# Patient Record
Sex: MALE | Race: WHITE | NOT HISPANIC OR LATINO | Employment: OTHER | URBAN - METROPOLITAN AREA
[De-identification: names, ages, dates, MRNs, and addresses within clinical notes are randomized per-mention and may not be internally consistent; named-entity substitution may affect disease eponyms.]

---

## 2017-01-13 ENCOUNTER — GENERIC CONVERSION - ENCOUNTER (OUTPATIENT)
Dept: OTHER | Facility: OTHER | Age: 47
End: 2017-01-13

## 2017-03-09 ENCOUNTER — ALLSCRIPTS OFFICE VISIT (OUTPATIENT)
Dept: OTHER | Facility: OTHER | Age: 47
End: 2017-03-09

## 2017-03-21 ENCOUNTER — ALLSCRIPTS OFFICE VISIT (OUTPATIENT)
Dept: OTHER | Facility: OTHER | Age: 47
End: 2017-03-21

## 2017-07-25 ENCOUNTER — ALLSCRIPTS OFFICE VISIT (OUTPATIENT)
Dept: OTHER | Facility: OTHER | Age: 47
End: 2017-07-25

## 2017-09-29 ENCOUNTER — GENERIC CONVERSION - ENCOUNTER (OUTPATIENT)
Dept: OTHER | Facility: OTHER | Age: 47
End: 2017-09-29

## 2017-09-29 DIAGNOSIS — E78.5 HYPERLIPIDEMIA: ICD-10-CM

## 2017-09-29 DIAGNOSIS — F32.9 MAJOR DEPRESSIVE DISORDER, SINGLE EPISODE: ICD-10-CM

## 2018-01-12 VITALS
OXYGEN SATURATION: 96 % | HEART RATE: 82 BPM | BODY MASS INDEX: 31.86 KG/M2 | HEIGHT: 67 IN | WEIGHT: 203 LBS | SYSTOLIC BLOOD PRESSURE: 118 MMHG | DIASTOLIC BLOOD PRESSURE: 82 MMHG

## 2018-01-12 NOTE — MISCELLANEOUS
Provider Comments  Provider Comments:   PT NO SHOWED      Signatures   Electronically signed by : ROMEO Logan;  Apr 20 2016 10:26AM EST                       (Author)    Electronically signed by : Kojo Meadows MD; Apr 20 2016  5:01PM EST

## 2018-01-14 VITALS
WEIGHT: 201 LBS | HEART RATE: 80 BPM | BODY MASS INDEX: 31.55 KG/M2 | OXYGEN SATURATION: 97 % | DIASTOLIC BLOOD PRESSURE: 88 MMHG | HEIGHT: 67 IN | SYSTOLIC BLOOD PRESSURE: 132 MMHG

## 2018-01-17 NOTE — PROCEDURES
Results/Data    Procedure: Electromyogram and Nerve Conduction Study  Indication: Left Upper Extremity   Referred by Dr Latia Foster  The procedure's were discussed with the patient  Written consent was obtained prior to the procedure and is detailed in the patient's record  Prior to the start of the procedure a time out was taken and the identity of the patient was confirmed via name and date of birth with the patient  The correct site and the procedure to be performed were confirmed  The correct side was confirmed if applicable  The positioning of the patient was verified  The availability of the correct equipment was verified  Procedure Start Time: 9:30    Technique: A sterile concentric needle electrode was used  The patient tolerated the procedure well  There were no complications  Results EMG LEFT UPPER EXTREMITY    Motor and sensory conduction studies were performed on the left median and ulnar nerves  The distal motor latencies were normal  The motor action potential amplitudes were normal  Motor conduction velocities were normal including conduction of the ulnar nerve across the elbow  The left median and ulnar F waves were normal     Left median and ulnar sensory latencies were normal including median palmar stimulation with normal sensory action potential amplitudes  Concentric needle EMG was performed in various distal and proximal muscles of the left upper extremity including APB, FDI, EDC, brachial radialis, biceps, triceps in the low cervical paraspinal region  There was no evidence of spontaneous activity seen  The compound motor unit potentials were of normal configuration and interference patterns were full or full for effort    IMPRESSION: This is a normal EMG of the left upper extremity    BUD Coleman        Signatures   Electronically signed by : Mai Gonzalez MD; Mar  9 2017 10:14AM EST                       (Author)

## 2018-01-22 VITALS
SYSTOLIC BLOOD PRESSURE: 128 MMHG | BODY MASS INDEX: 30.13 KG/M2 | WEIGHT: 192 LBS | DIASTOLIC BLOOD PRESSURE: 80 MMHG | HEART RATE: 86 BPM | HEIGHT: 67 IN | OXYGEN SATURATION: 98 %

## 2018-05-06 DIAGNOSIS — F32.A DEPRESSION, UNSPECIFIED DEPRESSION TYPE: Primary | ICD-10-CM

## 2018-05-07 RX ORDER — VENLAFAXINE HYDROCHLORIDE 150 MG/1
CAPSULE, EXTENDED RELEASE ORAL
Qty: 30 CAPSULE | Refills: 0 | Status: SHIPPED | OUTPATIENT
Start: 2018-05-07 | End: 2018-06-08 | Stop reason: SDUPTHER

## 2018-05-30 ENCOUNTER — OFFICE VISIT (OUTPATIENT)
Dept: INTERNAL MEDICINE CLINIC | Facility: CLINIC | Age: 48
End: 2018-05-30
Payer: COMMERCIAL

## 2018-05-30 VITALS
BODY MASS INDEX: 31.23 KG/M2 | WEIGHT: 199 LBS | DIASTOLIC BLOOD PRESSURE: 78 MMHG | HEART RATE: 80 BPM | SYSTOLIC BLOOD PRESSURE: 140 MMHG | OXYGEN SATURATION: 98 % | HEIGHT: 67 IN | RESPIRATION RATE: 16 BRPM

## 2018-05-30 DIAGNOSIS — E78.2 MIXED HYPERLIPIDEMIA: ICD-10-CM

## 2018-05-30 DIAGNOSIS — F32.A DEPRESSION, UNSPECIFIED DEPRESSION TYPE: Primary | ICD-10-CM

## 2018-05-30 PROBLEM — M99.01 CERVICAL SOMATIC DYSFUNCTION: Status: ACTIVE | Noted: 2018-05-30

## 2018-05-30 PROCEDURE — 99213 OFFICE O/P EST LOW 20 MIN: CPT | Performed by: PHYSICIAN ASSISTANT

## 2018-05-30 RX ORDER — SIMVASTATIN 10 MG
TABLET ORAL
COMMUNITY
End: 2018-09-20 | Stop reason: SDUPTHER

## 2018-05-30 RX ORDER — OXYCODONE AND ACETAMINOPHEN 10; 325 MG/1; MG/1
TABLET ORAL
Refills: 0 | COMMUNITY
Start: 2018-05-17 | End: 2019-07-19 | Stop reason: ALTCHOICE

## 2018-05-30 NOTE — PROGRESS NOTES
Assessment/Plan:   Patient Instructions   As patient to have his previously ordered labs done, and we will discuss when available  Meantime continue current medications  Schedule follow-up 6 months, sooner as needed  Followup scheduled per orders  Diagnoses and all orders for this visit:    Depression, unspecified depression type    Mixed hyperlipidemia    Other orders  -     simvastatin (ZOCOR) 10 mg tablet; simvastatin 10 mg tablet  -     oxyCODONE-acetaminophen (PERCOCET)  mg per tablet; TAKE 1 TABLET BY MOUTH 3 TIMES A DAY AS NEEDED **NEEDS P/A  MD FAXED  PT AWARE**          Subjective:      Patient ID: Koby Jerry is a 49 y o  male  Follow-up    Hyperlipidemia:  Patient did not have his labs done  He is telling me that he is taking his simvastatin, but on the same breath states he has a lot left over  He denies any side effects from the medication  Depression:  He is on venlafaxine and feels that it is effective  He got through the winter months doing well without any dysthymic episodes  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  Denies any other issues at this time  ALLERGIES:  Allergies   Allergen Reactions    Celecoxib Anaphylaxis    Diclofenac Shortness Of Breath       CURRENT MEDICATIONS:    Current Outpatient Prescriptions:     oxyCODONE-acetaminophen (PERCOCET)  mg per tablet, TAKE 1 TABLET BY MOUTH 3 TIMES A DAY AS NEEDED **NEEDS P/A  MD FAXED   PT AWARE**, Disp: , Rfl: 0    simvastatin (ZOCOR) 10 mg tablet, simvastatin 10 mg tablet, Disp: , Rfl:     venlafaxine (EFFEXOR-XR) 150 mg 24 hr capsule, TAKE ONE CAPSULE BY MOUTH EVERY DAY WITH FOOD, Disp: 30 capsule, Rfl: 0    ACTIVE PROBLEM LIST:  Patient Active Problem List   Diagnosis    Cervical somatic dysfunction    Depression    Disc degeneration, lumbar    Hyperlipidemia       PAST MEDICAL HISTORY:  Past Medical History:   Diagnosis Date    Disc degeneration, lumbar     Diverticulosis     Facet syndrome (HCC)     Other muscle spasm     Parapsoriasis     Psoriasis     Sacroiliitis (HCC)     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       PAST SURGICAL HISTORY:  Past Surgical History:   Procedure Laterality Date    HERNIA REPAIR      PYLOROMYOTOMY      ROTATOR CUFF REPAIR Left     SPINAL FUSION      EXPLORATION    TONSILLECTOMY AND ADENOIDECTOMY      TOOTH EXTRACTION         FAMILY HISTORY:  Family History   Problem Relation Age of Onset    Cancer Mother     Lung cancer Mother      CARCINOID TUMOR    Hypertension Mother     Hypothyroidism Mother     Kidney cancer Mother     Diabetes Maternal Grandmother     Alzheimer's disease Maternal Grandfather     Coronary artery disease Maternal Grandfather     Other Maternal Grandfather      PACEMAKER       SOCIAL HISTORY:  Social History     Social History    Marital status: /Civil Union     Spouse name: N/A    Number of children: 1    Years of education: N/A     Occupational History    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     Social History Main Topics    Smoking status: Former Smoker    Smokeless tobacco: Not on file    Alcohol use Yes      Comment: BEER - DESCRIBES AS INFREQUENT    Drug use: No    Sexual activity: Not on file     Other Topics Concern    Not on file     Social History Narrative    LIVING INDEPENDENTLY WITH SPOUSE    ONE SON       Review of Systems   Constitutional: Negative for activity change, chills, fatigue and fever  HENT: Negative for congestion  Eyes: Negative for discharge  Respiratory: Negative for cough, chest tightness and shortness of breath  Cardiovascular: Negative for chest pain, palpitations and leg swelling  Gastrointestinal: Negative for abdominal pain  Genitourinary: Negative for difficulty urinating  Musculoskeletal: Positive for back pain, neck pain and neck stiffness  Negative for arthralgias and myalgias  Skin: Negative for rash  Allergic/Immunologic: Negative for immunocompromised state  Neurological: Negative for dizziness, syncope, weakness, light-headedness and headaches  Hematological: Negative for adenopathy  Does not bruise/bleed easily  Psychiatric/Behavioral: Negative for dysphoric mood  The patient is not nervous/anxious  Objective:  Vitals:    05/30/18 1659   BP: 140/78   BP Location: Left arm   Patient Position: Sitting   Cuff Size: Adult   Pulse: 80   Resp: 16   SpO2: 98%   Weight: 90 3 kg (199 lb)   Height: 5' 7" (1 702 m)        Physical Exam   Constitutional: He is oriented to person, place, and time  He appears well-developed and well-nourished  No distress  Neck: Neck supple  Carotid bruit is not present  Cardiovascular: Normal rate, regular rhythm and normal heart sounds  Pulmonary/Chest: Effort normal and breath sounds normal    Musculoskeletal: He exhibits no edema  Lymphadenopathy:     He has no cervical adenopathy  Neurological: He is alert and oriented to person, place, and time  Skin: Skin is warm and dry  No rash noted  Psychiatric: He has a normal mood and affect  His behavior is normal    Nursing note and vitals reviewed  RESULTS:    No results found for this or any previous visit (from the past 1008 hour(s))  This note was created with voice recognition software  Phonic, grammatical and spelling errors may be present within the note as a result

## 2018-05-30 NOTE — PATIENT INSTRUCTIONS
As patient to have his previously ordered labs done, and we will discuss when available  Meantime continue current medications  Schedule follow-up 6 months, sooner as needed

## 2018-06-08 DIAGNOSIS — F32.A DEPRESSION, UNSPECIFIED DEPRESSION TYPE: ICD-10-CM

## 2018-06-08 RX ORDER — VENLAFAXINE HYDROCHLORIDE 150 MG/1
CAPSULE, EXTENDED RELEASE ORAL
Qty: 30 CAPSULE | Refills: 5 | Status: SHIPPED | OUTPATIENT
Start: 2018-06-08 | End: 2018-10-12 | Stop reason: ALTCHOICE

## 2018-07-17 LAB
ALBUMIN SERPL-MCNC: 4.4 G/DL (ref 3.6–5.1)
ALBUMIN/GLOB SERPL: 2.2 (CALC) (ref 1–2.5)
ALP SERPL-CCNC: 69 U/L (ref 40–115)
ALT SERPL-CCNC: 20 U/L (ref 9–46)
AST SERPL-CCNC: 18 U/L (ref 10–40)
BASOPHILS # BLD AUTO: 57 CELLS/UL (ref 0–200)
BASOPHILS NFR BLD AUTO: 1 %
BILIRUB SERPL-MCNC: 0.4 MG/DL (ref 0.2–1.2)
BUN SERPL-MCNC: 12 MG/DL (ref 7–25)
BUN/CREAT SERPL: NORMAL (CALC) (ref 6–22)
CALCIUM SERPL-MCNC: 9.4 MG/DL (ref 8.6–10.3)
CHLORIDE SERPL-SCNC: 107 MMOL/L (ref 98–110)
CHOLEST SERPL-MCNC: 188 MG/DL
CHOLEST/HDLC SERPL: 4.8 (CALC)
CO2 SERPL-SCNC: 26 MMOL/L (ref 20–31)
CREAT SERPL-MCNC: 0.87 MG/DL (ref 0.6–1.35)
EOSINOPHIL # BLD AUTO: 450 CELLS/UL (ref 15–500)
EOSINOPHIL NFR BLD AUTO: 7.9 %
ERYTHROCYTE [DISTWIDTH] IN BLOOD BY AUTOMATED COUNT: 12.8 % (ref 11–15)
GLOBULIN SER CALC-MCNC: 2 G/DL (CALC) (ref 1.9–3.7)
GLUCOSE SERPL-MCNC: 97 MG/DL (ref 65–99)
HCT VFR BLD AUTO: 42 % (ref 38.5–50)
HDLC SERPL-MCNC: 39 MG/DL
HGB BLD-MCNC: 14.5 G/DL (ref 13.2–17.1)
LDLC SERPL CALC-MCNC: 113 MG/DL (CALC)
LYMPHOCYTES # BLD AUTO: 2177 CELLS/UL (ref 850–3900)
LYMPHOCYTES NFR BLD AUTO: 38.2 %
MCH RBC QN AUTO: 31 PG (ref 27–33)
MCHC RBC AUTO-ENTMCNC: 34.5 G/DL (ref 32–36)
MCV RBC AUTO: 89.7 FL (ref 80–100)
MONOCYTES # BLD AUTO: 319 CELLS/UL (ref 200–950)
MONOCYTES NFR BLD AUTO: 5.6 %
NEUTROPHILS # BLD AUTO: 2696 CELLS/UL (ref 1500–7800)
NEUTROPHILS NFR BLD AUTO: 47.3 %
NONHDLC SERPL-MCNC: 149 MG/DL (CALC)
PLATELET # BLD AUTO: 301 THOUSAND/UL (ref 140–400)
PMV BLD REES-ECKER: 10 FL (ref 7.5–12.5)
POTASSIUM SERPL-SCNC: 4.1 MMOL/L (ref 3.5–5.3)
PROT SERPL-MCNC: 6.4 G/DL (ref 6.1–8.1)
RBC # BLD AUTO: 4.68 MILLION/UL (ref 4.2–5.8)
SL AMB EGFR AFRICAN AMERICAN: 118 ML/MIN/1.73M2
SL AMB EGFR NON AFRICAN AMERICAN: 102 ML/MIN/1.73M2
SODIUM SERPL-SCNC: 142 MMOL/L (ref 135–146)
TRIGL SERPL-MCNC: 236 MG/DL
WBC # BLD AUTO: 5.7 THOUSAND/UL (ref 3.8–10.8)

## 2018-07-26 ENCOUNTER — OFFICE VISIT (OUTPATIENT)
Dept: INTERNAL MEDICINE CLINIC | Facility: CLINIC | Age: 48
End: 2018-07-26
Payer: COMMERCIAL

## 2018-07-26 VITALS
OXYGEN SATURATION: 18 % | HEIGHT: 67 IN | RESPIRATION RATE: 20 BRPM | WEIGHT: 198.4 LBS | DIASTOLIC BLOOD PRESSURE: 82 MMHG | TEMPERATURE: 97.4 F | HEART RATE: 68 BPM | BODY MASS INDEX: 31.14 KG/M2 | SYSTOLIC BLOOD PRESSURE: 136 MMHG

## 2018-07-26 DIAGNOSIS — J20.9 ACUTE BRONCHITIS WITH BRONCHOSPASM: Primary | ICD-10-CM

## 2018-07-26 PROCEDURE — 99213 OFFICE O/P EST LOW 20 MIN: CPT | Performed by: INTERNAL MEDICINE

## 2018-07-26 PROCEDURE — 3008F BODY MASS INDEX DOCD: CPT | Performed by: INTERNAL MEDICINE

## 2018-07-26 RX ORDER — ALBUTEROL SULFATE 90 UG/1
2 AEROSOL, METERED RESPIRATORY (INHALATION) EVERY 6 HOURS PRN
Qty: 8.5 G | Refills: 0 | Status: SHIPPED | OUTPATIENT
Start: 2018-07-26 | End: 2019-02-25 | Stop reason: ALTCHOICE

## 2018-07-26 RX ORDER — AZITHROMYCIN 250 MG/1
TABLET, FILM COATED ORAL
Qty: 6 TABLET | Refills: 0 | Status: SHIPPED | OUTPATIENT
Start: 2018-07-26 | End: 2018-07-30

## 2018-07-26 NOTE — PROGRESS NOTES
Assessment/Plan:      Failed conservative outpatient measures  Will add antibiotics and since the inhaler did help, will send him a new 1  Return if symptoms worsen or fail to improve  No problem-specific Assessment & Plan notes found for this encounter  Diagnoses and all orders for this visit:    Acute bronchitis with bronchospasm  -     azithromycin (ZITHROMAX) 250 mg tablet; 2 tabs today then 1 tab daily for 4 days  -     albuterol (PROAIR HFA) 90 mcg/act inhaler; Inhale 2 puffs every 6 (six) hours as needed for wheezing          Subjective:      Patient ID: Sonja Patricio is a 49 y o  male  Patient comes in today complaining of 7-10 days worsening cough and chest congestion  No definite fevers  Cough is becoming more productive  He tried some over-the-counter remedies with minimal relief  He did note some wheezing  He did try an old albuterol inhaler which seemed to help somewhat  No ill contacts  ALLERGIES:  Allergies   Allergen Reactions    Celecoxib Anaphylaxis    Diclofenac Shortness Of Breath       CURRENT MEDICATIONS:    Current Outpatient Prescriptions:     albuterol (PROAIR HFA) 90 mcg/act inhaler, Inhale 2 puffs every 6 (six) hours as needed for wheezing, Disp: 8 5 g, Rfl: 0    azithromycin (ZITHROMAX) 250 mg tablet, 2 tabs today then 1 tab daily for 4 days, Disp: 6 tablet, Rfl: 0    oxyCODONE-acetaminophen (PERCOCET)  mg per tablet, TAKE 1 TABLET BY MOUTH 3 TIMES A DAY AS NEEDED **NEEDS P/A  MD FAXED   PT AWARE**, Disp: , Rfl: 0    simvastatin (ZOCOR) 10 mg tablet, simvastatin 10 mg tablet, Disp: , Rfl:     venlafaxine (EFFEXOR-XR) 150 mg 24 hr capsule, TAKE ONE CAPSULE BY MOUTH EVERY DAY WITH FOOD, Disp: 30 capsule, Rfl: 5    ACTIVE PROBLEM LIST:  Patient Active Problem List   Diagnosis    Cervical somatic dysfunction    Depression    Disc degeneration, lumbar    Hyperlipidemia       PAST MEDICAL HISTORY:  Past Medical History:   Diagnosis Date    Disc degeneration, lumbar     Diverticulosis     Facet syndrome (HCC)     Other muscle spasm     Parapsoriasis     Psoriasis     Sacroiliitis (HCC)     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       PAST SURGICAL HISTORY:  Past Surgical History:   Procedure Laterality Date    HERNIA REPAIR      PYLOROMYOTOMY      ROTATOR CUFF REPAIR Left     SPINAL FUSION      EXPLORATION    TONSILLECTOMY AND ADENOIDECTOMY      TOOTH EXTRACTION         FAMILY HISTORY:  Family History   Problem Relation Age of Onset    Cancer Mother     Lung cancer Mother         CARCINOID TUMOR    Hypertension Mother     Hypothyroidism Mother     Kidney cancer Mother     Diabetes Maternal Grandmother     Alzheimer's disease Maternal Grandfather     Coronary artery disease Maternal Grandfather     Other Maternal Grandfather         PACEMAKER       SOCIAL HISTORY:  Social History     Social History    Marital status: /Civil Union     Spouse name: N/A    Number of children: 1    Years of education: N/A     Occupational History    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     Social History Main Topics    Smoking status: Former Smoker    Smokeless tobacco: Not on file    Alcohol use Yes      Comment: BEER - DESCRIBES AS INFREQUENT    Drug use: No    Sexual activity: Not on file     Other Topics Concern    Not on file     Social History Narrative    LIVING INDEPENDENTLY WITH SPOUSE    ONE SON       Review of Systems   Constitutional: Negative for fever  HENT: Negative for sinus pain and sinus pressure  Respiratory: Positive for cough, chest tightness and wheezing  Cardiovascular: Negative for chest pain  Objective:  Vitals:    07/26/18 1409   BP: 136/82   Resp: 20   Temp: (!) 97 4 °F (36 3 °C)   TempSrc: Temporal   Weight: 90 kg (198 lb 6 4 oz)   Height: 5' 7" (1 702 m)        Physical Exam   Constitutional: He is oriented to person, place, and time   He appears well-developed and well-nourished  HENT:   Right Ear: External ear normal    Left Ear: External ear normal    Nose: Nose normal    Mouth/Throat: Oropharynx is clear and moist  No oropharyngeal exudate  Eyes: Conjunctivae are normal    Cardiovascular: Normal rate, regular rhythm and normal heart sounds  Pulmonary/Chest: Effort normal  No respiratory distress  He has wheezes  He has no rales  Lymphadenopathy:     He has no cervical adenopathy  Neurological: He is alert and oriented to person, place, and time  Skin: He is not diaphoretic  Nursing note and vitals reviewed          RESULTS:    Recent Results (from the past 1008 hour(s))   Lipid panel    Collection Time: 07/16/18  9:45 AM   Result Value Ref Range    Total Cholesterol 188 <200 mg/dL    HDL 39 (L) >40 mg/dL    Triglycerides 236 (H) <150 mg/dL    LDL Direct 113 (H) mg/dL (calc)    Chol HDLC Ratio 4 8 <5 0 (calc)    Non-HDL Cholesterol 149 (H) <130 mg/dL (calc)   Comprehensive metabolic panel    Collection Time: 07/16/18  9:45 AM   Result Value Ref Range    SL AMB GLUCOSE 97 65 - 99 mg/dL    BUN 12 7 - 25 mg/dL    Creatinine, Serum 0 87 0 60 - 1 35 mg/dL    eGFR Non  102 > OR = 60 mL/min/1 73m2    SL AMB EGFR  118 > OR = 60 mL/min/1 73m2    SL AMB BUN/CREATININE RATIO NOT APPLICABLE 6 - 22 (calc)    SL AMB SODIUM 142 135 - 146 mmol/L    SL AMB POTASSIUM 4 1 3 5 - 5 3 mmol/L    SL AMB CHLORIDE 107 98 - 110 mmol/L    SL AMB CARBON DIOXIDE 26 20 - 31 mmol/L    SL AMB CALCIUM 9 4 8 6 - 10 3 mg/dL    SL AMB PROTEIN, TOTAL 6 4 6 1 - 8 1 g/dL    Serum Albumin 4 4 3 6 - 5 1 g/dL    SL AMB GLOBULIN 2 0 1 9 - 3 7 g/dL (calc)    SL AMB ALBUMIN/GLOBULIN RATIO 2 2 1 0 - 2 5 (calc)    SL AMB BILIRUBIN, TOTAL 0 4 0 2 - 1 2 mg/dL    SL AMB ALKALINE PHOSPHATASE 69 40 - 115 U/L    SL AMB AST 18 10 - 40 U/L    SL AMB ALT 20 9 - 46 U/L   CBC and differential    Collection Time: 07/16/18  9:45 AM   Result Value Ref Range    SL AMB LAB WHITE BLOOD CELL COUNT 5 7 3 8 - 10 8 Thousand/uL    SL AMB LAB RED BLOOD CELLS 4 68 4 20 - 5 80 Million/uL    Hemoglobin 14 5 13 2 - 17 1 g/dL    Hematocrit 42 0 38 5 - 50 0 %    MCV 89 7 80 0 - 100 0 fL    MCH 31 0 27 0 - 33 0 pg    MCHC 34 5 32 0 - 36 0 g/dL    RDW 12 8 11 0 - 15 0 %    Platelet Count 172 711 - 400 Thousand/uL    SL AMB MPV 10 0 7 5 - 12 5 fL    Neutrophils (Absolute) 2,696 1,500 - 7,800 cells/uL    Lymphocytes (Absolute) 2,177 850 - 3,900 cells/uL    Monocytes (Absolute) 319 200 - 950 cells/uL    Eosinophils (Absolute) 450 15 - 500 cells/uL    Basophils (Absolute) 57 0 - 200 cells/uL    Neutrophils 47 3 %    Lymphocytes 38 2 %    Monocytes 5 6 %    Eosinophils 7 9 %    Basophils 1 0 %       This note was created with voice recognition software  Phonic, grammatical and spelling errors may be present within the note as a result

## 2018-09-20 DIAGNOSIS — E78.2 MIXED HYPERLIPIDEMIA: Primary | ICD-10-CM

## 2018-09-20 RX ORDER — SIMVASTATIN 10 MG
TABLET ORAL
Qty: 90 TABLET | Refills: 3 | Status: SHIPPED | OUTPATIENT
Start: 2018-09-20 | End: 2019-03-13

## 2018-10-12 ENCOUNTER — OFFICE VISIT (OUTPATIENT)
Dept: INTERNAL MEDICINE CLINIC | Facility: CLINIC | Age: 48
End: 2018-10-12
Payer: COMMERCIAL

## 2018-10-12 VITALS
BODY MASS INDEX: 30.35 KG/M2 | OXYGEN SATURATION: 96 % | RESPIRATION RATE: 17 BRPM | WEIGHT: 193.4 LBS | HEIGHT: 67 IN | TEMPERATURE: 98.1 F | SYSTOLIC BLOOD PRESSURE: 124 MMHG | DIASTOLIC BLOOD PRESSURE: 82 MMHG | HEART RATE: 80 BPM

## 2018-10-12 DIAGNOSIS — M79.645 PAIN OF FINGER OF LEFT HAND: ICD-10-CM

## 2018-10-12 DIAGNOSIS — Z23 NEED FOR INFLUENZA VACCINATION: ICD-10-CM

## 2018-10-12 DIAGNOSIS — F32.A DEPRESSION, UNSPECIFIED DEPRESSION TYPE: Primary | ICD-10-CM

## 2018-10-12 PROCEDURE — 99214 OFFICE O/P EST MOD 30 MIN: CPT | Performed by: PHYSICIAN ASSISTANT

## 2018-10-12 PROCEDURE — 90682 RIV4 VACC RECOMBINANT DNA IM: CPT

## 2018-10-12 PROCEDURE — 90471 IMMUNIZATION ADMIN: CPT

## 2018-10-12 RX ORDER — BIOFLAV,LEMON/VIT BCOMP,C
1000 TABLET ORAL 3 TIMES DAILY
COMMUNITY
End: 2019-02-25 | Stop reason: ALTCHOICE

## 2018-10-12 RX ORDER — DIPHENOXYLATE HYDROCHLORIDE AND ATROPINE SULFATE 2.5; .025 MG/1; MG/1
1 TABLET ORAL DAILY
COMMUNITY

## 2018-10-12 RX ORDER — VENLAFAXINE HYDROCHLORIDE 225 MG/1
225 TABLET, EXTENDED RELEASE ORAL
Qty: 30 TABLET | Refills: 5 | Status: SHIPPED | OUTPATIENT
Start: 2018-10-12 | End: 2019-04-08 | Stop reason: SDUPTHER

## 2018-10-12 NOTE — PROGRESS NOTES
Assessment/Plan:   Patient Instructions   Will increase venlafaxine to 225 mg daily  Will schedule patient to see psychologist   Names given to patient's wife to research whether not there covered under her insurance policy  Schedule follow-up with me in 3 weeks  Sooner as needed  Total time spent was greater than 25 minutes face to face of which greater than 50 percent was  for counseling and coordination of care         Return in about 3 weeks (around 11/2/2018) for Next scheduled follow up  Diagnoses and all orders for this visit:    Depression, unspecified depression type  -     venlafaxine 225 MG TB24; Take 1 tablet (225 mg total) by mouth daily with breakfast  -     Ambulatory referral to Psychology; Future    Need for influenza vaccination  -     influenza vaccine, 8313-6524, quadrivalent, recombinant, PF, 0 5 mL, for patients 18 yr+ (FLUBLOK)    Other orders  -     multivitamin (THERAGRAN) TABS; Take 1 tablet by mouth daily  -     Vitamins-Lipotropics (LIPOFLAVOVIT) TABS; Take 1,000 tablets by mouth 3 (three) times a day            Subjective:      Patient ID: Khang Fiore is a 49 y o  male  Acute visit/follow-up    Patient brought in by his wife today in that she is noted psychological decompensation over the past week  She states he is barely gotten out of bed for the past week  He admits this behavior is true  He states he has been progressively feeling more depressed since the summer months  He is on an antidepressant and is taking it on a regular basis  He however was able to discuss a significant psychological stressor that started when he was 14years of age  He states it revolved around [place] the beach  At this time he was not willing to reveal any other circumstances, but when discussing even to this part he became tearful and upset  He remark that he tried to tell his mother about the incident but she did not believe it  He is willing to start counseling    And was willing to try an increased dose of his medication to see if we can boost any affect  He denies any suicidal thoughts  Pain left 2nd finger  He states during the summer months he was trying to teach his son a bike trick, when he landed the bite his left hand slipped off the handle bar and jammed on another part of the bite  At that time he had significant swelling and discoloration  Since then it has gone down in size but patient states it still is painful when he goes to lift objects or extend the finger  Labs that have been done in July were reviewed with the patient and his wife  Lipids slightly elevated  ALLERGIES:  Allergies   Allergen Reactions    Celecoxib Anaphylaxis    Diclofenac Shortness Of Breath       CURRENT MEDICATIONS:    Current Outpatient Prescriptions:     albuterol (PROAIR HFA) 90 mcg/act inhaler, Inhale 2 puffs every 6 (six) hours as needed for wheezing, Disp: 8 5 g, Rfl: 0    multivitamin (THERAGRAN) TABS, Take 1 tablet by mouth daily, Disp: , Rfl:     oxyCODONE-acetaminophen (PERCOCET)  mg per tablet, TAKE 1 TABLET BY MOUTH 3 TIMES A DAY AS NEEDED **NEEDS P/A  MD FAXED   PT AWARE**, Disp: , Rfl: 0    simvastatin (ZOCOR) 10 mg tablet, TAKE 1 TABLET AT BEDTIME, Disp: 90 tablet, Rfl: 3    Vitamins-Lipotropics (LIPOFLAVOVIT) TABS, Take 1,000 tablets by mouth 3 (three) times a day  , Disp: , Rfl:     venlafaxine 225 MG TB24, Take 1 tablet (225 mg total) by mouth daily with breakfast, Disp: 30 tablet, Rfl: 5    ACTIVE PROBLEM LIST:  Patient Active Problem List   Diagnosis    Cervical somatic dysfunction    Depression    Disc degeneration, lumbar    Hyperlipidemia       PAST MEDICAL HISTORY:  Past Medical History:   Diagnosis Date    Disc degeneration, lumbar     Diverticulosis     Facet syndrome (HCC)     Other muscle spasm     Parapsoriasis     Psoriasis     Sacroiliitis (HCC)     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       PAST SURGICAL HISTORY:  Past Surgical History:   Procedure Laterality Date    HERNIA REPAIR      PYLOROMYOTOMY      ROTATOR CUFF REPAIR Left     SPINAL FUSION      EXPLORATION    TONSILLECTOMY AND ADENOIDECTOMY      TOOTH EXTRACTION         FAMILY HISTORY:  Family History   Problem Relation Age of Onset    Cancer Mother     Lung cancer Mother         CARCINOID TUMOR    Hypertension Mother     Hypothyroidism Mother     Kidney cancer Mother     Diabetes Maternal Grandmother     Alzheimer's disease Maternal Grandfather     Coronary artery disease Maternal Grandfather     Other Maternal Grandfather         PACEMAKER       SOCIAL HISTORY:  Social History     Social History    Marital status: /Civil Union     Spouse name: N/A    Number of children: 1    Years of education: N/A     Occupational History    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     Social History Main Topics    Smoking status: Former Smoker    Smokeless tobacco: Not on file    Alcohol use Yes      Comment: BEER - DESCRIBES AS INFREQUENT    Drug use: No    Sexual activity: Not on file     Other Topics Concern    Not on file     Social History Narrative    LIVING INDEPENDENTLY WITH SPOUSE    ONE SON       Review of Systems   Constitutional: Negative for activity change, chills, fatigue and fever  HENT: Negative for congestion  Eyes: Negative for discharge  Respiratory: Negative for cough, chest tightness and shortness of breath  Cardiovascular: Negative for chest pain, palpitations and leg swelling  Gastrointestinal: Negative for abdominal pain  Genitourinary: Negative for difficulty urinating  Musculoskeletal: Positive for arthralgias  Negative for myalgias  Skin: Negative for rash  Allergic/Immunologic: Negative for immunocompromised state  Neurological: Negative for dizziness, syncope, weakness, light-headedness and headaches  Hematological: Negative for adenopathy   Does not bruise/bleed easily  Psychiatric/Behavioral: Positive for decreased concentration and dysphoric mood  Negative for self-injury, sleep disturbance and suicidal ideas  The patient is not nervous/anxious  Objective:  Vitals:    10/12/18 1541   BP: 124/82   BP Location: Left arm   Patient Position: Sitting   Cuff Size: Adult   Pulse: 80   Resp: 17   Temp: 98 1 °F (36 7 °C)   TempSrc: Temporal   SpO2: 96%   Weight: 87 7 kg (193 lb 6 4 oz)   Height: 5' 7" (1 702 m)        Physical Exam   Constitutional: He is oriented to person, place, and time  He appears well-developed and well-nourished  No distress  Neck: Neck supple  No JVD present  Carotid bruit is not present  Erythema present  Cardiovascular: Normal rate, regular rhythm and normal heart sounds  Pulmonary/Chest: Effort normal and breath sounds normal    Musculoskeletal: He exhibits no edema  No appreciable palpable tenderness of his left 2nd finger or thumb  Patient did complain of tenderness on extension of the 2nd finger  No instability demonstrated  Lymphadenopathy:     He has no cervical adenopathy  Neurological: He is alert and oriented to person, place, and time  Skin: Skin is warm and dry  No rash noted  Psychiatric: He has a normal mood and affect  His behavior is normal    Patient demonstrated normal affect and range of affect  Behavior was appropriate, no difficulty and thought content  Normal judgment  Nursing note and vitals reviewed  RESULTS:    No results found for this or any previous visit (from the past 1008 hour(s))  This note was created with voice recognition software  Phonic, grammatical and spelling errors may be present within the note as a result

## 2018-10-12 NOTE — PATIENT INSTRUCTIONS
Will increase venlafaxine to 225 mg daily  Will schedule patient to see psychologist   Names given to patient's wife to research whether not there covered under her insurance policy  Schedule follow-up with me in 3 weeks  Sooner as needed

## 2018-10-24 ENCOUNTER — OFFICE VISIT (OUTPATIENT)
Dept: BEHAVIORAL/MENTAL HEALTH CLINIC | Facility: CLINIC | Age: 48
End: 2018-10-24
Payer: COMMERCIAL

## 2018-10-24 DIAGNOSIS — F33.42 MAJOR DEPRESSIVE DISORDER, RECURRENT, IN FULL REMISSION (HCC): Primary | ICD-10-CM

## 2018-10-24 PROCEDURE — 90832 PSYTX W PT 30 MINUTES: CPT | Performed by: SOCIAL WORKER

## 2018-10-24 NOTE — PSYCH
Assessment/Plan:      There are no diagnoses linked to this encounter  Subjective:     Patient ID: Sidra Lopez is a 51 y o  male  Provider met with patient for a 30 minute follow up appointment  Patient shared that there have been no changes  He shared that he did schedule an appointment with a therapist in [place] had recommended for him and he has an appointment on 11/1/2018  Patient did identify that he sleeps in order to deal with racing thoughts  He shared that when he has racing thoughts his pulse increases  Discussed patient implementing Deep Breathing techniques to help him reduce his anxiety and also discussed patient trying to reduce his use of sleep as a way to escape from what he is feeling  Encouraged patient to implement Deep Breathing to assist with this  Patient agreed that he would try  Discussed patient following up with provider as needed  Patient is agreeable  Review of Systems   Psychiatric/Behavioral: Positive for dysphoric mood  The patient is nervous/anxious  Objective:     Physical Exam   Psychiatric: He has a normal mood and affect  His behavior is normal  Judgment and thought content normal    Patient reports no SI or HI at this visit

## 2018-10-31 ENCOUNTER — OFFICE VISIT (OUTPATIENT)
Dept: INTERNAL MEDICINE CLINIC | Facility: CLINIC | Age: 48
End: 2018-10-31
Payer: COMMERCIAL

## 2018-10-31 VITALS
SYSTOLIC BLOOD PRESSURE: 130 MMHG | HEIGHT: 67 IN | HEART RATE: 90 BPM | BODY MASS INDEX: 30.45 KG/M2 | WEIGHT: 194 LBS | OXYGEN SATURATION: 95 % | RESPIRATION RATE: 16 BRPM | DIASTOLIC BLOOD PRESSURE: 76 MMHG

## 2018-10-31 DIAGNOSIS — F32.A DEPRESSION, UNSPECIFIED DEPRESSION TYPE: Primary | ICD-10-CM

## 2018-10-31 PROCEDURE — 3008F BODY MASS INDEX DOCD: CPT | Performed by: PHYSICIAN ASSISTANT

## 2018-10-31 PROCEDURE — 99213 OFFICE O/P EST LOW 20 MIN: CPT | Performed by: PHYSICIAN ASSISTANT

## 2018-10-31 PROCEDURE — 1036F TOBACCO NON-USER: CPT | Performed by: PHYSICIAN ASSISTANT

## 2018-10-31 NOTE — PROGRESS NOTES
Assessment/Plan:   Patient Instructions   Patient to follow with psychologist tomorrow  To call me after visit with medication recommendation  Will keep follow-up scheduled for end of November  Follow here sooner as needed  Return for Next scheduled follow up  Diagnoses and all orders for this visit:    Depression, unspecified depression type          Subjective:      Patient ID: Joel Kc is a 51 y o  male  Follow-up    Patient presents today to discuss his anti depressant medication  Patient states he has been on venlafaxine for at least 5 years with gradual increase in the dosage  He believes it is not helping at all  Patient has been seen by a psychotherapist and was told to present to have his medication changed  He will be seeing a new therapist starting tomorrow  We spent time today discussing medications  According to the patient venlafaxine is the only antidepressant he has ever tried  He has not been on any medication SSRI category  At this time he admits to occasional episodes of anxiety  He is not sleeping well with early morning awakening, his concentration is not as good as it has been, energy level is low, no interest in things he used to like to do, and admits to easily becoming irritated  He denies any suicidal thoughts  ALLERGIES:  Allergies   Allergen Reactions    Celecoxib Anaphylaxis    Diclofenac Shortness Of Breath       CURRENT MEDICATIONS:    Current Outpatient Prescriptions:     albuterol (PROAIR HFA) 90 mcg/act inhaler, Inhale 2 puffs every 6 (six) hours as needed for wheezing, Disp: 8 5 g, Rfl: 0    multivitamin (THERAGRAN) TABS, Take 1 tablet by mouth daily, Disp: , Rfl:     oxyCODONE-acetaminophen (PERCOCET)  mg per tablet, TAKE 1 TABLET BY MOUTH 3 TIMES A DAY AS NEEDED **NEEDS P/A  MD FAXED   PT AWARE**, Disp: , Rfl: 0    simvastatin (ZOCOR) 10 mg tablet, TAKE 1 TABLET AT BEDTIME, Disp: 90 tablet, Rfl: 3    venlafaxine 225 MG TB24, Take 1 tablet (225 mg total) by mouth daily with breakfast, Disp: 30 tablet, Rfl: 5    Vitamins-Lipotropics (LIPOFLAVOVIT) TABS, Take 1,000 tablets by mouth 3 (three) times a day  , Disp: , Rfl:     ACTIVE PROBLEM LIST:  Patient Active Problem List   Diagnosis    Cervical somatic dysfunction    Depression    Disc degeneration, lumbar    Hyperlipidemia    Pain of finger of left hand       PAST MEDICAL HISTORY:  Past Medical History:   Diagnosis Date    Disc degeneration, lumbar     Diverticulosis     Facet syndrome (HCC)     Other muscle spasm     Parapsoriasis     Psoriasis     Sacroiliitis (HCC)     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       PAST SURGICAL HISTORY:  Past Surgical History:   Procedure Laterality Date    HERNIA REPAIR      PYLOROMYOTOMY      ROTATOR CUFF REPAIR Left     SPINAL FUSION      EXPLORATION    TONSILLECTOMY AND ADENOIDECTOMY      TOOTH EXTRACTION         FAMILY HISTORY:  Family History   Problem Relation Age of Onset    Cancer Mother     Lung cancer Mother         CARCINOID TUMOR    Hypertension Mother     Hypothyroidism Mother     Kidney cancer Mother     Diabetes Maternal Grandmother     Alzheimer's disease Maternal Grandfather     Coronary artery disease Maternal Grandfather     Other Maternal Grandfather         PACEMAKER       SOCIAL HISTORY:  Social History     Social History    Marital status: /Civil Union     Spouse name: N/A    Number of children: 1    Years of education: N/A     Occupational History    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     Social History Main Topics    Smoking status: Former Smoker    Smokeless tobacco: Never Used    Alcohol use Yes      Comment: BEER - DESCRIBES AS INFREQUENT    Drug use: No    Sexual activity: Not on file     Other Topics Concern    Not on file     Social History Narrative    LIVING INDEPENDENTLY WITH SPOUSE    ONE SON       Review of Systems   Constitutional: Positive for fatigue  Negative for activity change, chills and fever  HENT: Negative for congestion  Eyes: Negative for discharge  Respiratory: Negative for cough, chest tightness and shortness of breath  Cardiovascular: Negative for chest pain, palpitations and leg swelling  Gastrointestinal: Negative for abdominal pain  Genitourinary: Negative for difficulty urinating  Musculoskeletal: Negative for arthralgias and myalgias  Skin: Negative for rash  Allergic/Immunologic: Negative for immunocompromised state  Neurological: Negative for dizziness, syncope, weakness, light-headedness and headaches  Hematological: Negative for adenopathy  Does not bruise/bleed easily  Psychiatric/Behavioral: Positive for confusion, decreased concentration, dysphoric mood and sleep disturbance  Negative for suicidal ideas  The patient is nervous/anxious  Objective:  Vitals:    10/31/18 1442   BP: 130/76   BP Location: Left arm   Patient Position: Sitting   Cuff Size: Adult   Pulse: 90   Resp: 16   SpO2: 95%   Weight: 88 kg (194 lb)   Height: 5' 7" (1 702 m)        Physical Exam      RESULTS:    No results found for this or any previous visit (from the past 1008 hour(s))  This note was created with voice recognition software  Phonic, grammatical and spelling errors may be present within the note as a result

## 2018-10-31 NOTE — PATIENT INSTRUCTIONS
Patient to follow with psychologist tomorrow  To call me after visit with medication recommendation  Will keep follow-up scheduled for end of November  Follow here sooner as needed

## 2018-11-06 ENCOUNTER — TELEPHONE (OUTPATIENT)
Dept: INTERNAL MEDICINE CLINIC | Facility: CLINIC | Age: 48
End: 2018-11-06

## 2018-11-06 NOTE — TELEPHONE ENCOUNTER
Pt called office stating that he seen Dr Riddhi Ferrari for psych pt states that he also recommended that he stays on the venoflaxine till further notice

## 2019-02-25 ENCOUNTER — OFFICE VISIT (OUTPATIENT)
Dept: INTERNAL MEDICINE CLINIC | Facility: CLINIC | Age: 49
End: 2019-02-25
Payer: COMMERCIAL

## 2019-02-25 VITALS
HEIGHT: 67 IN | BODY MASS INDEX: 29.03 KG/M2 | RESPIRATION RATE: 16 BRPM | DIASTOLIC BLOOD PRESSURE: 86 MMHG | OXYGEN SATURATION: 97 % | SYSTOLIC BLOOD PRESSURE: 132 MMHG | HEART RATE: 90 BPM | WEIGHT: 185 LBS

## 2019-02-25 DIAGNOSIS — M77.11 RIGHT LATERAL EPICONDYLITIS: Primary | ICD-10-CM

## 2019-02-25 DIAGNOSIS — F32.A DEPRESSION, UNSPECIFIED DEPRESSION TYPE: ICD-10-CM

## 2019-02-25 DIAGNOSIS — L30.1 DYSHYDROSIS: ICD-10-CM

## 2019-02-25 DIAGNOSIS — E78.2 MIXED HYPERLIPIDEMIA: ICD-10-CM

## 2019-02-25 DIAGNOSIS — R63.4 WEIGHT LOSS: ICD-10-CM

## 2019-02-25 PROCEDURE — 99214 OFFICE O/P EST MOD 30 MIN: CPT | Performed by: PHYSICIAN ASSISTANT

## 2019-02-25 RX ORDER — TRIAMCINOLONE ACETONIDE 5 MG/G
CREAM TOPICAL 3 TIMES DAILY
Qty: 30 G | Refills: 0 | Status: SHIPPED | OUTPATIENT
Start: 2019-02-25 | End: 2020-01-23 | Stop reason: ALTCHOICE

## 2019-02-25 NOTE — PATIENT INSTRUCTIONS
For right lateral epicondylitis, patient will try either Aleve 2 tablets twice daily with food for 5-7 days or Advil 3 tablets 3 times daily with food for 5-7 days  Can also apply the band that he has purchased as we discussed  For hand cracking will have patient use steroid cream and rub in well 2-3 times daily  Apply at the onset of the pimply type rash  For weight loss we will do some screening labs and have patient come back to review in 2 weeks

## 2019-02-25 NOTE — PROGRESS NOTES
Assessment/Plan:   Patient Instructions   For right lateral epicondylitis, patient will try either Aleve 2 tablets twice daily with food for 5-7 days or Advil 3 tablets 3 times daily with food for 5-7 days  Can also apply the band that he has purchased as we discussed  For hand cracking will have patient use steroid cream and rub in well 2-3 times daily  Apply at the onset of the pimply type rash  For weight loss we will do some screening labs and have patient come back to review in 2 weeks  BMI Counseling: Body mass index is 28 98 kg/m²  Discussed the patient's BMI with him  The BMI is above average  BMI counseling and education was provided to the patient  Nutrition recommendations include reducing portion sizes and decreasing overall calorie intake  Exercise recommendations include exercising 3-5 times per week  Return in about 2 weeks (around 3/11/2019) for Next scheduled follow up  Diagnoses and all orders for this visit:    Right lateral epicondylitis  -     Sedimentation rate, automated; Future    Dyshydrosis  Comments:  hands    Orders:  -     triamcinolone (KENALOG) 0 5 % cream; Apply topically 3 (three) times a day    Weight loss  -     CBC and differential; Future  -     Comprehensive metabolic panel; Future  -     T4, free; Future  -     TSH, 3rd generation; Future    Mixed hyperlipidemia  -     Lipid panel; Future    Depression, unspecified depression type          Subjective:      Patient ID: Chauncey Nova is a 51 y o  male  Acute visit/follow-up    Last time patient was seen here he was in significant psychological distress  He had been referred to Psychology for which he states he went to 4 sessions, did not feel he was connecting with the therapist therefore discontinued  He at this time states he is feeling better but acknowledges he should probably be seen someone  He denies any suicidal thoughts      He complains of right elbow pain that has been present for a couple of months  He at knowledge is that it hurts to pronate supinate the elbow  It also hurts to fully extend the elbow  He obtained an elbow band and placed it on the area of pain, however it sounds as if he applied the pressure to intensely, therefore cause more pain  He states he also obtained a wrist brace since his wrist was also hurting, he applied significant pressure to the wrist area and noted that the pain went away in the elbow, however the whole arm went numb  He denies any trauma to the area  It is not affecting his ADLs  He has had some patches on his fingers that initially look like little bumps that become very itchy, then progress to looking like dry skin, crack and eventually peel  Hyperlipidemia:  He is on a statin  Needs to have labs were checked  Weight loss:  He states that according to his scale since November he has lost what he believes to be 30 lb  According to our scale and weight measurements he has only lost 11 lb  He has chronic pain for which he sees a pain specialist, he has asked the pain specialist to wean him off the opioids and he is now being placed on medical marijuana  He is down to 2 oxycodone daily  He is also wondering whether not this is contributing to him feeling better and possibly some of his weight loss  He denies polyuria, polyphagia, polydipsia  Denies hair loss, sleeping difficulty, palpitations  ALLERGIES:  Allergies   Allergen Reactions    Celecoxib Anaphylaxis    Diclofenac Shortness Of Breath       CURRENT MEDICATIONS:    Current Outpatient Medications:     multivitamin (THERAGRAN) TABS, Take 1 tablet by mouth daily, Disp: , Rfl:     oxyCODONE-acetaminophen (PERCOCET)  mg per tablet, TAKE 1 TABLET BY MOUTH 3 TIMES A DAY AS NEEDED **NEEDS P/A  MD FAXED   PT AWARE**, Disp: , Rfl: 0    simvastatin (ZOCOR) 10 mg tablet, TAKE 1 TABLET AT BEDTIME, Disp: 90 tablet, Rfl: 3    venlafaxine 225 MG TB24, Take 1 tablet (225 mg total) by mouth daily with breakfast, Disp: 30 tablet, Rfl: 5    triamcinolone (KENALOG) 0 5 % cream, Apply topically 3 (three) times a day, Disp: 30 g, Rfl: 0    ACTIVE PROBLEM LIST:  Patient Active Problem List   Diagnosis    Cervical somatic dysfunction    Depression    Disc degeneration, lumbar    Hyperlipidemia    Pain of finger of left hand       PAST MEDICAL HISTORY:  Past Medical History:   Diagnosis Date    Disc degeneration, lumbar     Diverticulosis     Facet syndrome (HCC)     Other muscle spasm     Parapsoriasis     Psoriasis     Sacroiliitis (HCC)     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       PAST SURGICAL HISTORY:  Past Surgical History:   Procedure Laterality Date    HERNIA REPAIR      PYLOROMYOTOMY      ROTATOR CUFF REPAIR Left     SPINAL FUSION      EXPLORATION    TONSILLECTOMY AND ADENOIDECTOMY      TOOTH EXTRACTION         FAMILY HISTORY:  Family History   Problem Relation Age of Onset    Cancer Mother     Lung cancer Mother         CARCINOID TUMOR    Hypertension Mother     Hypothyroidism Mother     Kidney cancer Mother     Diabetes Maternal Grandmother     Alzheimer's disease Maternal Grandfather     Coronary artery disease Maternal Grandfather     Other Maternal Grandfather         PACEMAKER       SOCIAL HISTORY:  Social History     Socioeconomic History    Marital status: /Civil Union     Spouse name: Not on file    Number of children: 1    Years of education: Not on file    Highest education level: Not on file   Occupational History    Occupation: COMPUTER CONSULTING     Comment: FULL-TIME EMPLOYMENT   Social Needs    Financial resource strain: Not on file    Food insecurity:     Worry: Not on file     Inability: Not on file    Transportation needs:     Medical: Not on file     Non-medical: Not on file   Tobacco Use    Smoking status: Former Smoker    Smokeless tobacco: Never Used   Substance and Sexual Activity    Alcohol use: Yes     Comment: BEER - DESCRIBES AS INFREQUENT    Drug use: No    Sexual activity: Not on file   Lifestyle    Physical activity:     Days per week: Not on file     Minutes per session: Not on file    Stress: Not on file   Relationships    Social connections:     Talks on phone: Not on file     Gets together: Not on file     Attends Jehovah's witness service: Not on file     Active member of club or organization: Not on file     Attends meetings of clubs or organizations: Not on file     Relationship status: Not on file    Intimate partner violence:     Fear of current or ex partner: Not on file     Emotionally abused: Not on file     Physically abused: Not on file     Forced sexual activity: Not on file   Other Topics Concern    Not on file   Social History Narrative    LIVING INDEPENDENTLY WITH SPOUSE    ONE SON       Review of Systems   Constitutional: Negative for activity change, chills, fatigue and fever  HENT: Negative for congestion  Eyes: Negative for discharge  Respiratory: Negative for cough, chest tightness and shortness of breath  Cardiovascular: Negative for chest pain, palpitations and leg swelling  Gastrointestinal: Negative for abdominal pain, constipation, diarrhea, nausea and vomiting  Endocrine: Negative for polydipsia, polyphagia and polyuria  Genitourinary: Negative for difficulty urinating  Musculoskeletal: Negative for arthralgias and myalgias  Skin: Positive for rash  Allergic/Immunologic: Negative for immunocompromised state  Neurological: Negative for dizziness, syncope, weakness, light-headedness and headaches  Hematological: Negative for adenopathy  Does not bruise/bleed easily  Psychiatric/Behavioral: Negative for dysphoric mood  The patient is not nervous/anxious            Objective:  Vitals:    02/25/19 1029   BP: 132/86   Pulse: 90   Resp: 16   SpO2: 97%   Weight: 83 9 kg (185 lb)   Height: 5' 7" (1 702 m)     Body mass index is 28 98 kg/m²  Physical Exam   Constitutional: He is oriented to person, place, and time  He appears well-developed and well-nourished  Neck: Neck supple  Carotid bruit is not present  No thyromegaly present  Cardiovascular: Normal rate, regular rhythm and normal heart sounds  Pulmonary/Chest: Effort normal and breath sounds normal    Abdominal: Soft  Bowel sounds are normal  There is no hepatosplenomegaly  There is no tenderness  Musculoskeletal: He exhibits no edema  Lymphadenopathy:     He has no cervical adenopathy  Neurological: He is alert and oriented to person, place, and time  Skin: Skin is warm and dry  There is dry cracking patches on 1 finger of both hands  Left 5th finger and right 2nd finger  No secondary signs of infection  No neurovascular compromise  Psychiatric: He has a normal mood and affect  His behavior is normal    Nursing note and vitals reviewed  RESULTS:    No results found for this or any previous visit (from the past 1008 hour(s))  This note was created with voice recognition software  Phonic, grammatical and spelling errors may be present within the note as a result

## 2019-03-05 LAB
ALBUMIN SERPL-MCNC: 4.3 G/DL (ref 3.6–5.1)
ALBUMIN/GLOB SERPL: 2.3 (CALC) (ref 1–2.5)
ALP SERPL-CCNC: 59 U/L (ref 40–115)
ALT SERPL-CCNC: 18 U/L (ref 9–46)
AST SERPL-CCNC: 17 U/L (ref 10–40)
BASOPHILS # BLD AUTO: 51 CELLS/UL (ref 0–200)
BASOPHILS NFR BLD AUTO: 1 %
BILIRUB SERPL-MCNC: 0.5 MG/DL (ref 0.2–1.2)
BUN SERPL-MCNC: 11 MG/DL (ref 7–25)
BUN/CREAT SERPL: NORMAL (CALC) (ref 6–22)
CALCIUM SERPL-MCNC: 9.4 MG/DL (ref 8.6–10.3)
CHLORIDE SERPL-SCNC: 107 MMOL/L (ref 98–110)
CHOLEST SERPL-MCNC: 194 MG/DL
CHOLEST/HDLC SERPL: 4.7 (CALC)
CO2 SERPL-SCNC: 29 MMOL/L (ref 20–32)
CREAT SERPL-MCNC: 0.92 MG/DL (ref 0.6–1.35)
EOSINOPHIL # BLD AUTO: 270 CELLS/UL (ref 15–500)
EOSINOPHIL NFR BLD AUTO: 5.3 %
ERYTHROCYTE [DISTWIDTH] IN BLOOD BY AUTOMATED COUNT: 12.5 % (ref 11–15)
ERYTHROCYTE [SEDIMENTATION RATE] IN BLOOD BY WESTERGREN METHOD: 2 MM/H
GLOBULIN SER CALC-MCNC: 1.9 G/DL (CALC) (ref 1.9–3.7)
GLUCOSE SERPL-MCNC: 90 MG/DL (ref 65–99)
HCT VFR BLD AUTO: 40.3 % (ref 38.5–50)
HDLC SERPL-MCNC: 41 MG/DL
HGB BLD-MCNC: 13.9 G/DL (ref 13.2–17.1)
LDLC SERPL CALC-MCNC: 128 MG/DL (CALC)
LYMPHOCYTES # BLD AUTO: 1821 CELLS/UL (ref 850–3900)
LYMPHOCYTES NFR BLD AUTO: 35.7 %
MCH RBC QN AUTO: 30.5 PG (ref 27–33)
MCHC RBC AUTO-ENTMCNC: 34.5 G/DL (ref 32–36)
MCV RBC AUTO: 88.6 FL (ref 80–100)
MONOCYTES # BLD AUTO: 260 CELLS/UL (ref 200–950)
MONOCYTES NFR BLD AUTO: 5.1 %
NEUTROPHILS # BLD AUTO: 2698 CELLS/UL (ref 1500–7800)
NEUTROPHILS NFR BLD AUTO: 52.9 %
NONHDLC SERPL-MCNC: 153 MG/DL (CALC)
PLATELET # BLD AUTO: 287 THOUSAND/UL (ref 140–400)
PMV BLD REES-ECKER: 10.2 FL (ref 7.5–12.5)
POTASSIUM SERPL-SCNC: 4.8 MMOL/L (ref 3.5–5.3)
PROT SERPL-MCNC: 6.2 G/DL (ref 6.1–8.1)
RBC # BLD AUTO: 4.55 MILLION/UL (ref 4.2–5.8)
SL AMB EGFR AFRICAN AMERICAN: 113 ML/MIN/1.73M2
SL AMB EGFR NON AFRICAN AMERICAN: 97 ML/MIN/1.73M2
SODIUM SERPL-SCNC: 142 MMOL/L (ref 135–146)
T4 FREE SERPL-MCNC: 1.2 NG/DL (ref 0.8–1.8)
TRIGL SERPL-MCNC: 142 MG/DL
TSH SERPL-ACNC: 1.22 MIU/L (ref 0.4–4.5)
WBC # BLD AUTO: 5.1 THOUSAND/UL (ref 3.8–10.8)

## 2019-03-13 ENCOUNTER — OFFICE VISIT (OUTPATIENT)
Dept: INTERNAL MEDICINE CLINIC | Facility: CLINIC | Age: 49
End: 2019-03-13
Payer: COMMERCIAL

## 2019-03-13 VITALS
OXYGEN SATURATION: 99 % | HEIGHT: 67 IN | RESPIRATION RATE: 18 BRPM | WEIGHT: 185 LBS | SYSTOLIC BLOOD PRESSURE: 122 MMHG | HEART RATE: 84 BPM | BODY MASS INDEX: 29.03 KG/M2 | DIASTOLIC BLOOD PRESSURE: 86 MMHG

## 2019-03-13 DIAGNOSIS — E78.2 MIXED HYPERLIPIDEMIA: ICD-10-CM

## 2019-03-13 DIAGNOSIS — F32.A DEPRESSION, UNSPECIFIED DEPRESSION TYPE: Primary | ICD-10-CM

## 2019-03-13 PROCEDURE — 99213 OFFICE O/P EST LOW 20 MIN: CPT | Performed by: PHYSICIAN ASSISTANT

## 2019-03-13 PROCEDURE — 1036F TOBACCO NON-USER: CPT | Performed by: PHYSICIAN ASSISTANT

## 2019-03-13 PROCEDURE — 3008F BODY MASS INDEX DOCD: CPT | Performed by: PHYSICIAN ASSISTANT

## 2019-03-13 RX ORDER — SIMVASTATIN 10 MG
20 TABLET ORAL
Qty: 90 TABLET | Refills: 3
Start: 2019-03-13 | End: 2021-03-09 | Stop reason: SDUPTHER

## 2019-03-13 NOTE — PROGRESS NOTES
Assessment/Plan:   Patient Instructions   Will increase simvastatin to 20 mg daily in order to get better control of LDL cholesterol  Will schedule follow-up in 4 months with repeat labs  Will also refer to office Psychology  BMI Counseling: Body mass index is 28 98 kg/m²  Discussed the patient's BMI with him  The BMI is above average  BMI counseling and education was provided to the patient  Nutrition recommendations include reducing portion sizes and decreasing overall calorie intake  Exercise recommendations include exercising 3-5 times per week  Return in about 4 months (around 7/13/2019) for Next scheduled follow up  Diagnoses and all orders for this visit:    Depression, unspecified depression type  -     Ambulatory referral to Psychology; Future    Mixed hyperlipidemia  -     simvastatin (ZOCOR) 10 mg tablet; Take 2 tablets (20 mg total) by mouth daily at bedtime  -     Comprehensive metabolic panel; Future  -     Lipid panel; Future          Subjective:      Patient ID: Jovanni Schumacher is a 51 y o  male  Follow-up    Labs reviewed with patient  Hyperlipidemia:  Currently on simvastatin 10 mg daily  LDLs remain elevated  We discussed increasing the simvastatin to 20 mg daily and he is in agreement  Lateral epicondylitis:  Improved with using flaxseed oil and an elbow brace  Depression:  Patient agreeable to starting counseling with our office counselor  No other focal issues today  ALLERGIES:  Allergies   Allergen Reactions    Celecoxib Anaphylaxis    Diclofenac Shortness Of Breath       CURRENT MEDICATIONS:    Current Outpatient Medications:     multivitamin (THERAGRAN) TABS, Take 1 tablet by mouth daily, Disp: , Rfl:     oxyCODONE-acetaminophen (PERCOCET)  mg per tablet, TAKE 1 TABLET BY MOUTH 3 TIMES A DAY AS NEEDED **NEEDS P/A  MD FAXED   PT AWARE**, Disp: , Rfl: 0    simvastatin (ZOCOR) 10 mg tablet, Take 2 tablets (20 mg total) by mouth daily at bedtime, Disp: 90 tablet, Rfl: 3    triamcinolone (KENALOG) 0 5 % cream, Apply topically 3 (three) times a day, Disp: 30 g, Rfl: 0    venlafaxine 225 MG TB24, Take 1 tablet (225 mg total) by mouth daily with breakfast, Disp: 30 tablet, Rfl: 5    ACTIVE PROBLEM LIST:  Patient Active Problem List   Diagnosis    Cervical somatic dysfunction    Depression    Disc degeneration, lumbar    Hyperlipidemia    Pain of finger of left hand       PAST MEDICAL HISTORY:  Past Medical History:   Diagnosis Date    Disc degeneration, lumbar     Diverticulosis     Facet syndrome (HCC)     Other muscle spasm     Parapsoriasis     Psoriasis     Sacroiliitis (HCC)     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       PAST SURGICAL HISTORY:  Past Surgical History:   Procedure Laterality Date    HERNIA REPAIR      PYLOROMYOTOMY      ROTATOR CUFF REPAIR Left     SPINAL FUSION      EXPLORATION    TONSILLECTOMY AND ADENOIDECTOMY      TOOTH EXTRACTION         FAMILY HISTORY:  Family History   Problem Relation Age of Onset    Cancer Mother     Lung cancer Mother         CARCINOID TUMOR    Hypertension Mother     Hypothyroidism Mother     Kidney cancer Mother     Diabetes Maternal Grandmother     Alzheimer's disease Maternal Grandfather     Coronary artery disease Maternal Grandfather     Other Maternal Grandfather         PACEMAKER       SOCIAL HISTORY:  Social History     Socioeconomic History    Marital status: /Civil Union     Spouse name: Not on file    Number of children: 1    Years of education: Not on file    Highest education level: Not on file   Occupational History    Occupation: COMPUTER CONSULTING     Comment: FULL-TIME EMPLOYMENT   Social Needs    Financial resource strain: Not on file    Food insecurity:     Worry: Not on file     Inability: Not on file    Transportation needs:     Medical: Not on file     Non-medical: Not on file   Tobacco Use  Smoking status: Former Smoker    Smokeless tobacco: Never Used   Substance and Sexual Activity    Alcohol use: Yes     Comment: BEER - DESCRIBES AS INFREQUENT    Drug use: No    Sexual activity: Not on file   Lifestyle    Physical activity:     Days per week: Not on file     Minutes per session: Not on file    Stress: Not on file   Relationships    Social connections:     Talks on phone: Not on file     Gets together: Not on file     Attends Roman [employer] service: Not on file     Active member of club or organization: Not on file     Attends meetings of clubs or organizations: Not on file     Relationship status: Not on file    Intimate partner violence:     Fear of current or ex partner: Not on file     Emotionally abused: Not on file     Physically abused: Not on file     Forced sexual activity: Not on file   Other Topics Concern    Not on file   Social History Narrative    LIVING INDEPENDENTLY WITH SPOUSE    ONE SON       Review of Systems   Constitutional: Negative for activity change, chills, fatigue and fever  HENT: Negative for congestion  Eyes: Negative for discharge  Respiratory: Negative for cough, chest tightness and shortness of breath  Cardiovascular: Negative for chest pain, palpitations and leg swelling  Gastrointestinal: Negative for abdominal pain  Genitourinary: Negative for difficulty urinating  Musculoskeletal: Positive for arthralgias  Negative for myalgias  Skin: Negative for rash  Allergic/Immunologic: Negative for immunocompromised state  Neurological: Negative for dizziness, syncope, weakness, light-headedness and headaches  Hematological: Negative for adenopathy  Does not bruise/bleed easily  Psychiatric/Behavioral: Negative for agitation, confusion, decreased concentration, dysphoric mood and suicidal ideas  The patient is not nervous/anxious            Objective:  Vitals:    03/13/19 1134   BP: 122/86   Pulse: 84   Resp: 18   SpO2: 99%   Weight: 83 9 kg (185 lb)   Height: 5' 7" (1 702 m)     Body mass index is 28 98 kg/m²  Physical Exam   Constitutional: He is oriented to person, place, and time  He appears well-developed and well-nourished  No distress  Cardiovascular: Normal rate, regular rhythm and normal heart sounds  Pulmonary/Chest: Effort normal and breath sounds normal    Musculoskeletal: He exhibits no edema  Neurological: He is alert and oriented to person, place, and time  Skin: Skin is warm and dry  No rash noted  Psychiatric: He has a normal mood and affect  His behavior is normal    Nursing note and vitals reviewed          RESULTS:    Recent Results (from the past 1008 hour(s))   Lipid panel    Collection Time: 03/04/19 11:02 AM   Result Value Ref Range    Total Cholesterol 194 <200 mg/dL    HDL 41 >40 mg/dL    Triglycerides 142 <150 mg/dL    LDL Direct 128 (H) mg/dL (calc)    Chol HDLC Ratio 4 7 <5 0 (calc)    Non-HDL Cholesterol 153 (H) <130 mg/dL (calc)   Comprehensive metabolic panel    Collection Time: 03/04/19 11:02 AM   Result Value Ref Range    Glucose, Random 90 65 - 99 mg/dL    BUN 11 7 - 25 mg/dL    Creatinine 0 92 0 60 - 1 35 mg/dL    eGFR Non  97 > OR = 60 mL/min/1 73m2    eGFR  113 > OR = 60 mL/min/1 73m2    SL AMB BUN/CREATININE RATIO NOT APPLICABLE 6 - 22 (calc)    Sodium 142 135 - 146 mmol/L    Potassium 4 8 3 5 - 5 3 mmol/L    Chloride 107 98 - 110 mmol/L    CO2 29 20 - 32 mmol/L    SL AMB CALCIUM 9 4 8 6 - 10 3 mg/dL    Protein, Total 6 2 6 1 - 8 1 g/dL    Albumin 4 3 3 6 - 5 1 g/dL    Globulin 1 9 1 9 - 3 7 g/dL (calc)    Albumin/Globulin Ratio 2 3 1 0 - 2 5 (calc)    TOTAL BILIRUBIN 0 5 0 2 - 1 2 mg/dL    Alkaline Phosphatase 59 40 - 115 U/L    AST 17 10 - 40 U/L    ALT 18 9 - 46 U/L   Sedimentation rate, automated    Collection Time: 03/04/19 11:02 AM   Result Value Ref Range    Sedimentation Rate 2 < OR = 15 mm/h   CBC and differential    Collection Time: 03/04/19 11:02 AM   Result Value Ref Range    White Blood Cell Count 5 1 3 8 - 10 8 Thousand/uL    Red Blood Cell Count 4 55 4 20 - 5 80 Million/uL    Hemoglobin 13 9 13 2 - 17 1 g/dL    HCT 40 3 38 5 - 50 0 %    MCV 88 6 80 0 - 100 0 fL    MCH 30 5 27 0 - 33 0 pg    MCHC 34 5 32 0 - 36 0 g/dL    RDW 12 5 11 0 - 15 0 %    Platelet Count 150 730 - 400 Thousand/uL    SL AMB MPV 10 2 7 5 - 12 5 fL    Neutrophils (Absolute) 2,698 1,500 - 7,800 cells/uL    Lymphocytes (Absolute) 1,821 850 - 3,900 cells/uL    Monocytes (Absolute) 260 200 - 950 cells/uL    Eosinophils (Absolute) 270 15 - 500 cells/uL    Basophils ABS 51 0 - 200 cells/uL    Neutrophils 52 9 %    Lymphocytes 35 7 %    Monocytes 5 1 %    Eosinophils 5 3 %    Basophils PCT 1 0 %   T4, free    Collection Time: 03/04/19 11:02 AM   Result Value Ref Range    Free t4 1 2 0 8 - 1 8 ng/dL   TSH, 3rd generation    Collection Time: 03/04/19 11:02 AM   Result Value Ref Range    TSH 1 22 0 40 - 4 50 mIU/L       This note was created with voice recognition software  Phonic, grammatical and spelling errors may be present within the note as a result

## 2019-03-13 NOTE — PATIENT INSTRUCTIONS
Will increase simvastatin to 20 mg daily in order to get better control of LDL cholesterol  Will schedule follow-up in 4 months with repeat labs  Will also refer to office Psychology

## 2019-04-08 DIAGNOSIS — F32.A DEPRESSION, UNSPECIFIED DEPRESSION TYPE: ICD-10-CM

## 2019-04-08 RX ORDER — VENLAFAXINE HYDROCHLORIDE 225 MG/1
TABLET, EXTENDED RELEASE ORAL
Qty: 30 TABLET | Refills: 5 | Status: SHIPPED | OUTPATIENT
Start: 2019-04-08 | End: 2019-10-06 | Stop reason: SDUPTHER

## 2019-04-29 ENCOUNTER — OFFICE VISIT (OUTPATIENT)
Dept: URGENT CARE | Facility: CLINIC | Age: 49
End: 2019-04-29
Payer: COMMERCIAL

## 2019-04-29 VITALS
BODY MASS INDEX: 29.35 KG/M2 | OXYGEN SATURATION: 96 % | WEIGHT: 187 LBS | RESPIRATION RATE: 16 BRPM | DIASTOLIC BLOOD PRESSURE: 94 MMHG | HEART RATE: 94 BPM | SYSTOLIC BLOOD PRESSURE: 152 MMHG | HEIGHT: 67 IN | TEMPERATURE: 97.6 F

## 2019-04-29 DIAGNOSIS — J02.9 SORE THROAT: Primary | ICD-10-CM

## 2019-04-29 LAB — S PYO AG THROAT QL: NEGATIVE

## 2019-04-29 PROCEDURE — 87070 CULTURE OTHR SPECIMN AEROBIC: CPT | Performed by: PHYSICIAN ASSISTANT

## 2019-04-29 PROCEDURE — G0382 LEV 3 HOSP TYPE B ED VISIT: HCPCS | Performed by: PHYSICIAN ASSISTANT

## 2019-04-29 RX ORDER — ALBUTEROL SULFATE 90 UG/1
AEROSOL, METERED RESPIRATORY (INHALATION)
COMMUNITY
End: 2019-09-25 | Stop reason: SDUPTHER

## 2019-04-29 RX ORDER — OXYCODONE AND ACETAMINOPHEN 7.5; 325 MG/1; MG/1
1 TABLET ORAL 2 TIMES DAILY PRN
Refills: 0 | Status: ON HOLD | COMMUNITY
Start: 2019-04-02 | End: 2020-08-04

## 2019-05-01 LAB — BACTERIA THROAT CULT: NORMAL

## 2019-07-19 ENCOUNTER — OFFICE VISIT (OUTPATIENT)
Dept: INTERNAL MEDICINE CLINIC | Facility: CLINIC | Age: 49
End: 2019-07-19
Payer: COMMERCIAL

## 2019-07-19 VITALS
DIASTOLIC BLOOD PRESSURE: 74 MMHG | SYSTOLIC BLOOD PRESSURE: 122 MMHG | HEIGHT: 67 IN | OXYGEN SATURATION: 99 % | WEIGHT: 174 LBS | HEART RATE: 80 BPM | RESPIRATION RATE: 18 BRPM | BODY MASS INDEX: 27.31 KG/M2

## 2019-07-19 DIAGNOSIS — F32.A DEPRESSION, UNSPECIFIED DEPRESSION TYPE: ICD-10-CM

## 2019-07-19 DIAGNOSIS — E78.2 MIXED HYPERLIPIDEMIA: ICD-10-CM

## 2019-07-19 DIAGNOSIS — M77.12 LATERAL EPICONDYLITIS OF BOTH ELBOWS: Primary | ICD-10-CM

## 2019-07-19 DIAGNOSIS — M77.11 LATERAL EPICONDYLITIS OF BOTH ELBOWS: Primary | ICD-10-CM

## 2019-07-19 PROCEDURE — 3008F BODY MASS INDEX DOCD: CPT | Performed by: PHYSICIAN ASSISTANT

## 2019-07-19 PROCEDURE — 99213 OFFICE O/P EST LOW 20 MIN: CPT | Performed by: PHYSICIAN ASSISTANT

## 2019-07-19 PROCEDURE — 1036F TOBACCO NON-USER: CPT | Performed by: PHYSICIAN ASSISTANT

## 2019-07-19 NOTE — PROGRESS NOTES
Assessment/Plan:   Patient Instructions   For ongoing bilateral lateral epicondylitis right greater than left only partially responsive to conservative treatment, will refer to Orthopedics  Patient will have his labs done in 2 weeks and I will call him with results  Otherwise schedule follow-up in 6 months, sooner as needed  BMI Counseling: Body mass index is 27 25 kg/m²  Discussed the patient's BMI with him  The BMI is above average  BMI counseling and education was provided to the patient  Nutrition recommendations include reducing portion sizes and decreasing overall calorie intake  Exercise recommendations include exercising 3-5 times per week  Return in about 6 months (around 1/19/2020) for Next scheduled follow up  Diagnoses and all orders for this visit:    Lateral epicondylitis of both elbows  -     Ambulatory referral to Orthopedic Surgery; Future    Mixed hyperlipidemia    Depression, unspecified depression type          Subjective:      Patient ID: Fred Persaud is a 51 y o  male  Follow-up    Hyperlipidemia:  Patient did not have his labs done for this visit  He is on simvastatin 20 mg daily  Denies side effects  He will have his labs done within the next 2 weeks, and I will call him with results and any changes  Bilateral lateral epicondylitis right worse than left  He has tried elbow sleeves, flaxseed oil, and anti-inflammatories  All have provided minimal relief but the pain tends to recur  Right is definitely worse than his left  Depression:  On SNRI  Feels it is still beneficial   S stop going to counseling  No suicidal thoughts  Energy level better  Focusing better  No anhedonia  No sleep disturbance  Chronic pain in his low back  Follows with pain management  Stating that Pain Management is trying to wean him off narcotics and have him start medical marijuana        ALLERGIES:  Allergies   Allergen Reactions    Celecoxib Anaphylaxis    Diclofenac Shortness Of Breath       CURRENT MEDICATIONS:    Current Outpatient Medications:     albuterol (PROAIR HFA) 90 mcg/act inhaler, ProAir HFA 90 mcg/actuation aerosol inhaler, Disp: , Rfl:     Cholecalciferol (VITAMIN D3 PO), Take by mouth, Disp: , Rfl:     Flaxseed Oil OIL, by Does not apply route, Disp: , Rfl:     multivitamin (THERAGRAN) TABS, Take 1 tablet by mouth daily, Disp: , Rfl:     simvastatin (ZOCOR) 10 mg tablet, Take 2 tablets (20 mg total) by mouth daily at bedtime, Disp: 90 tablet, Rfl: 3    triamcinolone (KENALOG) 0 5 % cream, Apply topically 3 (three) times a day, Disp: 30 g, Rfl: 0    venlafaxine 225 MG TB24, TAKE 1 TABLET BY MOUTH EVERY DAY WITH BREAKFAST, Disp: 30 tablet, Rfl: 5    oxyCODONE-acetaminophen (PERCOCET) 7 5-325 MG per tablet, Take 1 tablet by mouth 2 (two) times a day as needed, Disp: , Rfl: 0    ACTIVE PROBLEM LIST:  Patient Active Problem List   Diagnosis    Cervical somatic dysfunction    Depression    Disc degeneration, lumbar    Hyperlipidemia    Pain of finger of left hand       PAST MEDICAL HISTORY:  Past Medical History:   Diagnosis Date    Disc degeneration, lumbar     Diverticulosis     Facet syndrome     Other muscle spasm     Parapsoriasis     Psoriasis     Sacroiliitis (HCC)     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       PAST SURGICAL HISTORY:  Past Surgical History:   Procedure Laterality Date    HERNIA REPAIR      PYLOROMYOTOMY      ROTATOR CUFF REPAIR Left     SPINAL FUSION      EXPLORATION    TONSILLECTOMY AND ADENOIDECTOMY      TOOTH EXTRACTION         FAMILY HISTORY:  Family History   Problem Relation Age of Onset    Cancer Mother     Lung cancer Mother         CARCINOID TUMOR    Hypertension Mother     Hypothyroidism Mother     Kidney cancer Mother     Diabetes Maternal Grandmother     Alzheimer's disease Maternal Grandfather     Coronary artery disease Maternal Grandfather     Other Maternal Grandfather         PACEMAKER       SOCIAL HISTORY:  Social History     Socioeconomic History    Marital status: /Civil Union     Spouse name: Not on file    Number of children: 1    Years of education: Not on file    Highest education level: Not on file   Occupational History    Occupation: COMPUTER CONSULTING     Comment: FULL-TIME EMPLOYMENT   Social Needs    Financial resource strain: Not on file    Food insecurity:     Worry: Not on file     Inability: Not on file    Transportation needs:     Medical: Not on file     Non-medical: Not on file   Tobacco Use    Smoking status: Former Smoker    Smokeless tobacco: Never Used   Substance and Sexual Activity    Alcohol use: Not Currently     Frequency: Monthly or less     Comment: BEER - DESCRIBES AS INFREQUENT    Drug use: Yes     Types: Marijuana    Sexual activity: Not on file   Lifestyle    Physical activity:     Days per week: Not on file     Minutes per session: Not on file    Stress: Not on file   Relationships    Social connections:     Talks on phone: Not on file     Gets together: Not on file     Attends Spiritism service: Not on file     Active member of club or organization: Not on file     Attends meetings of clubs or organizations: Not on file     Relationship status: Not on file    Intimate partner violence:     Fear of current or ex partner: Not on file     Emotionally abused: Not on file     Physically abused: Not on file     Forced sexual activity: Not on file   Other Topics Concern    Not on file   Social History Narrative    LIVING INDEPENDENTLY WITH SPOUSE    ONE SON       Review of Systems   Constitutional: Negative for activity change, chills, fatigue and fever  HENT: Negative for congestion  Eyes: Negative for discharge  Respiratory: Negative for cough, chest tightness and shortness of breath  Cardiovascular: Negative for chest pain, palpitations and leg swelling     Gastrointestinal: Negative for abdominal pain  Genitourinary: Negative for difficulty urinating  Musculoskeletal: Positive for arthralgias  Negative for myalgias  Skin: Negative for rash  Allergic/Immunologic: Negative for immunocompromised state  Neurological: Negative for dizziness, syncope, weakness, light-headedness and headaches  Hematological: Negative for adenopathy  Does not bruise/bleed easily  Psychiatric/Behavioral: Negative for agitation, confusion, decreased concentration, dysphoric mood, sleep disturbance and suicidal ideas  The patient is not nervous/anxious  Objective:  Vitals:    07/19/19 1028   BP: 122/74   BP Location: Left arm   Patient Position: Sitting   Cuff Size: Adult   Pulse: 80   Resp: 18   SpO2: 99%   Weight: 78 9 kg (174 lb)   Height: 5' 7" (1 702 m)     Body mass index is 27 25 kg/m²  Physical Exam   Constitutional: He is oriented to person, place, and time  He appears well-developed and well-nourished  No distress  Cardiovascular: Normal rate, regular rhythm and normal heart sounds  Pulmonary/Chest: Effort normal and breath sounds normal    Musculoskeletal: He exhibits no edema  Acute tenderness of bilateral lateral epicondyle regions  Tenderness right more than left on active pronation supination  Lymphadenopathy:     He has no cervical adenopathy  Neurological: He is alert and oriented to person, place, and time  Skin: Skin is warm and dry  No rash noted  Psychiatric: He has a normal mood and affect  His behavior is normal    Nursing note and vitals reviewed  RESULTS:    No results found for this or any previous visit (from the past 1008 hour(s))  This note was created with voice recognition software  Phonic, grammatical and spelling errors may be present within the note as a result

## 2019-07-19 NOTE — PATIENT INSTRUCTIONS
For ongoing bilateral lateral epicondylitis right greater than left only partially responsive to conservative treatment, will refer to Orthopedics  Patient will have his labs done in 2 weeks and I will call him with results  Otherwise schedule follow-up in 6 months, sooner as needed

## 2019-09-13 ENCOUNTER — APPOINTMENT (OUTPATIENT)
Dept: LAB | Facility: CLINIC | Age: 49
End: 2019-09-13
Payer: COMMERCIAL

## 2019-09-13 DIAGNOSIS — E78.2 MIXED HYPERLIPIDEMIA: ICD-10-CM

## 2019-09-13 DIAGNOSIS — R63.4 WEIGHT LOSS: ICD-10-CM

## 2019-09-13 DIAGNOSIS — M77.11 RIGHT LATERAL EPICONDYLITIS: ICD-10-CM

## 2019-09-13 LAB
ALBUMIN SERPL BCP-MCNC: 4.3 G/DL (ref 3.5–5)
ALP SERPL-CCNC: 82 U/L (ref 46–116)
ALT SERPL W P-5'-P-CCNC: 33 U/L (ref 12–78)
ANION GAP SERPL CALCULATED.3IONS-SCNC: -1 MMOL/L (ref 4–13)
AST SERPL W P-5'-P-CCNC: 21 U/L (ref 5–45)
BASOPHILS # BLD AUTO: 0.1 THOUSANDS/ΜL (ref 0–0.1)
BASOPHILS NFR BLD AUTO: 1 % (ref 0–1)
BILIRUB SERPL-MCNC: 0.41 MG/DL (ref 0.2–1)
BUN SERPL-MCNC: 13 MG/DL (ref 5–25)
CALCIUM SERPL-MCNC: 9.2 MG/DL (ref 8.3–10.1)
CHLORIDE SERPL-SCNC: 106 MMOL/L (ref 100–108)
CHOLEST SERPL-MCNC: 199 MG/DL (ref 50–200)
CO2 SERPL-SCNC: 31 MMOL/L (ref 21–32)
CREAT SERPL-MCNC: 0.96 MG/DL (ref 0.6–1.3)
EOSINOPHIL # BLD AUTO: 0.47 THOUSAND/ΜL (ref 0–0.61)
EOSINOPHIL NFR BLD AUTO: 5 % (ref 0–6)
ERYTHROCYTE [DISTWIDTH] IN BLOOD BY AUTOMATED COUNT: 12.8 % (ref 11.6–15.1)
ERYTHROCYTE [SEDIMENTATION RATE] IN BLOOD: 5 MM/HOUR (ref 0–10)
GFR SERPL CREATININE-BSD FRML MDRD: 92 ML/MIN/1.73SQ M
GLUCOSE P FAST SERPL-MCNC: 91 MG/DL (ref 65–99)
HCT VFR BLD AUTO: 46.1 % (ref 36.5–49.3)
HDLC SERPL-MCNC: 52 MG/DL (ref 40–60)
HGB BLD-MCNC: 15.6 G/DL (ref 12–17)
IMM GRANULOCYTES # BLD AUTO: 0.04 THOUSAND/UL (ref 0–0.2)
IMM GRANULOCYTES NFR BLD AUTO: 1 % (ref 0–2)
LDLC SERPL CALC-MCNC: 99 MG/DL (ref 0–100)
LYMPHOCYTES # BLD AUTO: 3.22 THOUSANDS/ΜL (ref 0.6–4.47)
LYMPHOCYTES NFR BLD AUTO: 37 % (ref 14–44)
MCH RBC QN AUTO: 30.8 PG (ref 26.8–34.3)
MCHC RBC AUTO-ENTMCNC: 33.8 G/DL (ref 31.4–37.4)
MCV RBC AUTO: 91 FL (ref 82–98)
MONOCYTES # BLD AUTO: 0.47 THOUSAND/ΜL (ref 0.17–1.22)
MONOCYTES NFR BLD AUTO: 5 % (ref 4–12)
NEUTROPHILS # BLD AUTO: 4.52 THOUSANDS/ΜL (ref 1.85–7.62)
NEUTS SEG NFR BLD AUTO: 51 % (ref 43–75)
NONHDLC SERPL-MCNC: 147 MG/DL
NRBC BLD AUTO-RTO: 0 /100 WBCS
PLATELET # BLD AUTO: 355 THOUSANDS/UL (ref 149–390)
PMV BLD AUTO: 9.4 FL (ref 8.9–12.7)
POTASSIUM SERPL-SCNC: 4.1 MMOL/L (ref 3.5–5.3)
PROT SERPL-MCNC: 7.5 G/DL (ref 6.4–8.2)
RBC # BLD AUTO: 5.06 MILLION/UL (ref 3.88–5.62)
SODIUM SERPL-SCNC: 136 MMOL/L (ref 136–145)
T4 FREE SERPL-MCNC: 0.8 NG/DL (ref 0.76–1.46)
TRIGL SERPL-MCNC: 241 MG/DL
TSH SERPL DL<=0.05 MIU/L-ACNC: 1.87 UIU/ML (ref 0.36–3.74)
WBC # BLD AUTO: 8.82 THOUSAND/UL (ref 4.31–10.16)

## 2019-09-13 PROCEDURE — 84443 ASSAY THYROID STIM HORMONE: CPT

## 2019-09-13 PROCEDURE — 84439 ASSAY OF FREE THYROXINE: CPT

## 2019-09-13 PROCEDURE — 80061 LIPID PANEL: CPT

## 2019-09-13 PROCEDURE — 36415 COLL VENOUS BLD VENIPUNCTURE: CPT

## 2019-09-13 PROCEDURE — 85025 COMPLETE CBC W/AUTO DIFF WBC: CPT

## 2019-09-13 PROCEDURE — 85652 RBC SED RATE AUTOMATED: CPT

## 2019-09-13 PROCEDURE — 80053 COMPREHEN METABOLIC PANEL: CPT

## 2019-09-25 DIAGNOSIS — J20.9 ACUTE BRONCHITIS WITH BRONCHOSPASM: ICD-10-CM

## 2019-10-06 DIAGNOSIS — F32.A DEPRESSION, UNSPECIFIED DEPRESSION TYPE: ICD-10-CM

## 2019-10-06 RX ORDER — VENLAFAXINE HYDROCHLORIDE 225 MG/1
TABLET, EXTENDED RELEASE ORAL
Qty: 90 TABLET | Refills: 1 | Status: SHIPPED | OUTPATIENT
Start: 2019-10-06 | End: 2020-04-12

## 2019-10-17 DIAGNOSIS — E78.2 MIXED HYPERLIPIDEMIA: ICD-10-CM

## 2019-10-17 RX ORDER — SIMVASTATIN 10 MG
TABLET ORAL
Qty: 90 TABLET | Refills: 3 | Status: SHIPPED | OUTPATIENT
Start: 2019-10-17 | End: 2020-07-27 | Stop reason: SDUPTHER

## 2019-10-18 DIAGNOSIS — J20.9 ACUTE BRONCHITIS WITH BRONCHOSPASM: ICD-10-CM

## 2019-10-18 RX ORDER — ALBUTEROL SULFATE 90 UG/1
AEROSOL, METERED RESPIRATORY (INHALATION)
Qty: 8.5 INHALER | Refills: 2 | Status: SHIPPED | OUTPATIENT
Start: 2019-10-18

## 2019-12-04 ENCOUNTER — TRANSCRIBE ORDERS (OUTPATIENT)
Dept: LAB | Facility: CLINIC | Age: 49
End: 2019-12-04

## 2019-12-04 ENCOUNTER — OFFICE VISIT (OUTPATIENT)
Dept: INTERNAL MEDICINE CLINIC | Facility: CLINIC | Age: 49
End: 2019-12-04
Payer: COMMERCIAL

## 2019-12-04 ENCOUNTER — APPOINTMENT (OUTPATIENT)
Dept: LAB | Facility: CLINIC | Age: 49
End: 2019-12-04
Payer: COMMERCIAL

## 2019-12-04 VITALS
BODY MASS INDEX: 28.25 KG/M2 | HEIGHT: 67 IN | WEIGHT: 180 LBS | OXYGEN SATURATION: 96 % | RESPIRATION RATE: 16 BRPM | TEMPERATURE: 98.2 F | HEART RATE: 92 BPM | DIASTOLIC BLOOD PRESSURE: 86 MMHG | SYSTOLIC BLOOD PRESSURE: 124 MMHG

## 2019-12-04 DIAGNOSIS — Z12.11 SCREENING FOR COLON CANCER: ICD-10-CM

## 2019-12-04 DIAGNOSIS — J01.00 ACUTE NON-RECURRENT MAXILLARY SINUSITIS: Primary | ICD-10-CM

## 2019-12-04 DIAGNOSIS — Z11.4 SCREENING FOR HIV (HUMAN IMMUNODEFICIENCY VIRUS): ICD-10-CM

## 2019-12-04 DIAGNOSIS — J20.9 ACUTE BRONCHITIS, UNSPECIFIED ORGANISM: ICD-10-CM

## 2019-12-04 PROCEDURE — 1036F TOBACCO NON-USER: CPT | Performed by: PHYSICIAN ASSISTANT

## 2019-12-04 PROCEDURE — 87389 HIV-1 AG W/HIV-1&-2 AB AG IA: CPT

## 2019-12-04 PROCEDURE — 3008F BODY MASS INDEX DOCD: CPT | Performed by: PHYSICIAN ASSISTANT

## 2019-12-04 PROCEDURE — 36415 COLL VENOUS BLD VENIPUNCTURE: CPT

## 2019-12-04 PROCEDURE — 99213 OFFICE O/P EST LOW 20 MIN: CPT | Performed by: PHYSICIAN ASSISTANT

## 2019-12-04 RX ORDER — CEFUROXIME AXETIL 250 MG/1
250 TABLET ORAL EVERY 12 HOURS SCHEDULED
Qty: 20 TABLET | Refills: 0 | Status: SHIPPED | OUTPATIENT
Start: 2019-12-04 | End: 2019-12-14

## 2019-12-04 NOTE — PATIENT INSTRUCTIONS
Rest, increase fluids  Tylenol for fever or aches as per package instructions  Start and finish antibiotic as we discussed  Always wise to take a probiotic and or eat yogurt daily when on an antibiotic  Also would advise starting either Claritin (loratadine) 10 mg, or Allegra (fexofenadine) 180 mg, or Zyrtec (cetirizine) 10 mg, or Xyzal 5 mg 1 daily for postnasal drip  May need to use 2-3 weeks  Can continue your Afrin for 3-4 days only  Follow up if not steadily improving  Can also use your Flonase on a daily basis  Will also refer for colonoscopy

## 2019-12-04 NOTE — PROGRESS NOTES
Assessment/Plan:      Patient Instructions   Rest, increase fluids  Tylenol for fever or aches as per package instructions  Start and finish antibiotic as we discussed  Always wise to take a probiotic and or eat yogurt daily when on an antibiotic  Also would advise starting either Claritin (loratadine) 10 mg, or Allegra (fexofenadine) 180 mg, or Zyrtec (cetirizine) 10 mg, or Xyzal 5 mg 1 daily for postnasal drip  May need to use 2-3 weeks  Can continue your Afrin for 3-4 days only  Follow up if not steadily improving  Can also use your Flonase on a daily basis  Will also refer for colonoscopy  Quality Measures:       Return for Next scheduled follow up  Diagnoses and all orders for this visit:    Acute non-recurrent maxillary sinusitis  -     cefuroxime (CEFTIN) 250 mg tablet; Take 1 tablet (250 mg total) by mouth every 12 (twelve) hours for 10 days    Acute bronchitis, unspecified organism  -     cefuroxime (CEFTIN) 250 mg tablet; Take 1 tablet (250 mg total) by mouth every 12 (twelve) hours for 10 days    Screening for colon cancer  -     Ambulatory referral to Gastroenterology; Future    Screening for HIV (human immunodeficiency virus)  -     HIV 1/2 AG-AB combo; Future          Subjective:      Patient ID: Marilyn Yusuf is a 47 y o  male  Acute visit    Patient has had the ongoing symptoms of postnasal drip and cough for the better part of 3 weeks  He initially started Mucinex over-the-counter for the cough which seems to help to some degree however the postnasal drip would continue  He has noticed some left maxillary sinus pressure, worse at night when supine with worsening of the postnasal drip at night  Cough rarely produces any sputum  No fever or chills  He has tried some over-the-counter Afrin at night which does seem to help the postnasal drip to some degree that he is able to sleep  He starting to feel tired because of the ongoing symptoms    No complaint of frequent sneezing, itchy watery eyes, or persistent runny nose  Discussed HIV screening and he is in agreement  Due for colonoscopy, he is in agreement to go to GI        ALLERGIES:  Allergies   Allergen Reactions    Celecoxib Anaphylaxis    Diclofenac Shortness Of Breath       CURRENT MEDICATIONS:    Current Outpatient Medications:     albuterol (PROVENTIL HFA,VENTOLIN HFA) 90 mcg/act inhaler, TAKE 2 PUFFS BY MOUTH EVERY 6 HOURS AS NEEDED FOR WHEEZE, Disp: 8 5 Inhaler, Rfl: 2    Cholecalciferol (VITAMIN D3 PO), Take by mouth, Disp: , Rfl:     Flaxseed Oil OIL, by Does not apply route, Disp: , Rfl:     multivitamin (THERAGRAN) TABS, Take 1 tablet by mouth daily, Disp: , Rfl:     oxyCODONE-acetaminophen (PERCOCET) 7 5-325 MG per tablet, Take 1 tablet by mouth 2 (two) times a day as needed, Disp: , Rfl: 0    venlafaxine 225 MG TB24, TAKE 1 TABLET BY MOUTH EVERY DAY WITH BREAKFAST, Disp: 90 tablet, Rfl: 1    cefuroxime (CEFTIN) 250 mg tablet, Take 1 tablet (250 mg total) by mouth every 12 (twelve) hours for 10 days, Disp: 20 tablet, Rfl: 0    simvastatin (ZOCOR) 10 mg tablet, Take 2 tablets (20 mg total) by mouth daily at bedtime (Patient not taking: Reported on 12/4/2019), Disp: 90 tablet, Rfl: 3    simvastatin (ZOCOR) 10 mg tablet, TAKE 1 TABLET BY MOUTH EVERYDAY AT BEDTIME (Patient not taking: Reported on 12/4/2019), Disp: 90 tablet, Rfl: 3    triamcinolone (KENALOG) 0 5 % cream, Apply topically 3 (three) times a day (Patient not taking: Reported on 12/4/2019), Disp: 30 g, Rfl: 0    ACTIVE PROBLEM LIST:  Patient Active Problem List   Diagnosis    Cervical somatic dysfunction    Depression    Disc degeneration, lumbar    Hyperlipidemia    Pain of finger of left hand       PAST MEDICAL HISTORY:  Past Medical History:   Diagnosis Date    Disc degeneration, lumbar     Diverticulosis     Facet syndrome     Other muscle spasm     Parapsoriasis     Psoriasis     Sacroiliitis (HCC)     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       PAST SURGICAL HISTORY:  Past Surgical History:   Procedure Laterality Date    HERNIA REPAIR      PYLOROMYOTOMY      ROTATOR CUFF REPAIR Left     SPINAL FUSION      EXPLORATION    TONSILLECTOMY AND ADENOIDECTOMY      TOOTH EXTRACTION         FAMILY HISTORY:  Family History   Problem Relation Age of Onset    Cancer Mother     Lung cancer Mother         CARCINOID TUMOR    Hypertension Mother     Hypothyroidism Mother     Kidney cancer Mother     Diabetes Maternal Grandmother     Alzheimer's disease Maternal Grandfather     Coronary artery disease Maternal Grandfather     Other Maternal Grandfather         PACEMAKER       SOCIAL HISTORY:  Social History     Socioeconomic History    Marital status: /Civil Union     Spouse name: Not on file    Number of children: 1    Years of education: Not on file    Highest education level: Not on file   Occupational History    Occupation: COMPUTER CONSULTING     Comment: FULL-TIME EMPLOYMENT   Social Needs    Financial resource strain: Not on file    Food insecurity:     Worry: Not on file     Inability: Not on file    Transportation needs:     Medical: Not on file     Non-medical: Not on file   Tobacco Use    Smoking status: Former Smoker    Smokeless tobacco: Never Used   Substance and Sexual Activity    Alcohol use: Not Currently     Frequency: Monthly or less     Comment: BEER - DESCRIBES AS INFREQUENT    Drug use: Yes     Types: Marijuana    Sexual activity: Not on file   Lifestyle    Physical activity:     Days per week: Not on file     Minutes per session: Not on file    Stress: Not on file   Relationships    Social connections:     Talks on phone: Not on file     Gets together: Not on file     Attends Tenriism service: Not on file     Active member of club or organization: Not on file     Attends meetings of clubs or organizations: Not on file     Relationship status: Not on file    Intimate partner violence:     Fear of current or ex partner: Not on file     Emotionally abused: Not on file     Physically abused: Not on file     Forced sexual activity: Not on file   Other Topics Concern    Not on file   Social History Narrative    LIVING INDEPENDENTLY WITH SPOUSE    ONE SON       Review of Systems   Constitutional: Negative for activity change, chills, fatigue and fever  HENT: Positive for congestion, postnasal drip, rhinorrhea and sinus pressure  Negative for ear pain, sneezing, sore throat and trouble swallowing  Eyes: Negative for discharge, redness, itching and visual disturbance  Respiratory: Positive for cough  Negative for chest tightness, shortness of breath and wheezing  Cardiovascular: Negative for chest pain, palpitations and leg swelling  Gastrointestinal: Negative for abdominal pain  Genitourinary: Negative for difficulty urinating  Musculoskeletal: Negative for arthralgias and myalgias  Skin: Negative for rash  Allergic/Immunologic: Negative for immunocompromised state  Neurological: Negative for dizziness, syncope, weakness, light-headedness and headaches  Hematological: Negative for adenopathy  Does not bruise/bleed easily  Psychiatric/Behavioral: Negative for dysphoric mood  The patient is not nervous/anxious  Objective:  Vitals:    12/04/19 1058   BP: 124/86   BP Location: Left arm   Patient Position: Sitting   Cuff Size: Standard   Pulse: 92   Resp: 16   Temp: 98 2 °F (36 8 °C)   TempSrc: Oral   SpO2: 96%   Weight: 81 6 kg (180 lb)   Height: 5' 7" (1 702 m)     Body mass index is 28 19 kg/m²  Physical Exam   Constitutional: He is oriented to person, place, and time  He appears well-developed and well-nourished  No distress  HENT:   Head: Normocephalic     HEENT-injected conjunctivae, TMs dull with fluid behind, nasal mucosa hyperemic with yellow/green mucoid drainage, injected red posterior pharynx with yellow/green post nasal drainage, tender left maxillary sinus   Eyes: Pupils are equal, round, and reactive to light  Neck: Neck supple  Cystic lesion left submandibular area  Cardiovascular: Normal rate, regular rhythm and normal heart sounds  Pulmonary/Chest: Effort normal and breath sounds normal  No respiratory distress  He has no wheezes  He has no rales  He exhibits no tenderness  Musculoskeletal: He exhibits no edema  Lymphadenopathy:     He has no cervical adenopathy  Neurological: He is alert and oriented to person, place, and time  Skin: Skin is warm and dry  No rash noted  Psychiatric: He has a normal mood and affect  His behavior is normal    Nursing note and vitals reviewed  RESULTS:    No results found for this or any previous visit (from the past 1008 hour(s))  This note was created with voice recognition software  Phonic, grammatical and spelling errors may be present within the note as a result

## 2019-12-05 LAB — HIV 1+2 AB+HIV1 P24 AG SERPL QL IA: NORMAL

## 2020-01-06 ENCOUNTER — APPOINTMENT (OUTPATIENT)
Dept: RADIOLOGY | Facility: CLINIC | Age: 50
End: 2020-01-06
Payer: COMMERCIAL

## 2020-01-06 ENCOUNTER — OFFICE VISIT (OUTPATIENT)
Dept: URGENT CARE | Facility: CLINIC | Age: 50
End: 2020-01-06
Payer: COMMERCIAL

## 2020-01-06 VITALS
DIASTOLIC BLOOD PRESSURE: 86 MMHG | RESPIRATION RATE: 16 BRPM | OXYGEN SATURATION: 98 % | SYSTOLIC BLOOD PRESSURE: 136 MMHG | BODY MASS INDEX: 29.03 KG/M2 | WEIGHT: 185 LBS | TEMPERATURE: 98.5 F | HEART RATE: 80 BPM | HEIGHT: 67 IN

## 2020-01-06 DIAGNOSIS — S69.92XA INJURY OF LEFT WRIST, INITIAL ENCOUNTER: ICD-10-CM

## 2020-01-06 DIAGNOSIS — S60.212A CONTUSION OF LEFT WRIST, INITIAL ENCOUNTER: Primary | ICD-10-CM

## 2020-01-06 PROCEDURE — 73110 X-RAY EXAM OF WRIST: CPT

## 2020-01-06 PROCEDURE — G0382 LEV 3 HOSP TYPE B ED VISIT: HCPCS | Performed by: FAMILY MEDICINE

## 2020-01-06 NOTE — PROGRESS NOTES
[address] [facility]        NAME: Forrest Montiel is a 47 y o  male  : 1970    MRN: [id number]  DATE: 2020  TIME: 12:23 PM    Assessment and Plan   Contusion of left wrist, initial encounter [S60 212A]  1  Contusion of left wrist, initial encounter     2  Injury of left wrist, initial encounter  XR wrist 3+ vw left         Patient Instructions   Left wrist contusion/sprain  Wear thumb spica splint for comfort and stability for the next 10-14 days  May take out and move wrist to avoid stiffness  Apply cool compress as needed for pain or swelling  Apply warm compress as needed for stiffness  Ibuprofen/Aleve OTC as needed for pain  Follow up with Orthopedics if pain persist     Follow up with PCP in 3-5 days  Proceed to  ER if symptoms worsen  Chief Complaint     Chief Complaint   Patient presents with    Wrist Pain     L wrist pain, states he fell yesterday while snowboarding         History of Present Illness       Patient presents with left wrist pain since falling on outstretched hand yesterday while snowboarding for the 1st time  He felt pain and stiffness with mild swelling  Pain is mostly localized over the radial aspect of the wrist and base of the thumb  Motion of the thumb is painful  Mild change in sensation along the 1st web space  No elbow pain  We used a thumb spica wrist splint which he had from embolization overnight  Pain slightly improved today  Review of Systems   Review of Systems   Musculoskeletal: Positive for arthralgias (Left wrist pain) and joint swelling  Skin: Negative for color change, rash and wound  Neurological: Positive for numbness           Current Medications       Current Outpatient Medications:     albuterol (PROVENTIL HFA,VENTOLIN HFA) 90 mcg/act inhaler, TAKE 2 PUFFS BY MOUTH EVERY 6 HOURS AS NEEDED FOR WHEEZE, Disp: 8 5 Inhaler, Rfl: 2    Cholecalciferol (VITAMIN D3 PO), Take by mouth, Disp: , Rfl:     Flaxseed Oil OIL, by Does not apply route, Disp: , Rfl:     multivitamin (THERAGRAN) TABS, Take 1 tablet by mouth daily, Disp: , Rfl:     oxyCODONE-acetaminophen (PERCOCET) 7 5-325 MG per tablet, Take 1 tablet by mouth 2 (two) times a day as needed, Disp: , Rfl: 0    venlafaxine 225 MG TB24, TAKE 1 TABLET BY MOUTH EVERY DAY WITH BREAKFAST, Disp: 90 tablet, Rfl: 1    simvastatin (ZOCOR) 10 mg tablet, Take 2 tablets (20 mg total) by mouth daily at bedtime (Patient not taking: Reported on 12/4/2019), Disp: 90 tablet, Rfl: 3    simvastatin (ZOCOR) 10 mg tablet, TAKE 1 TABLET BY MOUTH EVERYDAY AT BEDTIME (Patient not taking: Reported on 12/4/2019), Disp: 90 tablet, Rfl: 3    triamcinolone (KENALOG) 0 5 % cream, Apply topically 3 (three) times a day (Patient not taking: Reported on 12/4/2019), Disp: 30 g, Rfl: 0    Current Allergies     Allergies as of 01/06/2020 - Reviewed 01/06/2020   Allergen Reaction Noted    Celecoxib Anaphylaxis     Diclofenac Shortness Of Breath 01/17/2014            The following portions of the patient's history were reviewed and updated as appropriate: allergies, current medications, past family history, past medical history, past social history, past surgical history and problem list      Past Medical History:   Diagnosis Date    Disc degeneration, lumbar     Diverticulosis     Facet syndrome     Other muscle spasm     Parapsoriasis     Psoriasis     Sacroiliitis (HCC)     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       Past Surgical History:   Procedure Laterality Date    HERNIA REPAIR      PYLOROMYOTOMY      ROTATOR CUFF REPAIR Left     SPINAL FUSION      EXPLORATION    TONSILLECTOMY AND ADENOIDECTOMY      TOOTH EXTRACTION         Family History   Problem Relation Age of Onset    Cancer Mother     Lung cancer Mother         CARCINOID TUMOR    Hypertension Mother     Hypothyroidism Mother     Kidney cancer Mother     Diabetes Maternal Grandmother  Alzheimer's disease Maternal Grandfather     Coronary artery disease Maternal Grandfather     Other Maternal Grandfather         PACEMAKER         Medications have been verified  Objective   /86   Pulse 80   Temp 98 5 °F (36 9 °C) (Oral)   Resp 16   Ht 5' 7" (1 702 m)   Wt 83 9 kg (185 lb)   SpO2 98%   BMI 28 98 kg/m²        Physical Exam     Physical Exam   Constitutional: He appears well-developed and well-nourished  No distress  Musculoskeletal:   Left upper extremity with normal appearance  There is minimal swelling over the radial aspect of the right wrist and base of thumb  No skin changes otherwise  Tenderness to palpation is mild along the radial aspect of the wrist joint/scaphoid  No DRUJ tenderness  No significant tenderness through the hand  There is pain with resisted thumb motion mostly abduction  Sensation slightly diminished in the 1st web space along the ulnar border of the thumb and radial border of the index finger full finger extension with some discomfort and nearly full fist with discomfort/stiffness  Full pronation supination of the forearm  Wrist motion mildly decreased due to stiffness  Radial pulse is easily palpable, capillary refill less than 2 seconds  Neurological: A sensory deficit is present  Skin: Skin is warm and dry  Capillary refill takes less than 2 seconds  No rash noted  He is not diaphoretic  No erythema  No pallor  X-ray of the left wrist independently viewed and interpreted by me  No acute fracture or dislocation

## 2020-01-06 NOTE — PATIENT INSTRUCTIONS
Left wrist contusion/sprain  Wear thumb spica splint for comfort and stability for the next 10-14 days  May take out and move wrist to avoid stiffness  Apply cool compress as needed for pain or swelling  Apply warm compress as needed for stiffness  Ibuprofen/Aleve OTC as needed for pain    Follow up with Orthopedics if pain persist

## 2020-01-23 ENCOUNTER — OFFICE VISIT (OUTPATIENT)
Dept: INTERNAL MEDICINE CLINIC | Facility: CLINIC | Age: 50
End: 2020-01-23
Payer: COMMERCIAL

## 2020-01-23 VITALS
BODY MASS INDEX: 28.41 KG/M2 | HEIGHT: 67 IN | WEIGHT: 181 LBS | HEART RATE: 87 BPM | SYSTOLIC BLOOD PRESSURE: 132 MMHG | DIASTOLIC BLOOD PRESSURE: 88 MMHG | OXYGEN SATURATION: 97 %

## 2020-01-23 DIAGNOSIS — S66.912A SPRAIN AND STRAIN OF LEFT WRIST: ICD-10-CM

## 2020-01-23 DIAGNOSIS — E78.2 MIXED HYPERLIPIDEMIA: Primary | ICD-10-CM

## 2020-01-23 DIAGNOSIS — L72.3 SEBACEOUS CYST: ICD-10-CM

## 2020-01-23 DIAGNOSIS — F32.A DEPRESSION, UNSPECIFIED DEPRESSION TYPE: ICD-10-CM

## 2020-01-23 DIAGNOSIS — R05.9 COUGH: ICD-10-CM

## 2020-01-23 DIAGNOSIS — S63.502A SPRAIN AND STRAIN OF LEFT WRIST: ICD-10-CM

## 2020-01-23 PROCEDURE — 99214 OFFICE O/P EST MOD 30 MIN: CPT | Performed by: PHYSICIAN ASSISTANT

## 2020-01-23 PROCEDURE — 3008F BODY MASS INDEX DOCD: CPT | Performed by: PHYSICIAN ASSISTANT

## 2020-01-23 NOTE — PROGRESS NOTES
Assessment/Plan:   Patient Instructions   No change in current medication  Patient will do labs in March and I will call him with results  Due to recent left wrist sprain and ongoing pain with limitation of motion of the wrist will refer to physical therapy  Patient will keep me informed next week if current cough symptoms are not improving  Schedule follow-up in 6 months, sooner as needed  Quality Measures: BMI Counseling: Body mass index is 28 35 kg/m²  The BMI is above normal  Nutrition recommendations include decreasing portion sizes, encouraging healthy choices of fruits and vegetables, decreasing fast food intake and moderation in carbohydrate intake  Exercise recommendations include exercising 3-5 times per week  Return in about 6 months (around 7/23/2020) for Next scheduled follow up  Diagnoses and all orders for this visit:    Mixed hyperlipidemia  -     Comprehensive metabolic panel; Future  -     Lipid panel; Future    Sprain and strain of left wrist  -     Ambulatory referral to Physical Therapy; Future    Depression, unspecified depression type    Cough          Subjective:      Patient ID: Marium Marinelli is a 47 y o  male  Follow-up    Hyperlipidemia:  Labs not repeated for this visit  He informs me he has not taking his simvastatin  He believes his overall achiness has improved  Will recheck labs in March  Depression:  On venlafaxine which she now feels is effective  Denies typical depressive symptomatology  Ongoing cough for the past week and a half  Notes postnasal drip and some nasal stuffiness  No fever or chills  Cough is generally worse in the morning  No significant sinus symptoms at this time  Since the beginning of this month patient fell 1 time when trying to snowboard, injured his left wrist, went to urgent care where he was evaluated, x-rays were negative he was diagnosed with a sprain    He then 4 days later fell approximately 4 ft and sprained his right wrist   At this point his right wrist is not bothering him but his left remains sore and he is not able to move it is freely as his right wrist   Swelling is not significant  Cyst on the left side of his neck  He would like it removed as he at times nicks it when he shaves        ALLERGIES:  Allergies   Allergen Reactions    Celecoxib Anaphylaxis    Diclofenac Shortness Of Breath       CURRENT MEDICATIONS:    Current Outpatient Medications:     albuterol (PROVENTIL HFA,VENTOLIN HFA) 90 mcg/act inhaler, TAKE 2 PUFFS BY MOUTH EVERY 6 HOURS AS NEEDED FOR WHEEZE, Disp: 8 5 Inhaler, Rfl: 2    Cholecalciferol (VITAMIN D3 PO), Take by mouth, Disp: , Rfl:     Flaxseed Oil OIL, by Does not apply route, Disp: , Rfl:     multivitamin (THERAGRAN) TABS, Take 1 tablet by mouth daily, Disp: , Rfl:     oxyCODONE-acetaminophen (PERCOCET) 7 5-325 MG per tablet, Take 1 tablet by mouth 2 (two) times a day as needed, Disp: , Rfl: 0    venlafaxine 225 MG TB24, TAKE 1 TABLET BY MOUTH EVERY DAY WITH BREAKFAST, Disp: 90 tablet, Rfl: 1    simvastatin (ZOCOR) 10 mg tablet, Take 2 tablets (20 mg total) by mouth daily at bedtime (Patient not taking: Reported on 12/4/2019), Disp: 90 tablet, Rfl: 3    simvastatin (ZOCOR) 10 mg tablet, TAKE 1 TABLET BY MOUTH EVERYDAY AT BEDTIME (Patient not taking: Reported on 12/4/2019), Disp: 90 tablet, Rfl: 3    ACTIVE PROBLEM LIST:  Patient Active Problem List   Diagnosis    Cervical somatic dysfunction    Depression    Disc degeneration, lumbar    Hyperlipidemia    Pain of finger of left hand       PAST MEDICAL HISTORY:  Past Medical History:   Diagnosis Date    Disc degeneration, lumbar     Diverticulosis     Facet syndrome     Other muscle spasm     Parapsoriasis     Psoriasis     Sacroiliitis (HCC)     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       PAST SURGICAL HISTORY:  Past Surgical History:   Procedure Laterality Date    HERNIA REPAIR      PYLOROMYOTOMY      ROTATOR CUFF REPAIR Left     SPINAL FUSION      EXPLORATION    TONSILLECTOMY AND ADENOIDECTOMY      TOOTH EXTRACTION         FAMILY HISTORY:  Family History   Problem Relation Age of Onset    Cancer Mother     Lung cancer Mother         CARCINOID TUMOR    Hypertension Mother     Hypothyroidism Mother     Kidney cancer Mother     Diabetes Maternal Grandmother     Alzheimer's disease Maternal Grandfather     Coronary artery disease Maternal Grandfather     Other Maternal Grandfather         PACEMAKER       SOCIAL HISTORY:  Social History     Socioeconomic History    Marital status: /Civil Union     Spouse name: Not on file    Number of children: 1    Years of education: Not on file    Highest education level: Not on file   Occupational History    Occupation: COMPUTER CONSULTING     Comment: FULL-TIME EMPLOYMENT   Social Needs    Financial resource strain: Not on file    Food insecurity:     Worry: Not on file     Inability: Not on file    Transportation needs:     Medical: Not on file     Non-medical: Not on file   Tobacco Use    Smoking status: Former Smoker    Smokeless tobacco: Never Used   Substance and Sexual Activity    Alcohol use: Not Currently     Frequency: Monthly or less     Comment: BEER - DESCRIBES AS INFREQUENT    Drug use: Yes     Types: Marijuana    Sexual activity: Not on file   Lifestyle    Physical activity:     Days per week: Not on file     Minutes per session: Not on file    Stress: Not on file   Relationships    Social connections:     Talks on phone: Not on file     Gets together: Not on file     Attends Synagogue service: Not on file     Active member of club or organization: Not on file     Attends meetings of clubs or organizations: Not on file     Relationship status: Not on file    Intimate partner violence:     Fear of current or ex partner: Not on file     Emotionally abused: Not on file     Physically abused: Not on file     Forced sexual activity: Not on file   Other Topics Concern    Not on file   Social History Narrative    LIVING INDEPENDENTLY WITH SPOUSE    ONE SON       Review of Systems   Constitutional: Negative for activity change, chills, fatigue and fever  HENT: Positive for congestion, postnasal drip and rhinorrhea  Negative for sinus pressure, sinus pain, sneezing and sore throat  Eyes: Negative for discharge, redness, itching and visual disturbance  Respiratory: Positive for cough  Negative for chest tightness, shortness of breath and wheezing  Cardiovascular: Negative for chest pain, palpitations and leg swelling  Gastrointestinal: Negative for abdominal pain, blood in stool, constipation, diarrhea, nausea and vomiting  Genitourinary: Negative for difficulty urinating  Musculoskeletal: Positive for arthralgias  Negative for myalgias  Skin: Negative for rash  Allergic/Immunologic: Negative for immunocompromised state  Neurological: Negative for dizziness, syncope, weakness, light-headedness and headaches  Hematological: Negative for adenopathy  Does not bruise/bleed easily  Psychiatric/Behavioral: Negative for dysphoric mood, sleep disturbance and suicidal ideas  The patient is not nervous/anxious  Objective:  Vitals:    01/23/20 0953   BP: 132/88   BP Location: Right arm   Patient Position: Sitting   Cuff Size: Adult   Pulse: 87   SpO2: 97%   Weight: 82 1 kg (181 lb)   Height: 5' 7" (1 702 m)     Body mass index is 28 35 kg/m²  Physical Exam   Constitutional: He is oriented to person, place, and time  He appears well-developed and well-nourished  No distress  HENT:   HEENT-unremarkable   Eyes: Pupils are equal, round, and reactive to light  Neck: Neck supple  Nickel size sebaceous cyst left side of neck   Cardiovascular: Normal rate, regular rhythm and normal heart sounds     Pulmonary/Chest: Effort normal and breath sounds normal  No respiratory distress  He has no wheezes  He exhibits no tenderness  Musculoskeletal: He exhibits tenderness (Tenderness with limitation motion of left wrist on flexion and extension, no neurovascular compromise )  He exhibits no edema  Lymphadenopathy:     He has no cervical adenopathy  Neurological: He is alert and oriented to person, place, and time  Skin: Skin is warm and dry  No rash noted  Psychiatric: He has a normal mood and affect  His behavior is normal    Nursing note and vitals reviewed  RESULTS:    No results found for this or any previous visit (from the past 1008 hour(s))  This note was created with voice recognition software  Phonic, grammatical and spelling errors may be present within the note as a result

## 2020-01-23 NOTE — PATIENT INSTRUCTIONS
No change in current medication  Patient will do labs in March and I will call him with results  Due to recent left wrist sprain and ongoing pain with limitation of motion of the wrist will refer to physical therapy  Patient will keep me informed next week if current cough symptoms are not improving  Schedule follow-up in 6 months, sooner as needed

## 2020-01-27 ENCOUNTER — TELEPHONE (OUTPATIENT)
Dept: GASTROENTEROLOGY | Facility: CLINIC | Age: 50
End: 2020-01-27

## 2020-01-27 NOTE — TELEPHONE ENCOUNTER
01/27/20  Screened by: Lynn Crawford    Referring Provider     : If patient answers NO to medical questions, then schedule procedure  If patient answers YES to medical questions, then schedule office appointment  Previous Colonoscopy no  Date and Facility of last colonoscopy? Comments:         Pre- Screening: There is no height or weight on file to calculate BMI  Has patient been referred for a routine screening Colonoscopy? yes  Is the patient between 39-70 years old? yes    SCHEDULING STAFF   If the patient is between 45yrs-49yrs, please advise patient to confirm benefits/coverage with their insurance company for a routine screening colonoscopy, some insurance carriers will only cover at [phone] or older   If the patient is over 76years old, please schedule an office visit   If the patient had a previous colonoscopy send to the procedure  before continuing    Have you been diagnosed with a bleeding disorder or anemia? no    Do you take Other blood thinning medications no    Have you been diagnosed with Diabetes or are you taking any   Diabetic medications? no    Do you have any of the following symptoms?  none    Have you had a coronary or vascular stent within the last year? no    Have you had a heart attack or stroke in the last 6 months? no    Have you had intestinal surgery in the last 3 months? no    Do you have problems with: none    Do you use: none    Have you been hospitalized in the last Month? no    Have you had chest pain (angina) or breathing problems  (COPD) in the last 3 months? no    Do you have any difficulty walking up a flight of stairs? no    Have you had Kidney failure or insufficiency? no    Have you had heart valve surgery? no    Are you confined to a wheelchair?  no

## 2020-01-28 ENCOUNTER — EVALUATION (OUTPATIENT)
Dept: OCCUPATIONAL THERAPY | Facility: CLINIC | Age: 50
End: 2020-01-28
Payer: COMMERCIAL

## 2020-01-28 DIAGNOSIS — S66.912A SPRAIN AND STRAIN OF LEFT WRIST: ICD-10-CM

## 2020-01-28 DIAGNOSIS — S63.502A SPRAIN AND STRAIN OF LEFT WRIST: ICD-10-CM

## 2020-01-28 DIAGNOSIS — S63.502D SPRAIN OF LEFT WRIST, SUBSEQUENT ENCOUNTER: Primary | ICD-10-CM

## 2020-01-28 PROCEDURE — 97165 OT EVAL LOW COMPLEX 30 MIN: CPT

## 2020-01-28 PROCEDURE — 97110 THERAPEUTIC EXERCISES: CPT

## 2020-01-28 NOTE — PROGRESS NOTES
OT Evaluation     Today's date: 2020  Patient name: Joanie Phan  : 1970  MRN: [id number]  Referring provider: Barby Burton PA-C  Dx:   Encounter Diagnosis     ICD-10-CM    1  Sprain of left wrist, subsequent encounter S63 502D    2  Sprain and strain of left wrist S63 502A Ambulatory referral to Physical Therapy    [id number]                   Assessment  Assessment details: Mirela Briggs is a 47 y o  RHD male referred to OT/Hand Therapy for evaluation and treatment for diagnosis L wrist sprain/strain  Clinical findings are consistent with referring diagnosis  Pt presents with increased pain with activity, decreased wrist AROM, and decreased L /pinch strength  Due to these impairments pt has difficulty turning the steering wheel when driving and is unable to push up/down with the L hand  Pt does not present with palpable tenderness, but pain is reported at end range of wrist and thumb flexion  Pain is localized to the dorsal wrist  Pt would benefit from continued skilled OT to address these deficits and enable improved functional use of the LUE  Pt was instructed in a HEP addressing extrinsic UE stretches and a trial application of kinesiotape for dorsal wrist pain  Impairments: abnormal or restricted ROM, activity intolerance, impaired physical strength, lacks appropriate home exercise program and pain with function  Functional limitations: difficulty driving, unable to lift heavy items, unable to push down/pull up with LUE  Symptom irritability: moderateBarriers to therapy: none  Understanding of Dx/Px/POC: excellent  Goals  STGs:  1  Pt will be independent with HEP (1-2 visits)  2  Decrease pain by 50% when using LUE to drive (4 visits)  3  Increase L wrist AROM to WNL (4 visits)    LTGs (to be achieved by discharge):  1  Pt will be able to lift a 5# object without pain  2  Pt will be independent with household chores using the LUE  3   Improve FOTO score to 76    Plan  Patient would benefit from: skilled occupational therapy  Planned modality interventions: ultrasound, thermotherapy: paraffin bath and thermotherapy: hydrocollator packs  Planned therapy interventions: joint mobilization, manual therapy, Henderson taping, strengthening, stretching, therapeutic activities, therapeutic exercise, functional ROM exercises and home exercise program  Frequency: 2x week  Duration in weeks: 4  Plan of Care beginning date: 2020  Plan of Care expiration date: 2020  Treatment plan discussed with: patient        Subjective Evaluation    History of Present Illness  Date of onset: 2020  Mechanism of injury: Pt fell onto outstretched L arm while snowboarding  Pt went to Urgent Care the next day and was isued a removable brace which he wore for about 1 week  Not a recurrent problem   Quality of life: excellent    Pain  Current pain ratin  At best pain ratin  At worst pain ratin  Location: dorsal wrist and thumb  Quality: burning and sharp  Relieving factors: ice, medications and rest  Aggravating factors: lifting  Progression: no change    Social Support  Lives with: spouse and young children    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; currently not working)  Hand dominance: right    Treatments  No previous or current treatments  Current treatment: occupational therapy  Patient Goals  Patient goals for therapy: decreased pain, increased motion, increased strength, independence with ADLs/IADLs and return to sport/leisure activities  Patient goal: return to rock climbing        Objective     Tenderness     Left Wrist/Hand   No tenderness in the first dorsal compartment and triangular fibrocartilage complex        Additional Tenderness Details  No tenderness reported with palpation of dorsal wrist structures    Neurological Testing     Additional Neurological Details  Pt denies any numbness/tingling    Active Range of Motion     Left Wrist   Wrist flexion: 45 degrees with pain  Wrist extension: 62 degrees   Radial deviation: 15 degrees   Ulnar deviation: 15 degrees     Right Wrist   Wrist flexion: 55 degrees   Wrist extension: 68 degrees   Radial deviation: 15 degrees   Ulnar deviation: 20 degrees     Additional Active Range of Motion Details  Pain over mid/ulnar dorsal wrist during wrist flexion    Strength/Myotome Testing     Left Wrist/Hand   Wrist extension: 4-  Wrist flexion: 4  Radial deviation: 4  Ulnar deviation: 5     (2nd hand position)     Trial 1: 77    Trial 2: 68    Trial 3: 61    Thumb Strength  Key/Lateral Pinch     Trail 1: 18    Trail 2: 14    Trial 3: 11    Average: 14 33  Tip/Two-Point Pinch     Trial 1: 9    Trial 2: 10    Trial 3: 7    Average: 8 67  Palmar/Three-Point Pinch     Trial 1: 11    Trial 2: 8    Trial 3: 10    Average: 9 67     Right Wrist/Hand      (2nd hand position)     Trial 1: 91    Trial 2: 80    Trial 3: 77    Thumb Strength   Key/Lateral Pinch     Trial 1: 18    Trial 2: 19    Trial 3: 18    Average: 18 33  Tip/Two-Point Pinch     Trial 1: 12    Trial 2: 12    Trial 3: 11    Average: 11 67  Palmar/Three-Point Pinch     Trial 1: 15    Trial 2: 14    Trial 3: 14    Average: 14 33    Additional Strength Details  Pain over dorsal wrist (radial) during L  testing    Tests     Left Wrist/Hand   Negative Finkelstein's       Swelling     Left Wrist/Hand   Circumference wrist: 17 5 cm    Right Wrist/Hand   Circumference wrist: 17 3 cm      Flowsheet Rows      Most Recent Value   PT/OT G-Codes   Current Score  62   Projected Score  76             Precautions: Universal      Manual  1/28            IASTM                                                    K-tape applied to dorsal wrist                Exercise Diary  1/28            Wrist ext stretch 5x HEP            1st DC stretch 5x HEP Modalities              MHP L wrist

## 2020-02-04 ENCOUNTER — OFFICE VISIT (OUTPATIENT)
Dept: OCCUPATIONAL THERAPY | Facility: CLINIC | Age: 50
End: 2020-02-04
Payer: COMMERCIAL

## 2020-02-04 DIAGNOSIS — S63.502D SPRAIN OF LEFT WRIST, SUBSEQUENT ENCOUNTER: Primary | ICD-10-CM

## 2020-02-04 PROCEDURE — 97110 THERAPEUTIC EXERCISES: CPT

## 2020-02-04 PROCEDURE — 97010 HOT OR COLD PACKS THERAPY: CPT

## 2020-02-04 PROCEDURE — 97140 MANUAL THERAPY 1/> REGIONS: CPT

## 2020-02-04 NOTE — PROGRESS NOTES
Daily Note     Today's date: 2020  Patient name: Joanie Phan  : 1970  MRN: [id number]  Referring provider: Barby Burton PA-C  Dx:   Encounter Diagnosis     ICD-10-CM    1  Sprain of left wrist, subsequent encounter S63 502D                   Subjective: "My elbow hurts " NPR = 2/10      Objective: See treatment diary below      Assessment: Tolerated treatment well  Patient would benefit from continued OT  Pt c/o soreness over dorsal wrist and lateral elbow  AROM appears WFL      Plan: Continue per plan of care  Progress treatment as tolerated         Precautions: Universal      Manual             IASTM  10'                                                  K-tape applied to dorsal wrist applied               Exercise Diary             Wrist ext stretch 5x HEP 5x           1st DC stretch 5x HEP 5x           Thumb isometrics  10x           Eccentric wrist ext  1# 2x10           RFB sup/pron  3x10                                                                                                                                                                                                                  Modalities              MHP L wrist 10'

## 2020-02-05 ENCOUNTER — TELEPHONE (OUTPATIENT)
Dept: INTERNAL MEDICINE CLINIC | Facility: CLINIC | Age: 50
End: 2020-02-05

## 2020-02-05 DIAGNOSIS — J01.00 ACUTE NON-RECURRENT MAXILLARY SINUSITIS: Primary | ICD-10-CM

## 2020-02-05 RX ORDER — CEFUROXIME AXETIL 250 MG/1
250 TABLET ORAL EVERY 12 HOURS SCHEDULED
Qty: 20 TABLET | Refills: 0 | Status: SHIPPED | OUTPATIENT
Start: 2020-02-05 | End: 2020-02-15

## 2020-02-07 ENCOUNTER — APPOINTMENT (OUTPATIENT)
Dept: OCCUPATIONAL THERAPY | Facility: CLINIC | Age: 50
End: 2020-02-07
Payer: COMMERCIAL

## 2020-02-11 ENCOUNTER — OFFICE VISIT (OUTPATIENT)
Dept: OCCUPATIONAL THERAPY | Facility: CLINIC | Age: 50
End: 2020-02-11
Payer: COMMERCIAL

## 2020-02-11 DIAGNOSIS — S63.502D SPRAIN OF LEFT WRIST, SUBSEQUENT ENCOUNTER: Primary | ICD-10-CM

## 2020-02-11 DIAGNOSIS — S63.502A SPRAIN AND STRAIN OF LEFT WRIST: ICD-10-CM

## 2020-02-11 DIAGNOSIS — S66.912A SPRAIN AND STRAIN OF LEFT WRIST: ICD-10-CM

## 2020-02-11 PROCEDURE — 97110 THERAPEUTIC EXERCISES: CPT

## 2020-02-11 PROCEDURE — 97140 MANUAL THERAPY 1/> REGIONS: CPT

## 2020-02-11 NOTE — PROGRESS NOTES
Daily Note     Today's date: 2020  Patient name: Mariann Joseph  : 1970  MRN: [id number]  Referring provider: Sussy Walters PA-C  Dx:   Encounter Diagnosis     ICD-10-CM    1  Sprain of left wrist, subsequent encounter S63 502D    2  Sprain and strain of left wrist L12 129J     L15 675P                   Subjective: "It's sore " Pt c/o pain over volar radial wrist/base of thumb      Objective: See treatment diary below      Assessment: Tolerated treatment well  Patient would benefit from continued OT  Pt c/o fatigue more than pain after treatment  Pt instructed in isometric thumb & wrist HEP; demo good understanding  Plan: Continue per plan of care  Progress treatment as tolerated         Precautions: Universal      Manual            IASTM  10' 10'                                                 K-tape applied to dorsal wrist applied               Exercise Diary            Wrist ext stretch 5x HEP 5x 5x          1st DC stretch 5x HEP 5x 5x          Thumb isometrics  10x 10x HEP          Eccentric wrist ext  1# 2x10           RFB sup/pron  3x10 3x10          Wrist isometrics   10x HEP          RFB we/wf   3x10                                                                                                                                                                                       Modalities             MHP L wrist 10' 5'           CP post  5'

## 2020-02-14 ENCOUNTER — APPOINTMENT (OUTPATIENT)
Dept: OCCUPATIONAL THERAPY | Facility: CLINIC | Age: 50
End: 2020-02-14
Payer: COMMERCIAL

## 2020-02-14 ENCOUNTER — CONSULT (OUTPATIENT)
Dept: PLASTIC SURGERY | Facility: CLINIC | Age: 50
End: 2020-02-14
Payer: COMMERCIAL

## 2020-02-14 VITALS — HEIGHT: 67 IN | BODY MASS INDEX: 28.25 KG/M2 | WEIGHT: 180 LBS

## 2020-02-14 DIAGNOSIS — L72.9 SKIN CYST: Primary | ICD-10-CM

## 2020-02-14 PROCEDURE — 1036F TOBACCO NON-USER: CPT | Performed by: PHYSICIAN ASSISTANT

## 2020-02-14 PROCEDURE — 3008F BODY MASS INDEX DOCD: CPT | Performed by: PHYSICIAN ASSISTANT

## 2020-02-14 PROCEDURE — 99203 OFFICE O/P NEW LOW 30 MIN: CPT | Performed by: PHYSICIAN ASSISTANT

## 2020-02-14 NOTE — PROGRESS NOTES
Assessment/Plan:    No problem-specific Assessment & Plan notes found for this encounter  Diagnoses and all orders for this visit:    Skin cyst      Plan:  Schedule for excision, complex closure  All risks, benefits, and options, including but not limited to, pain, scarring, bleeding, infection,contour deformity, damage to adjacent nerves, vessels, hematoma, seroma, paresthesias, anesthesia (temporary versus permanent), skin necrosis, fat necrosis, flap necrosis, wound dehiscence with need for healing by secondary intention and postoperative dressing changes, hypertrophic and/or keloid scar formation, recurrence, and need for revision and/or reoperation were fully explained to the patient  Pt expressed understanding, would like to undergo the procedure, and signed the consent today  I have spent 30 minutes with Patient  today in which greater than 50% of this time was spent in counseling/coordination of care regarding Prognosis, Risks and benefits of tx options and Intructions for management  Subjective:      Patient ID: Joanie Phan is a 47 y o  male  Pt presents with concern about a cyst on the left side of his neck  He would like it removed as he at times nicks it when he shaves          Current Outpatient Medications:     albuterol (PROVENTIL HFA,VENTOLIN HFA) 90 mcg/act inhaler, TAKE 2 PUFFS BY MOUTH EVERY 6 HOURS AS NEEDED FOR WHEEZE, Disp: 8 5 Inhaler, Rfl: 2    cefuroxime (CEFTIN) 250 mg tablet, Take 1 tablet (250 mg total) by mouth every 12 (twelve) hours for 10 days, Disp: 20 tablet, Rfl: 0    Cholecalciferol (VITAMIN D3 PO), Take by mouth, Disp: , Rfl:     Flaxseed Oil OIL, by Does not apply route, Disp: , Rfl:     multivitamin (THERAGRAN) TABS, Take 1 tablet by mouth daily, Disp: , Rfl:     oxyCODONE-acetaminophen (PERCOCET) 7 5-325 MG per tablet, Take 1 tablet by mouth 2 (two) times a day as needed, Disp: , Rfl: 0    simvastatin (ZOCOR) 10 mg tablet, Take 2 tablets (20 mg total) by mouth daily at bedtime (Patient not taking: Reported on 12/4/2019), Disp: 90 tablet, Rfl: 3    simvastatin (ZOCOR) 10 mg tablet, TAKE 1 TABLET BY MOUTH EVERYDAY AT BEDTIME (Patient not taking: Reported on 12/4/2019), Disp: 90 tablet, Rfl: 3    venlafaxine 225 MG TB24, TAKE 1 TABLET BY MOUTH EVERY DAY WITH BREAKFAST, Disp: 90 tablet, Rfl: 1     Allergies   Allergen Reactions    Celecoxib Anaphylaxis    Diclofenac Shortness Of Breath       Past Medical History:   Diagnosis Date    Disc degeneration, lumbar     Diverticulosis     Facet syndrome     Other muscle spasm     Parapsoriasis     Psoriasis     Sacroiliitis (HCC)     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     Past Surgical History:   Procedure Laterality Date    HERNIA REPAIR      PYLOROMYOTOMY      ROTATOR CUFF REPAIR Left     SPINAL FUSION      EXPLORATION    TONSILLECTOMY AND ADENOIDECTOMY      TOOTH EXTRACTION       Family History   Problem Relation Age of Onset    Cancer Mother     Lung cancer Mother         CARCINOID TUMOR    Hypertension Mother     Hypothyroidism Mother     Kidney cancer Mother     Diabetes Maternal Grandmother     Alzheimer's disease Maternal Grandfather     Coronary artery disease Maternal Grandfather     Other Maternal Grandfather         PACEMAKER     Social History     Tobacco Use    Smoking status: Former Smoker    Smokeless tobacco: Never Used   Substance Use Topics    Alcohol use: Not Currently     Frequency: Monthly or less     Comment: BEER - DESCRIBES AS INFREQUENT    Drug use: Yes     Types: Marijuana       Review of Systems   Constitutional: Negative  HENT: Positive for sinus pressure  Eyes: Negative  Respiratory: Negative  Cardiovascular: Negative  Gastrointestinal: Negative  Endocrine: Negative  Genitourinary: Negative  Musculoskeletal: Negative  Skin: Negative  Allergic/Immunologic: Negative  Neurological: Negative  Hematological: Negative  Psychiatric/Behavioral: Positive for dysphoric mood  The patient is nervous/anxious  Objective: There were no vitals taken for this visit  Physical Exam   Constitutional: He is oriented to person, place, and time  He appears well-developed and well-nourished  HENT:   Head: Normocephalic and atraumatic  Eyes: Pupils are equal, round, and reactive to light  Neck: Normal range of motion  Cardiovascular: Normal rate  Pulmonary/Chest: Effort normal    Abdominal: Soft  Bowel sounds are normal    Neurological: He is alert and oriented to person, place, and time  Skin: Skin is warm and dry  Psychiatric: He has a normal mood and affect   His behavior is normal

## 2020-02-19 ENCOUNTER — OFFICE VISIT (OUTPATIENT)
Dept: OCCUPATIONAL THERAPY | Facility: CLINIC | Age: 50
End: 2020-02-19
Payer: COMMERCIAL

## 2020-02-19 DIAGNOSIS — S63.502D SPRAIN OF LEFT WRIST, SUBSEQUENT ENCOUNTER: Primary | ICD-10-CM

## 2020-02-19 PROCEDURE — 97140 MANUAL THERAPY 1/> REGIONS: CPT

## 2020-02-19 PROCEDURE — 97110 THERAPEUTIC EXERCISES: CPT

## 2020-02-19 PROCEDURE — 97530 THERAPEUTIC ACTIVITIES: CPT

## 2020-02-19 NOTE — PROGRESS NOTES
Daily Note     Today's date: 2020  Patient name: Mariann Joseph  : 1970  MRN: [id number]  Referring provider: Sussy Walters PA-C  Dx:   Encounter Diagnosis     ICD-10-CM    1  Sprain of left wrist, subsequent encounter S63 502D                   Subjective: "I was able to use a crowbar with this hand yesterday "      Objective: See treatment diary below      Assessment: Tolerated treatment well  Patient would benefit from continued OT  Pt c/o fatigue more than pain after treatment  Plan: Continue per plan of care  Progress treatment as tolerated         Precautions: Universal      Manual           IASTM  10' 10' 10'                                                K-tape applied to dorsal wrist applied               Exercise Diary           Wrist ext stretch 5x HEP 5x 5x 5x         1st DC stretch 5x HEP 5x 5x 5x         Thumb isometrics  10x 10x HEP          Eccentric wrist ext  1# 2x10  2# 2x10         RFB sup/pron  3x10 3x10 3x10         Wrist isometrics   10x HEP          RFB we/wf   3x10 3x10         RPW - , thumb flex    15x         Rubber bands on tennis ball    3x5                                                                                                                                                            Modalities            MHP L wrist 10' 5' 5'          CP post  5'

## 2020-02-21 ENCOUNTER — OFFICE VISIT (OUTPATIENT)
Dept: OCCUPATIONAL THERAPY | Facility: CLINIC | Age: 50
End: 2020-02-21
Payer: COMMERCIAL

## 2020-02-21 ENCOUNTER — OFFICE VISIT (OUTPATIENT)
Dept: INTERNAL MEDICINE CLINIC | Facility: CLINIC | Age: 50
End: 2020-02-21
Payer: COMMERCIAL

## 2020-02-21 VITALS
OXYGEN SATURATION: 98 % | BODY MASS INDEX: 28.88 KG/M2 | SYSTOLIC BLOOD PRESSURE: 116 MMHG | TEMPERATURE: 97.6 F | RESPIRATION RATE: 16 BRPM | DIASTOLIC BLOOD PRESSURE: 72 MMHG | HEART RATE: 70 BPM | HEIGHT: 67 IN | WEIGHT: 184 LBS

## 2020-02-21 DIAGNOSIS — J01.40 ACUTE NON-RECURRENT PANSINUSITIS: ICD-10-CM

## 2020-02-21 DIAGNOSIS — S63.502D SPRAIN OF LEFT WRIST, SUBSEQUENT ENCOUNTER: Primary | ICD-10-CM

## 2020-02-21 DIAGNOSIS — J20.9 ACUTE BRONCHITIS WITH BRONCHOSPASM: Primary | ICD-10-CM

## 2020-02-21 PROCEDURE — 99213 OFFICE O/P EST LOW 20 MIN: CPT | Performed by: PHYSICIAN ASSISTANT

## 2020-02-21 PROCEDURE — 97110 THERAPEUTIC EXERCISES: CPT

## 2020-02-21 PROCEDURE — 1036F TOBACCO NON-USER: CPT | Performed by: PHYSICIAN ASSISTANT

## 2020-02-21 PROCEDURE — 97530 THERAPEUTIC ACTIVITIES: CPT

## 2020-02-21 PROCEDURE — 97140 MANUAL THERAPY 1/> REGIONS: CPT

## 2020-02-21 PROCEDURE — 3008F BODY MASS INDEX DOCD: CPT | Performed by: PHYSICIAN ASSISTANT

## 2020-02-21 RX ORDER — CEFUROXIME AXETIL 500 MG/1
500 TABLET ORAL EVERY 12 HOURS SCHEDULED
Qty: 20 TABLET | Refills: 0 | Status: SHIPPED | OUTPATIENT
Start: 2020-02-21 | End: 2020-03-02

## 2020-02-21 NOTE — PATIENT INSTRUCTIONS
Rest, increase fluids  Tylenol for fever or aches as per package instructions  Start antibiotic and finish  Always wise to take a probiotic and or eat yogurt daily when on an antibiotic  Can still use over-the-counter decongestant  Usual inhaler 3 times daily for the next 5 days minimally  Follow-up if not steadily improving

## 2020-02-21 NOTE — PROGRESS NOTES
Daily Note     Today's date: 2020  Patient name: Mayo Valdes  : 1970  MRN: [id number]  Referring provider: Cristian Lee PA-C  Dx:   Encounter Diagnosis     ICD-10-CM    1  Sprain of left wrist, subsequent encounter S63 502D                   Subjective: "I tweaked my thumb yesterday when I tried to take a cast iron out of the oven "      Objective: See treatment diary below      Assessment: Tolerated treatment well  Patient would benefit from continued OT  Pt able to tolerate TE upgrades without c/o pain  Reports fatigue after treatment  Plan: Continue per plan of care  Progress treatment as tolerated         Precautions: Universal      Manual          IASTM  10' 10' 10' 5'        Passive extrinsic stretches     5'                                  K-tape applied to dorsal wrist applied               Exercise Diary          Wrist ext stretch 5x HEP 5x 5x 5x 5x        1st DC stretch 5x HEP 5x 5x 5x 5x        Thumb isometrics  10x 10x HEP          Eccentric wrist ext  1# 2x10  2# 2x10         RFB sup/pron  3x10 3x10 3x10 GFB 3x10        Wrist isometrics   10x HEP          RFB we/wf   3x10 3x10 GFB 3x10        RPW - , thumb flex    15x 3x10 each        Rubber bands on tennis ball    3x5 3x5 3 sets        RFB on table             Sup/pron bar     1# 3x10        Prayer stretch     5x                                                                                                                    Modalities           MHP L wrist 10' 5' 5' 5'         CP post  5'

## 2020-02-21 NOTE — PROGRESS NOTES
Assessment/Plan:   Patient Instructions   Rest, increase fluids  Tylenol for fever or aches as per package instructions  Start antibiotic and finish  Always wise to take a probiotic and or eat yogurt daily when on an antibiotic  Can still use over-the-counter decongestant  Usual inhaler 3 times daily for the next 5 days minimally  Follow-up if not steadily improving  Quality Measures:       No follow-ups on file  Diagnoses and all orders for this visit:    Acute bronchitis with bronchospasm  -     cefuroxime (CEFTIN) 500 mg tablet; Take 1 tablet (500 mg total) by mouth every 12 (twelve) hours for 10 days    Acute non-recurrent pansinusitis  -     cefuroxime (CEFTIN) 500 mg tablet; Take 1 tablet (500 mg total) by mouth every 12 (twelve) hours for 10 days          Subjective:      Patient ID: Forrest Montiel is a 47 y o  male  Acute visit    Patient over the last week has been using over-the-counter Tylenol cold and sinus for head congestion and postnasal drip  Last night he awoke with headache and complete blockage of his nose to the point where he used Afrin nasal spray to open his nasal passages  He has developed a cough that is productive of yellowish sputum along with hearing himself wheeze  He does have an inhaler but has not started to use it  He has felt feverish but has not taken his temperature  Has had intermittent sore throat and ear congestion symptoms  Patient informing me that he has his colonoscopy set up for Monday        ALLERGIES:  Allergies   Allergen Reactions    Celecoxib Anaphylaxis    Diclofenac Shortness Of Breath       CURRENT MEDICATIONS:    Current Outpatient Medications:     albuterol (PROVENTIL HFA,VENTOLIN HFA) 90 mcg/act inhaler, TAKE 2 PUFFS BY MOUTH EVERY 6 HOURS AS NEEDED FOR WHEEZE, Disp: 8 5 Inhaler, Rfl: 2    Cholecalciferol (VITAMIN D3 PO), Take by mouth, Disp: , Rfl:     Flaxseed Oil OIL, by Does not apply route, Disp: , Rfl:    multivitamin (THERAGRAN) TABS, Take 1 tablet by mouth daily, Disp: , Rfl:     oxyCODONE-acetaminophen (PERCOCET) 7 5-325 MG per tablet, Take 1 tablet by mouth 2 (two) times a day as needed, Disp: , Rfl: 0    venlafaxine 225 MG TB24, TAKE 1 TABLET BY MOUTH EVERY DAY WITH BREAKFAST, Disp: 90 tablet, Rfl: 1    cefuroxime (CEFTIN) 500 mg tablet, Take 1 tablet (500 mg total) by mouth every 12 (twelve) hours for 10 days, Disp: 20 tablet, Rfl: 0    simvastatin (ZOCOR) 10 mg tablet, Take 2 tablets (20 mg total) by mouth daily at bedtime (Patient not taking: Reported on 2/21/2020), Disp: 90 tablet, Rfl: 3    simvastatin (ZOCOR) 10 mg tablet, TAKE 1 TABLET BY MOUTH EVERYDAY AT BEDTIME (Patient not taking: Reported on 2/21/2020), Disp: 90 tablet, Rfl: 3    ACTIVE PROBLEM LIST:  Patient Active Problem List   Diagnosis    Cervical somatic dysfunction    Depression    Disc degeneration, lumbar    Hyperlipidemia    Pain of finger of left hand    Skin cyst       PAST MEDICAL HISTORY:  Past Medical History:   Diagnosis Date    Disc degeneration, lumbar     Diverticulosis     Facet syndrome     Other muscle spasm     Parapsoriasis     Psoriasis     Sacroiliitis (HCC)     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       PAST SURGICAL HISTORY:  Past Surgical History:   Procedure Laterality Date    HERNIA REPAIR      PYLOROMYOTOMY      ROTATOR CUFF REPAIR Left     SPINAL FUSION      EXPLORATION    TONSILLECTOMY AND ADENOIDECTOMY      TOOTH EXTRACTION         FAMILY HISTORY:  Family History   Problem Relation Age of Onset    Cancer Mother     Lung cancer Mother         CARCINOID TUMOR    Hypertension Mother     Hypothyroidism Mother     Kidney cancer Mother     Diabetes Maternal Grandmother     Alzheimer's disease Maternal Grandfather     Coronary artery disease Maternal Grandfather     Other Maternal Grandfather         PACEMAKER       SOCIAL HISTORY:  Social History     Socioeconomic History    Marital status: /Civil Union     Spouse name: Not on file    Number of children: 1    Years of education: Not on file    Highest education level: Not on file   Occupational History    Occupation: COMPUTER CONSULTING     Comment: FULL-TIME EMPLOYMENT   Social Needs    Financial resource strain: Not on file    Food insecurity:     Worry: Not on file     Inability: Not on file    Transportation needs:     Medical: Not on file     Non-medical: Not on file   Tobacco Use    Smoking status: Former Smoker    Smokeless tobacco: Never Used   Substance and Sexual Activity    Alcohol use: Not Currently     Frequency: Monthly or less     Comment: BEER - DESCRIBES AS INFREQUENT    Drug use: Yes     Types: Marijuana    Sexual activity: Not on file   Lifestyle    Physical activity:     Days per week: Not on file     Minutes per session: Not on file    Stress: Not on file   Relationships    Social connections:     Talks on phone: Not on file     Gets together: Not on file     Attends Religion service: Not on file     Active member of club or organization: Not on file     Attends meetings of clubs or organizations: Not on file     Relationship status: Not on file    Intimate partner violence:     Fear of current or ex partner: Not on file     Emotionally abused: Not on file     Physically abused: Not on file     Forced sexual activity: Not on file   Other Topics Concern    Not on file   Social History Narrative    LIVING INDEPENDENTLY WITH SPOUSE    ONE SON       Review of Systems   Constitutional: Positive for fatigue  Negative for activity change, chills and fever  HENT: Positive for congestion, ear pain, postnasal drip, rhinorrhea, sinus pressure and sore throat  Eyes: Negative for discharge, redness, itching and visual disturbance  Respiratory: Positive for cough, shortness of breath and wheezing  Negative for chest tightness      Cardiovascular: Negative for chest pain, palpitations and leg swelling  Gastrointestinal: Negative for abdominal pain  Endocrine: Negative for polydipsia, polyphagia and polyuria  Genitourinary: Negative for difficulty urinating  Musculoskeletal: Negative for arthralgias and myalgias  Skin: Negative for rash  Allergic/Immunologic: Negative for immunocompromised state  Neurological: Negative for dizziness, syncope, weakness, light-headedness and headaches  Hematological: Negative for adenopathy  Does not bruise/bleed easily  Psychiatric/Behavioral: Negative for dysphoric mood, sleep disturbance and suicidal ideas  The patient is not nervous/anxious  Objective:  Vitals:    02/21/20 0824   BP: 116/72   BP Location: Left arm   Patient Position: Sitting   Cuff Size: Adult   Pulse: 70   Resp: 16   Temp: 97 6 °F (36 4 °C)   SpO2: 98%   Weight: 83 5 kg (184 lb)   Height: 5' 7" (1 702 m)     Body mass index is 28 82 kg/m²  Physical Exam   Constitutional: He is oriented to person, place, and time  He appears well-developed and well-nourished  No distress  HENT:   Head: Normocephalic  HEENT-injected conjunctivae, TMs dull with fluid behind, mildly retracted nasal mucosa hyperemic with yellow/green mucoid drainage, injected red posterior pharynx with yellow/green post nasal drainage, tender maxillary sinus region   Eyes: Pupils are equal, round, and reactive to light  Neck: Neck supple  Cardiovascular: Normal rate, regular rhythm and normal heart sounds  Pulmonary/Chest: Effort normal  No respiratory distress  He has wheezes (Inspiratory and expiratory wheezing throughout, some clear with coughing, rare rhonchi)  He exhibits no tenderness  Musculoskeletal: He exhibits no edema  Lymphadenopathy:     He has no cervical adenopathy  Neurological: He is alert and oriented to person, place, and time  Skin: Skin is warm and dry  No rash noted  Psychiatric: He has a normal mood and affect   His behavior is normal  Nursing note and vitals reviewed  RESULTS:    No results found for this or any previous visit (from the past 1008 hour(s))  This note was created with voice recognition software  Phonic, grammatical and spelling errors may be present within the note as a result

## 2020-02-24 ENCOUNTER — LAB REQUISITION (OUTPATIENT)
Dept: LAB | Facility: HOSPITAL | Age: 50
End: 2020-02-24
Payer: COMMERCIAL

## 2020-02-24 DIAGNOSIS — R63.5 ABNORMAL WEIGHT GAIN: ICD-10-CM

## 2020-02-24 PROCEDURE — 88305 TISSUE EXAM BY PATHOLOGIST: CPT | Performed by: PATHOLOGY

## 2020-02-25 ENCOUNTER — APPOINTMENT (OUTPATIENT)
Dept: OCCUPATIONAL THERAPY | Facility: CLINIC | Age: 50
End: 2020-02-25
Payer: COMMERCIAL

## 2020-02-28 ENCOUNTER — OFFICE VISIT (OUTPATIENT)
Dept: OCCUPATIONAL THERAPY | Facility: CLINIC | Age: 50
End: 2020-02-28
Payer: COMMERCIAL

## 2020-02-28 DIAGNOSIS — S63.502D SPRAIN OF LEFT WRIST, SUBSEQUENT ENCOUNTER: Primary | ICD-10-CM

## 2020-02-28 PROCEDURE — 97110 THERAPEUTIC EXERCISES: CPT

## 2020-02-28 PROCEDURE — 97530 THERAPEUTIC ACTIVITIES: CPT

## 2020-02-28 PROCEDURE — 97140 MANUAL THERAPY 1/> REGIONS: CPT

## 2020-03-03 ENCOUNTER — OFFICE VISIT (OUTPATIENT)
Dept: OCCUPATIONAL THERAPY | Facility: CLINIC | Age: 50
End: 2020-03-03
Payer: COMMERCIAL

## 2020-03-03 DIAGNOSIS — S63.502D SPRAIN OF LEFT WRIST, SUBSEQUENT ENCOUNTER: Primary | ICD-10-CM

## 2020-03-03 PROCEDURE — 97140 MANUAL THERAPY 1/> REGIONS: CPT

## 2020-03-03 PROCEDURE — 97110 THERAPEUTIC EXERCISES: CPT

## 2020-03-03 PROCEDURE — 97530 THERAPEUTIC ACTIVITIES: CPT

## 2020-03-03 NOTE — PROGRESS NOTES
Daily Note     Today's date: 3/3/2020  Patient name: Karma Vivas  : 1970  MRN: [id number]  Referring provider: Ana Luisa Galeas PA-C  Dx:   Encounter Diagnosis     ICD-10-CM    1  Sprain of left wrist, subsequent encounter S69 502D                   Subjective: "I get pain when I try to lift heavy things with my palm up "      Objective: See treatment diary below      Assessment: Tolerated treatment well  Patient would benefit from continued OT  Pt reports fatigue at end of treatment session  Able to complete TE without increased pain  Plan: Continue per plan of care  Progress treatment as tolerated         Precautions: Universal      Manual   3/3      IASTM  10' 10' 10' 5' 7' volar/radial wrist 7' volar/radial wrist      Passive extrinsic stretches     5' 3' 3'                                K-tape applied to dorsal wrist applied               Exercise Diary   33      Wrist ext stretch 5x HEP 5x 5x 5x 5x  10x      1st DC stretch 5x HEP 5x 5x 5x 5x        Thumb isometrics  10x 10x HEP          Eccentric wrist ext  1# 2x10  2# 2x10         RFB sup/pron  3x10 3x10 3x10 GFB 3x10 GFB 3x10 GFB 3x10      Wrist isometrics   10x HEP          RFB we/wf   3x10 3x10 GFB 3x10 GFB 3x10 GFB 3x10      GPW -       3x10 3x10      RPW- , thumb flex    15x 3x10 each RPW thumb GPW 3x10      Rubber bands on tennis ball    3x5 3x5 3 sets 2 sets 3 sets      RFB on table       10x 4 direc      Sup/pron bar     1# 3x10 1# 3x10 2# 3x10      Prayer stretch     5x 5x 10x                                                                                                                  Modalities   3/3       MHP L wrist 10' 5' 5' 5' 5' 5'       CP post  5'

## 2020-03-06 ENCOUNTER — OFFICE VISIT (OUTPATIENT)
Dept: OCCUPATIONAL THERAPY | Facility: CLINIC | Age: 50
End: 2020-03-06
Payer: COMMERCIAL

## 2020-03-06 DIAGNOSIS — S63.502D SPRAIN OF LEFT WRIST, SUBSEQUENT ENCOUNTER: Primary | ICD-10-CM

## 2020-03-06 PROCEDURE — 97140 MANUAL THERAPY 1/> REGIONS: CPT

## 2020-03-06 PROCEDURE — 97110 THERAPEUTIC EXERCISES: CPT

## 2020-03-06 PROCEDURE — 97530 THERAPEUTIC ACTIVITIES: CPT

## 2020-03-06 NOTE — PROGRESS NOTES
OT Evaluation     Today's date: 3/6/2020  Patient name: Ramona Torres  : 1970  MRN: [id number]  Referring provider: Kelli Leung PA-C  Dx:   Encounter Diagnosis     ICD-10-CM    1  Sprain of left wrist, subsequent encounter S63 502D                   Assessment  Assessment details: Destiny Funes is a 47 y o  RHD male referred to OT/Hand Therapy for evaluation and treatment for diagnosis L wrist sprain/strain  Clinical findings are consistent with referring diagnosis  Pt presents with increased pain with activity, decreased wrist AROM, and decreased L /pinch strength  Due to these impairments pt has difficulty turning the steering wheel when driving and is unable to push up/down with the L hand  Pt does not present with palpable tenderness, but pain is reported at end range of wrist and thumb flexion  Pain is localized to the dorsal wrist  Pt would benefit from continued skilled OT to address these deficits and enable improved functional use of the LUE  Pt was instructed in a HEP addressing extrinsic UE stretches and a trial application of kinesiotape for dorsal wrist pain  3/6/20 Chica Belle has been seen for 8 OT visits and is making steady progress as noted by decreased pain, increased AROM, increased strength and pt report of improved functional use of the LUE  Pt continues to experience occasional pain with resistive/heavy lifting activities  Pt would benefit from continued skilled OT for strengthening  Impairments: activity intolerance, impaired physical strength, lacks appropriate home exercise program and pain with function  Functional limitations: pain with lifting heavy objects in supinated position  Symptom irritability: lowBarriers to therapy: none  Understanding of Dx/Px/POC: excellent  Goals  STGs:  1  Pt will be independent with HEP (1-2 visits) - MET  2  Decrease pain by 50% when using LUE to drive (4 visits) - MET  3   Increase L wrist AROM to WNL (4 visits) - MET    LTGs (to be achieved by discharge):  1  Pt will be able to lift a 5# object without pain - PROGRESSING  2  Pt will be independent with household chores using the LUE - PROGRESSING  3  Improve FOTO score to 76 - NOT MET    Plan  Plan details: Cont OT to achieve goals as stated above  Patient would benefit from: skilled occupational therapy  Planned modality interventions: ultrasound, thermotherapy: paraffin bath and thermotherapy: hydrocollator packs  Planned therapy interventions: joint mobilization, manual therapy, Henderson taping, strengthening, stretching, therapeutic activities, therapeutic exercise, functional ROM exercises and home exercise program  Frequency: 2x week  Duration in weeks: 4  Plan of Care beginning date: 3/6/2020  Plan of Care expiration date: 2020  Treatment plan discussed with: patient        Subjective Evaluation    History of Present Illness  Date of onset: 2020  Mechanism of injury: Pt fell onto outstretched L arm while snowboarding  Pt went to Urgent Care the next day and was isued a removable brace which he wore for about 1 week            Not a recurrent problem   Quality of life: excellent    Pain  Current pain ratin  At best pain ratin  At worst pain rating: 3  Location: dorsal wrist and thumb  Quality: burning and sharp  Relieving factors: ice, medications and rest  Aggravating factors: lifting  Progression: no change    Social Support  Lives with: spouse and young children    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; currently not working)  Hand dominance: right    Treatments  No previous or current treatments  Current treatment: occupational therapy  Patient Goals  Patient goals for therapy: decreased pain, increased motion, increased strength, independence with ADLs/IADLs and return to sport/leisure activities  Patient goal: return to rock climbing        Objective     Tenderness     Left Wrist/Hand   No tenderness in the first dorsal compartment and triangular fibrocartilage complex       Neurological Testing     Additional Neurological Details  Pt denies any numbness/tingling    Active Range of Motion     Left Wrist   Wrist flexion: 65 degrees   Wrist extension: 70 degrees   Radial deviation: 20 degrees   Ulnar deviation: 20 degrees     Right Wrist   Wrist flexion: 55 degrees   Wrist extension: 68 degrees   Radial deviation: 15 degrees   Ulnar deviation: 20 degrees     Strength/Myotome Testing     Left Wrist/Hand   Wrist extension: 5  Wrist flexion: 5  Radial deviation: 4  Ulnar deviation: 5     (2nd hand position)     Trial 1: 86    Trial 2: 84    Trial 3: 74    Thumb Strength  Key/Lateral Pinch     Trail 1: 16    Trail 2: 16    Trial 3: 19    Average: 17  Tip/Two-Point Pinch     Trial 1: 12    Trial 2: 10    Trial 3: 10    Average: 10 67  Palmar/Three-Point Pinch     Trial 1: 15    Trial 2: 15    Trial 3: 16    Average: 15 33     Tests     Left Wrist/Hand   Negative Finkelstein's  Swelling     Left Wrist/Hand   Circumference wrist: 17 5 cm    Right Wrist/Hand   Circumference wrist: 17 3 cm        Daily Note     Today's date: 3/6/2020  Patient name: Marilyn Yuusf  : 1970  MRN: [id number]  Referring provider: Tanika Cruz PA-C  Dx:   Encounter Diagnosis     ICD-10-CM    1  Sprain of left wrist, subsequent encounter S63 502D                   Subjective: "It's much better  I've been working in my basement "      Objective: See treatment diary below  See Re-eval      Assessment: See Re-eval for details  Tolerated treatment well  Patient demonstrated fatigue post treatment and would benefit from continued OT for strengthening  Plan: Continue per plan of care  Progress treatment as tolerated            Precautions: Universal        Manual  1/28 2/4 2/11 2/19 2/21 2/28 3/3 3/6     IASTM  10' 10' 10' 5' 7' volar/radial wrist 7' volar/radial wrist 7'     Passive extrinsic stretches     5' 3' 3' 3'                  Re-eval        done     K-tape applied to dorsal wrist applied               Exercise Diary  1/28 2/4 2/11 2/19 2/21 2/28 3/3 3/6     Wrist ext stretch 5x HEP 5x 5x 5x 5x  10x 10x     1st DC stretch 5x HEP 5x 5x 5x 5x   5x     Thumb isometrics  10x 10x HEP          Eccentric wrist ext  1# 2x10  2# 2x10         RFB sup/pron  3x10 3x10 3x10 GFB 3x10 GFB 3x10 GFB 3x10 GFB 3x10     Wrist isometrics   10x HEP          RFB we/wf   3x10 3x10 GFB 3x10 GFB 3x10 GFB 3x10 GFB 3x10     GPW -       3x10 3x10      RPW- , thumb flex    15x 3x10 each RPW thumb GPW 3x10      Rubber bands on tennis ball    3x5 3x5 3 sets 2 sets 3 sets      RFB on table       10x 4 direc GFB 10x 4 direc     Sup/pron bar     1# 3x10 1# 3x10 2# 3x10 3# 3x10     Prayer stretch     5x 5x 10x 10x     Ball turns        Green ball, FA sup 10x     Wall ball walks        Green 6x                                                                                       Modalities  2/4 2/11 2/19 2/21 2/28 3/3 3/6      MHP L wrist 10' 5' 5' 5' 5' 5' 5'      CP post  5'

## 2020-03-10 ENCOUNTER — OFFICE VISIT (OUTPATIENT)
Dept: OCCUPATIONAL THERAPY | Facility: CLINIC | Age: 50
End: 2020-03-10
Payer: COMMERCIAL

## 2020-03-10 DIAGNOSIS — S63.502D SPRAIN OF LEFT WRIST, SUBSEQUENT ENCOUNTER: Primary | ICD-10-CM

## 2020-03-10 PROCEDURE — 97530 THERAPEUTIC ACTIVITIES: CPT

## 2020-03-10 PROCEDURE — 97140 MANUAL THERAPY 1/> REGIONS: CPT

## 2020-03-10 PROCEDURE — 97110 THERAPEUTIC EXERCISES: CPT

## 2020-03-10 NOTE — PROGRESS NOTES
Daily Note     Today's date: 3/10/2020  Patient name: Neil Suárez  : 1970  MRN: [id number]  Referring provider: Nila Olmedo PA-C  Dx:   Encounter Diagnosis     ICD-10-CM    1  Sprain of left wrist, subsequent encounter S63 502D                   Subjective: "My arm was sore after rolling the ball up the wall last time "      Objective: See treatment diary below      Assessment: Tolerated treatment well  Patient demonstrated fatigue post treatment and would benefit from continued OTfor strengthening  Extrinsic tightness improves after passive/active stretching  Pt reports improving functional use of LUE      Plan: Continue per plan of care  Progress treatment as tolerated            Precautions: Universal        Manual   2 3/3 36 3/10    IASTM  10' 10' 10' 5' 7' volar/radial wrist 7' volar/radial wrist 7' 7'    Passive extrinsic stretches     5' 3' 3' 3' 3'                 Re-eval        done     K-tape applied to dorsal wrist applied               Exercise Diary   2 3/3 3/6 3/10    Wrist ext stretch 5x HEP 5x 5x 5x 5x  10x 10x 10x    1st DC stretch 5x HEP 5x 5x 5x 5x   5x     Thumb isometrics  10x 10x HEP          Eccentric wrist ext  1# 2x10  2# 2x10         RFB sup/pron  3x10 3x10 3x10 GFB 3x10 GFB 3x10 GFB 3x10 GFB 3x10 GFB 3x10    Wrist isometrics   10x HEP          RFB we/wf   3x10 3x10 GFB 3x10 GFB 3x10 GFB 3x10 GFB 3x10 GFB 3x10    GPW -       3x10 3x10      RPW- , thumb flex    15x 3x10 each RPW thumb GPW 3x10      Rubber bands on tennis ball    3x5 3x5 3 sets 2 sets 3 sets      RFB on table       10x 4 direc GFB 10x 4 direc GFB 10x 4 direc    Sup/pron bar     1# 3x10 1# 3x10 2# 3x10 3# 3x10 3# 3x10    Prayer stretch     5x 5x 10x 10x 10x    Ball turns        Green ball, FA sup 10x     Wall ball walks        Green 6x 5x    Gripper         35# 15x                                                                         Modalities 2/4 2/11 2/19 2/21 2/28 3/3 3/6 3/10     MHP L wrist 10' 5' 5' 5' 5' 5' 5' 5'     CP post  5'

## 2020-03-13 ENCOUNTER — OFFICE VISIT (OUTPATIENT)
Dept: OCCUPATIONAL THERAPY | Facility: CLINIC | Age: 50
End: 2020-03-13
Payer: COMMERCIAL

## 2020-03-13 DIAGNOSIS — S63.502D SPRAIN OF LEFT WRIST, SUBSEQUENT ENCOUNTER: Primary | ICD-10-CM

## 2020-03-13 PROCEDURE — 97110 THERAPEUTIC EXERCISES: CPT

## 2020-03-13 PROCEDURE — 97140 MANUAL THERAPY 1/> REGIONS: CPT

## 2020-03-13 PROCEDURE — 97530 THERAPEUTIC ACTIVITIES: CPT

## 2020-03-13 NOTE — PROGRESS NOTES
Daily Note     Today's date: 3/13/2020  Patient name: Nico De Paz  : 1970  MRN: [id number]  Referring provider: Chapo Tena PA-C  Dx:   Encounter Diagnosis     ICD-10-CM    1  Sprain of left wrist, subsequent encounter S63 502D                   Subjective: "It feels worked (after tx) "      Objective: See treatment diary below      Assessment: Tolerated treatment well  Patient demonstrated fatigue post treatment and would benefit from continued OT for strengthening  Pt reports difficulty using LUE for carrying heavy items  Pt is progressing with strengthening program       Plan: Continue per plan of care  Progress treatment as tolerated            Precautions: Universal        Manual   3/3 3/6 3/10 3/13   IASTM  10' 10' 10' 5' 7' volar/radial wrist 7' volar/radial wrist 7' 7' 5'   Passive extrinsic stretches     5' 3' 3' 3' 3' 3'                Re-eval        done     K-tape applied to dorsal wrist applied               Exercise Diary   2 3/3 3/6 3/10 3/13   Wrist ext stretch 5x HEP 5x 5x 5x 5x  10x 10x 10x 10x   1st DC stretch 5x HEP 5x 5x 5x 5x   5x  5x   Thumb isometrics  10x 10x HEP          Eccentric wrist ext  1# 2x10  2# 2x10         RFB sup/pron  3x10 3x10 3x10 GFB 3x10 GFB 3x10 GFB 3x10 GFB 3x10 GFB 3x10    Wrist isometrics   10x HEP          RFB we/wf   3x10 3x10 GFB 3x10 GFB 3x10 GFB 3x10 GFB 3x10 GFB 3x10    GPW -       3x10 3x10      RPW- , thumb flex    15x 3x10 each RPW thumb GPW 3x10      Rubber bands on tennis ball    3x5 3x5 3 sets 2 sets 3 sets      RFB on table       10x 4 direc GFB 10x 4 direc GFB 10x 4 direc    Sup/pron bar     1# 3x10 1# 3x10 2# 3x10 3# 3x10 3# 3x10 3# 2x10   Prayer stretch     5x 5x 10x 10x 10x 10x   Ball turns        Green ball, FA sup 10x     Wall ball walks        Green 6x 5x 5x   Gripper         35# 15x    Wrist PREs          3# 2x10   Rebounder          G ball 2x25 Modalities  2/4 2/11 2/19 2/21 2/28 3/3 3/6 3/10 3/13    MHP L wrist 10' 5' 5' 5' 5' 5' 5' 5' 5'    CP post  5'

## 2020-03-17 ENCOUNTER — OFFICE VISIT (OUTPATIENT)
Dept: OCCUPATIONAL THERAPY | Facility: CLINIC | Age: 50
End: 2020-03-17
Payer: COMMERCIAL

## 2020-03-17 DIAGNOSIS — S63.502D SPRAIN OF LEFT WRIST, SUBSEQUENT ENCOUNTER: Primary | ICD-10-CM

## 2020-03-17 PROCEDURE — 97110 THERAPEUTIC EXERCISES: CPT

## 2020-03-17 PROCEDURE — 97530 THERAPEUTIC ACTIVITIES: CPT

## 2020-03-17 PROCEDURE — 97140 MANUAL THERAPY 1/> REGIONS: CPT

## 2020-03-17 NOTE — PROGRESS NOTES
OT Re-Evaluation  and OT Discharge     Today's date: 3/17/2020  Patient name: Mariann Joseph  : 1970  MRN: [id number]  Referring provider: Sussy Walters PA-C  Dx:   Encounter Diagnosis     ICD-10-CM    1  Sprain of left wrist, subsequent encounter S63 502D                   Assessment  Assessment details: Prashant Garner is a 47 y o  RHD male referred to OT/Hand Therapy for evaluation and treatment for diagnosis L wrist sprain/strain  Clinical findings are consistent with referring diagnosis  Pt presents with increased pain with activity, decreased wrist AROM, and decreased L /pinch strength  Due to these impairments pt has difficulty turning the steering wheel when driving and is unable to push up/down with the L hand  Pt does not present with palpable tenderness, but pain is reported at end range of wrist and thumb flexion  Pain is localized to the dorsal wrist  Pt would benefit from continued skilled OT to address these deficits and enable improved functional use of the LUE  Pt was instructed in a HEP addressing extrinsic UE stretches and a trial application of kinesiotape for dorsal wrist pain  3/6/20 Liz Storey has been seen for 8 OT visits and is making steady progress as noted by decreased pain, increased AROM, increased strength and pt report of improved functional use of the LUE  Pt continues to experience occasional pain with resistive/heavy lifting activities  Pt would benefit from continued skilled OT for strengthening  3/17/20 - Ernesto Clark has been seen for 11 OT visits  LUE AROM is WNL and pt presents with decreased pain, increased strength and improved functional use of his LUE  Pt's primary deficit is occasional pain over the volar radial wrist when lifting objects weighing more than 10-15#  Pt states he is independent with ADLs and is using his LUE for home improvement projects   Pt has been instructed in an upgraded HEP addressing strengthening, wrist stabilization exercises and extrinsic muscle stretching  Plan is to see pt for 1 more OT visit to address any concerns and upgrade HEP as needed  Pt is in agreement with plan of care  Impairments: pain with function  Functional limitations: occasional pain w/lifting heavy objects   Symptom irritability: lowBarriers to therapy: none  Understanding of Dx/Px/POC: excellent  Goals  STGs:  1  Pt will be independent with HEP (1-2 visits) - MET  2  Decrease pain by 50% when using LUE to drive (4 visits) - MET  3  Increase L wrist AROM to WNL (4 visits) - MET    LTGs (to be achieved by discharge):  1  Pt will be able to lift a 5# object without pain - MET  2  Pt will be independent with household chores using the LUE - MET  3  Improve FOTO score to 68 - PROGRESSING    Plan  Plan details: Potential discharge to [facility] at next visit  Patient would benefit from: skilled occupational therapy  Planned therapy interventions: strengthening, stretching, therapeutic activities, therapeutic exercise and home exercise program  Frequency: 1x week  Duration in weeks: 1  Plan of Care beginning date: 3/17/2020  Plan of Care expiration date: 2020  Treatment plan discussed with: patient        Subjective Evaluation    History of Present Illness  Date of onset: 2020  Mechanism of injury: Pt fell onto outstretched L arm while snowboarding  Pt went to Urgent Care the next day and was isued a removable brace which he wore for about 1 week            Not a recurrent problem   Quality of life: excellent    Pain  Current pain ratin  At best pain ratin  At worst pain rating: 3  Location: volar wrist  Relieving factors: change in position  Aggravating factors: lifting  Progression: improved    Social Support  Lives with: spouse and young children    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; currently not working)  Hand dominance: right    Treatments  No previous or current treatments  Current treatment: occupational therapy  Patient Goals  Patient goals for therapy: decreased pain, increased motion, increased strength, independence with ADLs/IADLs and return to sport/leisure activities  Patient goal: return to rock climbing        Objective     Tenderness     Left Wrist/Hand   No tenderness in the first dorsal compartment and triangular fibrocartilage complex   Additional Tenderness Details  No palpable tenderness    Neurological Testing     Additional Neurological Details  Pt denies any numbness/tingling    Active Range of Motion     Left Wrist   Normal active range of motion  Wrist flexion: 65 degrees   Wrist extension: 70 degrees   Radial deviation: 20 degrees   Ulnar deviation: 20 degrees     Right Wrist   Wrist flexion: 55 degrees   Wrist extension: 68 degrees   Radial deviation: 15 degrees   Ulnar deviation: 20 degrees     Strength/Myotome Testing     Left Wrist/Hand   Wrist extension: 5  Wrist flexion: 5  Radial deviation: 4+  Ulnar deviation: 5     (2nd hand position)     Trial 1: 95    Trial 2: 77    Trial 3: 82    Thumb Strength  Key/Lateral Pinch     Trail 1: 19    Trail 2: 20    Trial 3: 17    Average: 18 67  Tip/Two-Point Pinch     Trial 1: 11    Trial 2: 10    Trial 3: 11    Average: 10 67  Palmar/Three-Point Pinch     Trial 1: 16    Trial 2: 18    Trial 3: 16    Average: 16 67     Right Wrist/Hand      (2nd hand position)     Trial 1: 93    Trial 2: 92    Trial 3: 93    Thumb Strength   Key/Lateral Pinch     Trial 1: 19    Trial 2: 20    Trial 3: 19    Average: 19 33  Tip/Two-Point Pinch     Trial 1: 13    Trial 2: 13    Trial 3: 14    Average: 13 33  Palmar/Three-Point Pinch     Trial 1: 15    Trial 2: 13    Trial 3: 15    Average: 14 33    Tests     Left Wrist/Hand   Negative Finkelstein's       Swelling     Left Wrist/Hand   Circumference wrist: 17 5 cm    Right Wrist/Hand   Circumference wrist: 17 3 cm        Daily Note     Today's date: 3/17/2020  Patient name: Forrest Montiel  : 1970  MRN: [id number]  Referring provider: Katie Diana RODNEY  Dx:   Encounter Diagnosis     ICD-10-CM    1  Sprain of left wrist, subsequent encounter S63 502D                   Subjective: "I'm doing a lot more  I had some pain when I was lifting a sheet of dry wall "      Objective: See treatment diary below  See Re-eval      Assessment: See Re-eval for details  Tolerated treatment well  Patient is independent with HEP  Pt denies any pain after completing strengthening exercises; only fatigue reported  Plan: Potential discharge next visit          Flowsheet Rows      Most Recent Value   PT/OT G-Codes   Current Score  72   Projected Score  76          Precautions: Universal        Manual  1/28 2/4 2/11 2/19 2/21 2/28 3/3 3/6 3/10 3/13 3/17   IASTM  10' 10' 10' 5' 7' volar/radial wrist 7' volar/radial wrist 7' 7' 5' 5'   Passive extrinsic stretches     5' 3' 3' 3' 3' 3' 3'                 Re-eval        done   done   K-tape applied to dorsal wrist applied                Exercise Diary  1/28 2/4 2/11 2/19 2/21 2/28 3/3 3/6 3/10 3/13 3/17   Wrist ext stretch 5x HEP 5x 5x 5x 5x  10x 10x 10x 10x 10x   1st DC stretch 5x HEP 5x 5x 5x 5x   5x  5x 5x   Thumb isometrics  10x 10x HEP           Eccentric wrist ext  1# 2x10  2# 2x10          RFB sup/pron  3x10 3x10 3x10 GFB 3x10 GFB 3x10 GFB 3x10 GFB 3x10 GFB 3x10     Wrist isometrics   10x HEP           RFB we/wf   3x10 3x10 GFB 3x10 GFB 3x10 GFB 3x10 GFB 3x10 GFB 3x10     GPW -       3x10 3x10       RPW- , thumb flex    15x 3x10 each RPW thumb GPW 3x10    GPW 3x10   Rubber bands on tennis ball    3x5 3x5 3 sets 2 sets 3 sets       RFB on table       10x 4 direc GFB 10x 4 direc GFB 10x 4 direc     Sup/pron bar     1# 3x10 1# 3x10 2# 3x10 3# 3x10 3# 3x10 3# 2x10    Prayer stretch     5x 5x 10x 10x 10x 10x 10x   Ball turns        Green ball, FA sup 10x      Wall ball walks        Green 6x 5x 5x 6x   Gripper         35# 15x     Wrist PREs          3# 2x10    Rebounder          G ball 2x25 G ball 3x25   Pinch ring R/G 3x                                   Modalities  2/4 2/11 2/19 2/21 2/28 3/3 3/6 3/10 3/13    MHP L wrist 10' 5' 5' 5' 5' 5' 5' 5' 5'    CP post  5'

## 2020-03-17 NOTE — PROGRESS NOTES
Daily Note     Today's date: 3/17/2020  Patient name: Pancho Bonilla  : 1970  MRN: [id number]  Referring provider: Yann Calhoun PA-C  Dx:   Encounter Diagnosis     ICD-10-CM    1  Sprain of left wrist, subsequent encounter S61 645D                   Subjective: "It feels worked (after tx) "      Objective: See treatment diary below    L (2) = 95, 77, 82   R (2) = 93, 92, 93    Lateral pinch: L= 19, 20,17; R= 19, 20, 19  3 jaw toñito pinch: L= 16, 18, 16;  R= 15, 13, 15  2 point pinch: L= 11, 10, 11 (w/pain); R = 13, 13, 14    Assessment: Tolerated treatment well  Patient demonstrated fatigue post treatment and would benefit from continued OT for strengthening  Pt reports difficulty using LUE for carrying heavy items  Pt is progressing with strengthening program       Plan: Continue per plan of care  Progress treatment as tolerated  Precautions: Universal      3/17  Manu  OT Evaluation     Today's date: 3/17/2020  Patient name: Pancho Bonilla  : 1970  MRN: [id number]  Referring provider: Yann Calhoun PA-C  Dx:   Encounter Diagnosis     ICD-10-CM    1  Sprain of left wrist, subsequent encounter S69 668D                   Assessment  Assessment details: Cuca Nieves is a 47 y o  RHD male referred to OT/Hand Therapy for evaluation and treatment for diagnosis L wrist sprain/strain  Clinical findings are consistent with referring diagnosis  Pt presents with increased pain with activity, decreased wrist AROM, and decreased L /pinch strength  Due to these impairments pt has difficulty turning the steering wheel when driving and is unable to push up/down with the L hand  Pt does not present with palpable tenderness, but pain is reported at end range of wrist and thumb flexion  Pain is localized to the dorsal wrist  Pt would benefit from continued skilled OT to address these deficits and enable improved functional use of the LUE   Pt was instructed in a HEP addressing extrinsic UE stretches and a trial application of kinesiotape for dorsal wrist pain  3/6/20 Liz Storey has been seen for 8 OT visits and is making steady progress as noted by decreased pain, increased AROM, increased strength and pt report of improved functional use of the LUE  Pt continues to experience occasional pain with resistive/heavy lifting activities  Pt would benefit from continued skilled OT for strengthening  3/17/20 Liz Storey has been seen for 11 OT visits and presents with improved AROM and strength  Pt also reports improved functional use of his LUE  His primary deficit is discomfort with weightbearing activities and difficulty with carrying heavy objects (>10#)  Pt's FOTO score has improved to 72  Pt is independent with his upgraded strengthening HEP  Pt is transitioning to HEP only and will be seen for 1 more visit to address any concerns and upgrade his HEP as needed  Pt is in agreement with this plan  Impairments: impaired physical strength and pain with function  Functional limitations: pain with lifting heavy objects in supinated position  Symptom irritability: lowBarriers to therapy: none  Understanding of Dx/Px/POC: excellent  Goals  STGs:  1  Pt will be independent with HEP (1-2 visits) - MET  2  Decrease pain by 50% when using LUE to drive (4 visits) - MET  3  Increase L wrist AROM to WNL (4 visits) - MET    LTGs (to be achieved by discharge):  1  Pt will be able to lift a 5# object without pain - MET  2  Pt will be independent with household chores using the LUE - MET  3   Improve FOTO score to 76 - PROGRESSING    Plan  Plan details: Cont OT to achieve goals as stated above  Patient would benefit from: skilled occupational therapy  Planned modality interventions: thermotherapy: hydrocollator packs  Planned therapy interventions: strengthening, therapeutic activities, therapeutic exercise and home exercise program  Frequency: 1x week  Duration in visits: 1  Plan of Care beginning date: 3/17/2020  Plan of Care expiration date: 2020  Treatment plan discussed with: patient        Subjective Evaluation    History of Present Illness  Date of onset: 2020  Mechanism of injury: Pt fell onto outstretched L arm while snowboarding  Pt went to Urgent Care the next day and was isued a removable brace which he wore for about 1 week  Not a recurrent problem   Quality of life: excellent    Pain  Current pain ratin  At best pain ratin  At worst pain rating: 3  Location: dorsal wrist and thumb  Quality: burning and sharp  Relieving factors: ice, medications and rest  Aggravating factors: lifting  Progression: no change    Social Support  Lives with: spouse and young children    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; currently not working)  Hand dominance: right    Treatments  No previous or current treatments  Current treatment: occupational therapy  Patient Goals  Patient goals for therapy: decreased pain, increased motion, increased strength, independence with ADLs/IADLs and return to sport/leisure activities  Patient goal: return to rock climbing        Objective     Tenderness     Left Wrist/Hand   No tenderness in the first dorsal compartment and triangular fibrocartilage complex        Neurological Testing     Additional Neurological Details  Pt denies any numbness/tingling    Active Range of Motion     Left Wrist   Wrist flexion: 65 degrees   Wrist extension: 70 degrees   Radial deviation: 20 degrees   Ulnar deviation: 20 degrees     Right Wrist   Wrist flexion: 55 degrees   Wrist extension: 68 degrees   Radial deviation: 15 degrees   Ulnar deviation: 20 degrees     Strength/Myotome Testing     Left Wrist/Hand   Wrist extension: 5  Wrist flexion: 5  Radial deviation: 4  Ulnar deviation: 5     (2nd hand position)     Trial 1: 86    Trial 2: 84    Trial 3: 74    Thumb Strength  Key/Lateral Pinch     Trail 1: 16    Trail 2: 16    Trial 3: 19    Average: 17  Tip/Two-Point Pinch Trial 1: 12    Trial 2: 10    Trial 3: 10    Average: 10 67  Palmar/Three-Point Pinch     Trial 1: 15    Trial 2: 15    Trial 3: 16    Average: 15 33     Tests     Left Wrist/Hand   Negative Finkelstein's  Swelling     Left Wrist/Hand   Circumference wrist: 17 5 cm    Right Wrist/Hand   Circumference wrist: 17 3 cm        Daily Note     Today's date: 3/17/2020  Patient name: Viki Collins  : 1970  MRN: [id number]  Referring provider: Frances Mosqueda PA-C  Dx:   Encounter Diagnosis     ICD-10-CM    1  Sprain of left wrist, subsequent encounter S63 502D                   Subjective: "It's much better  I've been working in my basement "      Objective: See treatment diary below  See Re-eval      Assessment: See Re-eval for details  Tolerated treatment well  Patient demonstrated fatigue post treatment and would benefit from continued OT for strengthening  Plan: Continue per plan of care  Progress treatment as tolerated            Flowsheet Rows      Most Recent Value   PT/OT G-Codes   Current Score  72   Projected Score  76      Precautions: Universal        Manual   3/3 3/6     IASTM  10' 10' 10' 5' 7' volar/radial wrist 7' volar/radial wrist 7'     Passive extrinsic stretches     5' 3' 3' 3'                  Re-eval        done     K-tape applied to dorsal wrist applied               Exercise Diary   2 3/3 3/6     Wrist ext stretch 5x HEP 5x 5x 5x 5x  10x 10x     1st DC stretch 5x HEP 5x 5x 5x 5x   5x     Thumb isometrics  10x 10x HEP          Eccentric wrist ext  1# 2x10  2# 2x10         RFB sup/pron  3x10 3x10 3x10 GFB 3x10 GFB 3x10 GFB 3x10 GFB 3x10     Wrist isometrics   10x HEP          RFB we/wf   3x10 3x10 GFB 3x10 GFB 3x10 GFB 3x10 GFB 3x10     GPW -       3x10 3x10      RPW- , thumb flex    15x 3x10 each RPW thumb GPW 3x10      Rubber bands on tennis ball    3x5 3x5 3 sets 2 sets 3 sets      RFB on table       10x 4 direc GFB 10x 4 direc     Sup/pron bar     1# 3x10 1# 3x10 2# 3x10 3# 3x10     Prayer stretch     5x 5x 10x 10x     Ball turns        Green ball, FA sup 10x     Wall ball walks        Green 6x                                                                                       Modalities  2/4 2/11 2/19 2/21 2/28 3/3 3/6      MHP L wrist 10' 5' 5' 5' 5' 5' 5'      CP post  5'                              al  1/28 2/4 2/11 2/19 2/21 2/28 3/3 3/6 3/10 3/13    IASTM  10' 10' 10' 5' 7' volar/radial wrist 7' volar/radial wrist 7' 7' 5'    Passive extrinsic stretches     5' 3' 3' 3' 3' 3'                  Re-eval        done      K-tape applied to dorsal wrist applied                Exercise Diary  1/28 2/4 2/11 2/19 2/21 2/28 3/3 3/6 3/10 3/13   Wrist ext stretch 5x HEP 5x 5x 5x 5x  10x 10x 10x 10x   1st DC stretch 5x HEP 5x 5x 5x 5x   5x  5x   Thumb isometrics  10x 10x HEP          Eccentric wrist ext  1# 2x10  2# 2x10         RFB sup/pron  3x10 3x10 3x10 GFB 3x10 GFB 3x10 GFB 3x10 GFB 3x10 GFB 3x10    Wrist isometrics   10x HEP          RFB we/wf   3x10 3x10 GFB 3x10 GFB 3x10 GFB 3x10 GFB 3x10 GFB 3x10    GPW -       3x10 3x10      RPW- , thumb flex    15x 3x10 each RPW thumb GPW 3x10      Rubber bands on tennis ball    3x5 3x5 3 sets 2 sets 3 sets      RFB on table       10x 4 direc GFB 10x 4 direc GFB 10x 4 direc    Sup/pron bar     1# 3x10 1# 3x10 2# 3x10 3# 3x10 3# 3x10 3# 2x10   Prayer stretch     5x 5x 10x 10x 10x 10x   Ball turns        Green ball, FA sup 10x     Wall ball walks        Green 6x 5x 5x   Gripper         35# 15x    Wrist PREs          3# 2x10   Rebounder          G ball 2x25                                              Modalities  2/4 2/11 2/19 2/21 2/28 3/3 3/6 3/10 3/13    MHP L wrist 10' 5' 5' 5' 5' 5' 5' 5' 5'    CP post  5'

## 2020-03-20 ENCOUNTER — APPOINTMENT (OUTPATIENT)
Dept: OCCUPATIONAL THERAPY | Facility: CLINIC | Age: 50
End: 2020-03-20
Payer: COMMERCIAL

## 2020-03-24 ENCOUNTER — APPOINTMENT (OUTPATIENT)
Dept: OCCUPATIONAL THERAPY | Facility: CLINIC | Age: 50
End: 2020-03-24
Payer: COMMERCIAL

## 2020-04-11 DIAGNOSIS — F32.A DEPRESSION, UNSPECIFIED DEPRESSION TYPE: ICD-10-CM

## 2020-04-12 RX ORDER — VENLAFAXINE HYDROCHLORIDE 225 MG/1
TABLET, EXTENDED RELEASE ORAL
Qty: 90 TABLET | Refills: 1 | Status: SHIPPED | OUTPATIENT
Start: 2020-04-12 | End: 2020-07-06 | Stop reason: SDUPTHER

## 2020-06-05 ENCOUNTER — OFFICE VISIT (OUTPATIENT)
Dept: INTERNAL MEDICINE CLINIC | Facility: CLINIC | Age: 50
End: 2020-06-05
Payer: COMMERCIAL

## 2020-06-05 VITALS
OXYGEN SATURATION: 98 % | SYSTOLIC BLOOD PRESSURE: 128 MMHG | WEIGHT: 184 LBS | DIASTOLIC BLOOD PRESSURE: 78 MMHG | BODY MASS INDEX: 28.88 KG/M2 | TEMPERATURE: 98.1 F | HEART RATE: 89 BPM | HEIGHT: 67 IN

## 2020-06-05 DIAGNOSIS — S91.332A PUNCTURE WOUND OF LEFT FOOT, INITIAL ENCOUNTER: Primary | ICD-10-CM

## 2020-06-05 PROCEDURE — 90471 IMMUNIZATION ADMIN: CPT | Performed by: INTERNAL MEDICINE

## 2020-06-05 PROCEDURE — 99213 OFFICE O/P EST LOW 20 MIN: CPT | Performed by: INTERNAL MEDICINE

## 2020-06-05 PROCEDURE — 90715 TDAP VACCINE 7 YRS/> IM: CPT | Performed by: INTERNAL MEDICINE

## 2020-06-05 PROCEDURE — 1036F TOBACCO NON-USER: CPT | Performed by: INTERNAL MEDICINE

## 2020-06-05 PROCEDURE — 3008F BODY MASS INDEX DOCD: CPT | Performed by: INTERNAL MEDICINE

## 2020-06-05 RX ORDER — AMOXICILLIN AND CLAVULANATE POTASSIUM 875; 125 MG/1; MG/1
1 TABLET, FILM COATED ORAL EVERY 12 HOURS SCHEDULED
Qty: 14 TABLET | Refills: 0 | Status: SHIPPED | OUTPATIENT
Start: 2020-06-05 | End: 2020-06-12

## 2020-07-06 ENCOUNTER — TELEPHONE (OUTPATIENT)
Dept: INTERNAL MEDICINE CLINIC | Facility: CLINIC | Age: 50
End: 2020-07-06

## 2020-07-06 DIAGNOSIS — F32.A DEPRESSION, UNSPECIFIED DEPRESSION TYPE: ICD-10-CM

## 2020-07-06 RX ORDER — VENLAFAXINE HYDROCHLORIDE 150 MG/1
150 TABLET, EXTENDED RELEASE ORAL
Qty: 30 TABLET | Refills: 1 | Status: SHIPPED | OUTPATIENT
Start: 2020-07-06 | End: 2020-07-29

## 2020-07-27 ENCOUNTER — OFFICE VISIT (OUTPATIENT)
Dept: INTERNAL MEDICINE CLINIC | Facility: CLINIC | Age: 50
End: 2020-07-27
Payer: COMMERCIAL

## 2020-07-27 VITALS
SYSTOLIC BLOOD PRESSURE: 120 MMHG | TEMPERATURE: 98.7 F | HEART RATE: 76 BPM | DIASTOLIC BLOOD PRESSURE: 84 MMHG | HEIGHT: 67 IN | RESPIRATION RATE: 16 BRPM | OXYGEN SATURATION: 97 % | BODY MASS INDEX: 29.98 KG/M2 | WEIGHT: 191 LBS

## 2020-07-27 DIAGNOSIS — E78.2 MIXED HYPERLIPIDEMIA: ICD-10-CM

## 2020-07-27 DIAGNOSIS — M25.521 RIGHT ELBOW PAIN: ICD-10-CM

## 2020-07-27 DIAGNOSIS — F32.A DEPRESSION, UNSPECIFIED DEPRESSION TYPE: Primary | ICD-10-CM

## 2020-07-27 PROCEDURE — 1036F TOBACCO NON-USER: CPT | Performed by: PHYSICIAN ASSISTANT

## 2020-07-27 PROCEDURE — 99214 OFFICE O/P EST MOD 30 MIN: CPT | Performed by: PHYSICIAN ASSISTANT

## 2020-07-27 PROCEDURE — 3008F BODY MASS INDEX DOCD: CPT | Performed by: PHYSICIAN ASSISTANT

## 2020-07-27 NOTE — PROGRESS NOTES
Assessment/Plan:   Patient Instructions   Change in current medications the patient is taking  If right elbow pain persists he will contact me and we will x-ray the elbow  Meantime he will get his labs done and we will report the results to him when available  Keep up the good work with exercise and weight reduction  Follow-up in 6 months, sooner as needed  Quality Measures:       Return in about 6 months (around 1/27/2021) for Next scheduled follow up  Diagnoses and all orders for this visit:    Depression, unspecified depression type    Mixed hyperlipidemia    Right elbow pain          Subjective:      Patient ID: Mar Rodriguez is a 47 y o  male  Follow-up    Overall doing well  Did not have his labs done for this visit  States he has been much more active, exercising regularly and according to his home scale losing weight  No focal issues today other than right elbow pain  He thinks it happened from [employer], lifting weights and also he was riding his quad when going over a stump it jerked his right arm  Denies any swelling or paresthesias  Depression:  Under good control with use of venlafaxine  Feels this is been effective in does not want to stop the medication  Hyperlipidemia:  Has not been taking simvastatin  He will do his labs and we will discuss the results  He feels with his current diet, exercise and weight loss his lipids should be under control        ALLERGIES:  Allergies   Allergen Reactions    Celecoxib Anaphylaxis    Diclofenac Shortness Of Breath       CURRENT MEDICATIONS:    Current Outpatient Medications:     albuterol (PROVENTIL HFA,VENTOLIN HFA) 90 mcg/act inhaler, TAKE 2 PUFFS BY MOUTH EVERY 6 HOURS AS NEEDED FOR WHEEZE, Disp: 8 5 Inhaler, Rfl: 2    Flaxseed Oil OIL, by Does not apply route, Disp: , Rfl:     multivitamin (THERAGRAN) TABS, Take 1 tablet by mouth daily, Disp: , Rfl:     venlafaxine 150 MG TB24, Take 1 tablet (150 mg total) by mouth daily with breakfast, Disp: 30 tablet, Rfl: 1    Cholecalciferol (VITAMIN D3 PO), Take by mouth, Disp: , Rfl:     oxyCODONE-acetaminophen (PERCOCET) 7 5-325 MG per tablet, Take 1 tablet by mouth 2 (two) times a day as needed, Disp: , Rfl: 0    simvastatin (ZOCOR) 10 mg tablet, Take 2 tablets (20 mg total) by mouth daily at bedtime (Patient not taking: Reported on 7/27/2020), Disp: 90 tablet, Rfl: 3    ACTIVE PROBLEM LIST:  Patient Active Problem List   Diagnosis    Cervical somatic dysfunction    Depression    Disc degeneration, lumbar    Hyperlipidemia    Pain of finger of left hand    Skin cyst       PAST MEDICAL HISTORY:  Past Medical History:   Diagnosis Date    Disc degeneration, lumbar     Diverticulosis     Facet syndrome     Other muscle spasm     Parapsoriasis     Psoriasis     Sacroiliitis (HCC)     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       PAST SURGICAL HISTORY:  Past Surgical History:   Procedure Laterality Date    HERNIA REPAIR      PYLOROMYOTOMY      ROTATOR CUFF REPAIR Left     SPINAL FUSION      EXPLORATION    TONSILLECTOMY AND ADENOIDECTOMY      TOOTH EXTRACTION         FAMILY HISTORY:  Family History   Problem Relation Age of Onset    Cancer Mother     Lung cancer Mother         CARCINOID TUMOR    Hypertension Mother     Hypothyroidism Mother     Kidney cancer Mother     Diabetes Maternal Grandmother     Alzheimer's disease Maternal Grandfather     Coronary artery disease Maternal Grandfather     Other Maternal Grandfather         PACEMAKER       SOCIAL HISTORY:  Social History     Socioeconomic History    Marital status: /Civil Union     Spouse name: Not on file    Number of children: 1    Years of education: Not on file    Highest education level: Not on file   Occupational History    Occupation: COMPUTER CONSULTING     Comment: FULL-TIME EMPLOYMENT   Social Needs    Financial resource strain: Not on file    Food insecurity:     Worry: Not on file     Inability: Not on file    Transportation needs:     Medical: Not on file     Non-medical: Not on file   Tobacco Use    Smoking status: Former Smoker    Smokeless tobacco: Never Used   Substance and Sexual Activity    Alcohol use: Not Currently     Frequency: Monthly or less     Comment: BEER - DESCRIBES AS INFREQUENT    Drug use: Yes     Types: Marijuana    Sexual activity: Not on file   Lifestyle    Physical activity:     Days per week: Not on file     Minutes per session: Not on file    Stress: Not on file   Relationships    Social connections:     Talks on phone: Not on file     Gets together: Not on file     Attends Hindu service: Not on file     Active member of club or organization: Not on file     Attends meetings of clubs or organizations: Not on file     Relationship status: Not on file    Intimate partner violence:     Fear of current or ex partner: Not on file     Emotionally abused: Not on file     Physically abused: Not on file     Forced sexual activity: Not on file   Other Topics Concern    Not on file   Social History Narrative    LIVING INDEPENDENTLY WITH SPOUSE    ONE SON       Review of Systems   Constitutional: Negative for activity change, chills, fatigue and fever  HENT: Negative for congestion  Eyes: Negative for discharge  Respiratory: Negative for cough, chest tightness and shortness of breath  Cardiovascular: Negative for chest pain, palpitations and leg swelling  Gastrointestinal: Negative for abdominal pain  Genitourinary: Negative for difficulty urinating  Musculoskeletal: Positive for arthralgias  Negative for myalgias  Skin: Negative for rash  Allergic/Immunologic: Negative for immunocompromised state  Neurological: Negative for dizziness, syncope, weakness, light-headedness and headaches  Hematological: Negative for adenopathy  Does not bruise/bleed easily     Psychiatric/Behavioral: Negative for dysphoric mood, sleep disturbance and suicidal ideas  The patient is not nervous/anxious  Objective:  Vitals:    07/27/20 0804   BP: 120/84   BP Location: Left arm   Patient Position: Sitting   Cuff Size: Standard   Pulse: 76   Resp: 16   Temp: 98 7 °F (37 1 °C)   TempSrc: Tympanic   SpO2: 97%   Weight: 86 6 kg (191 lb)   Height: 5' 7" (1 702 m)     Body mass index is 29 91 kg/m²  Physical Exam   Constitutional: He is oriented to person, place, and time  He appears well-developed and well-nourished  No distress  HENT:   Head: Normocephalic and atraumatic  Neck: No JVD present  Carotid bruit is not present  No thyromegaly present  Cardiovascular: Normal rate, regular rhythm and normal heart sounds  Pulmonary/Chest: Effort normal  He has wheezes (Occasional right basilar wheeze that clear with coughing  )  Musculoskeletal: He exhibits no edema  Tender right medial epicondyle region  Strength equal reflexes equal no neurovascular compromise   Lymphadenopathy:     He has no cervical adenopathy  Neurological: He is alert and oriented to person, place, and time  Skin: Skin is warm and dry  No rash noted  Psychiatric: He has a normal mood and affect  His behavior is normal    Nursing note and vitals reviewed  RESULTS:    No results found for this or any previous visit (from the past 1008 hour(s))  This note was created with voice recognition software  Phonic, grammatical and spelling errors may be present within the note as a result

## 2020-07-27 NOTE — PATIENT INSTRUCTIONS
Change in current medications the patient is taking  If right elbow pain persists he will contact me and we will x-ray the elbow  Meantime he will get his labs done and we will report the results to him when available  Keep up the good work with exercise and weight reduction  Follow-up in 6 months, sooner as needed

## 2020-07-29 ENCOUNTER — APPOINTMENT (OUTPATIENT)
Dept: LAB | Facility: HOSPITAL | Age: 50
End: 2020-07-29
Payer: COMMERCIAL

## 2020-07-29 DIAGNOSIS — F32.A DEPRESSION, UNSPECIFIED DEPRESSION TYPE: ICD-10-CM

## 2020-07-29 DIAGNOSIS — E78.2 MIXED HYPERLIPIDEMIA: ICD-10-CM

## 2020-07-29 LAB
ALBUMIN SERPL BCP-MCNC: 3.8 G/DL (ref 3.5–5)
ALP SERPL-CCNC: 72 U/L (ref 46–116)
ALT SERPL W P-5'-P-CCNC: 24 U/L (ref 12–78)
ANION GAP SERPL CALCULATED.3IONS-SCNC: 9 MMOL/L (ref 4–13)
AST SERPL W P-5'-P-CCNC: 18 U/L (ref 5–45)
BILIRUB SERPL-MCNC: 0.3 MG/DL (ref 0.2–1)
BUN SERPL-MCNC: 14 MG/DL (ref 5–25)
CALCIUM SERPL-MCNC: 8.7 MG/DL (ref 8.3–10.1)
CHLORIDE SERPL-SCNC: 106 MMOL/L (ref 100–108)
CHOLEST SERPL-MCNC: 238 MG/DL (ref 50–200)
CO2 SERPL-SCNC: 27 MMOL/L (ref 21–32)
CREAT SERPL-MCNC: 0.95 MG/DL (ref 0.6–1.3)
GFR SERPL CREATININE-BSD FRML MDRD: 93 ML/MIN/1.73SQ M
GLUCOSE P FAST SERPL-MCNC: 103 MG/DL (ref 65–99)
HDLC SERPL-MCNC: 44 MG/DL
LDLC SERPL CALC-MCNC: 176 MG/DL (ref 0–100)
NONHDLC SERPL-MCNC: 194 MG/DL
POTASSIUM SERPL-SCNC: 4.3 MMOL/L (ref 3.5–5.3)
PROT SERPL-MCNC: 6.9 G/DL (ref 6.4–8.2)
SODIUM SERPL-SCNC: 142 MMOL/L (ref 136–145)
TRIGL SERPL-MCNC: 90 MG/DL

## 2020-07-29 PROCEDURE — 80061 LIPID PANEL: CPT

## 2020-07-29 PROCEDURE — 36415 COLL VENOUS BLD VENIPUNCTURE: CPT

## 2020-07-29 PROCEDURE — 80053 COMPREHEN METABOLIC PANEL: CPT

## 2020-07-29 RX ORDER — VENLAFAXINE HYDROCHLORIDE 150 MG/1
CAPSULE, EXTENDED RELEASE ORAL
Qty: 30 CAPSULE | Refills: 3 | Status: SHIPPED | OUTPATIENT
Start: 2020-07-29 | End: 2020-10-22

## 2020-08-03 PROCEDURE — 99024 POSTOP FOLLOW-UP VISIT: CPT | Performed by: PHYSICIAN ASSISTANT

## 2020-08-04 ENCOUNTER — HOSPITAL ENCOUNTER (OUTPATIENT)
Facility: HOSPITAL | Age: 50
Setting detail: OUTPATIENT SURGERY
Discharge: HOME | End: 2020-08-04
Attending: PLASTIC SURGERY | Admitting: PLASTIC SURGERY
Payer: COMMERCIAL

## 2020-08-04 VITALS
OXYGEN SATURATION: 97 % | HEIGHT: 66 IN | RESPIRATION RATE: 18 BRPM | TEMPERATURE: 98 F | HEART RATE: 68 BPM | BODY MASS INDEX: 28.63 KG/M2 | SYSTOLIC BLOOD PRESSURE: 114 MMHG | DIASTOLIC BLOOD PRESSURE: 58 MMHG | WEIGHT: 178.13 LBS

## 2020-08-04 DIAGNOSIS — L72.9 SKIN CYST: ICD-10-CM

## 2020-08-04 PROCEDURE — 11423 EXC H-F-NK-SP B9+MARG 2.1-3: CPT | Performed by: PHYSICIAN ASSISTANT

## 2020-08-04 PROCEDURE — 88304 TISSUE EXAM BY PATHOLOGIST: CPT | Performed by: PATHOLOGY

## 2020-08-04 PROCEDURE — 12042 INTMD RPR N-HF/GENIT2.6-7.5: CPT | Performed by: PLASTIC SURGERY

## 2020-08-04 PROCEDURE — 12042 INTMD RPR N-HF/GENIT2.6-7.5: CPT | Performed by: PHYSICIAN ASSISTANT

## 2020-08-04 PROCEDURE — 11423 EXC H-F-NK-SP B9+MARG 2.1-3: CPT | Performed by: PLASTIC SURGERY

## 2020-08-04 RX ORDER — ACETAMINOPHEN 325 MG/1
975 TABLET ORAL ONCE
Status: COMPLETED | OUTPATIENT
Start: 2020-08-04 | End: 2020-08-04

## 2020-08-04 RX ORDER — GINSENG 100 MG
CAPSULE ORAL AS NEEDED
Status: DISCONTINUED | OUTPATIENT
Start: 2020-08-04 | End: 2020-08-04 | Stop reason: HOSPADM

## 2020-08-04 RX ORDER — GABAPENTIN 300 MG/1
300 CAPSULE ORAL ONCE
Status: COMPLETED | OUTPATIENT
Start: 2020-08-04 | End: 2020-08-04

## 2020-08-04 RX ORDER — MAGNESIUM HYDROXIDE 1200 MG/15ML
LIQUID ORAL AS NEEDED
Status: DISCONTINUED | OUTPATIENT
Start: 2020-08-04 | End: 2020-08-04 | Stop reason: HOSPADM

## 2020-08-04 RX ORDER — BUPIVACAINE HYDROCHLORIDE AND EPINEPHRINE 2.5; 5 MG/ML; UG/ML
INJECTION, SOLUTION EPIDURAL; INFILTRATION; INTRACAUDAL; PERINEURAL AS NEEDED
Status: DISCONTINUED | OUTPATIENT
Start: 2020-08-04 | End: 2020-08-04 | Stop reason: HOSPADM

## 2020-08-04 RX ORDER — LIDOCAINE HYDROCHLORIDE AND EPINEPHRINE 10; 10 MG/ML; UG/ML
INJECTION, SOLUTION INFILTRATION; PERINEURAL AS NEEDED
Status: DISCONTINUED | OUTPATIENT
Start: 2020-08-04 | End: 2020-08-04 | Stop reason: HOSPADM

## 2020-08-04 RX ADMIN — ACETAMINOPHEN 975 MG: 325 TABLET, FILM COATED ORAL at 07:14

## 2020-08-04 RX ADMIN — GABAPENTIN 300 MG: 300 CAPSULE ORAL at 07:14

## 2020-08-04 NOTE — INTERVAL H&P NOTE
H&P reviewed  After examining the patient I find no changes in the patients condition since the H&P had been written      Vitals:    08/04/20 0705   BP: 128/83   Pulse: 82   Resp: 18   Temp: (!) 97 3 °F (36 3 °C)   SpO2: 99%

## 2020-08-04 NOTE — H&P
H&P - Plastic Surgery   Jae Schwab 47 y o  male MRN: [id number]  Unit/Bed#:  Encounter: [id number]           Procedures:        EXCISION CYST NECK (Left Neck)       COMPLEX CLOSURE VS ADJACENT TISSUE REARRANGEMENT NECK (Left Neck)    Anesthesia type: Local    Diagnosis: Skin cyst [L72 9]            HPI:   Jae Schwab is a 47y o  year old male who presents with a skin cyst   He presents with concern about a cyst on the left side of his neck  He would like it removed as he at times nicks it when he shaves  REVIEW OF SYSTEMS    GENERAL/CONSTITUTIONAL: The patient denies fever, fatigue, weakness, weight gain or weight loss  HEAD, EYES, EARS, NOSE AND THROAT: Eyes - The patient denies pain, redness, loss of vision, double or blurred vision and denies wearing glasses  The patient denies ringing in the ears, nosebleeds sinusitis, post nasal drip  Also denies frequent sore throats, hoarseness, painful swallowing  CARDIOVASCULAR: The patient denies chest pain, irregular heartbeats, palpitations, shortness of breath, heart murmurs, high blood pressure, cramps in his legs with walking, pain in his feet or toes at night or varicose veins  RESPIRATORY: The patient denies chronic cough, wheezing or night sweats  GASTROINTESTINAL: The patient denies decreased appetite, nausea, vomiting, diarrhea, constipation, blood in the stools  GENITOURINARY: The patient denies difficult urination, pain or burning with urination, blood in the urine  MUSCULOSKELETAL: The patient denies arm, thigh or calf cramps  No joint or muscle pain  No muscle weakness or tenderness  No joint swelling, neck pain, back pain or major orthopedic injuries  SKIN AND BREASTS: see hpi The patient denies easy bruising, skin redness, skin rash, hives  NEUROLOGIC: The patient denies headache, dizziness, fainting, memory loss  PSYCHIATRIC: Positive for dysphoric mood  The patient is nervous/anxious      ENDOCRINE: The patient denies intolerance to hot or cold temperature, flushing, fingernail changes, increased thirst, increased salt intake or decreased sexual desire  HEMATOLOGIC/LYMPHATIC: The patient denies anemia, bleeding tendency or clotting tendency  ALLERGIC/IMMUNOLOGIC: The patient denies rhinitis, asthma, skin sensitivity, latex allergies or sensitivity  Historical Information   Past Medical History:   Diagnosis Date    Disc degeneration, lumbar     Diverticulosis     Facet syndrome     Other muscle spasm     Parapsoriasis     Psoriasis     Sacroiliitis (HCC)     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     Past Surgical History:   Procedure Laterality Date    HERNIA REPAIR      PYLOROMYOTOMY      ROTATOR CUFF REPAIR Left     SPINAL FUSION      EXPLORATION    TONSILLECTOMY AND ADENOIDECTOMY      TOOTH EXTRACTION       Social History   Social History     Substance and Sexual Activity   Alcohol Use Not Currently    Frequency: Monthly or less    Comment: BEER - DESCRIBES AS INFREQUENT     Social History     Substance and Sexual Activity   Drug Use Yes    Types: Marijuana     Social History     Tobacco Use   Smoking Status Former Smoker   Smokeless Tobacco Never Used     Family History:   Family History   Problem Relation Age of Onset    Cancer Mother     Lung cancer Mother         CARCINOID TUMOR    Hypertension Mother     Hypothyroidism Mother     Kidney cancer Mother     Diabetes Maternal Grandmother     Alzheimer's disease Maternal Grandfather     Coronary artery disease Maternal Grandfather     Other Maternal Grandfather         PACEMAKER       Meds/Allergies   No current facility-administered medications for this encounter       Current Outpatient Medications:     albuterol (PROVENTIL HFA,VENTOLIN HFA) 90 mcg/act inhaler, TAKE 2 PUFFS BY MOUTH EVERY 6 HOURS AS NEEDED FOR WHEEZE, Disp: 8 5 Inhaler, Rfl: 2    Cholecalciferol (VITAMIN D3 PO), Take by mouth, Disp: , Rfl:    Flaxseed Oil OIL, by Does not apply route, Disp: , Rfl:     multivitamin (THERAGRAN) TABS, Take 1 tablet by mouth daily, Disp: , Rfl:     oxyCODONE-acetaminophen (PERCOCET) 7 5-325 MG per tablet, Take 1 tablet by mouth 2 (two) times a day as needed, Disp: , Rfl: 0    simvastatin (ZOCOR) 10 mg tablet, Take 2 tablets (20 mg total) by mouth daily at bedtime (Patient not taking: Reported on 7/27/2020), Disp: 90 tablet, Rfl: 3    venlafaxine (EFFEXOR-XR) 150 mg 24 hr capsule, TAKE 1 CAPSULE BY MOUTH DAILY WITH BREAKFAST, Disp: 30 capsule, Rfl: 3     Allergies   Allergen Reactions    Celecoxib Anaphylaxis    Diclofenac Shortness Of Breath         Objective      Physical Exam   Constitutional: He is oriented to person, place, and time  He appears well-developed and well-nourished  HENT:   Head: Normocephalic and atraumatic  Eyes: Pupils are equal, round, and reactive to light  Neck: Normal range of motion  Cardiovascular: Normal rate  Pulmonary/Chest: Effort normal    Abdominal: Soft  Bowel sounds are normal    Neurological: He is alert and oriented to person, place, and time  Skin: Skin is warm and dry  Psychiatric: He has a normal mood and affect  His behavior is normal      Lab Results:   Lab Results   Component Value Date    WBC 8 82 09/13/2019    HGB 15 6 09/13/2019    HCT 46 1 09/13/2019    MCV 91 09/13/2019     09/13/2019          No results found for: TISSUECULT, WOUNDCULT      Imaging Studies:   No results found  EKG, Pathology, and Other Studies:   Lab Results   Component Value Date/Time    [facility]  02/24/2020 07:03 AM     A   Cecal biopsies:  - Tubular adenoma  - No high grade dysplasia nor malignancy seen         No intake or output data in the 24 hours ending 08/03/20 2244    Invasive Devices     None                 VTE Prophylaxis: Sequential compression device [place])     Code Status: No Order  Advance Directive and Living Will:      Power of :

## 2020-08-04 NOTE — OP NOTE
OPERATIVE REPORT  PATIENT NAME: Taisha Dickey    :  1970  MRN: [id number]  Pt Location: [facility] OR ROOM 02    SURGERY DATE: 2020    Surgeon(s) and Role:     * Leonardo Murillo MD - Primary     * Elizabeth George PA-C - Assisting    Preop Diagnosis:  Skin cyst [L72 9]    Post-Op Diagnosis Codes:     * Skin cyst [L72 9]    Procedure(s) (LRB):  EXCISION CYST NECK (Left)  COMPLEX CLOSURE  NECK (Left)    Specimen(s):  ID Type Source Tests Collected by Time Destination   1 : Left Neck Cyst Tissue Cyst TISSUE EXAM Leonardo Murillo MD 2020 [id number]        Estimated Blood Loss:   Minimal    Drains:  * No LDAs found *    Anesthesia Type:   Local    Operative Indications:  Skin cyst [L72 9]      Operative Findings:  25 X 20 mm cystic lesion with capsule and whitish material consistent of inclusion cyst   Left neck secondary defect 25 x 15 mm   Total length of closure 30 mm     Complications:   None    Procedure and Technique:  Mr Alma Hudson is about 47years old male with history of symptomatic left excess who presents for excision and closure  Risks and benefits of the procedure were explained in detail and informed consent was then obtained  Patient was met in the preoperative holding area and all the last minute questions were properly answered  Site of surgery was marked and verified he was then taken to the operative theater placed in supine position  The left neck was then prepped and draped in usual sterile fashion a time-out was then performed  Cystic lesion along the left neck was encounter and an ellipse was marked on opening trach  The area was infiltrated with 15 mL of a mixture 1% lidocaine with epinephrine and 0 25% Marcaine with epinephrine    After several minutes of anesthetic effect and incision was then made with a scalpel down to the subcutaneous tissue and using sharp scissors the cyst was then dissected free circumferentially with assistant are retraction cephalad caudal medial laterally satisfactorily and with electrocautery of the base to remove any blood vessels  Specimen passed of the table for permanent pathology  Hemostasis and irrigation was then performed  Skin flap was then undermined cephalad and caudal for approximately 2 cm to allow closure without undue tension  Using interrupted to 3-0 Monocryl the sternocleidomastoid muscle fascia was used to reapproximate the Rich's tissue for dead space obliteration  Followed by deep dermal 3-0 Monocryl and a running 4-0 Monocryl subcuticular stitch  Dressings were then applied the conclusion of the case       I was present for the entire procedure, I was present for all critical portions of the procedure, A qualified resident physician was not available and A physician assistant was required during the procedure for retraction tissue handling,dissection and suturing    Patient Disposition:  PACU  and hemodynamically stable    SIGNATURE: Rajeev Oconnor MD  DATE: August 4, 2020  TIME: 8:43 AM

## 2020-08-04 NOTE — DISCHARGE INSTRUCTIONS
Discharge Instructions - Excision      Please take tylenol and ibuprofen for pain  May shower tomorrow  Use a gentle shampoo such as baby shampoo  Apply Bacitracin and a bandaid to incision after shower  Keep head elevated to avoid bruising, swelling  Avoid applying any other lotions or creams to the incision  Follow up in our office in 10-14 days for wound check and suture removal   Please call [phone] for appointment  If  you experience fever greater than 100 5°, increasing redness,  increasing drainage or swelling, call our office at [phone]

## 2020-08-14 ENCOUNTER — OFFICE VISIT (OUTPATIENT)
Dept: PLASTIC SURGERY | Facility: CLINIC | Age: 50
End: 2020-08-14

## 2020-08-14 VITALS — BODY MASS INDEX: 28.61 KG/M2 | TEMPERATURE: 98.4 F | WEIGHT: 178 LBS | HEIGHT: 66 IN

## 2020-08-14 DIAGNOSIS — Z98.890 POST-OPERATIVE STATE: Primary | ICD-10-CM

## 2020-08-14 PROCEDURE — 99024 POSTOP FOLLOW-UP VISIT: CPT | Performed by: PLASTIC SURGERY

## 2020-08-14 PROCEDURE — 3008F BODY MASS INDEX DOCD: CPT | Performed by: PLASTIC SURGERY

## 2020-08-14 PROCEDURE — 3008F BODY MASS INDEX DOCD: CPT | Performed by: PHYSICIAN ASSISTANT

## 2020-08-14 RX ORDER — CEFUROXIME AXETIL 500 MG/1
TABLET ORAL
COMMUNITY
End: 2020-09-09 | Stop reason: ALTCHOICE

## 2020-08-14 RX ORDER — HYDROCODONE BITARTRATE AND ACETAMINOPHEN 7.5; 3 MG/1; MG/1
TABLET ORAL
COMMUNITY
End: 2020-09-09 | Stop reason: ALTCHOICE

## 2020-08-14 RX ORDER — OXYCODONE HYDROCHLORIDE AND ACETAMINOPHEN 5; 325 MG/1; MG/1
TABLET ORAL
Status: ON HOLD | COMMUNITY
Start: 2020-07-06 | End: 2021-02-22 | Stop reason: ALTCHOICE

## 2020-08-14 RX ORDER — PREGABALIN 25 MG/1
CAPSULE ORAL
COMMUNITY
End: 2020-09-09 | Stop reason: ALTCHOICE

## 2020-08-14 NOTE — PROGRESS NOTES
Patient in the office for first post operative incision check  S/P excision neck cyst on 8/4/2020  Incision is clean, dry, and intact  Suture loops removed  Photographs were taken  Patient instructed to use care when shaving  Bacitracin and Band-Aid place  Scar management given to patient, instructed to start in 2 weeks  The patient will follow up in 1 month

## 2020-09-09 ENCOUNTER — OFFICE VISIT (OUTPATIENT)
Dept: INTERNAL MEDICINE CLINIC | Facility: CLINIC | Age: 50
End: 2020-09-09
Payer: COMMERCIAL

## 2020-09-09 VITALS
SYSTOLIC BLOOD PRESSURE: 116 MMHG | DIASTOLIC BLOOD PRESSURE: 72 MMHG | TEMPERATURE: 98.9 F | OXYGEN SATURATION: 98 % | BODY MASS INDEX: 28.93 KG/M2 | WEIGHT: 180 LBS | HEART RATE: 72 BPM | HEIGHT: 66 IN

## 2020-09-09 DIAGNOSIS — M77.11 RIGHT LATERAL EPICONDYLITIS: ICD-10-CM

## 2020-09-09 DIAGNOSIS — L98.9 SKIN LESION: Primary | ICD-10-CM

## 2020-09-09 PROCEDURE — 99213 OFFICE O/P EST LOW 20 MIN: CPT | Performed by: PHYSICIAN ASSISTANT

## 2020-09-09 NOTE — PROGRESS NOTES
Assessment/Plan:   Patient Instructions   Due to re-injury of right elbow will have x-ray done, and report results when available  Due to the symptoms and exam consistent with tennis elbow (lateral epicondylitis) will have patient apply lateral epicondyle band as instructed, start Aleve 2 tablets in the morning after breakfast and 2 tablets after dinner for next 5-7 days  Apply topical moist heat to area 3-4 times daily for 20 minutes at a time if able  Since patient is following up with plastics will have them look at lesion on forehead  Quality Measures:       Return for Next scheduled follow up  Diagnoses and all orders for this visit:    Skin lesion  Comments:  Forehead  Orders:  -     Ambulatory referral to Plastic Surgery; Future    Right lateral epicondylitis  -     XR elbow 3+ vw right; Future          Subjective:      Patient ID: Chauncey Nova is a 47 y o  male  Acute visit    At last visit patient did discussed pain in his right elbow  He injured the elbow riding his quad when the handlebars were sharply turned in jammed his right elbow  He states with conservative treatment of ice packs and rest seemed as if the elbow was improving until he started Cr Soup on a punching bag, went to punch the bag and the bag had moved, this caused a sharp pronation movement and a snapping sensation  Ever since he elbow has been sore again and does not seem to respond to ice packs, in fact the ice packs seem to make it ache more  He has paretic lead tried some NSAIDs which she is not really sure helped  He has also noted a dark spot that just appeared on his mid forehead  This concerns him because he had a friend recently die of cancer however it was not skin cancer        ALLERGIES:  Allergies   Allergen Reactions    Celecoxib Anaphylaxis    Diclofenac Shortness Of Breath       CURRENT MEDICATIONS:    Current Outpatient Medications:     albuterol (PROVENTIL HFA,VENTOLIN HFA) 90 mcg/act inhaler, TAKE 2 PUFFS BY MOUTH EVERY 6 HOURS AS NEEDED FOR WHEEZE, Disp: 8 5 Inhaler, Rfl: 2    Cholecalciferol (VITAMIN D3 PO), Take by mouth, Disp: , Rfl:     Flaxseed Oil OIL, by Does not apply route, Disp: , Rfl:     multivitamin (THERAGRAN) TABS, Take 1 tablet by mouth daily, Disp: , Rfl:     oxyCODONE-acetaminophen (PERCOCET) 5-325 mg per tablet, TAKE ONE TABLET BY MOUTH ONCE TO TWICE DAILY AS NEEDED, Disp: , Rfl:     simvastatin (ZOCOR) 10 mg tablet, Take 2 tablets (20 mg total) by mouth daily at bedtime, Disp: 90 tablet, Rfl: 3    venlafaxine (EFFEXOR-XR) 150 mg 24 hr capsule, TAKE 1 CAPSULE BY MOUTH DAILY WITH BREAKFAST, Disp: 30 capsule, Rfl: 3    ACTIVE PROBLEM LIST:  Patient Active Problem List   Diagnosis    Cervical somatic dysfunction    Depression    Disc degeneration, lumbar    Hyperlipidemia    Pain of finger of left hand    Skin cyst       PAST MEDICAL HISTORY:  Past Medical History:   Diagnosis Date    Disc degeneration, lumbar     Diverticulosis     Facet syndrome     Hyperlipidemia     Other muscle spasm     Parapsoriasis     Psoriasis     Sacroiliitis (HCC)     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       PAST SURGICAL HISTORY:  Past Surgical History:   Procedure Laterality Date    FACIAL/NECK BIOPSY Left 8/4/2020    Procedure: EXCISION CYST NECK;  Surgeon: Joan Rodriguez MD;  [facility];  Service: Plastics    FLAP LOCAL HEAD / NECK Left 8/4/2020    Procedure: COMPLEX CLOSURE VS ADJACENT TISSUE REARRANGEMENT NECK;  Surgeon: Joan Rodriguez MD;  [facility];  Service: Plastics    HERNIA REPAIR      PYLOROMYOTOMY      ROTATOR CUFF REPAIR Left     TONSILLECTOMY AND ADENOIDECTOMY      TOOTH EXTRACTION         FAMILY HISTORY:  Family History   Problem Relation Age of Onset    Cancer Mother     Lung cancer Mother         CARCINOID TUMOR    Hypertension Mother     Hypothyroidism Mother     Kidney cancer Mother     Diabetes Maternal Grandmother     Alzheimer's disease Maternal Grandfather     Coronary artery disease Maternal Grandfather     Other Maternal Grandfather         PACEMAKER       SOCIAL HISTORY:  Social History     Socioeconomic History    Marital status: /Civil Union     Spouse name: Not on file    Number of children: 1    Years of education: Not on file    Highest education level: Not on file   Occupational History    Occupation: COMPUTER CONSULTING     Comment: FULL-TIME EMPLOYMENT   Social Needs    Financial resource strain: Not on file    Food insecurity     Worry: Not on file     Inability: Not on file    Transportation needs     Medical: Not on file     Non-medical: Not on file   Tobacco Use    Smoking status: Former Smoker    Smokeless tobacco: Never Used   Substance and Sexual Activity    Alcohol use: Yes     Frequency: Monthly or less     Drinks per session: 1 or 2     Comment: BEER - DESCRIBES AS INFREQUENT    Drug use: Yes     Types: Marijuana     Comment: Medical marijuana    Sexual activity: Not on file   Lifestyle    Physical activity     Days per week: Not on file     Minutes per session: Not on file    Stress: Not on file   Relationships    Social connections     Talks on phone: Not on file     Gets together: Not on file     Attends [employer] service: Not on file     Active member of club or organization: Not on file     Attends meetings of clubs or organizations: Not on file     Relationship status: Not on file    Intimate partner violence     Fear of current or ex partner: Not on file     Emotionally abused: Not on file     Physically abused: Not on file     Forced sexual activity: Not on file   Other Topics Concern    Not on file   Social History Narrative    LIVING INDEPENDENTLY WITH SPOUSE    ONE SON       Review of Systems   Constitutional: Negative for activity change, chills, fatigue and fever  HENT: Negative for congestion  Eyes: Negative for discharge  Respiratory: Negative for cough, chest tightness and shortness of breath  Cardiovascular: Negative for chest pain, palpitations and leg swelling  Gastrointestinal: Negative for abdominal pain  Genitourinary: Negative for difficulty urinating  Musculoskeletal: Positive for arthralgias  Negative for myalgias  Skin: Negative for rash  Allergic/Immunologic: Negative for immunocompromised state  Neurological: Negative for dizziness, syncope, weakness, light-headedness and headaches  Hematological: Negative for adenopathy  Does not bruise/bleed easily  Psychiatric/Behavioral: Negative for dysphoric mood  The patient is not nervous/anxious  Objective:  Vitals:    09/09/20 1500   BP: 116/72   BP Location: Left arm   Patient Position: Sitting   Cuff Size: Adult   Pulse: 72   Temp: 98 9 °F (37 2 °C)   TempSrc: Temporal   SpO2: 98%   Weight: 81 6 kg (180 lb)   Height: 5' 6" (1 676 m)     Body mass index is 29 05 kg/m²  Physical Exam  Vitals signs and nursing note reviewed  Constitutional:       General: He is not in acute distress  Appearance: He is well-developed  HENT:      Head: Normocephalic and atraumatic  Neck:      Thyroid: No thyromegaly  Vascular: No carotid bruit or JVD  Cardiovascular:      Rate and Rhythm: Normal rate and regular rhythm  Heart sounds: Normal heart sounds  Pulmonary:      Effort: Pulmonary effort is normal       Breath sounds: Normal breath sounds  Musculoskeletal:         General: Tenderness present  Right lower leg: No edema  Left lower leg: No edema  Comments: Acute tenderness of right lateral epicondyle region  Acute tenderness with active pronation  Tenderness with dorsiflexion of his right wrist   Finkelstein's negative  No weakness  No neurovascular compromise  Lymphadenopathy:      Cervical: No cervical adenopathy  Skin:     General: Skin is warm and dry        Findings: No rash       Comments: Tiny less than 1 mm dark spot located at mid forehead  Looks benign  Not raised, not red, no signs of inflammation  Neurological:      General: No focal deficit present  Mental Status: He is alert and oriented to person, place, and time  Mental status is at baseline  Psychiatric:         Mood and Affect: Mood normal          Behavior: Behavior normal            RESULTS:    Recent Results (from the past 1008 hour(s))   Tissue Exam    Collection Time: 08/04/20  8:38 AM   Result Value Ref Range    Case Report       Surgical Pathology Report                         Case: [id number]                                   Authorizing Provider:  Saba Ardon,   Collected:           08/04/2020 9327                                     MD                                                                           Ordering Location:     [facility] Received:            08/04/2020 1413                                     Operating Room                                                               Pathologist:           Adam Villela MD                                                             Specimen:    Cyst, Left Neck Cyst                                                                       Final Diagnosis       A  Skin Cyst, Left Neck Cyst, excision:  - Epidermal (infundibular) cyst             Additional Information       All reported additional testing was performed with appropriately reactive controls  These tests were developed and their performance characteristics determined by [facility] or appropriate performing facility, though some tests may be performed on tissues which have not been validated for performance characteristics (such as staining performed on alcohol exposed cell blocks and decalcified tissues)  Results should be interpreted with caution and in the context of the patients clinical condition   These tests may not be cleared or approved by the [place]  Food and Drug Administration, though the FDA has determined that such clearance or approval is not necessary  These tests are used for clinical purposes and they should not be regarded as investigational or for research  This laboratory has been approved by [place], designated as a high-complexity laboratory and is qualified to perform these tests  Interpretation performed at [facility], [address] [place] [address]  Gross Description       A  The specimen is received in formalin, labeled with the patient's name and hospital number, and is designated "left neck cyst  The specimen consists of a tan-white, rubbery, well-circumscribed cystic structure measuring 2 0 x 1 8 x 1 6 cm in greatest dimension with an attached tan, rubbery, wrinkled, unoriented ellipse of skin measuring 1 8 x 0 8 cm in greatest dimension  The epithelial surface exhibits no grossly identifiable lesions  The specimen is serially sectioned releasing tan-white caseous material from the cystic cavity  A representative section is submitted in 1 cassette  Note: The estimated total formalin fixation time based upon information provided by the submitting clinician and the standard processing schedule is under 72 hours  Lukas      Clinical Information Skin cyst        This note was created with voice recognition software  Phonic, grammatical and spelling errors may be present within the note as a result

## 2020-09-09 NOTE — PATIENT INSTRUCTIONS
Due to re-injury of right elbow will have x-ray done, and report results when available  Due to the symptoms and exam consistent with tennis elbow (lateral epicondylitis) will have patient apply lateral epicondyle band as instructed, start Aleve 2 tablets in the morning after breakfast and 2 tablets after dinner for next 5-7 days  Apply topical moist heat to area 3-4 times daily for 20 minutes at a time if able  Since patient is following up with plastics will have them look at lesion on forehead

## 2020-09-11 ENCOUNTER — HOSPITAL ENCOUNTER (OUTPATIENT)
Dept: RADIOLOGY | Facility: HOSPITAL | Age: 50
Discharge: HOME | End: 2020-09-11
Payer: COMMERCIAL

## 2020-09-11 DIAGNOSIS — M77.11 RIGHT LATERAL EPICONDYLITIS: ICD-10-CM

## 2020-09-11 PROCEDURE — 73080 X-RAY EXAM OF ELBOW: CPT

## 2020-09-15 ENCOUNTER — OFFICE VISIT (OUTPATIENT)
Dept: PLASTIC SURGERY | Facility: CLINIC | Age: 50
End: 2020-09-15

## 2020-09-15 VITALS — HEIGHT: 66 IN | TEMPERATURE: 98 F | BODY MASS INDEX: 28.93 KG/M2 | WEIGHT: 180 LBS

## 2020-09-15 DIAGNOSIS — L98.9 SKIN LESION: ICD-10-CM

## 2020-09-15 PROCEDURE — 99024 POSTOP FOLLOW-UP VISIT: CPT | Performed by: PHYSICIAN ASSISTANT

## 2020-09-15 NOTE — PROGRESS NOTES
POST-OP EVALUATION  9/15/2020    Hemanth Mathews is a 47 y o  male is here today for routine post-operative follow up   08/04/20 0800          Procedures:        EXCISION CYST NECK (Left Neck)       COMPLEX CLOSURE VS ADJACENT TISSUE REARRANGEMENT NECK (Left Neck)    He has no concerns today about his incision        Past Medical History:   Diagnosis Date    Disc degeneration, lumbar     Diverticulosis     Facet syndrome     Hyperlipidemia     Other muscle spasm     Parapsoriasis     Psoriasis     Sacroiliitis (HCC)     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     Past Surgical History:   Procedure Laterality Date    FACIAL/NECK BIOPSY Left 8/4/2020    Procedure: EXCISION CYST NECK;  Surgeon: Linnea Hilario MD;  [facility];  Service: Plastics    FLAP LOCAL HEAD / NECK Left 8/4/2020    Procedure: COMPLEX CLOSURE VS ADJACENT TISSUE REARRANGEMENT NECK;  Surgeon: Linnea Hilario MD;  [facility];  Service: Plastics    HERNIA REPAIR      PYLOROMYOTOMY      ROTATOR CUFF REPAIR Left     TONSILLECTOMY AND ADENOIDECTOMY      TOOTH EXTRACTION          Current Outpatient Medications:     albuterol (PROVENTIL HFA,VENTOLIN HFA) 90 mcg/act inhaler, TAKE 2 PUFFS BY MOUTH EVERY 6 HOURS AS NEEDED FOR WHEEZE, Disp: 8 5 Inhaler, Rfl: 2    Cholecalciferol (VITAMIN D3 PO), Take by mouth, Disp: , Rfl:     Flaxseed Oil OIL, by Does not apply route, Disp: , Rfl:     multivitamin (THERAGRAN) TABS, Take 1 tablet by mouth daily, Disp: , Rfl:     Omega-3 Fatty Acids (FISH OIL PO), Take by mouth, Disp: , Rfl:     oxyCODONE-acetaminophen (PERCOCET) 5-325 mg per tablet, TAKE ONE TABLET BY MOUTH ONCE TO TWICE DAILY AS NEEDED, Disp: , Rfl:     simvastatin (ZOCOR) 10 mg tablet, Take 2 tablets (20 mg total) by mouth daily at bedtime, Disp: 90 tablet, Rfl: 3    venlafaxine (EFFEXOR-XR) 150 mg 24 hr capsule, TAKE 1 CAPSULE BY MOUTH DAILY WITH BREAKFAST, Disp: 30 capsule, Rfl: 3  Allergies: Celecoxib and Diclofenac    Objective    Physical Exam   Constitutional  He appears well-developed and well-nourished  Eyes      General -             Right: Right eye extraocular movements are normal              Left: Left eye extraocular movements are normal        Pulmonary/Chest  Effort normal and breath sounds normal      Skin  Skin is warm and dry  Psychiatric  He has a normal mood and affect  His behavior is normal      Face          Assessment/Plan   Diagnoses and all orders for this visit:    Skin lesion  Comments:  Forehead  Orders:  -     Ambulatory referral to Plastic Surgery    Other orders  -     Omega-3 Fatty Acids (FISH OIL PO); Take by mouth      Silicone scar gel if desired  Monitor for concerning changes  Followup as needed      Layla Montanez PA-C

## 2020-09-17 ENCOUNTER — TELEPHONE (OUTPATIENT)
Dept: INTERNAL MEDICINE CLINIC | Facility: CLINIC | Age: 50
End: 2020-09-17

## 2020-09-17 NOTE — TELEPHONE ENCOUNTER
Patient's wife called stating that this patient is having a "nervous breakdown" due to him reliving his childhood trauma  She stated that her and her son became fearful for their lives and ended up leaving their home  The state troopers went to his house for a welfare check and she stated they left because she said the patient "is manipulative and knows how to talk to people"  She stated he was homicidal with a plan to harm certain people  After speaking with our , I contacted [facility] to make them aware our patient is having a mental health crisis and to do another welfare check  Officer that I spoke to took [employer] number and his wife's information  He will be calling the office back with an update as well

## 2020-09-18 ENCOUNTER — HOSPITAL ENCOUNTER (EMERGENCY)
Facility: HOSPITAL | Age: 50
Discharge: HOME | End: 2020-09-18
Attending: EMERGENCY MEDICINE
Payer: COMMERCIAL

## 2020-09-18 ENCOUNTER — TELEPHONE (OUTPATIENT)
Dept: INTERNAL MEDICINE CLINIC | Facility: CLINIC | Age: 50
End: 2020-09-18

## 2020-09-18 VITALS
RESPIRATION RATE: 16 BRPM | HEART RATE: 79 BPM | SYSTOLIC BLOOD PRESSURE: 212 MMHG | BODY MASS INDEX: 29.04 KG/M2 | OXYGEN SATURATION: 99 % | WEIGHT: 179.9 LBS | DIASTOLIC BLOOD PRESSURE: 94 MMHG | TEMPERATURE: 98.7 F

## 2020-09-18 DIAGNOSIS — R45.1 AGITATION: Primary | ICD-10-CM

## 2020-09-18 LAB
AMPHETAMINES SERPL QL SCN: NEGATIVE
BARBITURATES UR QL: NEGATIVE
BENZODIAZ UR QL: NEGATIVE
COCAINE UR QL: NEGATIVE
ETHANOL EXG-MCNC: 0 MG/DL
METHADONE UR QL: NEGATIVE
OPIATES UR QL SCN: NEGATIVE
OXYCODONE+OXYMORPHONE UR QL SCN: NEGATIVE
PCP UR QL: NEGATIVE
THC UR QL: POSITIVE

## 2020-09-18 PROCEDURE — 82075 ASSAY OF BREATH ETHANOL: CPT | Performed by: EMERGENCY MEDICINE

## 2020-09-18 PROCEDURE — 99284 EMERGENCY DEPT VISIT MOD MDM: CPT

## 2020-09-18 PROCEDURE — 80307 DRUG TEST PRSMV CHEM ANLYZR: CPT | Performed by: EMERGENCY MEDICINE

## 2020-09-18 PROCEDURE — 99284 EMERGENCY DEPT VISIT MOD MDM: CPT | Performed by: EMERGENCY MEDICINE

## 2020-09-18 NOTE — TELEPHONE ENCOUNTER
Александр Muniz, wife, will wait for your call later in the day today  She can be reached at [phone]

## 2020-09-21 ENCOUNTER — TELEPHONE (OUTPATIENT)
Dept: INTERNAL MEDICINE CLINIC | Facility: CLINIC | Age: 50
End: 2020-09-21

## 2020-09-22 NOTE — PSYCH
Assessment/Plan:      Diagnoses and all orders for this visit:    Moderate episode of recurrent major depressive disorder (HCC)    PTSD (post-traumatic stress disorder)          Subjective:     Patient ID: Taisha Dickey is a 47 y o  male  [facility] Plan   AREAS OF NEED: Depression as evidenced by decrease in energy and interests, sleep and appetite disturbance, poor impulse control, anger outbursts, feelings of guilt and self blame, flashbacks, and anxiety related to past trauma, relationship stressors, employment stressors, and unresolved grief  Date Initiated: 9/22/20    Strengths: loves his family, enjoys the outdoors    [address]:   Date Initiated: 9/22/20  1 0  I will identify 3 ways my overall functioning has improved and I am more productive in my day to day routine  Target Date: 10/22/20   Completion Date:       SHORT TERM OBJECTIVES:     Date Initiated: 9/22/20  1 1 I will identify 3 distress tolerance skills I can utilize when I begin to experience an intense emotion  Share successes and challenges of practicing tools with team    Revision Date:   Target Date: 10/2/20  Completion Date:     Date Initiated: 9/22/20  1 2 I will identify 3 ways I can begin to express emotion to myself and others practicing through expressing in group, writing, and creative ways  Revision Date:   Target Date: 10/2/20  Completion Date:     Date Initiated: 9/22/20  1 3 I will take medications as prescribed and share questions and concerns if arise  Revision Date:  Target Date: 10/2/20  Completion Date:      Date Initiated: 9/22/20  1 4 I will identify 1 meaningful thing from program each day and share with a support to begin a conversation about my wellness needs       Revision Date:  Target Date: 10/2/20  Completion Date:         7 DAY REVISION:    Date Initiated:  Revision Date:   Target Date:   Completion Date:    DUE TO COVID-19, CURRENTLY ALL INTERVENTIONS ARE BEING OFFERED VIRTUALLY    PSYCHIATRY:  Date Initiated: 9/22/20  Medication Management and Education       Revision Date:   The person(s) responsible for carrying out the plan is Cathi Lemus MD    NURSING:   Date Initiated: 9/22/20  1 1,1 2,1 3,1 4 This RN will provide daily wellness group five days weekly to educate Umberto Landis on S/S of his diagnoses and medications used in treatment  Revision Date:  The person(s) responsible for carrying out the plan is Aaron Carter RN    PSYCHOLOGY:   Date Initiated: 9/22/20       1 1, 1 2, 1 4 Provide psychotherapy group 5 times per week to allow opportunity for Umberto Landis  to explore stressors and ways of coping  Revision Date:   The person(s) responsible for carrying out the plan is Homer Hudson LCSW    ALLIED THERAPY:   Date Initiated: 9/22/20  1 1,1 2 Engage Umberto Landis in AT group 5 times daily to encourage development and use of wellness tools to decrease symptoms and promote recovery through meaningful activity  Revision Date:   The person(s) responsible for carrying out the plan is REMY Sher        CASE MANAGEMENT:   Date Initiated: 9/22/20      1 0 This  will meet with Umberto Landis  3-4 times weekly to assess treatment progress, discharge planning, connection to community supports and UR as indicated  Revision Date:   The person(s) responsible for carrying out the plan is Homer Hudson LCSW      TREATMENT REVIEW/COMMENTS:     DISCHARGE CRITERIA: Identify 3 signs of progress and complete relapse prevention plan  DISCHARGE PLAN: Referral to OP psychiatry and therapy  Estimated Length of Stay: 10-15 treatment days      Diagnosis and Treatment Plan explained to Clare Nikolai relates understanding diagnosis and is agreeable to Treatment Plan         CLIENT COMMENTS / Please share your thoughts, feelings, need and/or experiences regarding your treatment plan: _____________________________________________________________________________________________________________________________________________________________________________________________________________________________________________________________________________________________________________________ Date/Time: ______________     Patient Signature: ___Developed and Reviewed while online during TEAMS initial case management evaluation - Zuleika Love agreeable 9/23/20 at [phone] - copy sent via mail______________________________     Date/Time: ______________      Signature: Munir Castaneda, LCSW 9/23/20 5397 _______________________________     Date/Time: ______________
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
## 2020-09-23 ENCOUNTER — OFFICE VISIT (OUTPATIENT)
Dept: PSYCHOLOGY | Facility: CLINIC | Age: 50
End: 2020-09-23
Payer: COMMERCIAL

## 2020-09-23 ENCOUNTER — TELEMEDICINE (OUTPATIENT)
Dept: PSYCHIATRY | Facility: CLINIC | Age: 50
End: 2020-09-23
Payer: COMMERCIAL

## 2020-09-23 DIAGNOSIS — F43.10 PTSD (POST-TRAUMATIC STRESS DISORDER): ICD-10-CM

## 2020-09-23 DIAGNOSIS — F33.1 MODERATE EPISODE OF RECURRENT MAJOR DEPRESSIVE DISORDER (HCC): Primary | ICD-10-CM

## 2020-09-23 DIAGNOSIS — F32.A DEPRESSION, UNSPECIFIED DEPRESSION TYPE: Primary | ICD-10-CM

## 2020-09-23 PROCEDURE — 90791 PSYCH DIAGNOSTIC EVALUATION: CPT

## 2020-09-23 PROCEDURE — 90791 PSYCH DIAGNOSTIC EVALUATION: CPT | Performed by: HOSPITALIST

## 2020-09-23 NOTE — PSYCH
Virtual Regular Visit      Assessment/Plan:    Problem List Items Addressed This Visit        Other    PTSD (post-traumatic stress disorder)    Moderate episode of recurrent major depressive disorder ([facility] [address] ) - Primary               Reason for visit is VIRTUAL PHP INITIAL ASSESSMENT DUE TO COVID-19  Encounter provider [facility] PARTIAL PSYCH PROGRAM    Provider located at [address] [place] [address]   [id number] [address]    The patient was identified by name and date of birth  Bowenlucia Anisa was informed that this is a telemedicine visit and that the visit is being conducted through [employer]  My office door was closed  No one else was in the room  He acknowledged consent and understanding of privacy and security of the video platform  The patient has agreed to participate and understands they can discontinue the visit at any time  Patient is aware this is a billable service  Subjective  Bowenlucia Anisa is a 47 y o  male   I spent 60 minutes with patient today in which greater than 50% of the time was spent in counseling/coordination of care regarding PHP - SEE NOTES  VIRTUAL VISIT DISCLAIMER    Mercedes Lange acknowledges that he has consented to an online visit or consultation  He understands that the online visit is based solely on information provided by him, and that, in the absence of a face-to-face physical evaluation by the physician, the diagnosis he receives is both limited and provisional in terms of accuracy and completeness  This is not intended to replace a full medical face-to-face evaluation by the physician  Mercedes Lange understands and accepts these terms
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
## 2020-09-24 ENCOUNTER — OFFICE VISIT (OUTPATIENT)
Dept: PSYCHOLOGY | Facility: CLINIC | Age: 50
End: 2020-09-24
Payer: COMMERCIAL

## 2020-09-24 ENCOUNTER — DOCUMENTATION (OUTPATIENT)
Dept: PSYCHOLOGY | Facility: CLINIC | Age: 50
End: 2020-09-24

## 2020-09-24 DIAGNOSIS — F33.1 MODERATE EPISODE OF RECURRENT MAJOR DEPRESSIVE DISORDER (HCC): Primary | ICD-10-CM

## 2020-09-24 DIAGNOSIS — F43.10 PTSD (POST-TRAUMATIC STRESS DISORDER): ICD-10-CM

## 2020-09-24 PROCEDURE — 90832 PSYTX W PT 30 MINUTES: CPT

## 2020-09-24 PROCEDURE — G0410 GRP PSYCH PARTIAL HOSP 45-50: HCPCS

## 2020-09-24 PROCEDURE — G0176 OPPS/PHP;ACTIVITY THERAPY: HCPCS

## 2020-09-24 PROCEDURE — G0177 OPPS/PHP; TRAIN & EDUC SERV: HCPCS

## 2020-09-24 NOTE — PSYCH
Subjective:     Patient ID: Federica Israel is a 47 y o  male  [facility] Clinical Progress Notes      Specialized Services Documentation  Therapist must complete separate progress note for each specific clinical activity in which the individual participated during the day  Allied Therapy   This group was facilitated virtually in a private office using [employer] and Approved [employer] Teams  Federica Israel consented to the use of tele-video modality of treatment  [id number] Federica Israel  actively shared in [facility] group focused on WRAP development and use  Group explored key facets of recovery through prieto analysis and sections of the WRAP  Group explored the benefits of WRAP development and strategies to using this tool to support ongoing recovery  Volodymyr Lester was engaged and shared for his first treatment day  Some beginning progress noted toward goal   Continue AT to explore recovery strategies and use        Tx Plan Objective: 1 1, Therapist:  Blanka CARRASCOBC

## 2020-09-24 NOTE — PSYCH
Subjective:     Patient ID: Laurie Mathews is a 47 y o  male  [facility] Clinical Progress Notes      Specialized Services Documentation  Therapist must complete separate progress note for each specific clinical activity in which the individual participated during the day  Group Psychotherapy [id number]  This group was facilitated virtually in a private office using [employer] and Approved [employer] Teams  Laurie Mathews consented to the use of tele-video modality of treatment  Psychotherapy community outreach group focused on decreasing self- stigma and increasing available supports  Clients attentively listened to [facility] share his life story  Group encouraged power of learning about self, accepting mental health and personal responsibility in recovery and maintenance of wellness  Community resources reviewed in addition to personal resources including self talk/mantras  Laurie Mathews was attentive in group  Makeda Socorro is encouraged to continue group to encourage self-awareness and healthy engagement of supports  Tx Plan Objective: 1 4, Therapist:  Amna Kessler LCSW    Case Management Note    Amna Kessler LCSW    Current suicide risk : Low     [id number]  This case management session was facilitated virtually in a private office using HIPPA Compliant and Approved [employer] Teams  Laurie Mathews consented to the use of tele-video modality of treatment  Met with Marty  He is orienting to program   He states, "I shared a little today but I am shy "  Makeda Quintanilla gained from listening to his peers in group today  He felt he could relate to a peer who talked about work stress; specifically with technology  This allowed him to realize he is not alone  His treatment plan was reviewed  He has no questions or concerns at this time     He was encouraged to talk to his wife tonight about the program    Continue PHP to identify strengths, activate wellness tools, and create structure to prevent hospitalization  He declined a case management check in for tomorrow, 9/25/20  Next case management check in scheduled for Monday, 10/28/30  He has the program and Counts include [facility] beds at the [facility] crisis phone numbers if needed  Medications changes/added/denied? No    Treatment session number: 1    Individual Case Management Visit provided today?  Yes     [facility] follow up physician's orders: none at this time

## 2020-09-24 NOTE — PSYCH
Virtual Regular Visit      Assessment/Plan:    Problem List Items Addressed This Visit        Other    PTSD (post-traumatic stress disorder)    Moderate episode of recurrent major depressive disorder ([facility] [address] ) - Primary               Reason for visit is VIRTUAL PHP GROUP DUE TO COVID-19  Encounter provider [facility] PARTIAL PSYCH PROGRAM    Provider located at [address] [place] [address]   [address] [address]    The patient was identified by name and date of birth  Sarah Livingston was informed that this is a telemedicine visit and that the visit is being conducted through [employer]  My office door was closed  No one else was in the room  He acknowledged consent and understanding of privacy and security of the video platform  The patient has agreed to participate and understands they can discontinue the visit at any time  Patient is aware this is a billable service  Subjective  Sarah Livingston is a 47 y o  male   I spent FOUR GROUP HOURS PLUS CASE MANAGEMENT minutes with patient today in which greater than 50% of the time was spent in counseling/coordination of care regarding PHP - SEE NOTES  VIRTUAL VISIT DISCLAIMER    Sarah Livingston acknowledges that he has consented to an online visit or consultation  He understands that the online visit is based solely on information provided by him, and that, in the absence of a face-to-face physical evaluation by the physician, the diagnosis he receives is both limited and provisional in terms of accuracy and completeness  This is not intended to replace a full medical face-to-face evaluation by the physician  Sarah Livingston understands and accepts these terms

## 2020-09-24 NOTE — PSYCH
Subjective:     Patient ID: Michael Sweet is a 47 y o  male  In[facility]ations Clinical Progress Notes      Specialized Services Documentation  Therapist must complete separate progress note for each specific clinical activity in which the individual participated during the day  Education Therapy   This group was facilitated virtually in a private office using [employer] and Approved [employer] Teams  Michael Sweet consented to the use of tele-video modality of treatment    Time:  2638-7264  Previous goal met: First treatment day  Readiness to Learning: Receptive  Barriers to Learning: None  Learning Assessment  Time: 1892-6288  Education Completed: Illness, Aftercare and Wellness Tools  Teaching Method: Verbal, A/V and Demonstration  Shared Area of Learning: Yes   Goal Set: To find better seating  Tx Plan Objective: 1 1 Therapist:  Jeanne Gowers, RN

## 2020-09-24 NOTE — PROGRESS NOTES
Subjective:     Patient ID: Michael Sweet is a 47 y o  male  [facility]ations Clinical Progress Notes      Specialized Services Documentation  Therapist must complete separate progress note for each specific clinical activity in which the individual participated during the day  Group Psychotherapy 10:25-11:10  Tx Plan Objective: 1 2, Therapist:  Marybel Estrella LCSW    D:  Bridgett Stratton participated in group  Topics discussed were low self esteem, PTSD and  stress  Shared regarding his PTSD  "He thought he was good with his trauma but, found out he as not (referring to a relapse he had had) "  Discussed the dynamics of PTSD  A:  Mild progress on tx plan goals  It appears he did make progress in coping with his PTSD but, had a relapse  P:  To continue with tx plan goals  Apply coping skills discussed in group

## 2020-09-25 ENCOUNTER — OFFICE VISIT (OUTPATIENT)
Dept: PSYCHOLOGY | Facility: CLINIC | Age: 50
End: 2020-09-25
Payer: COMMERCIAL

## 2020-09-25 DIAGNOSIS — F33.1 MODERATE EPISODE OF RECURRENT MAJOR DEPRESSIVE DISORDER (HCC): Primary | ICD-10-CM

## 2020-09-25 DIAGNOSIS — F43.10 PTSD (POST-TRAUMATIC STRESS DISORDER): ICD-10-CM

## 2020-09-25 PROCEDURE — G0410 GRP PSYCH PARTIAL HOSP 45-50: HCPCS

## 2020-09-25 PROCEDURE — G0176 OPPS/PHP;ACTIVITY THERAPY: HCPCS

## 2020-09-25 PROCEDURE — G0177 OPPS/PHP; TRAIN & EDUC SERV: HCPCS

## 2020-09-25 NOTE — PSYCH
Subjective:     Patient ID: Yvrose Vázquez is a 47 y o  male    In[facility]ations Clinical Progress Notes      Specialized Services Documentation  Therapist must complete separate progress note for each specific clinical activity in which the individual participated during the day  Psychotherapy Group (9:30-10:15) This group was facilitated virtually in a private office using HIPAA Compliant and Approved [employer] Teams  Yvrose Vázquez consented to the use of tele-video modality of treatment  Pt moderately participated in Weekly Wellness Assessment group  This group was facilitated virtually in a private office using HIPAA Compliant and Approved [employer] Teams  Yvrose Vázquez consented to the use of tele-video modality of treatment and was virtually present for weekly wellness assessment group today  Clients reviewing weekend supports and plan as they approach the weekend  Compliance of medications reviewed and understood  The six (6) dimension of wellness discussed (Physical, Emotional, Cognitive, Vocational, Social and Spiritual)  The format for assessing wellness and measuring progress reviewed  Bill shared that one physical wellness area he would like to improve on for next week is not falling asleep on the couch in order to improve his sleep hygiene  Yvrose Vázquez is making progress towards goals and objectives and will continue to attend wellness group  Pt actively engaged with peers and shared willingly during group discussion  Pt was able to attend to activity and discussion throughout duration of group session  Continue to engage pt in group psychotherapy in order to continue to progress toward long-term goal achievement  Tx Plan Objective: 1 1, 1 2, 1 3 and 1 4    Therapist: Ana Sandoval MS, OTR/L

## 2020-09-25 NOTE — PSYCH
Virtual Regular Visit      Assessment/Plan:    Problem List Items Addressed This Visit        Other    PTSD (post-traumatic stress disorder)    Moderate episode of recurrent major depressive disorder ([facility] [address] ) - Primary               Reason for visit is VIRTUAL PHP GROUP DUE TO COVID-19  Encounter provider [facility] PARTIAL PSYCH PROGRAM    Provider located at [address] [place] [address]   [address] [address]    The patient was identified by name and date of birth  Taz Leija was informed that this is a telemedicine visit and that the visit is being conducted through [employer]  My office door was closed  No one else was in the room  He acknowledged consent and understanding of privacy and security of the video platform  The patient has agreed to participate and understands they can discontinue the visit at any time  Patient is aware this is a billable service  Subjective  Taz Leija is a 47 y o  male   I spent FOUR GROUP HOURS PLUS CASE MANAGEMENT minutes with patient today in which greater than 50% of the time was spent in counseling/coordination of care regarding PHP - SEE NOTES  VIRTUAL VISIT DISCLAIMER    Taz Guzmanmanan acknowledges that he has consented to an online visit or consultation  He understands that the online visit is based solely on information provided by him, and that, in the absence of a face-to-face physical evaluation by the physician, the diagnosis he receives is both limited and provisional in terms of accuracy and completeness  This is not intended to replace a full medical face-to-face evaluation by the physician  Taz Leija understands and accepts these terms

## 2020-09-25 NOTE — PSYCH
Subjective:     Patient ID: Kj Quinteros is a 47 y o  male  Innovations Clinical Progress Notes      Specialized Services Documentation  Therapist must complete separate progress note for each specific clinical activity in which the individual participated during the day  Group Psychotherapy [id number]  This group was facilitated virtually in a private office using [employer] and Approved [employer] Teams  Kj Quinteros consented to the use of tele-video modality of treatment  Open psychotherapy group held today  Topic discussed was coping effectively with intense emotions  Nemo Vale participated in the group discussion  He shared how he's been reflecting on the situation that led to his admission to program and exploring effective coping mechanisms for his anger  He identifies adrenaline rushes as an effective coping mechanism such as rock climbing, using his punching bag, and indoor skydiving  Group members offered support and feedback in which Nemo Vale was receptive towards  Some initial progress toward goals  Nemo Vale is encouraged to make progress toward goals and objectives through group participation and will continue to attend psychotherapy group  Tx Plan Objective: 1 1, 1 2, Therapist:  Alisa Hernandez LCSW    Education Therapy   This group was facilitated virtually in a private office using [employer] and Approved [employer] Teams  Kj Quinteros consented to the use of tele-video modality of treatment    Time:  0976-2240  Previous goal met: Yes   Readiness to Learning: Receptive  Barriers to Learning: None  Learning Assessment  Time: 3786-6721  Education Completed: Illness and Wellness Tools  Teaching Method: Verbal, Written and A/V  Shared Area of Learning: Yes   Goal Set: work on cleaning up the downstairs  Tx Plan Objective: 1 1, Therapist:  Alisa Hernandez LCSW      Case Management Note    Alisa Hernandez LCSW    Current suicide risk : Low     Today is not a scheduled CM meeting with Gerard Stevens  Additionally, Gerard Stevens did not request to meet with this writer  Medications changes/added/denied? No    Treatment session number: 2    Individual Case Management Visit provided today?  No    Innovations follow up physician's orders: none at this time

## 2020-09-25 NOTE — PSYCH
Subjective:     Patient ID: Mar Rodriguez is a 47 y o  male  Innovations Clinical Progress Notes      Specialized Services Documentation  Therapist must complete separate progress note for each specific clinical activity in which the individual participated during the day  Allied Therapy   This group was facilitated virtually in a private office using [employer] and Approved [employer] Teams  Mar Rodriguez consented to the use of tele-video modality of treatment  [id number] Mar Rodriguez  actively shared in [facility] group focused on managing anger and emotional regulation skills  Bill engaged in task exploring effective versus ineffective ways in addition to skills to refocus in the moment  He shared appropriately and appeared invested in topic and program    Group explored the benefits of positive choices with intense emotion and factors that increase emotional vulnerability  Some beginning  work toward goal noted   Continue AT to explore wellness tool awareness and practice       Tx Plan Objective: 1 2,1 1, Therapist:  Lexie ALBERTO

## 2020-09-28 ENCOUNTER — OFFICE VISIT (OUTPATIENT)
Dept: PSYCHOLOGY | Facility: CLINIC | Age: 50
End: 2020-09-28
Payer: COMMERCIAL

## 2020-09-28 DIAGNOSIS — F33.1 MODERATE EPISODE OF RECURRENT MAJOR DEPRESSIVE DISORDER (HCC): Primary | ICD-10-CM

## 2020-09-28 DIAGNOSIS — F43.10 PTSD (POST-TRAUMATIC STRESS DISORDER): ICD-10-CM

## 2020-09-28 PROCEDURE — G0176 OPPS/PHP;ACTIVITY THERAPY: HCPCS

## 2020-09-28 PROCEDURE — G0177 OPPS/PHP; TRAIN & EDUC SERV: HCPCS

## 2020-09-28 PROCEDURE — 90832 PSYTX W PT 30 MINUTES: CPT

## 2020-09-28 PROCEDURE — G0410 GRP PSYCH PARTIAL HOSP 45-50: HCPCS

## 2020-09-28 NOTE — PSYCH
Subjective:     Patient ID: Sidra Lopez is a 47 y o  male  In[facility]ations Clinical Progress Notes      Specialized Services Documentation  Therapist must complete separate progress note for each specific clinical activity in which the individual participated during the day  Allied Therapy   This group was facilitated virtually in a private office using [email or web address] and Approved [employer] Teams  Sidra Lopez consented to the use of tele-video modality of treatment  [id number] Sidra Lopez  actively shared in [facility] group focused on mindfulness and the [facility] strategies  Mariangel Kramer engaged in review of the [employer] of observe, describe and participate  Group introduced to and practiced the [facility] skills non-judgmental, one-mindfully, and effectiveness through various tasks  Group discussed ways to apply to slowing down to make more effective choices  Some beginning effort noted toward treatment goal   Continue AT to encourage the development and practice of mindfulness strategies to alleviate symptoms and support wellness        Tx Plan Objective: 1 1,1 2, Therapist:  Paola ALBERTO

## 2020-09-28 NOTE — PSYCH
Subjective:     Patient ID: Michael Sweet is a 47 y o  male  Innovations Clinical Progress Notes      Specialized Services Documentation  Therapist must complete separate progress note for each specific clinical activity in which the individual participated during the day  Group Psychotherapy 0930-10:15 am  Tx Plan Objective: 1 2, 1 4, Therapist:  Chi Padilla RN     This group was facilitated virtually in a private office using HIPAA Compliant and Approved [employer] Teams  Marty consented to the use of tele-video modality of treatment  Bridgetttigre Stratton participated in a wellness psychoeducation group focused on the science of positive emotions and how they work and how to utilize in recovery  Educational information was discussed and each member outlined their hand on a piece of paper and identified positive emotions associated with each finger, for example the thumb identified something they feel proud of, middle finger when client did something nice for another person, pinky identified something they feel grateful for  Bill shared and made progress towards goals  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

## 2020-09-28 NOTE — PSYCH
Subjective:     Patient ID: Mar Rodriguez is a [address] y o  male  Innovations Clinical Progress Notes      Specialized Services Documentation  Therapist must complete separate progress note for each specific clinical activity in which the individual participated during the day  Group Psychotherapy [id number]  This group was facilitated virtually in a private office using [employer] and Approved [employer] Teams  Mar Rodriguez consented to the use of tele-video modality of treatment  Psychotherapy group focused on cognitive distortions and tools to challenge negative thoughts of oneself, others, and the world  Psychoeducation was provided on the cognitive model  The group focused on the  cognitive distortions:  Personalization and catastrophising  Through discussion, Adam Kelley was able to gain increased awareness of how his thoughts effect ones feelings and behaviors  Adam Kelley was introduced to the tools, socratic questioning, thought log, putting your thoughts on trial, and de-catastrophising to begin challenging negative thoughts  Adam Kelley participated in the group discussion where he shared personal examples of distortions  He demonstrated understanding of the cognitive model and was able to relate it to personal experiences  Some positive progress toward goals  Adam Kelley is encouraged to make progress toward goals and objectives through participation and will continue to attend psychotherapy group  Tx Plan Objective: 1 1, Therapist:  Denise Schumacher [facility]    Education Therapy   This group was facilitated virtually in a private office using [employer] and Approved [employer] Teams  Mar Rodriguez consented to the use of tele-video modality of treatment    Time:  4886-3562  Previous goal met: Yes   Readiness to Learning: Receptive  Barriers to Learning: None  Learning Assessment  Time: 8710-8722  Education Completed: Illness and Wellness Tools  Teaching Method: Verbal, Written and A/V  Shared Area of Learning: Yes   Goal Set: make dinner  Tx Plan Objective: 1 4, Therapist:  Oscar Ramos LCSW      Case Management Note    Oscar Ramos LCSW    Current suicide risk : Low     [id number]  This case management session was facilitated virtually in a private office using [employer] and Approved [employer] Teams  Radhapavel  consented to the use of tele-video modality of treatment  Met with Marty  He states, "I am slowly starting to feel more like myself again "  He relates this to improvement with his elbow and being able to be more physically active  He feels his lack of physical activity with his elbow injury was an element to his anger outburst due to a lack of an emotional outlet  He shows insight that he's been suppressing his anger related to his own trauma and was triggered and emotionally reactive to a recent event  He expressed interest in beginning to work on forgiveness  Journaling, spirituality, meditations, mantras, and self compassion skills were reviewed  This writer encouraged ongoing individual therapy for when he completes PHP  He agrees to referral    Continue PHP to identify strengths, activate wellness tools, and create structure to prevent hospitalization  Next case management check in scheduled for Wednesday, 9/30/20  He was reminded of his medication follow up appt scheduled for tomorrow morning  Medications changes/added/denied? No    Treatment session number: 3    Individual Case Management Visit provided today?  Yes     Innovations follow up physician's orders: none at this time

## 2020-09-28 NOTE — PSYCH
Virtual Regular Visit      Assessment/Plan:    Problem List Items Addressed This Visit        Other    PTSD (post-traumatic stress disorder)    Moderate episode of recurrent major depressive disorder ([facility] [address] ) - Primary               Reason for visit is VIRTUAL PHP GROUP DUE TO COVID-19  Encounter provider [address] [facility] PARTIAL PSYCH PROGRAM    Provider located at [address] [place] [address]   [address] [address] [address]    The patient was identified by name and date of birth  Melissa Hare was informed that this is a telemedicine visit and that the visit is being conducted through [employer]  My office door was closed  No one else was in the room  He acknowledged consent and understanding of privacy and security of the video platform  The patient has agreed to participate and understands they can discontinue the visit at any time  Patient is aware this is a billable service  Subjective  Melissa Hare is a 47 y o  male   I spent FOUR GROUP HOURS PLUS CASE MANAGEMENT minutes with patient today in which greater than 50% of the time was spent in counseling/coordination of care regarding PHP - SEE NOTES  VIRTUAL VISIT DISCLAIMER    Melissa Connerecho acknowledges that he has consented to an online visit or consultation  He understands that the online visit is based solely on information provided by him, and that, in the absence of a face-to-face physical evaluation by the physician, the diagnosis he receives is both limited and provisional in terms of accuracy and completeness  This is not intended to replace a full medical face-to-face evaluation by the physician  Melissa Hare understands and accepts these terms

## 2020-09-29 ENCOUNTER — OFFICE VISIT (OUTPATIENT)
Dept: PSYCHOLOGY | Facility: CLINIC | Age: 50
End: 2020-09-29
Payer: COMMERCIAL

## 2020-09-29 ENCOUNTER — TELEMEDICINE (OUTPATIENT)
Dept: PSYCHIATRY | Facility: CLINIC | Age: 50
End: 2020-09-29
Payer: COMMERCIAL

## 2020-09-29 DIAGNOSIS — F43.10 PTSD (POST-TRAUMATIC STRESS DISORDER): ICD-10-CM

## 2020-09-29 DIAGNOSIS — F33.1 MODERATE EPISODE OF RECURRENT MAJOR DEPRESSIVE DISORDER (HCC): Primary | ICD-10-CM

## 2020-09-29 PROCEDURE — G0176 OPPS/PHP;ACTIVITY THERAPY: HCPCS

## 2020-09-29 PROCEDURE — G0410 GRP PSYCH PARTIAL HOSP 45-50: HCPCS

## 2020-09-29 PROCEDURE — G0177 OPPS/PHP; TRAIN & EDUC SERV: HCPCS

## 2020-09-29 PROCEDURE — 99213 OFFICE O/P EST LOW 20 MIN: CPT | Performed by: PHYSICIAN ASSISTANT

## 2020-09-29 NOTE — PSYCH
Subjective:     Patient ID: Laine Marvin is a 47 y o  male  In[facility]ations Clinical Progress Notes      Specialized Services Documentation  Therapist must complete separate progress note for each specific clinical activity in which the individual participated during the day  Group Psychotherapy [id number]  This group was facilitated virtually in a private office using [employer] and Approved [employer] Teams  Laine Marvin consented to the use of tele-video modality of treatment  Psychotherapy group focused on mindfulness  Marty explored what mindfulness is, the benefits of practicing mindfulness, and different mindfulness techniques  Marty then participated in a 15 minutes progressive muscle relaxation  Hilda Can participated in the discussion  He expressed interest in adding progressive muscle relaxation into his sleep hygiene routine  Some positive  progress toward goals  Hilda Can is encouraged to make progress towards goals and objectives through group participation and will continue to attend psychotherapy group  Tx Plan Objective: 1 1, Therapist:  Iris Overton LCSW      Education Therapy   This group was facilitated virtually in a private office using [employer] and Approved [employer] Teams  Laine Marvin consented to the use of tele-video modality of treatment  Time:  0998-2656  Previous goal met: Yes   Readiness to Learning: Receptive  Barriers to Learning: None  Learning Assessment  Time: 1023-5403  Education Completed: Illness and Wellness Tools  Teaching Method: Verbal, Written and A/V  Shared Area of Learning: Yes   Goal Set: take son to out to exercise  Tx Plan Objective: 1 1,1 4, Therapist:  Iris Overton LCSW    Case Management Note    Yesi Fleming LCSW    Current suicide risk : Low     Today is not a scheduled CM meeting with Marty  Additionally, Hilda Can did not request to meet with this writer  Medications changes/added/denied?  No    Treatment session number: 4    Individual Case Management Visit provided today? no    Innovations follow up physician's orders: see Sabiha Guzmán PA-C note

## 2020-09-29 NOTE — PSYCH
Virtual Regular Visit      Assessment/Plan:    Problem List Items Addressed This Visit        Other    PTSD (post-traumatic stress disorder)    Moderate episode of recurrent major depressive disorder ([facility] [address] ) - Primary               Reason for visit is VIRTUAL PHP GROUP DUE TO COVID-19  Encounter provider [facility] PARTIAL PSYCH PROGRAM    Provider located at [address] [place] [address]   [address] [address]    The patient was identified by name and date of birth  Teresa Calderon was informed that this is a telemedicine visit and that the visit is being conducted through [employer]  My office door was closed  No one else was in the room  He acknowledged consent and understanding of privacy and security of the video platform  The patient has agreed to participate and understands they can discontinue the visit at any time  Patient is aware this is a billable service  Subjective  Teresa Calderon is a 47 y o  male   I spent FOUR GROUP HOURS PLUS CASE MANAGEMENT minutes with patient today in which greater than 50% of the time was spent in counseling/coordination of care regarding PHP - SEE NOTES  VIRTUAL VISIT DISCLAIMER    Teresa Calderon acknowledges that he has consented to an online visit or consultation  He understands that the online visit is based solely on information provided by him, and that, in the absence of a face-to-face physical evaluation by the physician, the diagnosis he receives is both limited and provisional in terms of accuracy and completeness  This is not intended to replace a full medical face-to-face evaluation by the physician  Teresapavel Cooks understands and accepts these terms

## 2020-09-29 NOTE — PSYCH
Subjective:     Patient ID: Laine Marvin is a 47 y o  male  Innovations Clinical Progress Notes      Specialized Services Documentation  Therapist must complete separate progress note for each specific clinical activity in which the individual participated during the day  Group Psychotherapy (9:30-10:15) Group was facilitated virtually in a private office using HIPAA Compliant and Approved [employer] Teams  Terrance Mann consented to the use of tele-video modality of treatment and was virtually present for group psychotherapy today  This group was on processing the emotion of guilt  Participants received education about prompting evens for guilt, biological experiences of guilt and the after effects  Participants were introduced to toxic or unhealthy guilt which is present when one takes on responsibility for things they have no control over  Participants learned that life events, childhood experiences and depression all contribute to unhealthy feelings of guilt and cycles of negative thinking often which include guilt and questioning what one could have done differently  Participants were asked to share experiences, challenge unhealthy thoughts that contributed to this cycle and also learned new coping skills including a visualization to help decipher between guilt that can lead to healthy response verse that which magnifies depressive symptoms  Hilda Can was attentive and participated throughout this group  He shared a traumatic experience of he and his wife deciding to terminate a pregnancy due to the risk to his wife and how he has struggled with guilt for years regarding this  He discussed how he honors the memory of his unborn child  Hilda Can was engaged throughout the session and is making good progress toward his treatment goals  Tx Objectives: 1 1,1 2 Therapist: Esperanza Massey

## 2020-09-29 NOTE — PSYCH
Subjective:     Patient ID: Annette Kim is a 47 y o  male  Innovations Clinical Progress Notes      Specialized Services Documentation  Therapist must complete separate progress note for each specific clinical activity in which the individual participated during the day  Allied Therapy   This group was facilitated virtually in a private office using [employer] and Approved [employer] Teams  Annette Kim consented to the use of tele-video modality of treatment  [id number] Annette Kim  actively shared in [facility] group focused on challenging limiting beliefs  Group explored self talk through prieto analysis  Given list of limiting beliefs and CBT strategies of inclusion and counter-beliefs introduced and practiced  DBT [place] kindness meditation reviewed  Jono Sears remains attentive and shared when promted  Some effort toward treatment goals explored  Continue AT to encourage the development of wellness strategies and active practice        Tx Plan Objective: 1 2, Therapist:  Heather ALBERTO
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
## 2020-09-30 ENCOUNTER — OFFICE VISIT (OUTPATIENT)
Dept: PSYCHOLOGY | Facility: CLINIC | Age: 50
End: 2020-09-30
Payer: COMMERCIAL

## 2020-09-30 DIAGNOSIS — F33.1 MODERATE EPISODE OF RECURRENT MAJOR DEPRESSIVE DISORDER (HCC): Primary | ICD-10-CM

## 2020-09-30 DIAGNOSIS — F43.10 PTSD (POST-TRAUMATIC STRESS DISORDER): ICD-10-CM

## 2020-09-30 PROCEDURE — 90832 PSYTX W PT 30 MINUTES: CPT

## 2020-09-30 PROCEDURE — G0176 OPPS/PHP;ACTIVITY THERAPY: HCPCS

## 2020-09-30 PROCEDURE — G0177 OPPS/PHP; TRAIN & EDUC SERV: HCPCS

## 2020-09-30 PROCEDURE — G0410 GRP PSYCH PARTIAL HOSP 45-50: HCPCS
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
## 2020-09-30 NOTE — PSYCH
Virtual Regular Visit      Assessment/Plan:    Problem List Items Addressed This Visit        Other    PTSD (post-traumatic stress disorder)    Moderate episode of recurrent major depressive disorder ([facility] [address] ) - Primary               Reason for visit is VIRTUAL PHP GROUP DUE TO COVID-19  Encounter provider [facility] PARTIAL PSYCH PROGRAM    Provider located at [address] [place] [address]   [address] [address]    The patient was identified by name and date of birth  Tomas Alcazar was informed that this is a telemedicine visit and that the visit is being conducted through [employer]  My office door was closed  No one else was in the room  He acknowledged consent and understanding of privacy and security of the video platform  The patient has agreed to participate and understands they can discontinue the visit at any time  Patient is aware this is a billable service  Subjective  Tomas Alcazar is a 47 y o  male   I spent FOUR GROUP HOURS PLUS CASE MANAGEMENT minutes with patient today in which greater than 50% of the time was spent in counseling/coordination of care regarding PHP - SEE NOTES  VIRTUAL VISIT DISCLAIMER    Tomas Alcazar acknowledges that he has consented to an online visit or consultation  He understands that the online visit is based solely on information provided by him, and that, in the absence of a face-to-face physical evaluation by the physician, the diagnosis he receives is both limited and provisional in terms of accuracy and completeness  This is not intended to replace a full medical face-to-face evaluation by the physician  Tomas Alcazar understands and accepts these terms

## 2020-09-30 NOTE — PSYCH
Subjective:     Patient ID: Khang Fiore is a 47 y o  male  Innovations Clinical Progress Notes      Specialized Services Documentation  Therapist must complete separate progress note for each specific clinical activity in which the individual participated during the day  Allied Therapy   This group was facilitated virtually in a private office using [employer] and Approved [employer] Teams  Khang Fiore consented to the use of tele-video modality of treatment  [id number] Khang Fiore actively shared in [facility] group focused on self- care  Gracy Ochoa was encouraged to identify aspects of self-care and barriers to it  The concept of self -care versus selfishness explored  He identified engaging in music  as self-care and self as a barrier to it  When asked at end of a specific thing he could commit to in order to increase self-care, he stated exercise  Some progress voiced toward goal   Continue AT to engage in the development and practice of wellness tools         Tx Plan Objective: 1 2, Therapist:  Fadumo Franco MT-BC

## 2020-09-30 NOTE — PSYCH
Subjective:     Patient ID: Jovanni Schumacher is a 47 y o  male  Innovations Clinical Progress Notes      Specialized Services Documentation  Therapist must complete separate progress note for each specific clinical activity in which the individual participated during the day  Group Psychotherapy ([id number])   This group was facilitated virtually in a private office using [employer] and Approved [employer] Teams  Jovanni Schumacher consented to the use of tele-video modality of treatment  Jovanni Schumacher actively participated in nurse education group this morning which focused on the topic of anxiety  Group was educated on types of anxiety disorders, the cycle of anxiety, and treatments  Group members were encouraged to share their anxiety symptoms and current treatment as well as ways in which they are managing their symptoms  Sissytapan Sierra shared that he has been diagnosed with an anxiety disorder and discussed his symptoms that he currently struggles with  Good progress toward goal noted  Continue nurse education group in order to further learn about anxiety and strategies to manage common signs and symptoms      Tx Plan Objective: 1 2 Therapist:  Slick Griffin RN

## 2020-10-01 ENCOUNTER — OFFICE VISIT (OUTPATIENT)
Dept: PSYCHOLOGY | Facility: CLINIC | Age: 50
End: 2020-10-01
Payer: COMMERCIAL

## 2020-10-01 DIAGNOSIS — F43.10 PTSD (POST-TRAUMATIC STRESS DISORDER): ICD-10-CM

## 2020-10-01 DIAGNOSIS — F33.1 MODERATE EPISODE OF RECURRENT MAJOR DEPRESSIVE DISORDER (HCC): Primary | ICD-10-CM

## 2020-10-01 PROCEDURE — G0410 GRP PSYCH PARTIAL HOSP 45-50: HCPCS

## 2020-10-01 PROCEDURE — G0176 OPPS/PHP;ACTIVITY THERAPY: HCPCS

## 2020-10-01 PROCEDURE — G0177 OPPS/PHP; TRAIN & EDUC SERV: HCPCS

## 2020-10-02 ENCOUNTER — OFFICE VISIT (OUTPATIENT)
Dept: PSYCHOLOGY | Facility: CLINIC | Age: 50
End: 2020-10-02
Payer: COMMERCIAL

## 2020-10-02 DIAGNOSIS — F33.1 MODERATE EPISODE OF RECURRENT MAJOR DEPRESSIVE DISORDER (HCC): Primary | ICD-10-CM

## 2020-10-02 PROCEDURE — G0410 GRP PSYCH PARTIAL HOSP 45-50: HCPCS

## 2020-10-02 PROCEDURE — 90832 PSYTX W PT 30 MINUTES: CPT

## 2020-10-02 PROCEDURE — G0177 OPPS/PHP; TRAIN & EDUC SERV: HCPCS

## 2020-10-05 ENCOUNTER — OFFICE VISIT (OUTPATIENT)
Dept: PSYCHOLOGY | Facility: CLINIC | Age: 50
End: 2020-10-05
Payer: COMMERCIAL

## 2020-10-05 DIAGNOSIS — F33.1 MODERATE EPISODE OF RECURRENT MAJOR DEPRESSIVE DISORDER (HCC): Primary | ICD-10-CM

## 2020-10-05 PROCEDURE — 90832 PSYTX W PT 30 MINUTES: CPT

## 2020-10-05 PROCEDURE — G0177 OPPS/PHP; TRAIN & EDUC SERV: HCPCS

## 2020-10-05 PROCEDURE — G0410 GRP PSYCH PARTIAL HOSP 45-50: HCPCS

## 2020-10-05 PROCEDURE — G0176 OPPS/PHP;ACTIVITY THERAPY: HCPCS

## 2020-10-06 ENCOUNTER — OFFICE VISIT (OUTPATIENT)
Dept: PSYCHOLOGY | Facility: CLINIC | Age: 50
End: 2020-10-06
Payer: COMMERCIAL

## 2020-10-06 DIAGNOSIS — F43.10 PTSD (POST-TRAUMATIC STRESS DISORDER): ICD-10-CM

## 2020-10-06 DIAGNOSIS — F33.1 MODERATE EPISODE OF RECURRENT MAJOR DEPRESSIVE DISORDER (HCC): Primary | ICD-10-CM

## 2020-10-06 PROCEDURE — G0177 OPPS/PHP; TRAIN & EDUC SERV: HCPCS

## 2020-10-06 PROCEDURE — G0176 OPPS/PHP;ACTIVITY THERAPY: HCPCS

## 2020-10-06 PROCEDURE — G0410 GRP PSYCH PARTIAL HOSP 45-50: HCPCS

## 2020-10-07 ENCOUNTER — OFFICE VISIT (OUTPATIENT)
Dept: PSYCHOLOGY | Facility: CLINIC | Age: 50
End: 2020-10-07
Payer: COMMERCIAL

## 2020-10-07 ENCOUNTER — DOCUMENTATION (OUTPATIENT)
Dept: PSYCHOLOGY | Facility: CLINIC | Age: 50
End: 2020-10-07

## 2020-10-07 DIAGNOSIS — F33.1 MODERATE EPISODE OF RECURRENT MAJOR DEPRESSIVE DISORDER (HCC): Primary | ICD-10-CM

## 2020-10-07 DIAGNOSIS — F43.10 PTSD (POST-TRAUMATIC STRESS DISORDER): ICD-10-CM

## 2020-10-07 PROCEDURE — G0177 OPPS/PHP; TRAIN & EDUC SERV: HCPCS

## 2020-10-07 PROCEDURE — G0410 GRP PSYCH PARTIAL HOSP 45-50: HCPCS

## 2020-10-07 PROCEDURE — G0176 OPPS/PHP;ACTIVITY THERAPY: HCPCS

## 2020-10-08 ENCOUNTER — OFFICE VISIT (OUTPATIENT)
Dept: PSYCHOLOGY | Facility: CLINIC | Age: 50
End: 2020-10-08
Payer: COMMERCIAL

## 2020-10-08 DIAGNOSIS — F43.10 PTSD (POST-TRAUMATIC STRESS DISORDER): ICD-10-CM

## 2020-10-08 DIAGNOSIS — F33.1 MODERATE EPISODE OF RECURRENT MAJOR DEPRESSIVE DISORDER (HCC): Primary | ICD-10-CM

## 2020-10-08 PROCEDURE — G0410 GRP PSYCH PARTIAL HOSP 45-50: HCPCS

## 2020-10-08 PROCEDURE — G0177 OPPS/PHP; TRAIN & EDUC SERV: HCPCS

## 2020-10-08 PROCEDURE — G0176 OPPS/PHP;ACTIVITY THERAPY: HCPCS

## 2020-10-09 ENCOUNTER — OFFICE VISIT (OUTPATIENT)
Dept: PSYCHOLOGY | Facility: CLINIC | Age: 50
End: 2020-10-09
Payer: COMMERCIAL

## 2020-10-09 ENCOUNTER — TELEMEDICINE (OUTPATIENT)
Dept: PSYCHIATRY | Facility: CLINIC | Age: 50
End: 2020-10-09
Payer: COMMERCIAL

## 2020-10-09 DIAGNOSIS — F43.10 PTSD (POST-TRAUMATIC STRESS DISORDER): ICD-10-CM

## 2020-10-09 DIAGNOSIS — F33.1 MODERATE EPISODE OF RECURRENT MAJOR DEPRESSIVE DISORDER (HCC): Primary | ICD-10-CM

## 2020-10-09 PROCEDURE — G0176 OPPS/PHP;ACTIVITY THERAPY: HCPCS

## 2020-10-09 PROCEDURE — G0177 OPPS/PHP; TRAIN & EDUC SERV: HCPCS

## 2020-10-09 PROCEDURE — 90832 PSYTX W PT 30 MINUTES: CPT

## 2020-10-09 PROCEDURE — G0410 GRP PSYCH PARTIAL HOSP 45-50: HCPCS

## 2020-10-09 PROCEDURE — 99213 OFFICE O/P EST LOW 20 MIN: CPT | Performed by: PHYSICIAN ASSISTANT

## 2020-10-12 ENCOUNTER — OFFICE VISIT (OUTPATIENT)
Dept: PSYCHOLOGY | Facility: CLINIC | Age: 50
End: 2020-10-12
Payer: COMMERCIAL

## 2020-10-12 DIAGNOSIS — F33.1 MODERATE EPISODE OF RECURRENT MAJOR DEPRESSIVE DISORDER (HCC): Primary | ICD-10-CM

## 2020-10-12 DIAGNOSIS — F43.10 PTSD (POST-TRAUMATIC STRESS DISORDER): ICD-10-CM

## 2020-10-12 PROCEDURE — G0177 OPPS/PHP; TRAIN & EDUC SERV: HCPCS

## 2020-10-12 PROCEDURE — G0176 OPPS/PHP;ACTIVITY THERAPY: HCPCS

## 2020-10-12 PROCEDURE — G0410 GRP PSYCH PARTIAL HOSP 45-50: HCPCS

## 2020-10-13 ENCOUNTER — OFFICE VISIT (OUTPATIENT)
Dept: PSYCHOLOGY | Facility: CLINIC | Age: 50
End: 2020-10-13
Payer: COMMERCIAL

## 2020-10-13 ENCOUNTER — TELEMEDICINE (OUTPATIENT)
Dept: PSYCHIATRY | Facility: CLINIC | Age: 50
End: 2020-10-13
Payer: COMMERCIAL

## 2020-10-13 DIAGNOSIS — F43.10 PTSD (POST-TRAUMATIC STRESS DISORDER): ICD-10-CM

## 2020-10-13 DIAGNOSIS — F33.1 MODERATE EPISODE OF RECURRENT MAJOR DEPRESSIVE DISORDER (HCC): Primary | ICD-10-CM

## 2020-10-13 PROCEDURE — G0410 GRP PSYCH PARTIAL HOSP 45-50: HCPCS

## 2020-10-13 PROCEDURE — 90832 PSYTX W PT 30 MINUTES: CPT

## 2020-10-13 PROCEDURE — 99213 OFFICE O/P EST LOW 20 MIN: CPT | Performed by: PHYSICIAN ASSISTANT

## 2020-10-13 PROCEDURE — G0176 OPPS/PHP;ACTIVITY THERAPY: HCPCS

## 2020-10-13 PROCEDURE — G0177 OPPS/PHP; TRAIN & EDUC SERV: HCPCS

## 2020-10-14 ENCOUNTER — OFFICE VISIT (OUTPATIENT)
Dept: PSYCHOLOGY | Facility: CLINIC | Age: 50
End: 2020-10-14
Payer: COMMERCIAL

## 2020-10-14 DIAGNOSIS — F43.10 PTSD (POST-TRAUMATIC STRESS DISORDER): ICD-10-CM

## 2020-10-14 DIAGNOSIS — F33.1 MODERATE EPISODE OF RECURRENT MAJOR DEPRESSIVE DISORDER (HCC): Primary | ICD-10-CM

## 2020-10-14 PROCEDURE — 90832 PSYTX W PT 30 MINUTES: CPT

## 2020-10-14 PROCEDURE — G0177 OPPS/PHP; TRAIN & EDUC SERV: HCPCS

## 2020-10-14 PROCEDURE — G0410 GRP PSYCH PARTIAL HOSP 45-50: HCPCS

## 2020-10-14 PROCEDURE — G0176 OPPS/PHP;ACTIVITY THERAPY: HCPCS

## 2020-10-15 ENCOUNTER — OFFICE VISIT (OUTPATIENT)
Dept: PSYCHOLOGY | Facility: CLINIC | Age: 50
End: 2020-10-15
Payer: COMMERCIAL

## 2020-10-15 DIAGNOSIS — F33.1 MODERATE EPISODE OF RECURRENT MAJOR DEPRESSIVE DISORDER (HCC): Primary | ICD-10-CM

## 2020-10-15 DIAGNOSIS — F43.10 PTSD (POST-TRAUMATIC STRESS DISORDER): ICD-10-CM

## 2020-10-15 PROCEDURE — G0177 OPPS/PHP; TRAIN & EDUC SERV: HCPCS

## 2020-10-15 PROCEDURE — G0410 GRP PSYCH PARTIAL HOSP 45-50: HCPCS

## 2020-10-15 PROCEDURE — G0176 OPPS/PHP;ACTIVITY THERAPY: HCPCS

## 2020-10-16 ENCOUNTER — OFFICE VISIT (OUTPATIENT)
Dept: PSYCHOLOGY | Facility: CLINIC | Age: 50
End: 2020-10-16
Payer: COMMERCIAL

## 2020-10-16 DIAGNOSIS — F33.1 MODERATE EPISODE OF RECURRENT MAJOR DEPRESSIVE DISORDER (HCC): ICD-10-CM

## 2020-10-16 DIAGNOSIS — F43.10 PTSD (POST-TRAUMATIC STRESS DISORDER): Primary | ICD-10-CM

## 2020-10-16 PROCEDURE — 90832 PSYTX W PT 30 MINUTES: CPT

## 2020-10-16 PROCEDURE — G0177 OPPS/PHP; TRAIN & EDUC SERV: HCPCS

## 2020-10-16 PROCEDURE — G0410 GRP PSYCH PARTIAL HOSP 45-50: HCPCS

## 2020-10-19 ENCOUNTER — OFFICE VISIT (OUTPATIENT)
Dept: PSYCHOLOGY | Facility: CLINIC | Age: 50
End: 2020-10-19
Payer: COMMERCIAL

## 2020-10-19 ENCOUNTER — DOCUMENTATION (OUTPATIENT)
Dept: PSYCHOLOGY | Facility: CLINIC | Age: 50
End: 2020-10-19

## 2020-10-19 DIAGNOSIS — F33.1 MODERATE EPISODE OF RECURRENT MAJOR DEPRESSIVE DISORDER (HCC): Primary | ICD-10-CM

## 2020-10-19 DIAGNOSIS — F43.10 PTSD (POST-TRAUMATIC STRESS DISORDER): ICD-10-CM

## 2020-10-19 PROCEDURE — G0410 GRP PSYCH PARTIAL HOSP 45-50: HCPCS

## 2020-10-19 PROCEDURE — G0177 OPPS/PHP; TRAIN & EDUC SERV: HCPCS

## 2020-10-19 PROCEDURE — 90832 PSYTX W PT 30 MINUTES: CPT

## 2020-10-20 ENCOUNTER — OFFICE VISIT (OUTPATIENT)
Dept: PSYCHOLOGY | Facility: CLINIC | Age: 50
End: 2020-10-20
Payer: COMMERCIAL

## 2020-10-20 DIAGNOSIS — F33.1 MODERATE EPISODE OF RECURRENT MAJOR DEPRESSIVE DISORDER (HCC): Primary | ICD-10-CM

## 2020-10-20 PROCEDURE — G0177 OPPS/PHP; TRAIN & EDUC SERV: HCPCS

## 2020-10-20 PROCEDURE — G0410 GRP PSYCH PARTIAL HOSP 45-50: HCPCS

## 2020-10-21 ENCOUNTER — TELEMEDICINE (OUTPATIENT)
Dept: PSYCHIATRY | Facility: CLINIC | Age: 50
End: 2020-10-21
Payer: COMMERCIAL

## 2020-10-21 ENCOUNTER — OFFICE VISIT (OUTPATIENT)
Dept: PSYCHOLOGY | Facility: CLINIC | Age: 50
End: 2020-10-21
Payer: COMMERCIAL

## 2020-10-21 DIAGNOSIS — F43.10 PTSD (POST-TRAUMATIC STRESS DISORDER): ICD-10-CM

## 2020-10-21 DIAGNOSIS — F33.1 MODERATE EPISODE OF RECURRENT MAJOR DEPRESSIVE DISORDER (HCC): Primary | ICD-10-CM

## 2020-10-21 PROCEDURE — G0410 GRP PSYCH PARTIAL HOSP 45-50: HCPCS

## 2020-10-21 PROCEDURE — 90832 PSYTX W PT 30 MINUTES: CPT

## 2020-10-21 PROCEDURE — 99213 OFFICE O/P EST LOW 20 MIN: CPT | Performed by: PHYSICIAN ASSISTANT

## 2020-10-21 PROCEDURE — 1036F TOBACCO NON-USER: CPT | Performed by: PHYSICIAN ASSISTANT

## 2020-10-21 PROCEDURE — G0176 OPPS/PHP;ACTIVITY THERAPY: HCPCS

## 2020-10-21 PROCEDURE — G0177 OPPS/PHP; TRAIN & EDUC SERV: HCPCS

## 2020-10-22 ENCOUNTER — APPOINTMENT (OUTPATIENT)
Dept: PSYCHOLOGY | Facility: CLINIC | Age: 50
End: 2020-10-22
Payer: COMMERCIAL

## 2020-10-22 DIAGNOSIS — F32.A DEPRESSION, UNSPECIFIED DEPRESSION TYPE: ICD-10-CM

## 2020-10-22 RX ORDER — VENLAFAXINE HYDROCHLORIDE 150 MG/1
CAPSULE, EXTENDED RELEASE ORAL
Qty: 90 CAPSULE | Refills: 1 | Status: SHIPPED | OUTPATIENT
Start: 2020-10-22 | End: 2021-04-23

## 2020-10-23 ENCOUNTER — APPOINTMENT (OUTPATIENT)
Dept: PSYCHOLOGY | Facility: CLINIC | Age: 50
End: 2020-10-23
Payer: COMMERCIAL

## 2020-10-28 ENCOUNTER — SOCIAL WORK (OUTPATIENT)
Dept: BEHAVIORAL/MENTAL HEALTH CLINIC | Facility: CLINIC | Age: 50
End: 2020-10-28
Payer: COMMERCIAL

## 2020-10-28 DIAGNOSIS — F43.10 PTSD (POST-TRAUMATIC STRESS DISORDER): Primary | ICD-10-CM

## 2020-10-28 DIAGNOSIS — F33.1 MODERATE EPISODE OF RECURRENT MAJOR DEPRESSIVE DISORDER (HCC): ICD-10-CM

## 2020-10-28 PROCEDURE — 90834 PSYTX W PT 45 MINUTES: CPT | Performed by: SOCIAL WORKER

## 2020-11-03 ENCOUNTER — OFFICE VISIT (OUTPATIENT)
Dept: INTERNAL MEDICINE CLINIC | Facility: CLINIC | Age: 50
End: 2020-11-03
Payer: COMMERCIAL

## 2020-11-03 VITALS
HEART RATE: 91 BPM | TEMPERATURE: 98.3 F | SYSTOLIC BLOOD PRESSURE: 120 MMHG | RESPIRATION RATE: 16 BRPM | WEIGHT: 179 LBS | HEIGHT: 66 IN | BODY MASS INDEX: 28.77 KG/M2 | OXYGEN SATURATION: 97 % | DIASTOLIC BLOOD PRESSURE: 82 MMHG

## 2020-11-03 DIAGNOSIS — F33.1 MODERATE EPISODE OF RECURRENT MAJOR DEPRESSIVE DISORDER (HCC): Primary | ICD-10-CM

## 2020-11-03 DIAGNOSIS — E78.2 MIXED HYPERLIPIDEMIA: ICD-10-CM

## 2020-11-03 DIAGNOSIS — R51.9 ACUTE NONINTRACTABLE HEADACHE, UNSPECIFIED HEADACHE TYPE: ICD-10-CM

## 2020-11-03 PROCEDURE — 99214 OFFICE O/P EST MOD 30 MIN: CPT | Performed by: PHYSICIAN ASSISTANT

## 2020-11-03 PROCEDURE — 3008F BODY MASS INDEX DOCD: CPT | Performed by: PHYSICIAN ASSISTANT

## 2020-11-03 PROCEDURE — 1036F TOBACCO NON-USER: CPT | Performed by: PHYSICIAN ASSISTANT

## 2020-11-03 PROCEDURE — 3725F SCREEN DEPRESSION PERFORMED: CPT | Performed by: PHYSICIAN ASSISTANT

## 2020-11-04 ENCOUNTER — SOCIAL WORK (OUTPATIENT)
Dept: BEHAVIORAL/MENTAL HEALTH CLINIC | Facility: CLINIC | Age: 50
End: 2020-11-04
Payer: COMMERCIAL

## 2020-11-04 DIAGNOSIS — F43.10 PTSD (POST-TRAUMATIC STRESS DISORDER): Primary | ICD-10-CM

## 2020-11-04 PROCEDURE — 90834 PSYTX W PT 45 MINUTES: CPT | Performed by: SOCIAL WORKER

## 2020-11-18 ENCOUNTER — SOCIAL WORK (OUTPATIENT)
Dept: BEHAVIORAL/MENTAL HEALTH CLINIC | Facility: CLINIC | Age: 50
End: 2020-11-18
Payer: COMMERCIAL

## 2020-11-18 DIAGNOSIS — F43.10 PTSD (POST-TRAUMATIC STRESS DISORDER): Primary | ICD-10-CM

## 2020-11-18 DIAGNOSIS — F33.1 MODERATE EPISODE OF RECURRENT MAJOR DEPRESSIVE DISORDER (HCC): ICD-10-CM

## 2020-11-18 PROCEDURE — 90834 PSYTX W PT 45 MINUTES: CPT | Performed by: SOCIAL WORKER

## 2020-11-25 ENCOUNTER — SOCIAL WORK (OUTPATIENT)
Dept: BEHAVIORAL/MENTAL HEALTH CLINIC | Facility: CLINIC | Age: 50
End: 2020-11-25
Payer: COMMERCIAL

## 2020-11-25 DIAGNOSIS — F33.1 MODERATE EPISODE OF RECURRENT MAJOR DEPRESSIVE DISORDER (HCC): ICD-10-CM

## 2020-11-25 DIAGNOSIS — F43.10 PTSD (POST-TRAUMATIC STRESS DISORDER): Primary | ICD-10-CM

## 2020-11-25 PROCEDURE — 90834 PSYTX W PT 45 MINUTES: CPT | Performed by: SOCIAL WORKER

## 2020-12-02 ENCOUNTER — TELEMEDICINE (OUTPATIENT)
Dept: BEHAVIORAL/MENTAL HEALTH CLINIC | Facility: CLINIC | Age: 50
End: 2020-12-02
Payer: COMMERCIAL

## 2020-12-02 DIAGNOSIS — F43.10 PTSD (POST-TRAUMATIC STRESS DISORDER): Primary | ICD-10-CM

## 2020-12-02 DIAGNOSIS — F33.1 MODERATE EPISODE OF RECURRENT MAJOR DEPRESSIVE DISORDER (HCC): ICD-10-CM

## 2020-12-02 PROCEDURE — 90834 PSYTX W PT 45 MINUTES: CPT | Performed by: SOCIAL WORKER

## 2020-12-09 ENCOUNTER — TELEMEDICINE (OUTPATIENT)
Dept: BEHAVIORAL/MENTAL HEALTH CLINIC | Facility: CLINIC | Age: 50
End: 2020-12-09
Payer: COMMERCIAL

## 2020-12-09 DIAGNOSIS — F43.10 PTSD (POST-TRAUMATIC STRESS DISORDER): Primary | ICD-10-CM

## 2020-12-09 DIAGNOSIS — F33.1 MODERATE EPISODE OF RECURRENT MAJOR DEPRESSIVE DISORDER (HCC): ICD-10-CM

## 2020-12-09 PROCEDURE — 90834 PSYTX W PT 45 MINUTES: CPT | Performed by: SOCIAL WORKER

## 2020-12-10 DIAGNOSIS — Z20.822 EXPOSURE TO COVID-19 VIRUS: Primary | ICD-10-CM

## 2020-12-11 DIAGNOSIS — Z20.822 EXPOSURE TO COVID-19 VIRUS: ICD-10-CM

## 2020-12-11 PROCEDURE — U0003 INFECTIOUS AGENT DETECTION BY NUCLEIC ACID (DNA OR RNA); SEVERE ACUTE RESPIRATORY SYNDROME CORONAVIRUS 2 (SARS-COV-2) (CORONAVIRUS DISEASE [COVID-19]), AMPLIFIED PROBE TECHNIQUE, MAKING USE OF HIGH THROUGHPUT TECHNOLOGIES AS DESCRIBED BY CMS-2020-01-R: HCPCS | Performed by: NURSE PRACTITIONER

## 2020-12-12 LAB — SARS-COV-2 RNA SPEC QL NAA+PROBE: DETECTED

## 2020-12-14 ENCOUNTER — TELEMEDICINE (OUTPATIENT)
Dept: INTERNAL MEDICINE CLINIC | Facility: CLINIC | Age: 50
End: 2020-12-14
Payer: COMMERCIAL

## 2020-12-14 VITALS — TEMPERATURE: 97.7 F | HEART RATE: 67 BPM | OXYGEN SATURATION: 97 % | WEIGHT: 175 LBS | BODY MASS INDEX: 28.25 KG/M2

## 2020-12-14 DIAGNOSIS — U07.1 COVID-19 VIRUS INFECTION: Primary | ICD-10-CM

## 2020-12-14 PROCEDURE — 1036F TOBACCO NON-USER: CPT | Performed by: NURSE PRACTITIONER

## 2020-12-14 PROCEDURE — 99213 OFFICE O/P EST LOW 20 MIN: CPT | Performed by: NURSE PRACTITIONER

## 2020-12-17 ENCOUNTER — TELEMEDICINE (OUTPATIENT)
Dept: BEHAVIORAL/MENTAL HEALTH CLINIC | Facility: CLINIC | Age: 50
End: 2020-12-17
Payer: COMMERCIAL

## 2020-12-17 DIAGNOSIS — F33.1 MODERATE EPISODE OF RECURRENT MAJOR DEPRESSIVE DISORDER (HCC): ICD-10-CM

## 2020-12-17 DIAGNOSIS — F43.10 PTSD (POST-TRAUMATIC STRESS DISORDER): Primary | ICD-10-CM

## 2020-12-17 PROCEDURE — 90834 PSYTX W PT 45 MINUTES: CPT | Performed by: SOCIAL WORKER

## 2020-12-18 ENCOUNTER — TELEMEDICINE (OUTPATIENT)
Dept: INTERNAL MEDICINE CLINIC | Facility: CLINIC | Age: 50
End: 2020-12-18
Payer: COMMERCIAL

## 2020-12-18 VITALS — HEART RATE: 91 BPM | OXYGEN SATURATION: 96 % | BODY MASS INDEX: 28.57 KG/M2 | WEIGHT: 177 LBS | TEMPERATURE: 98.1 F

## 2020-12-18 DIAGNOSIS — U07.1 COVID-19 VIRUS INFECTION: Primary | ICD-10-CM

## 2020-12-18 PROCEDURE — 99213 OFFICE O/P EST LOW 20 MIN: CPT | Performed by: NURSE PRACTITIONER

## 2020-12-18 RX ORDER — MULTIVIT WITH MINERALS/LUTEIN
1000 TABLET ORAL DAILY
COMMUNITY

## 2020-12-23 ENCOUNTER — TELEMEDICINE (OUTPATIENT)
Dept: BEHAVIORAL/MENTAL HEALTH CLINIC | Facility: CLINIC | Age: 50
End: 2020-12-23
Payer: COMMERCIAL

## 2020-12-23 DIAGNOSIS — F43.10 PTSD (POST-TRAUMATIC STRESS DISORDER): Primary | ICD-10-CM

## 2020-12-23 DIAGNOSIS — F33.1 MODERATE EPISODE OF RECURRENT MAJOR DEPRESSIVE DISORDER (HCC): ICD-10-CM

## 2020-12-23 PROCEDURE — 90834 PSYTX W PT 45 MINUTES: CPT | Performed by: SOCIAL WORKER

## 2021-01-12 ENCOUNTER — TELEMEDICINE (OUTPATIENT)
Dept: BEHAVIORAL/MENTAL HEALTH CLINIC | Facility: CLINIC | Age: 51
End: 2021-01-12
Payer: COMMERCIAL

## 2021-01-12 DIAGNOSIS — F43.10 PTSD (POST-TRAUMATIC STRESS DISORDER): Primary | ICD-10-CM

## 2021-01-12 DIAGNOSIS — F32.A DEPRESSION, UNSPECIFIED DEPRESSION TYPE: ICD-10-CM

## 2021-01-12 PROCEDURE — 90834 PSYTX W PT 45 MINUTES: CPT | Performed by: SOCIAL WORKER
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
## 2021-01-19 ENCOUNTER — SOCIAL WORK (OUTPATIENT)
Dept: BEHAVIORAL/MENTAL HEALTH CLINIC | Facility: CLINIC | Age: 51
End: 2021-01-19
Payer: COMMERCIAL

## 2021-01-19 DIAGNOSIS — F43.10 PTSD (POST-TRAUMATIC STRESS DISORDER): Primary | ICD-10-CM

## 2021-01-19 DIAGNOSIS — F33.1 MODERATE EPISODE OF RECURRENT MAJOR DEPRESSIVE DISORDER (HCC): ICD-10-CM

## 2021-01-19 PROCEDURE — 90834 PSYTX W PT 45 MINUTES: CPT | Performed by: SOCIAL WORKER

## 2021-01-19 NOTE — PSYCH
12:10pm-1:00pm    Assessment/Plan: f/u next week     There are no diagnoses linked to this encounter  Subjective: therapist met w/pt for individual session  Pt shared that he still has some days where he is anxious  Therapist asked pt open ended questions in regards to what he felt triggered his anxiety and how he managed it  He stated looking back he isn't sure but that he knows he struggled all day and was unable to do anything else other than stay stuck in his thoughts  Therapist recommended again that pt start to document when his mood shifts and what was going on for pt prior to the shift in mood so they can determine what triggers him  Therapist educated pt on the importance of self awareness so that he can better manage his symptoms  Therapist and pt discussed the importance of pt getting back on a routine and attempt exercising at the beginning of the day to help start his day off in more of a positive way  Patient ID: Pancho Bonilla is a 45 y o  male  HPI 45 minutes    Review of Systems      Objective: Pt appeared to be in a good mood and was easily engaged         Physical Exam  pt denied any SI/HI/Ah/VH

## 2021-01-22 ENCOUNTER — OFFICE VISIT (OUTPATIENT)
Dept: INTERNAL MEDICINE CLINIC | Facility: CLINIC | Age: 51
End: 2021-01-22
Payer: COMMERCIAL

## 2021-01-22 VITALS
WEIGHT: 182 LBS | HEIGHT: 66 IN | HEART RATE: 73 BPM | OXYGEN SATURATION: 97 % | BODY MASS INDEX: 29.25 KG/M2 | RESPIRATION RATE: 18 BRPM | DIASTOLIC BLOOD PRESSURE: 70 MMHG | TEMPERATURE: 96.5 F | SYSTOLIC BLOOD PRESSURE: 110 MMHG

## 2021-01-22 DIAGNOSIS — K40.90 RIGHT INGUINAL HERNIA: Primary | ICD-10-CM

## 2021-01-22 PROCEDURE — 3008F BODY MASS INDEX DOCD: CPT | Performed by: PHYSICIAN ASSISTANT

## 2021-01-22 PROCEDURE — 99213 OFFICE O/P EST LOW 20 MIN: CPT | Performed by: PHYSICIAN ASSISTANT

## 2021-01-22 PROCEDURE — 1036F TOBACCO NON-USER: CPT | Performed by: PHYSICIAN ASSISTANT

## 2021-01-22 NOTE — PROGRESS NOTES
Assessment/Plan:   Patient Instructions   Clinically patient has a right inguinal hernia that is symptomatic  We will refer him to General surgery for repair  Quality Measures: BMI Counseling: Body mass index is 29 38 kg/m²  The BMI is above normal  Nutrition recommendations include decreasing portion sizes, encouraging healthy choices of fruits and vegetables, consuming healthier snacks and moderation in carbohydrate intake  Exercise recommendations include exercising 3-5 times per week  Patient exercises regularly           Return for Next scheduled follow up  Diagnoses and all orders for this visit:    Right inguinal hernia  -     Ambulatory referral to General Surgery; Future          Subjective:      Patient ID: Cece Garnica is a 45 y o  male  Acute visit     Right lower abdominal pain radiating into right inguinal area  States he has been exercising vigorously recently and noticed a bulge in his right lower abdomen area that he states he massaged and then noted that the pain seemed to radiate into his inguinal area  He has also had some pain and tingling in his right lateral thigh region  Does note some intermittent back pain but not severe  Patient also complaining of recent difficulty with orgasms, and ejaculation        ALLERGIES:  Allergies   Allergen Reactions    Celecoxib Anaphylaxis    Diclofenac Shortness Of Breath       CURRENT MEDICATIONS:    Current Outpatient Medications:     albuterol (PROVENTIL HFA,VENTOLIN HFA) 90 mcg/act inhaler, TAKE 2 PUFFS BY MOUTH EVERY 6 HOURS AS NEEDED FOR WHEEZE, Disp: 8 5 Inhaler, Rfl: 2    Ascorbic Acid (vitamin C) 1000 MG tablet, Take 1,000 mg by mouth daily, Disp: , Rfl:     Cholecalciferol (VITAMIN D3 PO), Take by mouth, Disp: , Rfl:     Flaxseed Oil OIL, by Does not apply route, Disp: , Rfl:     multivitamin (THERAGRAN) TABS, Take 1 tablet by mouth daily, Disp: , Rfl:     Omega-3 Fatty Acids (FISH OIL PO), Take by mouth, Disp: , Rfl:     venlafaxine (EFFEXOR-XR) 150 mg 24 hr capsule, TAKE 1 CAPSULE BY MOUTH DAILY WITH BREAKFAST, Disp: 90 capsule, Rfl: 1    oxyCODONE-acetaminophen (PERCOCET) 5-325 mg per tablet, TAKE ONE TABLET BY MOUTH ONCE TO TWICE DAILY AS NEEDED, Disp: , Rfl:     simvastatin (ZOCOR) 10 mg tablet, Take 2 tablets (20 mg total) by mouth daily at bedtime (Patient not taking: Reported on 1/22/2021), Disp: 90 tablet, Rfl: 3    ACTIVE PROBLEM LIST:  Patient Active Problem List   Diagnosis    Cervical somatic dysfunction    Depression    Disc degeneration, lumbar    Hyperlipidemia    Pain of finger of left hand    Skin cyst    PTSD (post-traumatic stress disorder)    Moderate episode of recurrent major depressive disorder ([facility] [address] )    COVID-19 virus infection       PAST MEDICAL HISTORY:  Past Medical History:   Diagnosis Date    Disc degeneration, lumbar     Diverticulosis     Facet syndrome     Hyperlipidemia     Other muscle spasm     Parapsoriasis     Psoriasis     Sacroiliitis (HCC)     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       PAST SURGICAL HISTORY:  Past Surgical History:   Procedure Laterality Date    FACIAL/NECK BIOPSY Left 8/4/2020    Procedure: EXCISION CYST NECK;  Surgeon: Maryuri King MD;  Location: [facility];  Service: Plastics    FLAP LOCAL HEAD / NECK Left 8/4/2020    Procedure: COMPLEX CLOSURE VS ADJACENT TISSUE REARRANGEMENT NECK;  Surgeon: Maryuri King MD;  Location: [facility];  Service: Plastics    HERNIA REPAIR      PYLOROMYOTOMY      ROTATOR CUFF REPAIR Left     TONSILLECTOMY AND ADENOIDECTOMY      TOOTH EXTRACTION         FAMILY HISTORY:  Family History   Problem Relation Age of Onset    Cancer Mother     Lung cancer Mother         CARCINOID TUMOR    Hypertension Mother     Hypothyroidism Mother     Kidney cancer Mother     Diabetes Maternal Grandmother     Alzheimer's disease Maternal Grandfather     Coronary artery disease Maternal Grandfather     Other Maternal Grandfather         PACEMAKER       SOCIAL HISTORY:  Social History     Socioeconomic History    Marital status: /Civil Union     Spouse name: Not on file    Number of children: 1    Years of education: Not on file    Highest education level: Not on file   Occupational History    Occupation: COMPUTER CONSULTING     Comment: FULL-TIME EMPLOYMENT   Social Needs    Financial resource strain: Not on file    Food insecurity     Worry: Not on file     Inability: Not on file    Transportation needs     Medical: Not on file     Non-medical: Not on file   Tobacco Use    Smoking status: Former Smoker    Smokeless tobacco: Never Used   Substance and Sexual Activity    Alcohol use: Yes     Frequency: Monthly or less     Drinks per session: 1 or 2     Comment: BEER - DESCRIBES AS INFREQUENT    Drug use: Yes     Types: Marijuana     Comment: Medical marijuana    Sexual activity: Not on file   Lifestyle    Physical activity     Days per week: Not on file     Minutes per session: Not on file    Stress: Not on file   Relationships    Social connections     Talks on phone: Not on file     Gets together: Not on file     Attends Latter day service: Not on file     Active member of club or organization: Not on file     Attends meetings of clubs or organizations: Not on file     Relationship status: Not on file    Intimate partner violence     Fear of current or ex partner: Not on file     Emotionally abused: Not on file     Physically abused: Not on file     Forced sexual activity: Not on file   Other Topics Concern    Not on file   Social History Narrative    LIVING INDEPENDENTLY WITH SPOUSE    ONE SON       Review of Systems   Constitutional: Negative for activity change, chills, fatigue and fever  HENT: Negative for congestion  Eyes: Negative for discharge  Respiratory: Negative for cough, chest tightness and shortness of breath  Cardiovascular: Negative for chest pain, palpitations and leg swelling  Gastrointestinal: Positive for abdominal pain  Genitourinary: Negative for difficulty urinating  Musculoskeletal: Negative for arthralgias and myalgias  Skin: Negative for rash  Allergic/Immunologic: Negative for immunocompromised state  Neurological: Negative for dizziness, syncope, weakness, light-headedness and headaches  Hematological: Negative for adenopathy  Does not bruise/bleed easily  Psychiatric/Behavioral: Negative for dysphoric mood, sleep disturbance and suicidal ideas  The patient is not nervous/anxious  Objective:  Vitals:    01/22/21 0950   BP: 110/70   BP Location: Right arm   Patient Position: Sitting   Cuff Size: Standard   Pulse: 73   Resp: 18   Temp: (!) 96 5 °F (35 8 °C)   TempSrc: Tympanic   SpO2: 97%   Weight: 82 6 kg (182 lb)   Height: 5' 6" (1 676 m)     Body mass index is 29 38 kg/m²  Physical Exam  Vitals signs and nursing note reviewed  Constitutional:       General: He is not in acute distress  Appearance: He is well-developed  HENT:      Head: Normocephalic and atraumatic  Eyes:      Pupils: Pupils are equal, round, and reactive to light  Neck:      Musculoskeletal: Neck supple  Thyroid: No thyromegaly  Vascular: No carotid bruit or JVD  Cardiovascular:      Rate and Rhythm: Normal rate and regular rhythm  Heart sounds: Normal heart sounds  Pulmonary:      Effort: Pulmonary effort is normal  No respiratory distress  Breath sounds: Normal breath sounds  Abdominal:      General: Abdomen is flat  Bowel sounds are normal       Palpations: Abdomen is soft  Hernia: A hernia is present  Hernia is present in the right inguinal area  Musculoskeletal:      Right lower leg: No edema  Left lower leg: No edema  Lymphadenopathy:      Cervical: No cervical adenopathy  Skin:     General: Skin is warm and dry  Findings: No rash  Neurological:      General: No focal deficit present  Mental Status: He is alert and oriented to person, place, and time  Mental status is at baseline  Psychiatric:         Mood and Affect: Mood normal          Behavior: Behavior normal            RESULTS:    Recent Results (from the past 1008 hour(s))   Novel Coronavirus (COVID-19), PCR [facility]    Collection Time: 12/11/20 11:15 AM    Specimen: Nose; Nares   Result Value Ref Range    SARS-CoV-2  Detected (A) Not Detected       This note was created with voice recognition software  Phonic, grammatical and spelling errors may be present within the note as a result

## 2021-01-22 NOTE — PATIENT INSTRUCTIONS
Clinically patient has a right inguinal hernia that is symptomatic  We will refer him to General surgery for repair

## 2021-01-29 ENCOUNTER — SOCIAL WORK (OUTPATIENT)
Dept: BEHAVIORAL/MENTAL HEALTH CLINIC | Facility: CLINIC | Age: 51
End: 2021-01-29
Payer: COMMERCIAL

## 2021-01-29 DIAGNOSIS — F33.1 MODERATE EPISODE OF RECURRENT MAJOR DEPRESSIVE DISORDER (HCC): ICD-10-CM

## 2021-01-29 DIAGNOSIS — F43.10 PTSD (POST-TRAUMATIC STRESS DISORDER): Primary | ICD-10-CM

## 2021-01-29 PROCEDURE — 90834 PSYTX W PT 45 MINUTES: CPT | Performed by: SOCIAL WORKER

## 2021-01-29 NOTE — PSYCH
8:30-9:15am    Assessment/Plan: f/u next week     There are no diagnoses linked to this encounter  Subjective: Therapist met w/pt for individual session  Pt shared that overall he feels his anxiety symptoms haven't been too bad this past week  He shared that his anxiety was high earlier this week when his son went to get an ultrasound  He shared that the last time he saw someone have an ultrasound was his son who had passed  He shared that the baby was still in utero and they had to decide the best way to move forward which was very sad for pt  Therapist and pt discussed how he was able to cope with the anxiety that came up for him  Therapist and pt discussed other helpful coping skills that pt has been utilizing to help maintain emotional stability  Patient ID: Efe Wen is a 45 y o  male  HPI 45 minutes    Review of Systems      Objective: pt appeared to be in a good mood  He was easily engaged and cooperative  Pt appeared to have good insight and awareness         Physical Exam  Pt denied any SI/HI/AH/VH

## 2021-02-05 ENCOUNTER — SOCIAL WORK (OUTPATIENT)
Dept: BEHAVIORAL/MENTAL HEALTH CLINIC | Facility: CLINIC | Age: 51
End: 2021-02-05
Payer: COMMERCIAL

## 2021-02-05 DIAGNOSIS — F33.1 MODERATE EPISODE OF RECURRENT MAJOR DEPRESSIVE DISORDER (HCC): ICD-10-CM

## 2021-02-05 DIAGNOSIS — F43.10 PTSD (POST-TRAUMATIC STRESS DISORDER): Primary | ICD-10-CM

## 2021-02-05 PROCEDURE — 90834 PSYTX W PT 45 MINUTES: CPT | Performed by: SOCIAL WORKER

## 2021-02-08 NOTE — PSYCH
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  Therapist and pt discussed how pt continues to work on maintaining healthy boundaries w/his friends which has been helpful  He shared that he continues to be worried for his friend but also acknowledges that he cannot "fix" her  Pt shared that he feels less angry and irritable as well due to being so active this week  Therapist and pt discussed how pt can continue to engage in these positive coping skills to manage his symptoms effectively  Patient ID: Stewart Roman is a 45 y o  male  HPI 45 minutes    Review of Systems      Objective: pt appeared calm and engaged         Physical Exam  Pt denied any SI/HI/AH/VH

## 2021-02-09 ENCOUNTER — TELEPHONE (OUTPATIENT)
Dept: SURGERY | Facility: CLINIC | Age: 51
End: 2021-02-09

## 2021-02-09 NOTE — TELEPHONE ENCOUNTER
RefMarcelene Spring [phone]  Active and effective as of 2/22/2012  Open access [employer] select plan  No referral required   in network

## 2021-02-11 ENCOUNTER — CONSULT (OUTPATIENT)
Dept: SURGERY | Facility: CLINIC | Age: 51
End: 2021-02-11
Payer: COMMERCIAL

## 2021-02-11 VITALS
TEMPERATURE: 97 F | HEART RATE: 73 BPM | SYSTOLIC BLOOD PRESSURE: 112 MMHG | HEIGHT: 66 IN | BODY MASS INDEX: 29.15 KG/M2 | DIASTOLIC BLOOD PRESSURE: 74 MMHG | WEIGHT: 181.4 LBS

## 2021-02-11 DIAGNOSIS — K40.20 NON-RECURRENT BILATERAL INGUINAL HERNIA WITHOUT OBSTRUCTION OR GANGRENE: Primary | ICD-10-CM

## 2021-02-11 DIAGNOSIS — K40.90 RIGHT INGUINAL HERNIA: ICD-10-CM

## 2021-02-11 PROCEDURE — 99203 OFFICE O/P NEW LOW 30 MIN: CPT | Performed by: SURGERY

## 2021-02-11 RX ORDER — HEPARIN SODIUM 5000 [USP'U]/ML
5000 INJECTION, SOLUTION INTRAVENOUS; SUBCUTANEOUS
Status: CANCELLED | OUTPATIENT
Start: 2021-02-11 | End: 2021-02-12

## 2021-02-11 RX ORDER — SODIUM CHLORIDE, SODIUM LACTATE, POTASSIUM CHLORIDE, CALCIUM CHLORIDE 600; 310; 30; 20 MG/100ML; MG/100ML; MG/100ML; MG/100ML
125 INJECTION, SOLUTION INTRAVENOUS
Status: CANCELLED | OUTPATIENT
Start: 2021-02-11 | End: 2021-02-12

## 2021-02-11 NOTE — PATIENT INSTRUCTIONS
Deep Vein Thrombosis Prevention   WHAT YOU NEED TO KNOW:   Deep vein thrombosis (DVT) is a blood clot that forms in a deep vein of the body  The deep veins in the legs, thighs, and hips are the most common sites for DVT  DVT can also occur in your arms  The clot prevents the normal flow of blood in the vein  The blood backs up and causes pain and swelling  The DVT can break into smaller pieces and travel to your lungs and cause a blockage called a pulmonary embolism  A pulmonary embolism can become life-threatening  DISCHARGE INSTRUCTIONS:   Call 911 for any of the following:   · You feel lightheaded, short of breath, and have chest pain  · You cough up blood  Return to the emergency department if:   · Your arm or leg feels warm, tender, and painful  It may look swollen and red  Contact your healthcare provider if:   · You have questions or concerns about your condition or care  Risk factors for DVT:  A DVT can happen to anybody, but certain things can increase your risk  You may be at higher risk if you have had DVT in the past  You may also be at risk if you have a family member who has had blood clots  The following conditions also increase your risk:  · Limited activity caused by bed rest, a leg cast, or sitting for long periods    · Injury to a deep vein, or surgery    · A blood disorder that makes your blood clot faster than normal, such as factor V Leiden mutation    · Age older than 60 years    · Use of hormone replacement therapy or some types of birth control medicine    · Pregnancy, and for 6 weeks after childbirth     · Cancer or heart failure     · A catheter placed in a large vein    · Smoking    · Obesity or varicose veins  Prevent DVT:   · Guidelines for everyone:      ¨ Maintain a healthy weight  Ask your healthcare provider how much you should weigh  Ask him to help you create a weight loss plan if you are overweight  ¨ Do not smoke    Nicotine and other chemicals in cigarettes and cigars can damage blood vessels and increase your risk for a DVT  Ask your healthcare provider for information if you currently smoke and need help to quit  E-cigarettes or smokeless tobacco still contain nicotine  Talk to your healthcare provider before you use these products  ¨ Move regularly if you sit for long periods of time  If you travel by car or work at a desk, move and stretch in your seat several times each hour  In an airplane, get up and walk every hour  Exercise your legs while sitting by raising and lowering your heels  Keep your toes on the floor while you do this  You can also raise and lower your toes while keeping your heels on the floor  Also tighten and release your leg muscles while sitting  ¨ Exercise regularly  to help increase your blood flow  Walking is a good low-impact exercise  Talk to your healthcare provider about the best exercise plan for you  · Guidelines for people at high risk for DVT:      ¨ Take blood thinner medicines as directed  Your healthcare provider may recommend blood thinners and other medicines to help prevent blood clots  ¨ Wear pressure stockings as directed  The stockings are tight and put pressure on your legs  This improves blood flow and helps prevent clots  Wear the stockings during the day  Do not wear them when you sleep  ¨ Elevate your legs  above the level of your heart as often as you can  This will help decrease swelling and pain  Prop your legs on pillows or blankets to keep them elevated comfortably  ¨ Get up and move as directed after surgery or an injury, or during an illness  Early and regular movement can help decrease your risk for DVT by helping to increase your blood flow  Ask your healthcare provider what type of activity you need and how often you should do it  ¨ Change body positions often if you are bedridden  Ask for help to change your position every 1 to 2 hours    Follow up with your healthcare provider as directed:  Write down your questions so you remember to ask them during your visits  © 2017 [address] [employer] Information is for End User's use only and may not be sold, redistributed or otherwise used for commercial purposes  All illustrations and images included in CareNotes® are the copyrighted property of [employer]  or [employer]  The above information is an  only  It is not intended as medical advice for individual conditions or treatments  Talk to your doctor, nurse or pharmacist before following any medical regimen to see if it is safe and effective for you  Inguinal Hernia   WHAT YOU NEED TO KNOW:   An inguinal hernia happens when organs or abdominal tissue push through a weak spot in the abdominal wall  The abdominal wall is made of fat and muscle  It holds the intestines in place  The hernia may contain fluid, tissue from the abdomen, or part of an organ (such as an intestine)  DISCHARGE INSTRUCTIONS:   Return to the emergency department if:   · You have severe abdominal pain with nausea and vomiting  · Your abdomen is larger than usual      · Your hernia gets bigger or is purple or blue  · You see blood in your bowel movements  · You feel weak, dizzy, or faint  Contact your healthcare provider if:   · You have a fever  · You have questions or concerns about your condition or care  Medicine: You may  need the following:  · NSAIDs , such as ibuprofen, help decrease swelling, pain, and fever  NSAIDs can cause stomach bleeding or kidney problems in certain people  If you take blood thinner medicine, always ask your healthcare provider if NSAIDs are safe for you  Always read the medicine label and follow directions  · Take your medicine as directed  Contact your healthcare provider if you think your medicine is not helping or if you have side effects  Tell him or her if you are allergic to any medicine   Keep a list of the medicines, vitamins, and herbs you take  Include the amounts, and when and why you take them  Bring the list or the pill bottles to follow-up visits  Carry your medicine list with you in case of an emergency  Follow up with your healthcare provider as directed:  Write down your questions so you remember to ask them during your visits  Manage your symptoms and prevent another hernia:   · Do not lift anything heavy  Heavy lifting can make your hernia worse or cause another hernia  Ask your healthcare provider how much is safe for you to lift  · Drink liquids as directed  Liquids may prevent constipation and straining during a bowel movement  Ask how much liquid to drink each day and which liquids are best for you  · Eat foods high in fiber  Fiber may prevent constipation and straining during a bowel movement  Foods that contain fiber include fruits, vegetables, beans, lentils, and whole grains  · Maintain a healthy weight  If you are overweight, weight loss may prevent your hernia from getting worse  It may also prevent another hernia  Talk to your healthcare provider about exercise and how to lose weight safely if you are overweight  · Do not smoke  Nicotine and other chemicals in cigarettes and cigars can weaken the abdominal wall  This may increase your risk for another hernia  Ask your healthcare provider for information if you currently smoke and need help to quit  E-cigarettes or smokeless tobacco still contain nicotine  Talk to your healthcare provider before you use these products  © 2017 [address] [facility] Information is for End User's use only and may not be sold, redistributed or otherwise used for commercial purposes  All illustrations and images included in CareNotes® are the copyrighted property of [employer]  or [employer]arly [employer]cial  The above information is an  only  It is not intended as medical advice for individual conditions or treatments   Talk to your doctor, nurse or pharmacist before following any medical regimen to see if it is safe and effective for you  Endoscopic Total Extraperitoneal Hernia Repair   WHAT YOU SHOULD KNOW:   An endoscopic total extraperitoneal (TEP) hernia repair is surgery to repair an inguinal hernia  CARE AGREEMENT:   You have the right to help plan your care  Learn about your health condition and how it may be treated  Discuss treatment options with your caregivers to decide what care you want to receive  You always have the right to refuse treatment  RISKS:   · You may have bruising, fluid buildup, swelling, or infection at your surgery site  A deep infection may also occur from the mesh patch used during surgery  You may have trouble emptying your bladder after surgery  You may have shoulder and chest pain from the gas used during your surgery  In some cases, an endoscopic TEP hernia repair may need to be changed into an open hernia repair surgery  An open surgery means that larger and deeper incisions will be made  These incisions will take longer to heal     · Your nerves, blood vessels, or abdominal organs may be injured during surgery  You may also have chronic pain or numbness in your groin area for months or years after your surgery  You may need to have another surgery if your condition returns or occurs in a different area  Without this surgery, your pain or discomfort may increase  Your soft tissues or bowels may become trapped in your abnormal opening  When tissues and organs are trapped, the tissue may die  This may become life-threatening  GETTING READY:   The week before your surgery:   · Write down the correct date, time, and location of your surgery  · Arrange a ride home  Ask a family member or friend to drive you home after your surgery or procedure  Do not drive yourself home      · Ask your caregiver if you need to stop using aspirin or any other prescribed or over-the-counter medicine before your procedure or surgery  · Bring your medicine bottles or a list of your medicines when you see your caregiver  Tell your caregiver if you are allergic to any medicine  Tell your caregiver if you use any herbs, food supplements, or over-the-counter medicine  · You may need to have an abdominal ultrasound or CT scan before your surgery  Talk to your caregiver about these or other tests you may need  Write down the date, time, and location for each test   The night before your surgery:  Ask caregivers about directions for eating and drinking  The day of your surgery:   · Ask your caregiver before you take any medicine on the day of your surgery  Bring a list of all the medicines you take, or your pill bottles, with you to the hospital  Caregivers will check that your medicines will not interact poorly with the medicine you need for surgery  · You or a close family member will be asked to sign a legal document called a consent form  It gives caregivers permission to do the procedure or surgery  It also explains the problems that may happen, and your choices  Make sure all your questions are answered before you sign this form  · Caregivers may insert an intravenous tube (IV) into your vein  A vein in the arm is usually chosen  Through the IV tube, you may be given liquids and medicine  · An anesthesiologist will talk to you before your surgery  You may need medicine to keep you asleep or numb an area of your body during surgery  Tell caregivers if you or anyone in your family has had a problem with anesthesia in the past   TREATMENT:   What will happen:   · Your surgeon will make a small incision under your belly button  Your abdominal muscles will be secured to the sides of this incision  Your caregiver will make a tunnel under your skin and muscle to reach the area of your hernia  A device with a balloon may be put into your incision   The balloon will be inflated to create a space outside your peritoneum (the lining of your peritoneal cavity)  · The balloon is then removed and the space may be filled with carbon dioxide gas  The gas will help your surgeon see the area in need of repair  One to 2 more small incisions may be made  A scope and other devices will be put through the incisions  Mesh is inserted to cover your weak muscles, and to push your tissues and organs back into place  The mesh is secured in place and the tools are then removed  Your incisions will be closed with stitches  A bandage will cover your incisions to keep them clean and dry  A bandage will also help prevent infection  After your surgery: You will be taken to a room to rest until you are fully awake  Caregivers will monitor you closely for any problems  Do not get out of bed until your caregiver says it is okay  When your caregiver sees that you are okay, you will be able to go home or be taken to your hospital room  CONTACT A CAREGIVER IF:   · You cannot make it to your surgery  · You have a fever  · You get a cold or the flu  · You have questions or concerns about your surgery  SEEK CARE IMMEDIATELY IF:   · You have an upset stomach or are vomiting  · Your hernia area suddenly becomes very painful  · Your lump increases in size, or you are not able to gently push it back into your abdomen  © 2014 [address] [employer] is for End User's use only and may not be sold, redistributed or otherwise used for commercial purposes  All illustrations and images included in CareNotes® are the copyrighted property of [employer]  or Charly Marcial  The above information is an  only  It is not intended as medical advice for individual conditions or treatments  Talk to your doctor, nurse or pharmacist before following any medical regimen to see if it is safe and effective for you

## 2021-02-11 NOTE — PROGRESS NOTES
Assessment/Plan:    No problem-specific Assessment & Plan notes found for this encounter  Subjective: Right inguinal hernia     Patient ID: Patel Marlow is a 45 y o  male  Objective: There were no vitals taken for this visit           Physical Exam

## 2021-02-11 NOTE — PROGRESS NOTES
Consult- General Surgery   Joanie Phan 45 y o  male MRN: [id number]  Unit/Bed#:  Encounter: [id number]    Assessment/Plan     Assessment:   bilateral inguinal hernia  Anxiety  Asthma versus COPD  Chronic back pain  Plan:   in light of the symptomatology I advised the patient top undergo laparoscopic TEPP bilateral inguinal hernia repair with mesh in the near future  Discussed the operative procedure, risks, benefits, alternatives and possible complications, he understood and agreed to proceed  History of Present Illness     HPI:  Joanie Phan is a 45 y o  male who presents   To my office for evaluation of right inguinal hernia  The patient noticed severe pain on the right groin approximately 3 weeks ago, the pain was so severe that he dropped on the floor  He denied having any nausea, vomiting, diarrhea, constipation or urinary symptoms at that time  He has had similar episodes subsequently but not as severe  In light of the above he went to see his primary care physician and subsequently he was  Referred to our office for  Evaluation  Patient also describes the pain with radiation to the lateral aspect of the right thigh and into the right testicle  The patient uses medical marijuana on a daily basis for anxiety and chronic back pain  Review of Systems   Constitutional: Negative for chills and fever  HENT: Negative for nosebleeds and sore throat  Eyes: Negative for pain and discharge  Respiratory: Negative for cough and shortness of breath  Cardiovascular: Negative for chest pain and palpitations  Gastrointestinal: Negative for abdominal pain, blood in stool, constipation and diarrhea  Endocrine: Negative for cold intolerance and heat intolerance  Genitourinary: Negative for dysuria and hematuria  Neurological: Negative for seizures and headaches  Hematological: Negative for adenopathy  Does not bruise/bleed easily  Psychiatric/Behavioral: Negative for confusion   The patient is not nervous/anxious          Historical Information   Past Medical History:   Diagnosis Date    Disc degeneration, lumbar     Diverticulosis     Facet syndrome     Hyperlipidemia     Other muscle spasm     Parapsoriasis     Psoriasis     Sacroiliitis (HCC)     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     Past Surgical History:   Procedure Laterality Date    FACIAL/NECK BIOPSY Left 8/4/2020    Procedure: EXCISION CYST NECK;  Surgeon: Simona Covington MD;  Location: [facility];  Service: Plastics    FLAP LOCAL HEAD / NECK Left 8/4/2020    Procedure: COMPLEX CLOSURE VS ADJACENT TISSUE REARRANGEMENT NECK;  Surgeon: Simona Covington MD;  Location: [facility];  Service: Plastics    HERNIA REPAIR      PYLOROMYOTOMY      ROTATOR CUFF REPAIR Left     TONSILLECTOMY AND ADENOIDECTOMY      TOOTH EXTRACTION       Social History   Social History     Substance and Sexual Activity   Alcohol Use Yes    Frequency: Monthly or less    Drinks per session: 1 or 2    Comment: BEER - DESCRIBES AS INFREQUENT     Social History     Substance and Sexual Activity   Drug Use Yes    Types: Marijuana    Comment: Medical marijuana     Social History     Tobacco Use   Smoking Status Former Smoker   Smokeless Tobacco Never Used     Family History: non-contributory    Meds/Allergies   all medications and allergies reviewed     Current Outpatient Medications:     albuterol (PROVENTIL HFA,VENTOLIN HFA) 90 mcg/act inhaler, TAKE 2 PUFFS BY MOUTH EVERY 6 HOURS AS NEEDED FOR WHEEZE, Disp: 8 5 Inhaler, Rfl: 2    Ascorbic Acid (vitamin C) 1000 MG tablet, Take 1,000 mg by mouth daily, Disp: , Rfl:     Cholecalciferol (VITAMIN D3 PO), Take by mouth, Disp: , Rfl:     Flaxseed Oil OIL, by Does not apply route, Disp: , Rfl:     multivitamin (THERAGRAN) TABS, Take 1 tablet by mouth daily, Disp: , Rfl:     Omega-3 Fatty Acids (FISH OIL PO), Take by mouth, Disp: , Rfl:    oxyCODONE-acetaminophen (PERCOCET) 5-325 mg per tablet, TAKE ONE TABLET BY MOUTH ONCE TO TWICE DAILY AS NEEDED, Disp: , Rfl:     simvastatin (ZOCOR) 10 mg tablet, Take 2 tablets (20 mg total) by mouth daily at bedtime (Patient not taking: Reported on 1/22/2021), Disp: 90 tablet, Rfl: 3    venlafaxine (EFFEXOR-XR) 150 mg 24 hr capsule, TAKE 1 CAPSULE BY MOUTH DAILY WITH BREAKFAST, Disp: 90 capsule, Rfl: 1  Allergies   Allergen Reactions    Celecoxib Anaphylaxis    Diclofenac Shortness Of Breath       Objective     Current Vitals:   Blood Pressure: 112/74 (02/11/21 0803)  Pulse: 73 (02/11/21 0803)  Temperature: (!) 97 °F (36 1 °C) (02/11/21 0803)  Temp Source: Temporal (02/11/21 0803)  Height: 5' 6" (167 6 cm) (02/11/21 0803)  Weight - Scale: 82 3 kg (181 lb 6 4 oz) (02/11/21 0803)    Physical Exam  Vitals signs and nursing note reviewed  Constitutional:       General: He is not in acute distress  Cardiovascular:      Rate and Rhythm: Normal rate and regular rhythm  Heart sounds: No murmur  Pulmonary:      Effort: Pulmonary effort is normal       Breath sounds: Normal breath sounds  No stridor  Abdominal:      Palpations: Abdomen is soft  There is no mass  Tenderness: There is no abdominal tenderness  Comments: Digital examination of the right and left inguinal canal with Valsalva maneuver revealed bilateral inguinal hernia, left greater than the right  Skin:     Coloration: Skin is not jaundiced  Findings: No erythema or rash  Neurological:      Mental Status: He is alert and oriented to person, place, and time  Cranial Nerves: No cranial nerve deficit     Psychiatric:         Mood and Affect: Mood normal          Behavior: Behavior normal

## 2021-02-11 NOTE — H&P (VIEW-ONLY)
Consult- General Surgery   Dandre Chapman 45 y o  male MRN: [id number]  Unit/Bed#:  Encounter: [id number]    Assessment/Plan     Assessment:   bilateral inguinal hernia  Anxiety  Asthma versus COPD  Chronic back pain  Plan:   in light of the symptomatology I advised the patient top undergo laparoscopic TEPP bilateral inguinal hernia repair with mesh in the near future  Discussed the operative procedure, risks, benefits, alternatives and possible complications, he understood and agreed to proceed  History of Present Illness     HPI:  Dandre Chapman is a 45 y o  male who presents   To my office for evaluation of right inguinal hernia  The patient noticed severe pain on the right groin approximately 3 weeks ago, the pain was so severe that he dropped on the floor  He denied having any nausea, vomiting, diarrhea, constipation or urinary symptoms at that time  He has had similar episodes subsequently but not as severe  In light of the above he went to see his primary care physician and subsequently he was  Referred to our office for  Evaluation  Patient also describes the pain with radiation to the lateral aspect of the right thigh and into the right testicle  The patient uses medical marijuana on a daily basis for anxiety and chronic back pain  Review of Systems   Constitutional: Negative for chills and fever  HENT: Negative for nosebleeds and sore throat  Eyes: Negative for pain and discharge  Respiratory: Negative for cough and shortness of breath  Cardiovascular: Negative for chest pain and palpitations  Gastrointestinal: Negative for abdominal pain, blood in stool, constipation and diarrhea  Endocrine: Negative for cold intolerance and heat intolerance  Genitourinary: Negative for dysuria and hematuria  Neurological: Negative for seizures and headaches  Hematological: Negative for adenopathy  Does not bruise/bleed easily  Psychiatric/Behavioral: Negative for confusion   The patient is not nervous/anxious          Historical Information   Past Medical History:   Diagnosis Date    Disc degeneration, lumbar     Diverticulosis     Facet syndrome     Hyperlipidemia     Other muscle spasm     Parapsoriasis     Psoriasis     Sacroiliitis (HCC)     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     Past Surgical History:   Procedure Laterality Date    FACIAL/NECK BIOPSY Left 8/4/2020    Procedure: EXCISION CYST NECK;  Surgeon: Velia Kaye MD;  Location: [facility];  Service: Plastics    FLAP LOCAL HEAD / NECK Left 8/4/2020    Procedure: COMPLEX CLOSURE VS ADJACENT TISSUE REARRANGEMENT NECK;  Surgeon: Velia Kaye MD;  Location: [facility];  Service: Plastics    HERNIA REPAIR      PYLOROMYOTOMY      ROTATOR CUFF REPAIR Left     TONSILLECTOMY AND ADENOIDECTOMY      TOOTH EXTRACTION       Social History   Social History     Substance and Sexual Activity   Alcohol Use Yes    Frequency: Monthly or less    Drinks per session: 1 or 2    Comment: BEER - DESCRIBES AS INFREQUENT     Social History     Substance and Sexual Activity   Drug Use Yes    Types: Marijuana    Comment: Medical marijuana     Social History     Tobacco Use   Smoking Status Former Smoker   Smokeless Tobacco Never Used     Family History: non-contributory    Meds/Allergies   all medications and allergies reviewed     Current Outpatient Medications:     albuterol (PROVENTIL HFA,VENTOLIN HFA) 90 mcg/act inhaler, TAKE 2 PUFFS BY MOUTH EVERY 6 HOURS AS NEEDED FOR WHEEZE, Disp: 8 5 Inhaler, Rfl: 2    Ascorbic Acid (vitamin C) 1000 MG tablet, Take 1,000 mg by mouth daily, Disp: , Rfl:     Cholecalciferol (VITAMIN D3 PO), Take by mouth, Disp: , Rfl:     Flaxseed Oil OIL, by Does not apply route, Disp: , Rfl:     multivitamin (THERAGRAN) TABS, Take 1 tablet by mouth daily, Disp: , Rfl:     Omega-3 Fatty Acids (FISH OIL PO), Take by mouth, Disp: , Rfl:    oxyCODONE-acetaminophen (PERCOCET) 5-325 mg per tablet, TAKE ONE TABLET BY MOUTH ONCE TO TWICE DAILY AS NEEDED, Disp: , Rfl:     simvastatin (ZOCOR) 10 mg tablet, Take 2 tablets (20 mg total) by mouth daily at bedtime (Patient not taking: Reported on 1/22/2021), Disp: 90 tablet, Rfl: 3    venlafaxine (EFFEXOR-XR) 150 mg 24 hr capsule, TAKE 1 CAPSULE BY MOUTH DAILY WITH BREAKFAST, Disp: 90 capsule, Rfl: 1  Allergies   Allergen Reactions    Celecoxib Anaphylaxis    Diclofenac Shortness Of Breath       Objective     Current Vitals:   Blood Pressure: 112/74 (02/11/21 0803)  Pulse: 73 (02/11/21 0803)  Temperature: (!) 97 °F (36 1 °C) (02/11/21 0803)  Temp Source: Temporal (02/11/21 0803)  Height: 5' 6" (167 6 cm) (02/11/21 0803)  Weight - Scale: 82 3 kg (181 lb 6 4 oz) (02/11/21 0803)    Physical Exam  Vitals signs and nursing note reviewed  Constitutional:       General: He is not in acute distress  Cardiovascular:      Rate and Rhythm: Normal rate and regular rhythm  Heart sounds: No murmur  Pulmonary:      Effort: Pulmonary effort is normal       Breath sounds: Normal breath sounds  No stridor  Abdominal:      Palpations: Abdomen is soft  There is no mass  Tenderness: There is no abdominal tenderness  Comments: Digital examination of the right and left inguinal canal with Valsalva maneuver revealed bilateral inguinal hernia, left greater than the right  Skin:     Coloration: Skin is not jaundiced  Findings: No erythema or rash  Neurological:      Mental Status: He is alert and oriented to person, place, and time  Cranial Nerves: No cranial nerve deficit     Psychiatric:         Mood and Affect: Mood normal          Behavior: Behavior normal

## 2021-02-15 ENCOUNTER — OFFICE VISIT (OUTPATIENT)
Dept: LAB | Facility: HOSPITAL | Age: 51
End: 2021-02-15
Attending: SURGERY
Payer: COMMERCIAL

## 2021-02-15 ENCOUNTER — HOSPITAL ENCOUNTER (OUTPATIENT)
Dept: RADIOLOGY | Facility: HOSPITAL | Age: 51
Discharge: HOME | End: 2021-02-15
Attending: SURGERY
Payer: COMMERCIAL

## 2021-02-15 DIAGNOSIS — K40.20 NON-RECURRENT BILATERAL INGUINAL HERNIA WITHOUT OBSTRUCTION OR GANGRENE: ICD-10-CM

## 2021-02-15 PROCEDURE — 93005 ELECTROCARDIOGRAM TRACING: CPT

## 2021-02-15 PROCEDURE — 71046 X-RAY EXAM CHEST 2 VIEWS: CPT

## 2021-02-15 NOTE — PRE-PROCEDURE INSTRUCTIONS
Pre-Surgery Instructions:   Medication Instructions    albuterol (PROVENTIL HFA,VENTOLIN HFA) 90 mcg/act inhaler Instructed patient per Anesthesia Guidelines   Ascorbic Acid (vitamin C) 1000 MG tablet Instructed patient per Anesthesia Guidelines   Cholecalciferol (VITAMIN D3 PO) Instructed patient per Anesthesia Guidelines   Flaxseed Oil OIL Instructed patient per Anesthesia Guidelines   multivitamin (THERAGRAN) TABS Instructed patient per Anesthesia Guidelines   Omega-3 Fatty Acids (FISH OIL PO) Instructed patient per Anesthesia Guidelines   venlafaxine (EFFEXOR-XR) 150 mg 24 hr capsule Instructed patient per Anesthesia Guidelines  Education Index    Med Instructions Troubleshoot   Acetaminophen Med Class    Continue to take this medication on your normal schedule  If this is an oral medication and you take it in the morning, then you may take this medicine with a sip of water  Antidepressant Med Class    Continue to take this medication on your normal schedule  If this is an oral medication and you take it in the morning, then you may take this medicine with a sip of water  Inhalational Med Class    Continue to take these inhaler medications on your normal schedule up to and including the day of surgery  Opioid Med Class    Continue to take this medication on your normal schedule  If this is an oral medication and you take it in the morning, then you may take this medicine with a sip of water  Statin Med Class    Continue to take this medication on your normal schedule  If this is an oral medication and you take it in the morning, then you may take this medicine with a sip of water  Vitamin Med Class    You may continue to take any vitamin that your surgeon has prescribed to you up to the day before surgery  If your surgeon has not specifically prescribed this vitamin or instructed you to continue then stop taking 7 days prior to surgery        Pt instructed to stop vitamins and supplements on 2/15/21  Pt instructed to take Effexor the am of surgery with a small sip of water

## 2021-02-16 LAB
ATRIAL RATE: 65 BPM
P AXIS: 57 DEGREES
PR INTERVAL: 138 MS
QRS AXIS: 40 DEGREES
QRSD INTERVAL: 86 MS
QT INTERVAL: 390 MS
QTC INTERVAL: 405 MS
T WAVE AXIS: 55 DEGREES
VENTRICULAR RATE: 65 BPM

## 2021-02-16 PROCEDURE — 93010 ELECTROCARDIOGRAM REPORT: CPT | Performed by: INTERNAL MEDICINE

## 2021-02-18 LAB
ALBUMIN SERPL-MCNC: 4.2 G/DL (ref 3.6–5.1)
ALBUMIN/GLOB SERPL: 2.1 (CALC) (ref 1–2.5)
ALP SERPL-CCNC: 61 U/L (ref 35–144)
ALT SERPL-CCNC: 18 U/L (ref 9–46)
AST SERPL-CCNC: 18 U/L (ref 10–35)
BASOPHILS # BLD AUTO: 82 CELLS/UL (ref 0–200)
BASOPHILS NFR BLD AUTO: 1.3 %
BILIRUB SERPL-MCNC: 0.2 MG/DL (ref 0.2–1.2)
BUN SERPL-MCNC: 13 MG/DL (ref 7–25)
BUN/CREAT SERPL: NORMAL (CALC) (ref 6–22)
CALCIUM SERPL-MCNC: 9.5 MG/DL (ref 8.6–10.3)
CHLORIDE SERPL-SCNC: 106 MMOL/L (ref 98–110)
CO2 SERPL-SCNC: 28 MMOL/L (ref 20–32)
CREAT SERPL-MCNC: 0.92 MG/DL (ref 0.7–1.33)
EOSINOPHIL # BLD AUTO: 265 CELLS/UL (ref 15–500)
EOSINOPHIL NFR BLD AUTO: 4.2 %
ERYTHROCYTE [DISTWIDTH] IN BLOOD BY AUTOMATED COUNT: 13.1 % (ref 11–15)
GLOBULIN SER CALC-MCNC: 2 G/DL (CALC) (ref 1.9–3.7)
GLUCOSE SERPL-MCNC: 99 MG/DL (ref 65–99)
HCT VFR BLD AUTO: 44.8 % (ref 38.5–50)
HGB BLD-MCNC: 15.4 G/DL (ref 13.2–17.1)
LYMPHOCYTES # BLD AUTO: 2255 CELLS/UL (ref 850–3900)
LYMPHOCYTES NFR BLD AUTO: 35.8 %
MCH RBC QN AUTO: 31.1 PG (ref 27–33)
MCHC RBC AUTO-ENTMCNC: 34.4 G/DL (ref 32–36)
MCV RBC AUTO: 90.5 FL (ref 80–100)
MONOCYTES # BLD AUTO: 384 CELLS/UL (ref 200–950)
MONOCYTES NFR BLD AUTO: 6.1 %
NEUTROPHILS # BLD AUTO: 3314 CELLS/UL (ref 1500–7800)
NEUTROPHILS NFR BLD AUTO: 52.6 %
PLATELET # BLD AUTO: 290 THOUSAND/UL (ref 140–400)
PMV BLD REES-ECKER: 10.2 FL (ref 7.5–12.5)
POTASSIUM SERPL-SCNC: 5 MMOL/L (ref 3.5–5.3)
PROT SERPL-MCNC: 6.2 G/DL (ref 6.1–8.1)
RBC # BLD AUTO: 4.95 MILLION/UL (ref 4.2–5.8)
SL AMB EGFR AFRICAN AMERICAN: 111 ML/MIN/1.73M2
SL AMB EGFR NON AFRICAN AMERICAN: 96 ML/MIN/1.73M2
SODIUM SERPL-SCNC: 140 MMOL/L (ref 135–146)
WBC # BLD AUTO: 6.3 THOUSAND/UL (ref 3.8–10.8)

## 2021-02-22 ENCOUNTER — ANESTHESIA (OUTPATIENT)
Dept: PERIOP | Facility: HOSPITAL | Age: 51
End: 2021-02-22
Payer: COMMERCIAL

## 2021-02-22 ENCOUNTER — ANESTHESIA EVENT (OUTPATIENT)
Dept: PERIOP | Facility: HOSPITAL | Age: 51
End: 2021-02-22
Payer: COMMERCIAL

## 2021-02-22 ENCOUNTER — HOSPITAL ENCOUNTER (OUTPATIENT)
Facility: HOSPITAL | Age: 51
Setting detail: OUTPATIENT SURGERY
Discharge: HOME | End: 2021-02-22
Attending: SURGERY | Admitting: SURGERY
Payer: COMMERCIAL

## 2021-02-22 VITALS
SYSTOLIC BLOOD PRESSURE: 128 MMHG | WEIGHT: 178.35 LBS | DIASTOLIC BLOOD PRESSURE: 72 MMHG | HEART RATE: 70 BPM | HEIGHT: 66 IN | TEMPERATURE: 97.6 F | RESPIRATION RATE: 16 BRPM | BODY MASS INDEX: 28.66 KG/M2 | OXYGEN SATURATION: 95 %

## 2021-02-22 VITALS — HEART RATE: 89 BPM

## 2021-02-22 DIAGNOSIS — K40.20 NON-RECURRENT BILATERAL INGUINAL HERNIA WITHOUT OBSTRUCTION OR GANGRENE: ICD-10-CM

## 2021-02-22 PROCEDURE — 49650 LAP ING HERNIA REPAIR INIT: CPT | Performed by: PHYSICIAN ASSISTANT

## 2021-02-22 PROCEDURE — C1781 MESH (IMPLANTABLE): HCPCS | Performed by: SURGERY

## 2021-02-22 PROCEDURE — 49650 LAP ING HERNIA REPAIR INIT: CPT | Performed by: SURGERY

## 2021-02-22 DEVICE — LAPAROSCOPIC SELF-FIXATING MESH POLYESTER WITH POLYLACTIC ACID GRIPS AND COLLAGEN FILM
Type: IMPLANTABLE DEVICE | Site: INGUINAL | Status: FUNCTIONAL
Brand: PROGRIP

## 2021-02-22 RX ORDER — PROPOFOL 10 MG/ML
INJECTION, EMULSION INTRAVENOUS AS NEEDED
Status: DISCONTINUED | OUTPATIENT
Start: 2021-02-22 | End: 2021-02-22

## 2021-02-22 RX ORDER — ACETAMINOPHEN AND CODEINE PHOSPHATE 300; 30 MG/1; MG/1
1 TABLET ORAL EVERY 6 HOURS PRN
Qty: 20 TABLET | Refills: 0 | Status: SHIPPED | OUTPATIENT
Start: 2021-02-22 | End: 2021-03-04

## 2021-02-22 RX ORDER — DIPHENHYDRAMINE HYDROCHLORIDE 50 MG/ML
12.5 INJECTION INTRAMUSCULAR; INTRAVENOUS ONCE AS NEEDED
Status: DISCONTINUED | OUTPATIENT
Start: 2021-02-22 | End: 2021-02-22 | Stop reason: HOSPADM

## 2021-02-22 RX ORDER — BUPIVACAINE HYDROCHLORIDE 2.5 MG/ML
INJECTION, SOLUTION EPIDURAL; INFILTRATION; INTRACAUDAL AS NEEDED
Status: DISCONTINUED | OUTPATIENT
Start: 2021-02-22 | End: 2021-02-22 | Stop reason: HOSPADM

## 2021-02-22 RX ORDER — HEPARIN SODIUM 5000 [USP'U]/ML
5000 INJECTION, SOLUTION INTRAVENOUS; SUBCUTANEOUS
Status: COMPLETED | OUTPATIENT
Start: 2021-02-22 | End: 2021-02-22

## 2021-02-22 RX ORDER — NEOSTIGMINE METHYLSULFATE 1 MG/ML
INJECTION INTRAVENOUS AS NEEDED
Status: DISCONTINUED | OUTPATIENT
Start: 2021-02-22 | End: 2021-02-22

## 2021-02-22 RX ORDER — ONDANSETRON 2 MG/ML
INJECTION INTRAMUSCULAR; INTRAVENOUS AS NEEDED
Status: DISCONTINUED | OUTPATIENT
Start: 2021-02-22 | End: 2021-02-22

## 2021-02-22 RX ORDER — FENTANYL CITRATE/PF 50 MCG/ML
50 SYRINGE (ML) INJECTION
Status: DISCONTINUED | OUTPATIENT
Start: 2021-02-22 | End: 2021-02-22 | Stop reason: HOSPADM

## 2021-02-22 RX ORDER — TRAMADOL HYDROCHLORIDE 50 MG/1
50 TABLET ORAL EVERY 6 HOURS PRN
Status: DISCONTINUED | OUTPATIENT
Start: 2021-02-22 | End: 2021-02-22 | Stop reason: HOSPADM

## 2021-02-22 RX ORDER — ONDANSETRON 2 MG/ML
4 INJECTION INTRAMUSCULAR; INTRAVENOUS ONCE AS NEEDED
Status: DISCONTINUED | OUTPATIENT
Start: 2021-02-22 | End: 2021-02-22 | Stop reason: HOSPADM

## 2021-02-22 RX ORDER — LIDOCAINE HYDROCHLORIDE 20 MG/ML
INJECTION, SOLUTION EPIDURAL; INFILTRATION; INTRACAUDAL; PERINEURAL AS NEEDED
Status: DISCONTINUED | OUTPATIENT
Start: 2021-02-22 | End: 2021-02-22

## 2021-02-22 RX ORDER — SODIUM CHLORIDE, SODIUM LACTATE, POTASSIUM CHLORIDE, CALCIUM CHLORIDE 600; 310; 30; 20 MG/100ML; MG/100ML; MG/100ML; MG/100ML
INJECTION, SOLUTION INTRAVENOUS CONTINUOUS PRN
Status: DISCONTINUED | OUTPATIENT
Start: 2021-02-22 | End: 2021-02-22

## 2021-02-22 RX ORDER — LIDOCAINE HYDROCHLORIDE 10 MG/ML
INJECTION, SOLUTION EPIDURAL; INFILTRATION; INTRACAUDAL; PERINEURAL AS NEEDED
Status: DISCONTINUED | OUTPATIENT
Start: 2021-02-22 | End: 2021-02-22

## 2021-02-22 RX ORDER — HYDROMORPHONE HCL 110MG/55ML
PATIENT CONTROLLED ANALGESIA SYRINGE INTRAVENOUS AS NEEDED
Status: DISCONTINUED | OUTPATIENT
Start: 2021-02-22 | End: 2021-02-22

## 2021-02-22 RX ORDER — FENTANYL CITRATE 50 UG/ML
INJECTION, SOLUTION INTRAMUSCULAR; INTRAVENOUS AS NEEDED
Status: DISCONTINUED | OUTPATIENT
Start: 2021-02-22 | End: 2021-02-22

## 2021-02-22 RX ORDER — GLYCOPYRROLATE 0.2 MG/ML
INJECTION INTRAMUSCULAR; INTRAVENOUS AS NEEDED
Status: DISCONTINUED | OUTPATIENT
Start: 2021-02-22 | End: 2021-02-22

## 2021-02-22 RX ORDER — HYDROMORPHONE HCL/PF 1 MG/ML
0.4 SYRINGE (ML) INJECTION
Status: DISCONTINUED | OUTPATIENT
Start: 2021-02-22 | End: 2021-02-22 | Stop reason: HOSPADM

## 2021-02-22 RX ORDER — ROCURONIUM BROMIDE 10 MG/ML
INJECTION, SOLUTION INTRAVENOUS AS NEEDED
Status: DISCONTINUED | OUTPATIENT
Start: 2021-02-22 | End: 2021-02-22

## 2021-02-22 RX ORDER — SODIUM CHLORIDE, SODIUM LACTATE, POTASSIUM CHLORIDE, CALCIUM CHLORIDE 600; 310; 30; 20 MG/100ML; MG/100ML; MG/100ML; MG/100ML
125 INJECTION, SOLUTION INTRAVENOUS
Status: COMPLETED | OUTPATIENT
Start: 2021-02-22 | End: 2021-02-22

## 2021-02-22 RX ORDER — MAGNESIUM HYDROXIDE 1200 MG/15ML
LIQUID ORAL AS NEEDED
Status: DISCONTINUED | OUTPATIENT
Start: 2021-02-22 | End: 2021-02-22 | Stop reason: HOSPADM

## 2021-02-22 RX ORDER — MIDAZOLAM HYDROCHLORIDE 2 MG/2ML
INJECTION, SOLUTION INTRAMUSCULAR; INTRAVENOUS AS NEEDED
Status: DISCONTINUED | OUTPATIENT
Start: 2021-02-22 | End: 2021-02-22

## 2021-02-22 RX ORDER — DEXMEDETOMIDINE HYDROCHLORIDE 100 UG/ML
INJECTION, SOLUTION INTRAVENOUS AS NEEDED
Status: DISCONTINUED | OUTPATIENT
Start: 2021-02-22 | End: 2021-02-22

## 2021-02-22 RX ORDER — CEFAZOLIN SODIUM 2 G/50ML
2000 SOLUTION INTRAVENOUS
Status: COMPLETED | OUTPATIENT
Start: 2021-02-22 | End: 2021-02-22

## 2021-02-22 RX ORDER — ONDANSETRON 2 MG/ML
4 INJECTION INTRAMUSCULAR; INTRAVENOUS EVERY 8 HOURS PRN
Status: DISCONTINUED | OUTPATIENT
Start: 2021-02-22 | End: 2021-02-22 | Stop reason: HOSPADM

## 2021-02-22 RX ORDER — CEFAZOLIN SODIUM 1 G/3ML
INJECTION, POWDER, FOR SOLUTION INTRAMUSCULAR; INTRAVENOUS AS NEEDED
Status: DISCONTINUED | OUTPATIENT
Start: 2021-02-22 | End: 2021-02-22

## 2021-02-22 RX ADMIN — HYDROMORPHONE HYDROCHLORIDE 0.2 MG: 2 INJECTION, SOLUTION INTRAMUSCULAR; INTRAVENOUS; SUBCUTANEOUS at 13:16

## 2021-02-22 RX ADMIN — HYDROMORPHONE HYDROCHLORIDE 0.2 MG: 2 INJECTION, SOLUTION INTRAMUSCULAR; INTRAVENOUS; SUBCUTANEOUS at 13:15

## 2021-02-22 RX ADMIN — MIDAZOLAM HYDROCHLORIDE 2 MG: 1 INJECTION, SOLUTION INTRAMUSCULAR; INTRAVENOUS at 11:45

## 2021-02-22 RX ADMIN — PROPOFOL 200 MG: 10 INJECTION, EMULSION INTRAVENOUS at 11:53

## 2021-02-22 RX ADMIN — HYDROMORPHONE HYDROCHLORIDE 0.6 MG: 2 INJECTION, SOLUTION INTRAMUSCULAR; INTRAVENOUS; SUBCUTANEOUS at 12:47

## 2021-02-22 RX ADMIN — ROCURONIUM BROMIDE 10 MG: 10 INJECTION, SOLUTION INTRAVENOUS at 12:31

## 2021-02-22 RX ADMIN — PROPOFOL 30 MG: 10 INJECTION, EMULSION INTRAVENOUS at 12:31

## 2021-02-22 RX ADMIN — HYDROMORPHONE HYDROCHLORIDE 0.2 MG: 2 INJECTION, SOLUTION INTRAMUSCULAR; INTRAVENOUS; SUBCUTANEOUS at 13:18

## 2021-02-22 RX ADMIN — DEXMEDETOMIDINE HCL 8 MCG: 100 INJECTION INTRAVENOUS at 12:35

## 2021-02-22 RX ADMIN — ROCURONIUM BROMIDE 10 MG: 10 INJECTION, SOLUTION INTRAVENOUS at 12:47

## 2021-02-22 RX ADMIN — GLYCOPYRROLATE 0.1 MG: 0.2 INJECTION, SOLUTION INTRAMUSCULAR; INTRAVENOUS at 12:32

## 2021-02-22 RX ADMIN — SODIUM CHLORIDE, SODIUM LACTATE, POTASSIUM CHLORIDE, AND CALCIUM CHLORIDE: .6; .31; .03; .02 INJECTION, SOLUTION INTRAVENOUS at 12:40

## 2021-02-22 RX ADMIN — DEXMEDETOMIDINE HCL 4 MCG: 100 INJECTION INTRAVENOUS at 13:04

## 2021-02-22 RX ADMIN — FENTANYL CITRATE 100 MCG: 50 INJECTION, SOLUTION INTRAMUSCULAR; INTRAVENOUS at 11:50

## 2021-02-22 RX ADMIN — SODIUM CHLORIDE, SODIUM LACTATE, POTASSIUM CHLORIDE, AND CALCIUM CHLORIDE 125 ML/HR: .6; .31; .03; .02 INJECTION, SOLUTION INTRAVENOUS at 09:28

## 2021-02-22 RX ADMIN — TRAMADOL HYDROCHLORIDE 50 MG: 50 TABLET, FILM COATED ORAL at 14:17

## 2021-02-22 RX ADMIN — DEXMEDETOMIDINE HCL 4 MCG: 100 INJECTION INTRAVENOUS at 12:38

## 2021-02-22 RX ADMIN — CEFAZOLIN 1000 MG: 1 INJECTION, POWDER, FOR SOLUTION INTRAVENOUS at 11:59

## 2021-02-22 RX ADMIN — ONDANSETRON 4 MG: 2 INJECTION INTRAMUSCULAR; INTRAVENOUS at 13:04

## 2021-02-22 RX ADMIN — SODIUM CHLORIDE, SODIUM LACTATE, POTASSIUM CHLORIDE, AND CALCIUM CHLORIDE: .6; .31; .03; .02 INJECTION, SOLUTION INTRAVENOUS at 11:40

## 2021-02-22 RX ADMIN — DEXMEDETOMIDINE HCL 8 MCG: 100 INJECTION INTRAVENOUS at 12:47

## 2021-02-22 RX ADMIN — HYDROMORPHONE HYDROCHLORIDE 0.2 MG: 2 INJECTION, SOLUTION INTRAMUSCULAR; INTRAVENOUS; SUBCUTANEOUS at 13:17

## 2021-02-22 RX ADMIN — ROCURONIUM BROMIDE 40 MG: 10 INJECTION, SOLUTION INTRAVENOUS at 11:53

## 2021-02-22 RX ADMIN — GLYCOPYRROLATE 0.5 MG: 0.2 INJECTION, SOLUTION INTRAMUSCULAR; INTRAVENOUS at 13:05

## 2021-02-22 RX ADMIN — CEFAZOLIN SODIUM 2000 MG: 2 SOLUTION INTRAVENOUS at 10:45

## 2021-02-22 RX ADMIN — HEPARIN SODIUM 5000 UNITS: 5000 INJECTION INTRAVENOUS; SUBCUTANEOUS at 09:28

## 2021-02-22 RX ADMIN — LIDOCAINE HYDROCHLORIDE 80 MG: 20 INJECTION, SOLUTION EPIDURAL; INFILTRATION; INTRACAUDAL; PERINEURAL at 11:52

## 2021-02-22 RX ADMIN — ROCURONIUM BROMIDE 10 MG: 10 INJECTION, SOLUTION INTRAVENOUS at 12:12

## 2021-02-22 RX ADMIN — PHENYLEPHRINE HYDROCHLORIDE 100 MCG: 10 INJECTION INTRAVENOUS at 12:06

## 2021-02-22 RX ADMIN — PHENYLEPHRINE HYDROCHLORIDE 100 MCG: 10 INJECTION INTRAVENOUS at 12:21

## 2021-02-22 RX ADMIN — PROPOFOL 100 MG: 10 INJECTION, EMULSION INTRAVENOUS at 11:55

## 2021-02-22 RX ADMIN — HYDROMORPHONE HYDROCHLORIDE 0.4 MG: 2 INJECTION, SOLUTION INTRAMUSCULAR; INTRAVENOUS; SUBCUTANEOUS at 12:44

## 2021-02-22 RX ADMIN — HYDROMORPHONE HYDROCHLORIDE 0.2 MG: 2 INJECTION, SOLUTION INTRAMUSCULAR; INTRAVENOUS; SUBCUTANEOUS at 13:04

## 2021-02-22 RX ADMIN — NEOSTIGMINE METHYLSULFATE 3 MG: 1 INJECTION INTRAVENOUS at 13:05

## 2021-02-22 NOTE — ANESTHESIA PREPROCEDURE EVALUATION
Procedure:  REPAIR HERNIA INGUINAL, LAPAROSCOPIC BILATERAL WITH MESH (Bilateral Groin)    Relevant Problems   CARDIO   (+) Hyperlipidemia      MUSCULOSKELETAL   (+) Disc degeneration, lumbar      NEURO/PSYCH   (+) Depression   (+) Moderate episode of recurrent major depressive disorder (HCC)   (+) PTSD (post-traumatic stress disorder)        Physical Exam    Airway    Mallampati score: II  TM Distance: >3 FB  Neck ROM: full     Dental   Comment: Denies loose teeth,     Cardiovascular  Cardiovascular exam normal    Pulmonary  Pulmonary exam normal     Other Findings  Portions of exam deferred due to low yield and/or unknown COVID status      Anesthesia Plan  ASA Score- 2     Anesthesia Type- general with ASA Monitors  Additional Monitors:   Airway Plan: ETT  Plan Factors-Exercise tolerance (METS): >4 METS  Chart reviewed  Existing labs reviewed  Patient summary reviewed  Patient is not a current smoker  Induction- intravenous  Postoperative Plan-     Informed Consent- Anesthetic plan and risks discussed with patient  I personally reviewed this patient with the CRNA  Discussed and agreed on the Anesthesia Plan with the CRNA  Norman Bonilla

## 2021-02-22 NOTE — ANESTHESIA POSTPROCEDURE EVALUATION
Post-Op Assessment Note    CV Status:  Stable  Pain Score: 1    Pain management: adequate     Mental Status:  Sleepy   Hydration Status:  Stable   PONV Controlled:  Controlled   Airway Patency:  Patent      Post Op Vitals Reviewed: Yes      Staff: CRNA         No complications documented      BP (P) 146/73 (02/22/21 1316)    Temp (P) 99 4 °F (37 4 °C) (02/22/21 1316)    Pulse (P) 69 (02/22/21 1316)   Resp   16   SpO2 (P) 100 % (02/22/21 1316)

## 2021-02-22 NOTE — DISCHARGE INSTRUCTIONS
No diet restriction for this surgery  May shower every day  Remove dressings in 3 days  Remove strips in 7 days  Call office to make an appointment in 2 weeks [phone]  Call office with any issues regarding the surgery  No driving, heavy lifting or strenuous exercise for one week  Resume home medications  Apply ice to the incisions  May take Tylenol 3, regular Tylenol or ibuprofen for pain  Alternating narcotics with ibuprofen or Advil leave will have better pain control    May take Colace for constipation  If you experience urinary retention, please contact your Urologist or go to the emergency room to be treated

## 2021-02-22 NOTE — OP NOTE
OPERATIVE REPORT  PATIENT NAME: Camron Malik    :  1970  MRN: [id number]  Pt Location: [facility] 02    SURGERY DATE: 2021    Surgeon(s) and Role:     * Su Echeverria MD - Primary     * Laurie Jara PA-C - Assisting    Preop Diagnosis:  Non-recurrent bilateral inguinal hernia without obstruction or gangrene [K40 20]    Post-Op Diagnosis Codes:     * Non-recurrent bilateral inguinal hernia without obstruction or gangrene [K40 20]    Procedure(s) (LRB):  REPAIR HERNIA INGUINAL, LAPAROSCOPIC BILATERAL WITH MESH (Bilateral)    Specimen(s):  None    Estimated Blood Loss:   Minimal    Drains:  None    Anesthesia Type:   General    Operative Indications:  Non-recurrent bilateral inguinal hernia without obstruction or gangrene [K40 20]    Operative Findings:  The patient have bilateral indirect inguinal hernia    Complications:   None    Procedure and Technique:  The patient was identified he was placed in the operating table in a supine position  After adequate anesthesia induction and satisfactory endotracheal intubation the abdomen and perineum were prepped and draped in sterile usual fashion with chlor prep  Timeout was called the patient was identified as well as surgical sites  An infra umbilical incision was made with a scalpel, taken down through the subcutaneous tissue with electrocautery  The fascia of the rectus muscle was opened with electrocautery in a transverse fashion  The rectus muscle was retracted laterally and with blunt finger dissection a space was created posterior to the rectus muscle  The balloon dissector was inserted into the preperitoneal space and insufflated under direct vision  The balloon was deflated and subsequently retrieved  The structural balloon was placed into the preperitoneal space and insufflated  The preperitoneal space was insufflated with CO2  The scope was advanced and then we proceeded to place a 5 mm trocar in the suprapubic area ×2 under direct vision   At this point the symphysis pubis was identified and Eros's ligament on the right side  The cord structures were identified by gently pulling on the right testicle  I proceeded to dissect between the inferior epigastric vessels and the cord structures laterally until the lateral wall was identified  Then I proceeded to dissect medially towards the cord structure  The large hernia sac was identified and dissected from the direct defect, the peritoneum was dissected off the cord structure as high as the superior Iliac spine  12 x 15 cm Pro  mesh was placed and tacked it into anterior abdominal wall  At this point the attention was directed into the left side  The symphysis pubis and Eros's ligament were identified on the left side  The cord structure were identified by gently pulling on the left testicle  I proceeded to dissect between the inferior epigastric vessels and the cord structures until the lateral wall was identified  Then a proceeded to dissect medially towards the cord structure and the hernia sac was dissected from the direct defect, the peritoneum was dissected off the cord structure as high as the superior iliac spine  12 x 15 cm Pro  mesh was placed and tacked it into the anterior abdominal wall  The CO2 was deflated and the ports were removed under direct vision without evidence of bleeding from the abdominal wall  The structural balloon was deflated and subsequently retrieved  The infra umbilical port site fascia was closed with a 0 Vicryl in an interrupted figure-of-eight fashion  The subcutaneous tissue was infiltrated with 0 5% Marcaine and the skin was closed with a 4-0 Vicryl in an interrupted subcuticular fashion  Sterile dressing were applied  At the end of the case instrument, needles, sponges counts were correct  The patient tolerated the procedure well and then he was transferred to recovery room in a stable conditions       I was present for the entire procedure, A qualified resident physician was not available and A physician assistant was required during the procedure for retraction tissue handling,dissection and suturing    Patient Disposition:  PACU , hemodynamically stable and extubated and stable    SIGNATURE: Alia Dinh MD  DATE: February 22, 2021  TIME: 1:08 PM

## 2021-02-25 ENCOUNTER — TELEPHONE (OUTPATIENT)
Dept: SURGERY | Facility: CLINIC | Age: 51
End: 2021-02-25

## 2021-03-02 ENCOUNTER — SOCIAL WORK (OUTPATIENT)
Dept: BEHAVIORAL/MENTAL HEALTH CLINIC | Facility: CLINIC | Age: 51
End: 2021-03-02
Payer: COMMERCIAL

## 2021-03-02 DIAGNOSIS — F43.10 PTSD (POST-TRAUMATIC STRESS DISORDER): Primary | ICD-10-CM

## 2021-03-02 DIAGNOSIS — F33.1 MODERATE EPISODE OF RECURRENT MAJOR DEPRESSIVE DISORDER (HCC): ICD-10-CM

## 2021-03-02 PROCEDURE — 90834 PSYTX W PT 45 MINUTES: CPT | Performed by: SOCIAL WORKER

## 2021-03-02 NOTE — PSYCH
9: 00am-9:45am    Assessment/Plan: f/u in three weeks     There are no diagnoses linked to this encounter  Subjective: Therapist met w/pt for individual session  Pt stated that he had surgery a week and a ½ ago and is finally feeling better  He shared that due to surgery he has been at home, recovering  He shared that although he hasnt been doing many things and interacting with others as much as he usually does he feel pretty good    Pt shared that he feels his anxiety is more manageable  He identified driving as being the biggest trigger for his anxiety/trauma but that that doesnt prevent him from driving  Pt shared that he has a f/u medical appointment next week and based on his recent bloodwork he knows he is going to have to work on being more mindful of his sugar intake  Therapist and pt discussed how receptive pt will be to this and pt stated that he knows it will only help him so he will have to abide due to wanting to be as healthy as he can  Patient ID: Viki Collins is a 45 y o  male  HPI 45 minutes    Review of Systems      Objective: Pt appeared calm and easily engaged         Physical Exam  Pt denied any SI/Hi/Ah/VH

## 2021-03-09 ENCOUNTER — OFFICE VISIT (OUTPATIENT)
Dept: INTERNAL MEDICINE CLINIC | Facility: CLINIC | Age: 51
End: 2021-03-09
Payer: COMMERCIAL

## 2021-03-09 VITALS
WEIGHT: 176 LBS | HEIGHT: 66 IN | TEMPERATURE: 97.7 F | DIASTOLIC BLOOD PRESSURE: 76 MMHG | BODY MASS INDEX: 28.28 KG/M2 | HEART RATE: 79 BPM | OXYGEN SATURATION: 97 % | SYSTOLIC BLOOD PRESSURE: 122 MMHG

## 2021-03-09 DIAGNOSIS — F33.1 MODERATE EPISODE OF RECURRENT MAJOR DEPRESSIVE DISORDER (HCC): ICD-10-CM

## 2021-03-09 DIAGNOSIS — F43.10 PTSD (POST-TRAUMATIC STRESS DISORDER): Primary | ICD-10-CM

## 2021-03-09 DIAGNOSIS — E78.2 MIXED HYPERLIPIDEMIA: ICD-10-CM

## 2021-03-09 LAB
ALBUMIN SERPL-MCNC: 4.4 G/DL (ref 3.6–5.1)
ALBUMIN/GLOB SERPL: 2 (CALC) (ref 1–2.5)
ALP SERPL-CCNC: 63 U/L (ref 35–144)
ALT SERPL-CCNC: 17 U/L (ref 9–46)
AST SERPL-CCNC: 20 U/L (ref 10–35)
BILIRUB SERPL-MCNC: 0.3 MG/DL (ref 0.2–1.2)
BUN SERPL-MCNC: 13 MG/DL (ref 7–25)
BUN/CREAT SERPL: ABNORMAL (CALC) (ref 6–22)
CALCIUM SERPL-MCNC: 9.6 MG/DL (ref 8.6–10.3)
CHLORIDE SERPL-SCNC: 105 MMOL/L (ref 98–110)
CHOLEST SERPL-MCNC: 226 MG/DL
CHOLEST/HDLC SERPL: 4 (CALC)
CO2 SERPL-SCNC: 26 MMOL/L (ref 20–32)
CREAT SERPL-MCNC: 0.89 MG/DL (ref 0.7–1.33)
GLOBULIN SER CALC-MCNC: 2.2 G/DL (CALC) (ref 1.9–3.7)
GLUCOSE SERPL-MCNC: 104 MG/DL (ref 65–99)
HDLC SERPL-MCNC: 57 MG/DL
LDLC SERPL CALC-MCNC: 143 MG/DL (CALC)
NONHDLC SERPL-MCNC: 169 MG/DL (CALC)
POTASSIUM SERPL-SCNC: 4.5 MMOL/L (ref 3.5–5.3)
PROT SERPL-MCNC: 6.6 G/DL (ref 6.1–8.1)
SL AMB EGFR AFRICAN AMERICAN: 115 ML/MIN/1.73M2
SL AMB EGFR NON AFRICAN AMERICAN: 99 ML/MIN/1.73M2
SODIUM SERPL-SCNC: 141 MMOL/L (ref 135–146)
TRIGL SERPL-MCNC: 137 MG/DL

## 2021-03-09 PROCEDURE — 3725F SCREEN DEPRESSION PERFORMED: CPT | Performed by: PHYSICIAN ASSISTANT

## 2021-03-09 PROCEDURE — 99214 OFFICE O/P EST MOD 30 MIN: CPT | Performed by: PHYSICIAN ASSISTANT

## 2021-03-09 PROCEDURE — 1036F TOBACCO NON-USER: CPT | Performed by: PHYSICIAN ASSISTANT

## 2021-03-09 RX ORDER — SIMVASTATIN 10 MG
20 TABLET ORAL
Qty: 90 TABLET | Refills: 3
Start: 2021-03-09 | End: 2021-03-31 | Stop reason: SDUPTHER

## 2021-03-09 NOTE — PROGRESS NOTES
Assessment/Plan:   Patient Instructions   Start back on simvastatin, we will then recheck your lipids in 6 months  Discussed with your counselor possibility of tapering off venlafaxine  After that discussing get back to me  If in agreement we will taper off as you and I discussed  Will schedule follow-up 6 months, sooner as needed  Quality Measures:       Return in about 6 months (around 9/9/2021) for Next scheduled follow up  Diagnoses and all orders for this visit:    Mixed hyperlipidemia  -     simvastatin (ZOCOR) 10 mg tablet; Take 2 tablets (20 mg total) by mouth daily at bedtime  -     Comprehensive metabolic panel; Future  -     Lipid panel; Future          Subjective:      Patient ID: Pancho Bonilla is a 45 y o  male  Follow-up     Hyperlipidemia:  At last visit patient had not been taking his simvastatin for an unknown reason  We decided to recheck his lipids off medication  Continues to have elevated total cholesterol and LDL but good HDL and control triglycerides  ASCVD risk score is 5 5%  Discussion held  He is in agreement to go back on the medication and re-evaluate lipids in 6 months  He will let me know if there is any side effects  PTSD:  Patient is seeing psychology  He is asking today to be tapered off his venlafaxine which she states he has been on for "years" and is not sure that it is doing anything positive  We discussed the medication and why it was initiated  I asked him to discuss with his psychologist next week at their session whether not this professional felt it was wise to stop the medication at this time  He is to let me know  Status post laparoscopic bilateral inguinal hernia repair:  Done on 02/22/2021  States he feels well  No complaints  Bowels functioning  No difficulty urinating  Slight elevation in the patient's blood sugar however he did eat a [place] prior to going 1st blood work        ALLERGIES:  Allergies   Allergen Reactions  Celecoxib Anaphylaxis    Diclofenac Shortness Of Breath       CURRENT MEDICATIONS:    Current Outpatient Medications:     albuterol (PROVENTIL HFA,VENTOLIN HFA) 90 mcg/act inhaler, TAKE 2 PUFFS BY MOUTH EVERY 6 HOURS AS NEEDED FOR WHEEZE, Disp: 8 5 Inhaler, Rfl: 2    Ascorbic Acid (vitamin C) 1000 MG tablet, Take 1,000 mg by mouth daily, Disp: , Rfl:     Cholecalciferol (VITAMIN D3 PO), Take by mouth, Disp: , Rfl:     Flaxseed Oil OIL, Use 1,200 mg daily , Disp: , Rfl:     multivitamin (THERAGRAN) TABS, Take 1 tablet by mouth daily, Disp: , Rfl:     Omega-3 Fatty Acids (FISH OIL PO), Take 1,000 mg by mouth daily , Disp: , Rfl:     venlafaxine (EFFEXOR-XR) 150 mg 24 hr capsule, TAKE 1 CAPSULE BY MOUTH DAILY WITH BREAKFAST, Disp: 90 capsule, Rfl: 1    simvastatin (ZOCOR) 10 mg tablet, Take 2 tablets (20 mg total) by mouth daily at bedtime, Disp: 90 tablet, Rfl: 3    ACTIVE PROBLEM LIST:  Patient Active Problem List   Diagnosis    Cervical somatic dysfunction    Depression    Disc degeneration, lumbar    Hyperlipidemia    Pain of finger of left hand    Skin cyst    PTSD (post-traumatic stress disorder)    Moderate episode of recurrent major depressive disorder ([facility] [place] [address] )    COVID-19 virus infection    Bilateral inguinal hernia       PAST MEDICAL HISTORY:  Past Medical History:   Diagnosis Date    Bronchitis     Cough     Pt states he has a morning cough from some sinus drainage- pt reports having a cold week of 2/8/21    COVID-19 12/2020    Pt states he only lost taste and smell- no other symptoms reported      Disc degeneration, lumbar     Diverticulosis     Facet syndrome     Hyperlipidemia     Meningitis     as child    Other muscle spasm     Parapsoriasis     Pneumonia     Psoriasis     Sacroiliitis (HCC)     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       PAST SURGICAL HISTORY:  Past Surgical History:   Procedure Laterality Date    FACIAL/NECK BIOPSY Left 8/4/2020    Procedure: EXCISION CYST NECK;  Surgeon: Zoey Foster MD;  [facility];  Service: Plastics    FLAP LOCAL HEAD / NECK Left 8/4/2020    Procedure: COMPLEX CLOSURE VS ADJACENT TISSUE REARRANGEMENT NECK;  Surgeon: Zoey Foster MD;  [facility];  Service: Plastics    HERNIA REPAIR      HERNIA REPAIR Bilateral 2/22/2021    Procedure: REPAIR HERNIA INGUINAL, LAPAROSCOPIC BILATERAL WITH MESH;  Surgeon: Opal Vasquez MD;  [facility];  Service: General    PYLOROMYOTOMY      ROTATOR CUFF REPAIR Left     times 2 per pt    TONSILLECTOMY AND ADENOIDECTOMY      TOOTH EXTRACTION         FAMILY HISTORY:  Family History   Problem Relation Age of Onset    Cancer Mother     Lung cancer Mother         CARCINOID TUMOR    Hypertension Mother     Hypothyroidism Mother     Kidney cancer Mother     Diabetes Maternal Grandmother     Alzheimer's disease Maternal Grandfather     Coronary artery disease Maternal Grandfather     Other Maternal Grandfather         PACEMAKER       SOCIAL HISTORY:  Social History     Socioeconomic History    Marital status: /Civil Union     Spouse name: Not on file    Number of children: 1    Years of education: Not on file    Highest education level: Not on file   Occupational History    Occupation: COMPUTER CONSULTING     Comment: FULL-TIME EMPLOYMENT   Social Needs    Financial resource strain: Not on file    Food insecurity     Worry: Not on file     Inability: Not on file    Transportation needs     Medical: Not on file     Non-medical: Not on file   Tobacco Use    Smoking status: Former Smoker     Types: Cigarettes    Smokeless tobacco: Never Used    Tobacco comment: Pt quit 2008   Substance and Sexual Activity    Alcohol use: Yes     Frequency: Monthly or less     Drinks per session: 1 or 2     Comment: BEER - DESCRIBES AS INFREQUENT    Drug use: Yes     Types: Marijuana     Comment: Medical marijuana    Sexual activity: Not on file     Comment: did not ask   Lifestyle    Physical activity     Days per week: Not on file     Minutes per session: Not on file    Stress: Not on file   Relationships    Social connections     Talks on phone: Not on file     Gets together: Not on file     Attends Yazidi service: Not on file     Active member of club or organization: Not on file     Attends meetings of clubs or organizations: Not on file     Relationship status: Not on file    Intimate partner violence     Fear of current or ex partner: Not on file     Emotionally abused: Not on file     Physically abused: Not on file     Forced sexual activity: Not on file   Other Topics Concern    Not on file   Social History Narrative    LIVING INDEPENDENTLY WITH SPOUSE    ONE SON       Review of Systems   Constitutional: Negative for activity change, chills, fatigue and fever  HENT: Negative for congestion  Eyes: Negative for discharge  Respiratory: Negative for cough, chest tightness and shortness of breath  Cardiovascular: Negative for chest pain, palpitations and leg swelling  Gastrointestinal: Negative for abdominal pain, blood in stool, constipation, diarrhea, nausea and vomiting  Endocrine: Negative for polydipsia, polyphagia and polyuria  Genitourinary: Negative for difficulty urinating  Musculoskeletal: Negative for arthralgias and myalgias  Skin: Negative for rash  Allergic/Immunologic: Negative for immunocompromised state  Neurological: Negative for dizziness, syncope, weakness, light-headedness and headaches  Hematological: Negative for adenopathy  Does not bruise/bleed easily  Psychiatric/Behavioral: Negative for dysphoric mood, sleep disturbance and suicidal ideas  The patient is not nervous/anxious            Objective:  Vitals:    03/09/21 1012   BP: 122/76   BP Location: Right arm   Patient Position: Sitting   Cuff Size: Adult   Pulse: 79   Temp: 97 7 °F (36 5 °C) TempSrc: Temporal   SpO2: 97%   Weight: 79 8 kg (176 lb)   Height: 5' 6" (1 676 m)     Body mass index is 28 41 kg/m²  Physical Exam  Vitals signs and nursing note reviewed  Constitutional:       General: He is not in acute distress  Appearance: He is well-developed  HENT:      Head: Normocephalic and atraumatic  Eyes:      Pupils: Pupils are equal, round, and reactive to light  Neck:      Musculoskeletal: Neck supple  Thyroid: No thyromegaly  Vascular: No carotid bruit or JVD  Cardiovascular:      Rate and Rhythm: Normal rate and regular rhythm  Heart sounds: Normal heart sounds  Pulmonary:      Effort: Pulmonary effort is normal  No respiratory distress  Breath sounds: Normal breath sounds  Abdominal:      General: Abdomen is flat  Palpations: Abdomen is soft  Tenderness: There is no abdominal tenderness  Comments: Suprapubic wounds are clean, dry, well adherent, without any evidence of infection  Musculoskeletal:      Right lower leg: No edema  Left lower leg: No edema  Lymphadenopathy:      Cervical: No cervical adenopathy  Skin:     General: Skin is warm and dry  Findings: No rash  Neurological:      General: No focal deficit present  Mental Status: He is alert and oriented to person, place, and time  Mental status is at baseline     Psychiatric:         Mood and Affect: Mood normal          Behavior: Behavior normal            RESULTS:    Recent Results (from the past 1008 hour(s))   ECG 12 lead    Collection Time: 02/15/21 11:49 AM   Result Value Ref Range    Ventricular Rate 65 BPM    Atrial Rate 65 BPM    WV Interval 138 ms    QRSD Interval 86 ms    QT Interval 390 ms    QTC Interval 405 ms    P Axis 57 degrees    QRS Axis 40 degrees    T Wave Axis 55 degrees   Comprehensive metabolic panel    Collection Time: 02/17/21  9:08 AM   Result Value Ref Range    Glucose, Random 99 65 - 99 mg/dL    BUN 13 7 - 25 mg/dL    Creatinine 0  92 0 70 - 1 33 mg/dL    eGFR Non  96 > OR = 60 mL/min/1 73m2    eGFR  111 > OR = 60 mL/min/1 73m2    SL AMB BUN/CREATININE RATIO NOT APPLICABLE 6 - 22 (calc)    Sodium 140 135 - 146 mmol/L    Potassium 5 0 3 5 - 5 3 mmol/L    Chloride 106 98 - 110 mmol/L    CO2 28 20 - 32 mmol/L    Calcium 9 5 8 6 - 10 3 mg/dL    Protein, Total 6 2 6 1 - 8 1 g/dL    Albumin 4 2 3 6 - 5 1 g/dL    Globulin 2 0 1 9 - 3 7 g/dL (calc)    Albumin/Globulin Ratio 2 1 1 0 - 2 5 (calc)    TOTAL BILIRUBIN 0 2 0 2 - 1 2 mg/dL    Alkaline Phosphatase 61 35 - 144 U/L    AST 18 10 - 35 U/L    ALT 18 9 - 46 U/L   CBC and differential    Collection Time: 02/17/21  9:08 AM   Result Value Ref Range    White Blood Cell Count 6 3 3 8 - 10 8 Thousand/uL    Red Blood Cell Count 4 95 4 20 - 5 80 Million/uL    Hemoglobin 15 4 13 2 - 17 1 g/dL    HCT 44 8 38 5 - 50 0 %    MCV 90 5 80 0 - 100 0 fL    MCH 31 1 27 0 - 33 0 pg    MCHC 34 4 32 0 - 36 0 g/dL    RDW 13 1 11 0 - 15 0 %    Platelet Count 768 715 - 400 Thousand/uL    SL AMB MPV 10 2 7 5 - 12 5 fL    Neutrophils (Absolute) 3,314 1,500 - 7,800 cells/uL    Lymphocytes (Absolute) 2,255 850 - 3,900 cells/uL    Monocytes (Absolute) 384 200 - 950 cells/uL    Eosinophils (Absolute) 265 15 - 500 cells/uL    Basophils ABS 82 0 - 200 cells/uL    Neutrophils 52 6 %    Lymphocytes 35 8 %    Monocytes 6 1 %    Eosinophils 4 2 %    Basophils PCT 1 3 %   Lipid panel    Collection Time: 03/08/21  9:22 AM   Result Value Ref Range    Total Cholesterol 226 (H) <200 mg/dL    HDL 57 > OR = 40 mg/dL    Triglycerides 137 <150 mg/dL    LDL Calculated 143 (H) mg/dL (calc)    Chol HDLC Ratio 4 0 <5 0 (calc)    Non-HDL Cholesterol 169 (H) <130 mg/dL (calc)   Comprehensive metabolic panel    Collection Time: 03/08/21  9:22 AM   Result Value Ref Range    Glucose, Random 104 (H) 65 - 99 mg/dL    BUN 13 7 - 25 mg/dL    Creatinine 0 89 0 70 - 1 33 mg/dL    eGFR Non  99 > OR = 60 mL/min/1 73m2    eGFR  115 > OR = 60 mL/min/1 73m2    SL AMB BUN/CREATININE RATIO NOT APPLICABLE 6 - 22 (calc)    Sodium 141 135 - 146 mmol/L    Potassium 4 5 3 5 - 5 3 mmol/L    Chloride 105 98 - 110 mmol/L    CO2 26 20 - 32 mmol/L    Calcium 9 6 8 6 - 10 3 mg/dL    Protein, Total 6 6 6 1 - 8 1 g/dL    Albumin 4 4 3 6 - 5 1 g/dL    Globulin 2 2 1 9 - 3 7 g/dL (calc)    Albumin/Globulin Ratio 2 0 1 0 - 2 5 (calc)    TOTAL BILIRUBIN 0 3 0 2 - 1 2 mg/dL    Alkaline Phosphatase 63 35 - 144 U/L    AST 20 10 - 35 U/L    ALT 17 9 - 46 U/L       This note was created with voice recognition software  Phonic, grammatical and spelling errors may be present within the note as a result

## 2021-03-09 NOTE — PATIENT INSTRUCTIONS
Start back on simvastatin, we will then recheck your lipids in 6 months  Discussed with your counselor possibility of tapering off venlafaxine  After that discussing get back to me  If in agreement we will taper off as you and I discussed  Will schedule follow-up 6 months, sooner as needed

## 2021-03-22 ENCOUNTER — SOCIAL WORK (OUTPATIENT)
Dept: BEHAVIORAL/MENTAL HEALTH CLINIC | Facility: CLINIC | Age: 51
End: 2021-03-22
Payer: COMMERCIAL

## 2021-03-22 DIAGNOSIS — F33.1 MODERATE EPISODE OF RECURRENT MAJOR DEPRESSIVE DISORDER (HCC): ICD-10-CM

## 2021-03-22 DIAGNOSIS — F43.10 PTSD (POST-TRAUMATIC STRESS DISORDER): Primary | ICD-10-CM

## 2021-03-22 PROCEDURE — 90834 PSYTX W PT 45 MINUTES: CPT | Performed by: SOCIAL WORKER

## 2021-03-22 NOTE — PSYCH
9: 00am-9:45am    Assessment/Plan: f/u in one week     There are no diagnoses linked to this encounter  Subjective: Therapist met w/pt for individual session  Pt stated that things have been pretty "bad" the past three weeks  Pt stated that within days of the last session they had his PTSD was triggered  Pt shared what had happened and in the moment didn't realize why he got so angry but was able to sit with his anger at home and reflect to determine what was going on for him  Pt stated that once he realized why his mood completely changed he was able to talk to his friend about the situation and how it triggered him  Pt stated the fact that he was able to talk to his friend and work through his anxiety was helpful  He shared that he is in another situation where his anxiety is heightened and he feels as if a friend of his is taking advantage of someone he cares a lot about  Pt shared that it has triggered similar feelings within him  Therapist and pt discussed healthy ways pt has been working on coping with his anxiety and PTSD symptoms  Therapist and pt discussed other grounding tools that may be helpful for pt to help stay in the moment  Patient ID: Neil Suárez is a 45 y o  male  HPI 45 minutes    Review of Systems      Objective: pt appeared to be anxious yet easily engaged  Pt appeared to have fair insight and awareness  Pt seemed determined to continue to work on managing his PTSD symptoms         Physical Exam  Pt denied any SI/HI/Ah/Vh

## 2021-03-23 ENCOUNTER — OFFICE VISIT (OUTPATIENT)
Dept: SURGERY | Facility: CLINIC | Age: 51
End: 2021-03-23

## 2021-03-23 VITALS
WEIGHT: 170.8 LBS | HEART RATE: 103 BPM | BODY MASS INDEX: 27.45 KG/M2 | HEIGHT: 66 IN | SYSTOLIC BLOOD PRESSURE: 102 MMHG | TEMPERATURE: 97.3 F | DIASTOLIC BLOOD PRESSURE: 64 MMHG

## 2021-03-23 DIAGNOSIS — Z48.89 POSTOPERATIVE VISIT: Primary | ICD-10-CM

## 2021-03-23 PROCEDURE — 3008F BODY MASS INDEX DOCD: CPT | Performed by: PHYSICIAN ASSISTANT

## 2021-03-23 PROCEDURE — 99024 POSTOP FOLLOW-UP VISIT: CPT | Performed by: SURGERY

## 2021-03-23 NOTE — PROGRESS NOTES
Assessment/Plan:    No problem-specific Assessment & Plan notes found for this encounter  Subjective: Inguinal Hernia     Patient ID: Pancho Bonilla is a 45 y o  male  Objective: There were no vitals taken for this visit           Physical Exam

## 2021-03-23 NOTE — PROGRESS NOTES
Post-Op Follow Up- General Surgery   Lety Cobos 45 y o  male MRN: [id number]  Unit/Bed#:  Encounter: [id number]    Assessment/Plan     Assessment:    Status post laparoscopic bilateral inguinal hernia repair with mesh, improved  Plan:   patient is discharged from my care, will be glad to see him if any problem arises in the future  History of Present Illness     HPI:  Lety Cobos is a 45 y o  male who presents   To my office for 1st postop follow-up after laparoscopic TEPP bilateral inguinal hernia repair with mesh from February 22nd  The patient stated doing well, tolerating diet having regular bowel movement  The patient denies having any fever, chills, nausea, vomiting, diarrhea, constipation or abdominal pain  Historical Information   Past Medical History:   Diagnosis Date    Bronchitis     Cough     Pt states he has a morning cough from some sinus drainage- pt reports having a cold week of 2/8/21    COVID-19 12/2020    Pt states he only lost taste and smell- no other symptoms reported      Disc degeneration, lumbar     Diverticulosis     Facet syndrome     Hyperlipidemia     Meningitis     as child    Other muscle spasm     Parapsoriasis     Pneumonia     Psoriasis     Sacroiliitis (HCC)     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     Past Surgical History:   Procedure Laterality Date    FACIAL/NECK BIOPSY Left 8/4/2020    Procedure: EXCISION CYST NECK;  Surgeon: Becky Corona MD;  [facility];  Service: Plastics    FLAP LOCAL HEAD / NECK Left 8/4/2020    Procedure: COMPLEX CLOSURE VS ADJACENT TISSUE REARRANGEMENT NECK;  Surgeon: Becky Corona MD;  [facility];  Service: Plastics    HERNIA REPAIR      HERNIA REPAIR Bilateral 2/22/2021    Procedure: REPAIR HERNIA INGUINAL, LAPAROSCOPIC BILATERAL WITH MESH;  Surgeon: Chen Holly MD;  [facility];  Service: Deep Samuel PYLOROMYOTOMY      ROTATOR CUFF REPAIR Left     times 2 per pt    TONSILLECTOMY AND ADENOIDECTOMY      TOOTH EXTRACTION       Social History   Social History     Substance and Sexual Activity   Alcohol Use Yes    Frequency: Monthly or less    Drinks per session: 1 or 2    Comment: BEER - DESCRIBES AS INFREQUENT     Social History     Substance and Sexual Activity   Drug Use Yes    Types: Marijuana    Comment: Medical marijuana     Social History     Tobacco Use   Smoking Status Former Smoker    Types: Cigarettes   Smokeless Tobacco Never Used   Tobacco Comment    Pt quit 2008     Family History: non-contributory    Meds/Allergies   all medications and allergies reviewed     Current Outpatient Medications:     albuterol (PROVENTIL HFA,VENTOLIN HFA) 90 mcg/act inhaler, TAKE 2 PUFFS BY MOUTH EVERY 6 HOURS AS NEEDED FOR WHEEZE, Disp: 8 5 Inhaler, Rfl: 2    Ascorbic Acid (vitamin C) 1000 MG tablet, Take 1,000 mg by mouth daily, Disp: , Rfl:     Cholecalciferol (VITAMIN D3 PO), Take by mouth, Disp: , Rfl:     Flaxseed Oil OIL, Use 1,200 mg daily , Disp: , Rfl:     multivitamin (THERAGRAN) TABS, Take 1 tablet by mouth daily, Disp: , Rfl:     Omega-3 Fatty Acids (FISH OIL PO), Take 1,000 mg by mouth daily , Disp: , Rfl:     venlafaxine (EFFEXOR-XR) 150 mg 24 hr capsule, TAKE 1 CAPSULE BY MOUTH DAILY WITH BREAKFAST, Disp: 90 capsule, Rfl: 1    simvastatin (ZOCOR) 10 mg tablet, Take 2 tablets (20 mg total) by mouth daily at bedtime (Patient not taking: Reported on 3/23/2021), Disp: 90 tablet, Rfl: 3  Allergies   Allergen Reactions    Celecoxib Anaphylaxis    Diclofenac Shortness Of Breath       Objective     Current Vitals:   Blood Pressure: 102/64 (03/23/21 0829)  Pulse: 103 (03/23/21 0829)  Temperature: (!) 97 3 °F (36 3 °C) (03/23/21 0829)  Temp Source: Temporal (03/23/21 0829)  Height: 5' 6" (167 6 cm) (03/23/21 0829)  Weight - Scale: 77 5 kg (170 lb 12 8 oz) (03/23/21 0829)      Physical Exam  Vitals signs and nursing note reviewed  Abdominal:      Comments: Abdomen is soft, nondistended and nontender  Incisions are well-healed, no evidence of recurrence of the hernias

## 2021-03-29 ENCOUNTER — SOCIAL WORK (OUTPATIENT)
Dept: BEHAVIORAL/MENTAL HEALTH CLINIC | Facility: CLINIC | Age: 51
End: 2021-03-29
Payer: COMMERCIAL

## 2021-03-29 DIAGNOSIS — F41.9 ANXIETY: Primary | ICD-10-CM

## 2021-03-29 DIAGNOSIS — F33.1 MODERATE EPISODE OF RECURRENT MAJOR DEPRESSIVE DISORDER (HCC): ICD-10-CM

## 2021-03-29 DIAGNOSIS — F43.10 PTSD (POST-TRAUMATIC STRESS DISORDER): ICD-10-CM

## 2021-03-29 PROCEDURE — 90834 PSYTX W PT 45 MINUTES: CPT | Performed by: SOCIAL WORKER

## 2021-03-29 NOTE — PSYCH
8: 00am-8:45am    Assessment/Plan: F/u in two weeks     There are no diagnoses linked to this encounter  Subjective: Therapist met w/pt for individual session  Pt shared that last week he felt as if he had lingering anxiety  He shared a big part of his anxiety last week was financial   Therapist and pt discussed coping skills pt attempted to utilize to help manage his anxiety and then discussed other coping skills that pt didn't try that may help in the future  He shared that on Saturday he felt as if his anxiety was lifted and that he was able to enjoy his time with his friends and family  Therapist and pt discussed the importance of pt maintaining healthy boundaries w/his loved ones due to sometimes overextending himself in an attempt to help others  Patient ID: Ryan Cox is a 45 y o  male  HPI 45 minutes    Review of Systems      Objective:   Pt appeared to be in a good mood  He appeared to have good insight and judgment       Physical Exam  Pt denied any Si/HI/Ah/Vh

## 2021-03-31 DIAGNOSIS — E78.2 MIXED HYPERLIPIDEMIA: ICD-10-CM

## 2021-03-31 RX ORDER — SIMVASTATIN 10 MG
20 TABLET ORAL
Qty: 90 TABLET | Refills: 3
Start: 2021-03-31 | End: 2021-04-06 | Stop reason: SDUPTHER

## 2021-03-31 NOTE — TELEPHONE ENCOUNTER
----- Message from Shin Wiley sent at 3/31/2021  4:24 PM EDT -----  Regarding: Prescription Question  Contact: [phone]  Charlotte Westfall     From my last visit, you prescribed a pill  My [facility]'t have [facility] simvastatin  Can you please resend?   Thank you :)

## 2021-04-06 DIAGNOSIS — E78.2 MIXED HYPERLIPIDEMIA: ICD-10-CM

## 2021-04-06 RX ORDER — SIMVASTATIN 10 MG
20 TABLET ORAL
Qty: 90 TABLET | Refills: 3 | Status: SHIPPED | OUTPATIENT
Start: 2021-04-06 | End: 2021-09-14 | Stop reason: ALTCHOICE

## 2021-04-12 ENCOUNTER — SOCIAL WORK (OUTPATIENT)
Dept: BEHAVIORAL/MENTAL HEALTH CLINIC | Facility: CLINIC | Age: 51
End: 2021-04-12
Payer: COMMERCIAL

## 2021-04-12 DIAGNOSIS — F43.10 PTSD (POST-TRAUMATIC STRESS DISORDER): Primary | ICD-10-CM

## 2021-04-12 DIAGNOSIS — F33.1 MODERATE EPISODE OF RECURRENT MAJOR DEPRESSIVE DISORDER (HCC): ICD-10-CM

## 2021-04-12 PROCEDURE — 90832 PSYTX W PT 30 MINUTES: CPT | Performed by: SOCIAL WORKER

## 2021-04-12 NOTE — PSYCH
8: 30am-9:00am    Assessment/Plan:  F/u in two weeks     There are no diagnoses linked to this encounter  Subjective: Therapist met w/pt for individual session  Pt apologized for being late and stated that he had thought the appointment was at 8:30am   Pt shared that overall he feels that his anxiety has been low  He shared that the biggest stressor recently was dental work that he underwent  He shared that the pain from that has been the main focus  He stated that he continues to work on implementing positive coping skills including: reaching out to others, playing video games, exercising, and different mindfulness strategies  Patient ID: Zachariah Ann is a 45 y o  male  HPI 30 minutes    Review of Systems      Objective: Pt appeared to be in a good mood         Physical Exam  Pt denied any SI/HI/AH/Vh

## 2021-04-23 DIAGNOSIS — F32.A DEPRESSION, UNSPECIFIED DEPRESSION TYPE: ICD-10-CM

## 2021-04-23 RX ORDER — VENLAFAXINE HYDROCHLORIDE 75 MG/1
75 CAPSULE, EXTENDED RELEASE ORAL DAILY
Qty: 30 CAPSULE | Refills: 1 | Status: SHIPPED | OUTPATIENT
Start: 2021-04-23 | End: 2021-06-23 | Stop reason: SDUPTHER

## 2021-04-23 RX ORDER — VENLAFAXINE HYDROCHLORIDE 150 MG/1
CAPSULE, EXTENDED RELEASE ORAL
Qty: 90 CAPSULE | Refills: 1 | Status: SHIPPED | OUTPATIENT
Start: 2021-04-23 | End: 2021-04-23 | Stop reason: SDUPTHER

## 2021-04-28 ENCOUNTER — SOCIAL WORK (OUTPATIENT)
Dept: BEHAVIORAL/MENTAL HEALTH CLINIC | Facility: CLINIC | Age: 51
End: 2021-04-28
Payer: COMMERCIAL

## 2021-04-28 DIAGNOSIS — F32.A DEPRESSION, UNSPECIFIED DEPRESSION TYPE: ICD-10-CM

## 2021-04-28 DIAGNOSIS — F43.10 PTSD (POST-TRAUMATIC STRESS DISORDER): Primary | ICD-10-CM

## 2021-04-28 PROCEDURE — 90834 PSYTX W PT 45 MINUTES: CPT | Performed by: SOCIAL WORKER

## 2021-04-28 NOTE — PSYCH
8: 00am-8:45am    Assessment/Plan: f/u in one month     There are no diagnoses linked to this encounter  Subjective: Therapist met w/pt for individual session  Pt shared that overall he feels that he has been stable emotionally  He was able to identify a few things that have increased his anxiety but pt shared that he feels he was able to cope well and utilize his supports  He shared that he has been struggling with getting back to exercise which he knows also helps his mental health so that is something he plans on getting back into today  Pt shared that overall he feels less irritable and less "in his head "  Therapist and pt continued to discuss ways to help him manage his symptoms effectively as they arise  Patient ID: Rosa Mckeon is a 45 y o  male  HPI 45minutes    Review of Systems      Objective: Pt appeared to be asa good mood  He was easily engaged       Physical Exam  Pt denied any SI/HI/Ah/Vh

## 2021-05-24 ENCOUNTER — SOCIAL WORK (OUTPATIENT)
Dept: BEHAVIORAL/MENTAL HEALTH CLINIC | Facility: CLINIC | Age: 51
End: 2021-05-24
Payer: COMMERCIAL

## 2021-05-24 DIAGNOSIS — F33.1 MODERATE EPISODE OF RECURRENT MAJOR DEPRESSIVE DISORDER (HCC): ICD-10-CM

## 2021-05-24 DIAGNOSIS — F43.10 PTSD (POST-TRAUMATIC STRESS DISORDER): Primary | ICD-10-CM

## 2021-05-24 PROCEDURE — 90834 PSYTX W PT 45 MINUTES: CPT | Performed by: SOCIAL WORKER

## 2021-05-25 NOTE — PSYCH
9: 00am-9:45am    Assessment/Plan: f/u next month     There are no diagnoses linked to this encounter  Subjective: Therapist met w/pt for individual session  He shared that he feels his anxiety has been manageable and nothing over the past month have triggered it to get out of control  He shared that he is struggling with allergies which has prevented him from working out which he is frustrated about  He stated that the only thing that he has been thinking about is the year anniversary of the incident that made him come into therapy is coming up  He shared it isn't constantly on his mind but he is mindful of it  Therapist encouraged pt to continue to accept the feelings that arise rather than  them  Pt shared overall he is feeling more hopeful and less anxious  Patient ID: Michael Sweet is a 45 y o  male  HPI 45 minutes    Review of Systems      Objective: pt appeared to be calm and easily engaged         Physical Exam  Pt denied any Si/HI/Ah/Vh

## 2021-06-21 ENCOUNTER — SOCIAL WORK (OUTPATIENT)
Dept: BEHAVIORAL/MENTAL HEALTH CLINIC | Facility: CLINIC | Age: 51
End: 2021-06-21
Payer: COMMERCIAL

## 2021-06-21 DIAGNOSIS — F33.1 MODERATE EPISODE OF RECURRENT MAJOR DEPRESSIVE DISORDER (HCC): ICD-10-CM

## 2021-06-21 DIAGNOSIS — F43.10 PTSD (POST-TRAUMATIC STRESS DISORDER): Primary | ICD-10-CM

## 2021-06-21 PROCEDURE — 90834 PSYTX W PT 45 MINUTES: CPT | Performed by: SOCIAL WORKER

## 2021-06-21 NOTE — PSYCH
900am-9:45am    Assessment/Plan: f/u next month     There are no diagnoses linked to this encounter  Subjective: Therapist met w/pt for individual session  Pt stated that overall he feels that he has been more aware of when his moods change  He stated that he knows that he hasnt been able to exercise which is something that adds to him feeling more irritable  He stated that something that he knows helps him with his anxiety is being busy  He stated that he has been camping the past two weekends  He shared that it has been nice to spend time with his family and friends  He stated that in some ways he feels as if things are back to [employer]  Therapist and pt discussed how the one year anniversary of what set him off is coming up and rather than reliving it he is being mindful of his feelings and trying to implement positive coping skills  Patient ID: Sidra Lopez is a 45 y o  male  HPI 45 minutes    Review of Systems      Objective: pt appeared to be calm and easily engaged         Physical Exam  Pt denied any Si/HI/AH/VH

## 2021-06-23 ENCOUNTER — OFFICE VISIT (OUTPATIENT)
Dept: INTERNAL MEDICINE CLINIC | Facility: CLINIC | Age: 51
End: 2021-06-23
Payer: COMMERCIAL

## 2021-06-23 VITALS
SYSTOLIC BLOOD PRESSURE: 120 MMHG | OXYGEN SATURATION: 98 % | WEIGHT: 180.6 LBS | DIASTOLIC BLOOD PRESSURE: 80 MMHG | HEART RATE: 78 BPM | TEMPERATURE: 98 F | BODY MASS INDEX: 29.02 KG/M2 | HEIGHT: 66 IN

## 2021-06-23 DIAGNOSIS — Z00.00 ANNUAL PHYSICAL EXAM: Primary | ICD-10-CM

## 2021-06-23 DIAGNOSIS — R20.2 PARESTHESIA OF BOTH HANDS: ICD-10-CM

## 2021-06-23 DIAGNOSIS — R20.2 NUMBNESS AND TINGLING IN LEFT ARM: ICD-10-CM

## 2021-06-23 DIAGNOSIS — Z12.5 SCREENING FOR PROSTATE CANCER: ICD-10-CM

## 2021-06-23 DIAGNOSIS — F32.A DEPRESSION, UNSPECIFIED DEPRESSION TYPE: ICD-10-CM

## 2021-06-23 DIAGNOSIS — R07.9 CHEST PAIN, UNSPECIFIED TYPE: ICD-10-CM

## 2021-06-23 DIAGNOSIS — R20.0 NUMBNESS AND TINGLING IN LEFT ARM: ICD-10-CM

## 2021-06-23 PROCEDURE — 1036F TOBACCO NON-USER: CPT | Performed by: PHYSICIAN ASSISTANT

## 2021-06-23 PROCEDURE — 93000 ELECTROCARDIOGRAM COMPLETE: CPT | Performed by: PHYSICIAN ASSISTANT

## 2021-06-23 PROCEDURE — 3008F BODY MASS INDEX DOCD: CPT | Performed by: PHYSICIAN ASSISTANT

## 2021-06-23 PROCEDURE — 3725F SCREEN DEPRESSION PERFORMED: CPT | Performed by: PHYSICIAN ASSISTANT

## 2021-06-23 PROCEDURE — 99396 PREV VISIT EST AGE 40-64: CPT | Performed by: PHYSICIAN ASSISTANT

## 2021-06-23 RX ORDER — VENLAFAXINE HYDROCHLORIDE 75 MG/1
75 CAPSULE, EXTENDED RELEASE ORAL DAILY
Qty: 90 CAPSULE | Refills: 1 | Status: SHIPPED | OUTPATIENT
Start: 2021-06-23 | End: 2021-12-27

## 2021-06-23 NOTE — PATIENT INSTRUCTIONS
General medical exam is good  Will add PSA to do with next blood draw for September  Will also send patient for nerve conduction study of his upper extremities as his symptoms are suggestive of bilateral carpal tunnel syndrome  Will discuss results with patient when available

## 2021-06-23 NOTE — PROGRESS NOTES
Assessment/Plan:  Patient Instructions     General medical exam is good  Will add PSA to do with next blood draw for September  Will also send patient for nerve conduction study of his upper extremities as his symptoms are suggestive of bilateral carpal tunnel syndrome  Will discuss results with patient when available  Quality Measures:       No follow-ups on file  Diagnoses and all orders for this visit:    Annual physical exam    Paresthesia of both hands  -     Nerve conduction test; Future    Numbness and tingling in left arm  -     Nerve conduction test; Future    Chest pain, unspecified type  -     POCT ECG    Screening for prostate cancer  -     PSA, Total Screen; Future          Subjective:      Patient ID: Sandy Sotomayor is a 45 y o  male  48-year-old male presents for his annual physical   Generally states he feels well with exception of experiencing intermittent left and right arm numbness and tingling  More in his left arm than the right  He does notice it when he awakens in the morning  Often has to shake his hands to get the feeling to come back to normal   Also notes it particularly in his left arm when driving and holding the steering wheel  He at times has had an uncomfortable feeling in his upper chest and at 1 time had a shortness of breath episode  Blood pressure is well controlled  No history of diabetes  Has history of hyperlipidemia        ALLERGIES:  Allergies   Allergen Reactions    Celecoxib Anaphylaxis    Diclofenac Shortness Of Breath       CURRENT MEDICATIONS:    Current Outpatient Medications:     albuterol (PROVENTIL HFA,VENTOLIN HFA) 90 mcg/act inhaler, TAKE 2 PUFFS BY MOUTH EVERY 6 HOURS AS NEEDED FOR WHEEZE, Disp: 8 5 Inhaler, Rfl: 2    Ascorbic Acid (vitamin C) 1000 MG tablet, Take 1,000 mg by mouth daily, Disp: , Rfl:     Cholecalciferol (VITAMIN D3 PO), Take by mouth, Disp: , Rfl:     Flaxseed Oil OIL, Use 1,200 mg daily , Disp: , Rfl:    multivitamin (THERAGRAN) TABS, Take 1 tablet by mouth daily, Disp: , Rfl:     Omega-3 Fatty Acids (FISH OIL PO), Take 1,000 mg by mouth daily , Disp: , Rfl:     venlafaxine (EFFEXOR-XR) 75 mg 24 hr capsule, Take 1 capsule (75 mg total) by mouth daily, Disp: 30 capsule, Rfl: 1    simvastatin (ZOCOR) 10 mg tablet, Take 2 tablets (20 mg total) by mouth daily at bedtime (Patient not taking: Reported on 6/23/2021), Disp: 90 tablet, Rfl: 3    ACTIVE PROBLEM LIST:  Patient Active Problem List   Diagnosis    Cervical somatic dysfunction    Depression    Disc degeneration, lumbar    Hyperlipidemia    Pain of finger of left hand    Skin cyst    PTSD (post-traumatic stress disorder)    Moderate episode of recurrent major depressive disorder ([facility] [address] )    COVID-19 virus infection    Bilateral inguinal hernia       PAST MEDICAL HISTORY:  Past Medical History:   Diagnosis Date    Bronchitis     Cough     Pt states he has a morning cough from some sinus drainage- pt reports having a cold week of 2/8/21    COVID-19 12/2020    Pt states he only lost taste and smell- no other symptoms reported      Disc degeneration, lumbar     Diverticulosis     Facet syndrome     Hyperlipidemia     Meningitis     as child    Other muscle spasm     Parapsoriasis     Pneumonia     Psoriasis     Sacroiliitis (HCC)     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       PAST SURGICAL HISTORY:  Past Surgical History:   Procedure Laterality Date    FACIAL/NECK BIOPSY Left 8/4/2020    Procedure: EXCISION CYST NECK;  Surgeon: Loren Michel MD;  [facility];  Service: Plastics    FLAP LOCAL HEAD / NECK Left 8/4/2020    Procedure: COMPLEX CLOSURE VS ADJACENT TISSUE REARRANGEMENT NECK;  Surgeon: Loren Michel MD;  [facility];  Service: Plastics    HERNIA REPAIR      HERNIA REPAIR Bilateral 2/22/2021    Procedure: REPAIR HERNIA INGUINAL, LAPAROSCOPIC BILATERAL WITH MESH;  Surgeon: Iris Xie MD;  Location: [facility];  Service: General    [id number] [facility] Left     times 2 per pt    TONSILLECTOMY AND ADENOIDECTOMY      TOOTH EXTRACTION         FAMILY HISTORY:  Family History   Problem Relation Age of Onset    Cancer Mother     Lung cancer Mother         CARCINOID TUMOR    Hypertension Mother     Hypothyroidism Mother     Kidney cancer Mother     Thyroid cancer Mother     Diabetes Maternal Grandmother     Alzheimer's disease Maternal Grandfather     Coronary artery disease Maternal Grandfather     Other Maternal Grandfather         PACEMAKER       SOCIAL HISTORY:  Social History     Socioeconomic History    Marital status: /Civil Union     Spouse name: Not on file    Number of children: 1    Years of education: Not on file    Highest education level: Not on file   Occupational History    Occupation: COMPUTER CONSULTING     Comment: FULL-TIME EMPLOYMENT   Tobacco Use    Smoking status: Former Smoker     Types: Cigarettes    Smokeless tobacco: Never Used    Tobacco comment: Pt quit 2008   Vaping Use    Vaping Use: Never used   Substance and Sexual Activity    Alcohol use: Yes     Comment: BEER - DESCRIBES AS INFREQUENT    Drug use: Yes     Types: Marijuana     Comment: Medical marijuana    Sexual activity: Yes     Partners: Female     Comment: did not ask   Other Topics Concern    Not on file   Social History Narrative    LIVING INDEPENDENTLY WITH SPOUSE    ONE SON    Unemployed    Hobbies-exercising, yd work, video games, camping     Social Determinants of Health     Financial Resource Strain:     Difficulty of Paying Living Expenses:    Food Insecurity:     Worried About [address] in the Last Year:     [address] in the Last Year:    Transportation Needs:     Lack of Transportation (Medical):      Lack of Transportation (Non-Medical):    Physical Activity:     Days of Exercise per Week:     Minutes of Exercise per Session:    Stress:     Feeling of Stress :    Social Connections:     Frequency of Communication with Friends and Family:     Frequency of Social Gatherings with Friends and Family:     Attends [employer] Services:     Active Member of Clubs or Organizations:     Attends Club or Organization Meetings:     Marital Status:    Intimate Partner Violence:     Fear of Current or Ex-Partner:     Emotionally Abused:     Physically Abused:     Sexually Abused:        Review of Systems   Constitutional: Negative for activity change, chills, fatigue and fever  HENT: Negative for congestion  Eyes: Negative for discharge  Respiratory: Negative for cough, chest tightness and shortness of breath  Cardiovascular: Negative for chest pain, palpitations and leg swelling  Gastrointestinal: Negative for abdominal pain, blood in stool, constipation, diarrhea, nausea and vomiting  Endocrine: Negative for polydipsia, polyphagia and polyuria  Genitourinary: Negative for difficulty urinating  Musculoskeletal: Negative for arthralgias and myalgias  Skin: Negative for rash  Allergic/Immunologic: Negative for immunocompromised state  Neurological: Positive for numbness  Negative for dizziness, syncope, weakness, light-headedness and headaches  Hematological: Negative for adenopathy  Does not bruise/bleed easily  Psychiatric/Behavioral: Negative for dysphoric mood, sleep disturbance and suicidal ideas  The patient is not nervous/anxious  Objective:  Vitals:    06/23/21 1102   BP: 120/80   BP Location: Left arm   Patient Position: Sitting   Cuff Size: Adult   Pulse: 78   Temp: 98 °F (36 7 °C)   TempSrc: Temporal   SpO2: 98%   Weight: 81 9 kg (180 lb 9 6 oz)   Height: 5' 6" (1 676 m)     Body mass index is 29 15 kg/m²  Physical Exam  Vitals and nursing note reviewed  Constitutional:       General: He is not in acute distress  Appearance: Normal appearance  He is well-developed  He is not ill-appearing  HENT:      Head: Normocephalic  Right Ear: Tympanic membrane, ear canal and external ear normal       Left Ear: Tympanic membrane, ear canal and external ear normal       Nose: Nose normal       Mouth/Throat:      Mouth: Mucous membranes are moist       Pharynx: Oropharynx is clear  No oropharyngeal exudate  Eyes:      General: No scleral icterus  Right eye: No discharge  Left eye: No discharge  Extraocular Movements: Extraocular movements intact  Conjunctiva/sclera: Conjunctivae normal       Pupils: Pupils are equal, round, and reactive to light  Neck:      Thyroid: No thyromegaly  Vascular: No carotid bruit or JVD  Trachea: No tracheal deviation  Cardiovascular:      Rate and Rhythm: Normal rate and regular rhythm  Pulses: Normal pulses  Heart sounds: Normal heart sounds  No murmur heard  No friction rub  No gallop  Pulmonary:      Effort: Pulmonary effort is normal  No respiratory distress  Breath sounds: Normal breath sounds  No wheezing or rales  Chest:      Chest wall: No tenderness (  No palpable chest wall tenderness)  Abdominal:      General: Bowel sounds are normal  There is no distension  Palpations: Abdomen is soft  There is no hepatomegaly, splenomegaly or mass  Tenderness: There is no abdominal tenderness  There is no right CVA tenderness, left CVA tenderness, guarding or rebound  Genitourinary:     Comments:   Circumcised adult male  No lesions of the phallus, scrotum, or testes  No inguinal hernias  Rectal exam:  Normal rectal tone  Musculoskeletal:         General: No tenderness or deformity  Normal range of motion  Cervical back: Normal range of motion and neck supple  Right lower leg: No edema  Left lower leg: No edema  Comments:   Positive Tinel's and Phalen's on the left, negative on the right  No percussive tenderness of the spine  Lymphadenopathy:      Cervical: No cervical adenopathy  Skin:     General: Skin is warm and dry  Findings: No rash  Neurological:      General: No focal deficit present  Mental Status: He is alert and oriented to person, place, and time  Cranial Nerves: No cranial nerve deficit  Sensory: No sensory deficit  Motor: No weakness or abnormal muscle tone  Coordination: Coordination normal       Gait: Gait normal       Deep Tendon Reflexes: Reflexes are normal and symmetric  Reflexes normal    Psychiatric:         Mood and Affect: Mood normal          Behavior: Behavior normal          Thought Content: Thought content normal          Judgment: Judgment normal            RESULTS:    No results found for this or any previous visit (from the past 1008 hour(s))  This note was created with voice recognition software  Phonic, grammatical and spelling errors may be present within the note as a result

## 2021-06-25 ENCOUNTER — IMMUNIZATIONS (OUTPATIENT)
Dept: FAMILY MEDICINE CLINIC | Facility: HOSPITAL | Age: 51
End: 2021-06-25

## 2021-06-25 DIAGNOSIS — Z23 ENCOUNTER FOR IMMUNIZATION: Primary | ICD-10-CM

## 2021-06-25 PROCEDURE — 0001A SARS-COV-2 / COVID-19 MRNA VACCINE (PFIZER-BIONTECH) 30 MCG: CPT

## 2021-06-25 PROCEDURE — 91300 SARS-COV-2 / COVID-19 MRNA VACCINE (PFIZER-BIONTECH) 30 MCG: CPT

## 2021-06-28 ENCOUNTER — TELEPHONE (OUTPATIENT)
Dept: INTERNAL MEDICINE CLINIC | Facility: CLINIC | Age: 51
End: 2021-06-28

## 2021-06-28 NOTE — TELEPHONE ENCOUNTER
Pt had problems getting his nerve conduction study scheduled      Can we try reordering this for pt? ,  Central scheduling is having an issue seeing it as an active request      I called and verified with central scheduling/ flakito

## 2021-07-23 ENCOUNTER — IMMUNIZATIONS (OUTPATIENT)
Dept: FAMILY MEDICINE CLINIC | Facility: HOSPITAL | Age: 51
End: 2021-07-23

## 2021-07-23 DIAGNOSIS — Z23 ENCOUNTER FOR IMMUNIZATION: Primary | ICD-10-CM

## 2021-07-23 PROCEDURE — 91300 SARS-COV-2 / COVID-19 MRNA VACCINE (PFIZER-BIONTECH) 30 MCG: CPT

## 2021-07-23 PROCEDURE — 0002A SARS-COV-2 / COVID-19 MRNA VACCINE (PFIZER-BIONTECH) 30 MCG: CPT

## 2021-07-28 ENCOUNTER — SOCIAL WORK (OUTPATIENT)
Dept: BEHAVIORAL/MENTAL HEALTH CLINIC | Facility: CLINIC | Age: 51
End: 2021-07-28
Payer: COMMERCIAL

## 2021-07-28 DIAGNOSIS — F33.1 MODERATE EPISODE OF RECURRENT MAJOR DEPRESSIVE DISORDER (HCC): ICD-10-CM

## 2021-07-28 DIAGNOSIS — F43.10 PTSD (POST-TRAUMATIC STRESS DISORDER): Primary | ICD-10-CM

## 2021-07-28 PROCEDURE — 90834 PSYTX W PT 45 MINUTES: CPT | Performed by: SOCIAL WORKER

## 2021-07-28 NOTE — PSYCH
9: 00am-9:45am    Assessment/Plan: f/u in a month     There are no diagnoses linked to this encounter  Subjective: Therapist met w/pt for individual session  Pt stated that his anxiety was heightened a few times in between sessions  Therapist and pt discussed the biggest anxiety trigger for him and how he is proud of himself for working through it  Pt stated that his initial thought was to avoid it but knew that it was something important that he should attend so was able to implement calming techniques  Therapist and pt discussed progress pt continues to make and the importance of continuing to reflect ton how he feels and implementing these calming strategies rather than allowing his feelings to build up  Patient ID: Tisha Junior is a 45 y o  male  HPI 45 minutes    Review of Systems      Objective: Pt appeared to be calm and easily engaged         Physical Exam  pt denied any SI/HI/Ah/Vh

## 2021-08-26 ENCOUNTER — SOCIAL WORK (OUTPATIENT)
Dept: BEHAVIORAL/MENTAL HEALTH CLINIC | Facility: CLINIC | Age: 51
End: 2021-08-26
Payer: COMMERCIAL

## 2021-08-26 DIAGNOSIS — F33.1 MODERATE EPISODE OF RECURRENT MAJOR DEPRESSIVE DISORDER (HCC): ICD-10-CM

## 2021-08-26 DIAGNOSIS — F43.10 PTSD (POST-TRAUMATIC STRESS DISORDER): Primary | ICD-10-CM

## 2021-08-26 PROCEDURE — 90834 PSYTX W PT 45 MINUTES: CPT | Performed by: SOCIAL WORKER

## 2021-08-26 NOTE — PSYCH
11:00am-11:45am    Assessment/Plan: f/u in a month     There are no diagnoses linked to this encounter  Subjective: Therapist met w/pt for individual session  Pt stated that his anxiety is heightened due to going away tomorrow and seeing a good friend that he hasn't seen since last year after the incident  Pt stated that he isn't sure what to expect and is struggling with racing thoughts, irritability and being on edge  Therapist and pt discussed different techniques and strategies that may help pt with these symptoms  Patient ID: Jina Colbert is a 45 y o  male  HPI 45 minutes    Review of Systems      Objective: Pt appeared to be anxious yet easily engaged         Physical Exam  Pt denied any Si/Hi/Ah/VH

## 2021-09-07 LAB
ALBUMIN SERPL-MCNC: 4.4 G/DL (ref 3.6–5.1)
ALBUMIN/GLOB SERPL: 2.2 (CALC) (ref 1–2.5)
ALP SERPL-CCNC: 72 U/L (ref 35–144)
ALT SERPL-CCNC: 22 U/L (ref 9–46)
AST SERPL-CCNC: 21 U/L (ref 10–35)
BILIRUB SERPL-MCNC: 0.2 MG/DL (ref 0.2–1.2)
BUN SERPL-MCNC: 19 MG/DL (ref 7–25)
BUN/CREAT SERPL: NORMAL (CALC) (ref 6–22)
CALCIUM SERPL-MCNC: 9.3 MG/DL (ref 8.6–10.3)
CHLORIDE SERPL-SCNC: 104 MMOL/L (ref 98–110)
CHOLEST SERPL-MCNC: 243 MG/DL
CHOLEST/HDLC SERPL: 4.7 (CALC)
CO2 SERPL-SCNC: 28 MMOL/L (ref 20–32)
CREAT SERPL-MCNC: 1.01 MG/DL (ref 0.7–1.33)
GLOBULIN SER CALC-MCNC: 2 G/DL (CALC) (ref 1.9–3.7)
GLUCOSE SERPL-MCNC: 96 MG/DL (ref 65–99)
HDLC SERPL-MCNC: 52 MG/DL
LDLC SERPL CALC-MCNC: 159 MG/DL (CALC)
NONHDLC SERPL-MCNC: 191 MG/DL (CALC)
POTASSIUM SERPL-SCNC: 4.3 MMOL/L (ref 3.5–5.3)
PROT SERPL-MCNC: 6.4 G/DL (ref 6.1–8.1)
PSA SERPL-MCNC: 0.3 NG/ML
SL AMB EGFR AFRICAN AMERICAN: 99 ML/MIN/1.73M2
SL AMB EGFR NON AFRICAN AMERICAN: 86 ML/MIN/1.73M2
SODIUM SERPL-SCNC: 139 MMOL/L (ref 135–146)
TRIGL SERPL-MCNC: 169 MG/DL

## 2021-09-08 ENCOUNTER — PROCEDURE VISIT (OUTPATIENT)
Dept: NEUROLOGY | Facility: CLINIC | Age: 51
End: 2021-09-08
Payer: COMMERCIAL

## 2021-09-08 DIAGNOSIS — R20.2 NUMBNESS AND TINGLING: ICD-10-CM

## 2021-09-08 DIAGNOSIS — R20.0 NUMBNESS AND TINGLING: ICD-10-CM

## 2021-09-08 PROCEDURE — 95911 NRV CNDJ TEST 9-10 STUDIES: CPT | Performed by: PHYSICAL MEDICINE & REHABILITATION

## 2021-09-08 PROCEDURE — 95886 MUSC TEST DONE W/N TEST COMP: CPT | Performed by: PHYSICAL MEDICINE & REHABILITATION

## 2021-09-08 NOTE — PROGRESS NOTES
EMG 2 Limb Upper Extremity     Date/Time 9/8/2021 10:17 AM     Performed by  Ruthy Paul MD     Authorized by Nolberto Wrigth PA-C      Universal Protocol   Consent: Verbal consent obtained  Risks and benefits: risks, benefits and alternatives were discussed  Consent given by: patient  Patient understanding: patient states understanding of the procedure being performed  Patient consent: the patient's understanding of the procedure matches consent given               EMG REPORT  56-year-old right-handed male presents with complaints of intermittent left and right arm numbness and tingling, left more than the right  He does note that it awakens him in the morning  Denies any radicular symptoms  Patient is being evaluated for a focal neuropathy  On physical examination, motor strength is 5/5 throughout  Sensations are intact to light touch and pinprick in the bilateral median, ulnar and radial nerve distribution  The left and right median and ulnar motor conduction velocities and compound muscle action potentials were normal with normal distal latencies across the wrists  The right median , bilateral radial and ulnar sensory conduction velocity and sensory action potentials were also normal with normal distal latencies across the wrists  The left median sensory peak latency was prolonged at 3 6 milliseconds with a normal sensory action potential amplitude  The left and right median and ulnar F wave latencies were within normal limits  Concentric needle EMG was performed  on various proximal and distal muscles of the bilateral upper extremities including deltoid, biceps, triceps, pronator teres, abductor pollicis brevis, FDI and low cervical paraspinals  There was no evidence of active denervation in any of the muscles tested  Early recruited motor units appear normal   Recruitment patterns were full or full for effort       INTERPRETATION: There is electrophysiologic evidence of a:    1  Mild median nerve compression neuropathy at the wrist on the left with demyelinative changes, consistent with a diagnosis of carpal tunnel syndrome    2  There is no evidence of a cervical radiculopathy or ulnar neuropathy bilaterally  Clinical correlation is recommended       BUD Cade

## 2021-09-14 ENCOUNTER — OFFICE VISIT (OUTPATIENT)
Dept: INTERNAL MEDICINE CLINIC | Facility: CLINIC | Age: 51
End: 2021-09-14
Payer: COMMERCIAL

## 2021-09-14 VITALS
BODY MASS INDEX: 29.57 KG/M2 | DIASTOLIC BLOOD PRESSURE: 80 MMHG | HEART RATE: 70 BPM | OXYGEN SATURATION: 96 % | TEMPERATURE: 98.2 F | WEIGHT: 184 LBS | HEIGHT: 66 IN | SYSTOLIC BLOOD PRESSURE: 114 MMHG

## 2021-09-14 DIAGNOSIS — E78.2 MIXED HYPERLIPIDEMIA: Primary | ICD-10-CM

## 2021-09-14 DIAGNOSIS — G56.02 CARPAL TUNNEL SYNDROME ON LEFT: ICD-10-CM

## 2021-09-14 DIAGNOSIS — K21.9 GASTROESOPHAGEAL REFLUX DISEASE, UNSPECIFIED WHETHER ESOPHAGITIS PRESENT: ICD-10-CM

## 2021-09-14 DIAGNOSIS — Z11.59 NEED FOR HEPATITIS C SCREENING TEST: ICD-10-CM

## 2021-09-14 DIAGNOSIS — R53.83 FATIGUE, UNSPECIFIED TYPE: ICD-10-CM

## 2021-09-14 DIAGNOSIS — F32.A DEPRESSION, UNSPECIFIED DEPRESSION TYPE: ICD-10-CM

## 2021-09-14 PROCEDURE — 99214 OFFICE O/P EST MOD 30 MIN: CPT | Performed by: PHYSICIAN ASSISTANT

## 2021-09-14 PROCEDURE — 1036F TOBACCO NON-USER: CPT | Performed by: PHYSICIAN ASSISTANT

## 2021-09-14 PROCEDURE — 3008F BODY MASS INDEX DOCD: CPT | Performed by: PHYSICIAN ASSISTANT

## 2021-09-14 RX ORDER — ATORVASTATIN CALCIUM 20 MG/1
20 TABLET, FILM COATED ORAL DAILY
Qty: 90 TABLET | Refills: 3 | Status: SHIPPED | OUTPATIENT
Start: 2021-09-14

## 2021-09-14 NOTE — PATIENT INSTRUCTIONS
Would recommend you keep famotidine ( Pepcid) 20 mg at home to use as needed for heartburn  Try to avoid heartburn triggers  Will switch from simvastatin to atorvastatin, again 1 at bedtime  Schedule follow-up in 6 months with repeat labs including testosterone level for that visit  Follow-up sooner as needed

## 2021-09-14 NOTE — PROGRESS NOTES
Assessment/Plan:   Patient Instructions   Would recommend you keep famotidine ( Pepcid) 20 mg at home to use as needed for heartburn  Try to avoid heartburn triggers  Will switch from simvastatin to atorvastatin, again 1 at bedtime  Schedule follow-up in 6 months with repeat labs including testosterone level for that visit  Follow-up sooner as needed  Quality Measures:       Return in about 6 months (around 3/14/2022) for Next scheduled follow up  Diagnoses and all orders for this visit:    Mixed hyperlipidemia  -     atorvastatin (LIPITOR) 20 mg tablet; Take 1 tablet (20 mg total) by mouth daily  -     Comprehensive metabolic panel; Future  -     Lipid panel; Future    Depression, unspecified depression type    Gastroesophageal reflux disease, unspecified whether esophagitis present  -     CBC and differential; Future    Carpal tunnel syndrome on left    Fatigue, unspecified type  -     Testosterone, free, total; Future    Need for hepatitis C screening test  -     Hepatitis C antibody; Future          Subjective:      Patient ID: Deandre Brown is a 45 y o  male  Follow-up, labs reviewed with patient    Patient presents today with a bottle of Nugenix  He is stating that it really did seem to help for energy, however he was taking 1/3 of the recommended dose and noted it was causing him to become short tempered and angry  He therefore stop the medication  He however continues to complain of fatigue  No difficulty with his libido, getting an erection or maintaining erection  He is however concerned that his testosterone level is low  I tried to explain to him that the supplement he was taking may have increased his testosterone level causing his anger  GERD:  Patient states he recently went away for the weekend  Eight out and consumes spicy food along with alcohol, and did admit he probably late supine within 2 hours of eating    The next day he had reflux symptoms that had occurred for 2 days  He however at the same time restarted his simvastatin as he did review his recent cholesterol level  He was concerned that maybe the simvastatin was contributing to his GERD  GERD triggers were reviewed with patient he was advised to cut them down  Hyperlipidemia:  Since being off the cholesterol-lowering medication his lipids have risen  He is stating he has tried watching his diet and does exercise regularly  He understands that his family history has a big part in his cholesterol  He is willing to restart medication  I asked him to switch to atorvastatin which she is in agreement to do  Nerve conduction study testing did prove carpal tunnel syndrome on the left  At this time it is not to the point where it is bothering him that he would like hand specialty referral   He will keep me informed  Hepatitis C screening discussed  Patient in agreement  Depression:  He feels it is much better on the venlafaxine  He however still describes lack of motivation        ALLERGIES:  Allergies   Allergen Reactions    Celecoxib Anaphylaxis    Diclofenac Shortness Of Breath       CURRENT MEDICATIONS:    Current Outpatient Medications:     albuterol (PROVENTIL HFA,VENTOLIN HFA) 90 mcg/act inhaler, TAKE 2 PUFFS BY MOUTH EVERY 6 HOURS AS NEEDED FOR WHEEZE, Disp: 8 5 Inhaler, Rfl: 2    Ascorbic Acid (vitamin C) 1000 MG tablet, Take 1,000 mg by mouth daily, Disp: , Rfl:     Cholecalciferol (VITAMIN D3 PO), Take by mouth, Disp: , Rfl:     Flaxseed Oil OIL, Use 1,200 mg daily , Disp: , Rfl:     multivitamin (THERAGRAN) TABS, Take 1 tablet by mouth daily, Disp: , Rfl:     Omega-3 Fatty Acids (FISH OIL PO), Take 1,000 mg by mouth daily , Disp: , Rfl:     venlafaxine (EFFEXOR-XR) 75 mg 24 hr capsule, Take 1 capsule (75 mg total) by mouth daily, Disp: 90 capsule, Rfl: 1    atorvastatin (LIPITOR) 20 mg tablet, Take 1 tablet (20 mg total) by mouth daily, Disp: 90 tablet, Rfl: 3    ACTIVE PROBLEM LIST:  Patient Active Problem List   Diagnosis    Cervical somatic dysfunction    Depression    Disc degeneration, lumbar    Hyperlipidemia    Pain of finger of left hand    Skin cyst    PTSD (post-traumatic stress disorder)    Moderate episode of recurrent major depressive disorder ([address] )    COVID-19 virus infection    Bilateral inguinal hernia    Carpal tunnel syndrome on left       PAST MEDICAL HISTORY:  Past Medical History:   Diagnosis Date    Bronchitis     Cough     Pt states he has a morning cough from some sinus drainage- pt reports having a cold week of 2/8/21    COVID-19 12/2020    Pt states he only lost taste and smell- no other symptoms reported      Disc degeneration, lumbar     Diverticulosis     Facet syndrome     Hyperlipidemia     Meningitis     as child    Other muscle spasm     Parapsoriasis     Pneumonia     Psoriasis     Sacroiliitis (HCC)     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       PAST SURGICAL HISTORY:  Past Surgical History:   Procedure Laterality Date    FACIAL/NECK BIOPSY Left 8/4/2020    Procedure: EXCISION CYST NECK;  Surgeon: Alpesh Moreno MD;  [facility];  Service: Plastics    FLAP LOCAL HEAD / NECK Left 8/4/2020    Procedure: COMPLEX CLOSURE VS ADJACENT TISSUE REARRANGEMENT NECK;  Surgeon: Alpesh Moreno MD;  [facility];  Service: Plastics    HERNIA REPAIR      HERNIA REPAIR Bilateral 2/22/2021    Procedure: REPAIR HERNIA INGUINAL, LAPAROSCOPIC BILATERAL WITH MESH;  Surgeon: Sp Alcantar MD;  [facility];  Service: General    PYLOROMYOTOMY      ROTATOR CUFF REPAIR Left     times 2 per pt    TONSILLECTOMY AND ADENOIDECTOMY      TOOTH EXTRACTION         FAMILY HISTORY:  Family History   Problem Relation Age of Onset    Cancer Mother     Lung cancer Mother         CARCINOID TUMOR    Hypertension Mother     Hypothyroidism Mother     Kidney cancer Mother     Thyroid cancer Mother     Diabetes Maternal Grandmother     Alzheimer's disease Maternal Grandfather     Coronary artery disease Maternal Grandfather     Other Maternal Grandfather         PACEMAKER       SOCIAL HISTORY:  Social History     Socioeconomic History    Marital status: /Civil Union     Spouse name: Not on file    Number of children: 1    Years of education: Not on file    Highest education level: Not on file   Occupational History    Occupation: COMPUTER CONSULTING     Comment: FULL-TIME EMPLOYMENT   Tobacco Use    Smoking status: Former Smoker     Types: Cigarettes    Smokeless tobacco: Never Used    Tobacco comment: Pt quit 2008   Vaping Use    Vaping Use: Never used   Substance and Sexual Activity    Alcohol use: Yes     Comment: BEER - DESCRIBES AS INFREQUENT    Drug use: Yes     Types: Marijuana     Comment: Medical marijuana    Sexual activity: Yes     Partners: Female     Comment: did not ask   Other Topics Concern    Not on file   Social History Narrative    LIVING INDEPENDENTLY WITH SPOUSE    ONE SON    Unemployed    Hobbies-exercising, yd work, video games, camping     Social Determinants of Health     Financial Resource Strain:     Difficulty of Paying Living Expenses:    Food Insecurity:     Worried About Running Out of Food in the Last Year:     [address] in the Last Year:    Transportation Needs:     Lack of Transportation (Medical):      Lack of Transportation (Non-Medical):    Physical Activity:     Days of Exercise per Week:     Minutes of Exercise per Session:    Stress:     Feeling of Stress :    Social Connections:     Frequency of Communication with Friends and Family:     Frequency of Social Gatherings with Friends and Family:     Attends Hinduism Services:     Active Member of Clubs or Organizations:     Attends Club or Organization Meetings:     Marital Status:    Intimate Partner Violence:     Fear of Current or Ex-Partner:     Emotionally Abused:     Physically Abused:     Sexually Abused:        Review of Systems   Constitutional: Negative for activity change, chills, fatigue and fever  HENT: Negative for congestion  Eyes: Negative for discharge  Respiratory: Negative for cough, chest tightness and shortness of breath  Cardiovascular: Negative for chest pain, palpitations and leg swelling  Gastrointestinal: Negative for abdominal pain, blood in stool, constipation, diarrhea, nausea and vomiting  Endocrine: Negative for polydipsia, polyphagia and polyuria  Genitourinary: Negative for difficulty urinating  Musculoskeletal: Negative for arthralgias and myalgias  Skin: Negative for rash  Allergic/Immunologic: Negative for immunocompromised state  Neurological: Negative for dizziness, syncope, weakness, light-headedness and headaches  Hematological: Negative for adenopathy  Does not bruise/bleed easily  Psychiatric/Behavioral: Negative for dysphoric mood, sleep disturbance and suicidal ideas  The patient is not nervous/anxious  Objective:  Vitals:    09/14/21 0854   BP: 114/80   Pulse: 70   Temp: 98 2 °F (36 8 °C)   SpO2: 96%   Weight: 83 5 kg (184 lb)   Height: 5' 6" (1 676 m)     Body mass index is 29 7 kg/m²  Physical Exam  Vitals and nursing note reviewed  Constitutional:       General: He is not in acute distress  Appearance: He is well-developed  He is not ill-appearing  HENT:      Head: Normocephalic and atraumatic  Eyes:      Extraocular Movements: Extraocular movements intact  Pupils: Pupils are equal, round, and reactive to light  Neck:      Thyroid: No thyromegaly  Vascular: No carotid bruit or JVD  Cardiovascular:      Rate and Rhythm: Normal rate and regular rhythm  Heart sounds: Normal heart sounds  Pulmonary:      Effort: Pulmonary effort is normal  No respiratory distress  Breath sounds: Normal breath sounds     Musculoskeletal: Cervical back: Neck supple  Right lower leg: No edema  Left lower leg: No edema  Lymphadenopathy:      Cervical: No cervical adenopathy  Skin:     General: Skin is warm and dry  Findings: No rash  Neurological:      General: No focal deficit present  Mental Status: He is alert and oriented to person, place, and time  Mental status is at baseline  Psychiatric:         Mood and Affect: Mood normal          Behavior: Behavior normal            RESULTS:    Recent Results (from the past 1008 hour(s))   Lipid panel    Collection Time: 09/07/21  7:39 AM   Result Value Ref Range    Total Cholesterol 243 (H) <200 mg/dL    HDL 52 > OR = 40 mg/dL    Triglycerides 169 (H) <150 mg/dL    LDL Calculated 159 (H) mg/dL (calc)    Chol HDLC Ratio 4 7 <5 0 (calc)    Non-HDL Cholesterol 191 (H) <130 mg/dL (calc)   Comprehensive metabolic panel    Collection Time: 09/07/21  7:39 AM   Result Value Ref Range    Glucose, Random 96 65 - 99 mg/dL    BUN 19 7 - 25 mg/dL    Creatinine 1 01 0 70 - 1 33 mg/dL    eGFR Non  86 > OR = 60 mL/min/1 73m2    eGFR  99 > OR = 60 mL/min/1 73m2    SL AMB BUN/CREATININE RATIO NOT APPLICABLE 6 - 22 (calc)    Sodium 139 135 - 146 mmol/L    Potassium 4 3 3 5 - 5 3 mmol/L    Chloride 104 98 - 110 mmol/L    CO2 28 20 - 32 mmol/L    Calcium 9 3 8 6 - 10 3 mg/dL    Protein, Total 6 4 6 1 - 8 1 g/dL    Albumin 4 4 3 6 - 5 1 g/dL    Globulin 2 0 1 9 - 3 7 g/dL (calc)    Albumin/Globulin Ratio 2 2 1 0 - 2 5 (calc)    TOTAL BILIRUBIN 0 2 0 2 - 1 2 mg/dL    Alkaline Phosphatase 72 35 - 144 U/L    AST 21 10 - 35 U/L    ALT 22 9 - 46 U/L   PSA, Total Screen    Collection Time: 09/07/21  7:39 AM   Result Value Ref Range    Prostate Specific Antigen Total 0 3 < OR = 4 0 ng/mL       This note was created with voice recognition software  Phonic, grammatical and spelling errors may be present within the note as a result

## 2021-09-14 NOTE — PROGRESS NOTES
BMI Counseling: Body mass index is 29 7 kg/m²  The BMI is above normal  Nutrition recommendations include 3-5 servings of fruits/vegetables daily, reducing fast food intake and consuming healthier snacks  Exercise recommendations include exercising 3-5 times per week

## 2021-09-21 ENCOUNTER — TELEPHONE (OUTPATIENT)
Dept: INTERNAL MEDICINE CLINIC | Facility: CLINIC | Age: 51
End: 2021-09-21

## 2021-09-21 NOTE — TELEPHONE ENCOUNTER
Patient said that he is not feeling any better and is just worried and would like to know if you can order a covid test Please? He said he has been coughing and wheezing  Please advise    Ariela Morocho

## 2021-09-22 PROCEDURE — U0005 INFEC AGEN DETEC AMPLI PROBE: HCPCS | Performed by: PHYSICIAN ASSISTANT

## 2021-09-22 PROCEDURE — U0003 INFECTIOUS AGENT DETECTION BY NUCLEIC ACID (DNA OR RNA); SEVERE ACUTE RESPIRATORY SYNDROME CORONAVIRUS 2 (SARS-COV-2) (CORONAVIRUS DISEASE [COVID-19]), AMPLIFIED PROBE TECHNIQUE, MAKING USE OF HIGH THROUGHPUT TECHNOLOGIES AS DESCRIBED BY CMS-2020-01-R: HCPCS | Performed by: PHYSICIAN ASSISTANT

## 2021-09-30 ENCOUNTER — SOCIAL WORK (OUTPATIENT)
Dept: BEHAVIORAL/MENTAL HEALTH CLINIC | Facility: CLINIC | Age: 51
End: 2021-09-30
Payer: COMMERCIAL

## 2021-09-30 DIAGNOSIS — F33.1 MODERATE EPISODE OF RECURRENT MAJOR DEPRESSIVE DISORDER (HCC): ICD-10-CM

## 2021-09-30 DIAGNOSIS — F43.10 PTSD (POST-TRAUMATIC STRESS DISORDER): Primary | ICD-10-CM

## 2021-09-30 PROCEDURE — 90834 PSYTX W PT 45 MINUTES: CPT | Performed by: SOCIAL WORKER

## 2021-09-30 NOTE — PSYCH
8: 30am-9:15am    Assessment/Plan: f/u in a month     There are no diagnoses linked to this encounter  Subjective: Therapist met w/pt for individual session  Pt stated that his anxiety symptoms have been increased for a few reasons  Pt stated that his son has gone back to school and now he has limited distractions so he has more time to think about things  Pt stated that he has also been talking to his friend about talking ot her son about the trauma that he endured  Pt stated that he wants to be supportive to her son but thinking about doing it has brought up feelings for him from his own past trauma  Therapist and pt discussed tools pt can implement when he begins to think about his past trauma so he can stay grounded  Pt stated he has been thinking about obtaining a part time job but has anxiety about it  Therapist and pt discussed the anxiety thoughts/feelings and how isolating/avoiding isn't always the best solution  Patient ID: Archie Ledesma is a 45 y o  male      HPI 45 minutes    Review of Systems      Objective: Pt appeared to be anxious when talking about the trauma he endured but also open minded to implement some grounding techniques     Physical Exam  Pt denied any SI/HI/AH/Vh

## 2021-10-27 ENCOUNTER — SOCIAL WORK (OUTPATIENT)
Dept: BEHAVIORAL/MENTAL HEALTH CLINIC | Facility: CLINIC | Age: 51
End: 2021-10-27
Payer: COMMERCIAL

## 2021-10-27 DIAGNOSIS — F33.1 MODERATE EPISODE OF RECURRENT MAJOR DEPRESSIVE DISORDER (HCC): ICD-10-CM

## 2021-10-27 DIAGNOSIS — F43.10 PTSD (POST-TRAUMATIC STRESS DISORDER): Primary | ICD-10-CM

## 2021-10-27 PROCEDURE — 90834 PSYTX W PT 45 MINUTES: CPT | Performed by: SOCIAL WORKER

## 2021-12-26 DIAGNOSIS — F32.A DEPRESSION, UNSPECIFIED DEPRESSION TYPE: ICD-10-CM

## 2021-12-27 RX ORDER — VENLAFAXINE HYDROCHLORIDE 75 MG/1
CAPSULE, EXTENDED RELEASE ORAL
Qty: 90 CAPSULE | Refills: 1 | Status: SHIPPED | OUTPATIENT
Start: 2021-12-27 | End: 2022-06-22

## 2022-01-31 ENCOUNTER — SOCIAL WORK (OUTPATIENT)
Dept: BEHAVIORAL/MENTAL HEALTH CLINIC | Facility: CLINIC | Age: 52
End: 2022-01-31
Payer: COMMERCIAL

## 2022-01-31 DIAGNOSIS — F43.10 PTSD (POST-TRAUMATIC STRESS DISORDER): ICD-10-CM

## 2022-01-31 DIAGNOSIS — F33.1 MODERATE EPISODE OF RECURRENT MAJOR DEPRESSIVE DISORDER (HCC): Primary | ICD-10-CM

## 2022-01-31 PROCEDURE — 90834 PSYTX W PT 45 MINUTES: CPT | Performed by: SOCIAL WORKER

## 2022-01-31 NOTE — PSYCH
10:00am-10:45am    Assessment/Plan: f/u in a month     There are no diagnoses linked to this encounter  Subjective: Therapist met w/pt for individual session  Pt apologized for cancelling last session and never rescheduling until now  He stated that he had a lot of stress from his basement and plumbing issues  He stated overall he feels his mood has been "okay "  He stated that he has had bouts of anxiety but overall has been managing his symptoms  Therapist and pt discussed a few things that triggered his anxiety  Pt shared that winter tends to be more difficult because he doesn't go out as much but is trying to utilize his time with his family and his projects around the house  He stated working on the basement has been his main hobby  Pt denied having any flashbacks  Patient ID: Zachariah Ann is a 45 y o  male  HPI 45 minutes    Review of Systems      Objective: Pt appeared to be calm and easily engaged  He appeared to be alert and oriented x3  Pt denied any SI/HI        Physical Exam  Pt denied any SI/HI/AH/VH

## 2022-02-24 ENCOUNTER — OFFICE VISIT (OUTPATIENT)
Dept: URGENT CARE | Facility: MEDICAL CENTER | Age: 52
End: 2022-02-24
Payer: COMMERCIAL

## 2022-02-24 VITALS
HEART RATE: 100 BPM | WEIGHT: 175 LBS | HEIGHT: 66 IN | BODY MASS INDEX: 28.12 KG/M2 | RESPIRATION RATE: 18 BRPM | TEMPERATURE: 97.1 F | OXYGEN SATURATION: 97 %

## 2022-02-24 DIAGNOSIS — J06.9 UPPER RESPIRATORY TRACT INFECTION, UNSPECIFIED TYPE: Primary | ICD-10-CM

## 2022-02-24 DIAGNOSIS — R05.9 COUGH: ICD-10-CM

## 2022-02-24 PROCEDURE — G0382 LEV 3 HOSP TYPE B ED VISIT: HCPCS | Performed by: PHYSICIAN ASSISTANT

## 2022-02-24 PROCEDURE — 87636 SARSCOV2 & INF A&B AMP PRB: CPT | Performed by: PHYSICIAN ASSISTANT

## 2022-02-24 NOTE — PROGRESS NOTES
[address] [facility]        NAME: Bk Irby is a 45 y o  male  : 1970    MRN: [id number]  DATE: 2022  TIME: 12:58 PM    Assessment and Plan   Upper respiratory tract infection, unspecified type [J06 9]  1  Upper respiratory tract infection, unspecified type  Covid19 and INFLUENZA A/B PCR   2  Cough  Covid19 and INFLUENZA A/B PCR         Patient Instructions     1  Increase fluids  2  Tylenol as needed for headache/fever  3  Quarantine until test results available  4  Follow up with PCP in 3-5 days if symptoms persist      Chief Complaint     Chief Complaint   Patient presents with    Fever     Highest 100 5    Facial Pain     Started 3 days ago with sinus symptoms, fatigue and fever  Has been taking sudafed and tylenol  Is fully covid vaccinated  History of Present Illness       Joshua Schilling is a 50-year-old male presents with a 3 day history of fatigue, chills, body aches, nasal discharge and cough  Patient denies any vomiting but has had several episodes of diarrhea since the onset of his illness  He is vaccinated for COVID-19 but has not had a booster vaccine  Patient also reports his son has similar symptoms  Fever  Associated symptoms include chills, congestion, coughing, fatigue and a fever  Review of Systems   Review of Systems   Constitutional: Positive for chills, fatigue and fever  HENT: Positive for congestion, postnasal drip and rhinorrhea  Respiratory: Positive for cough  Gastrointestinal: Negative            Current Medications       Current Outpatient Medications:     albuterol (PROVENTIL HFA,VENTOLIN HFA) 90 mcg/act inhaler, TAKE 2 PUFFS BY MOUTH EVERY 6 HOURS AS NEEDED FOR WHEEZE, Disp: 8 5 Inhaler, Rfl: 2    atorvastatin (LIPITOR) 20 mg tablet, Take 1 tablet (20 mg total) by mouth daily, Disp: 90 tablet, Rfl: 3    Cholecalciferol (VITAMIN D3 PO), Take by mouth, Disp: , Rfl:     Flaxseed Oil OIL, Use 1,200 mg daily , Disp: , Rfl:    multivitamin (THERAGRAN) TABS, Take 1 tablet by mouth daily, Disp: , Rfl:     Omega-3 Fatty Acids (FISH OIL PO), Take 1,000 mg by mouth daily , Disp: , Rfl:     Saw Palmetto, Serenoa repens, (SAW PALMETTO PO), Take by mouth, Disp: , Rfl:     venlafaxine (EFFEXOR-XR) 75 mg 24 hr capsule, TAKE 1 CAPSULE BY MOUTH EVERY DAY, Disp: 90 capsule, Rfl: 1    Ascorbic Acid (vitamin C) 1000 MG tablet, Take 1,000 mg by mouth daily, Disp: , Rfl:     Current Allergies     Allergies as of 02/24/2022 - Reviewed 02/24/2022   Allergen Reaction Noted    Celecoxib Anaphylaxis     Diclofenac Shortness Of Breath 01/17/2014            The following portions of the patient's history were reviewed and updated as appropriate: allergies, current medications, past family history, past medical history, past social history, past surgical history and problem list      Past Medical History:   Diagnosis Date    Bronchitis     Cough     Pt states he has a morning cough from some sinus drainage- pt reports having a cold week of 2/8/21    COVID-19 12/2020    Pt states he only lost taste and smell- no other symptoms reported      Disc degeneration, lumbar     Diverticulosis     Facet syndrome     Hyperlipidemia     Meningitis     as child    Other muscle spasm     Parapsoriasis     Pneumonia     Psoriasis     Sacroiliitis (HCC)     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       Past Surgical History:   Procedure Laterality Date    FACIAL/NECK BIOPSY Left 8/4/2020    Procedure: EXCISION CYST NECK;  Surgeon: Fredis Mandujano MD;  [facility];  Service: Plastics    FLAP LOCAL HEAD / NECK Left 8/4/2020    Procedure: COMPLEX CLOSURE VS ADJACENT TISSUE REARRANGEMENT NECK;  Surgeon: Fredis Mandujano MD;  [facility];  Service: Plastics    HERNIA REPAIR      HERNIA REPAIR Bilateral 2/22/2021    Procedure: REPAIR HERNIA INGUINAL, LAPAROSCOPIC BILATERAL WITH MESH; Surgeon: Shruti Dodge MD;  Location: [facility];  Service: General    [id number] [facility]     times 2 per pt    TONSILLECTOMY AND ADENOIDECTOMY      TOOTH EXTRACTION         Family History   Problem Relation Age of Onset    Cancer Mother     Lung cancer Mother         CARCINOID TUMOR    Hypertension Mother     Hypothyroidism Mother     Kidney cancer Mother     Thyroid cancer Mother     Diabetes Maternal Grandmother     Alzheimer's disease Maternal Grandfather     Coronary artery disease Maternal Grandfather     Other Maternal Grandfather         PACEMAKER         Medications have been verified  Objective   Pulse 100   Temp (!) 97 1 °F (36 2 °C)   Resp 18   Ht 5' 6" (1 676 m)   Wt 79 4 kg (175 lb)   SpO2 97%   BMI 28 25 kg/m²   No LMP for male patient  Physical Exam     Physical Exam  Constitutional:       General: He is not in acute distress  Appearance: Normal appearance  He is not ill-appearing  HENT:      Head: Normocephalic and atraumatic  Right Ear: Tympanic membrane and ear canal normal       Left Ear: Tympanic membrane and ear canal normal       Nose: Congestion and rhinorrhea present  Rhinorrhea is clear  Mouth/Throat:      Lips: Pink  Mouth: Mucous membranes are moist       Pharynx: Oropharynx is clear  Cardiovascular:      Rate and Rhythm: Normal rate and regular rhythm  Heart sounds: Normal heart sounds, S1 normal and S2 normal  No murmur heard  Pulmonary:      Effort: Pulmonary effort is normal       Breath sounds: Normal breath sounds and air entry  Neurological:      Mental Status: He is alert

## 2022-02-24 NOTE — PATIENT INSTRUCTIONS
1  Increase fluids  2  Tylenol as needed for headache/fever  3  Quarantine until test results available  4   Follow up with PCP in 3-5 days if symptoms persist

## 2022-02-25 LAB
FLUAV RNA RESP QL NAA+PROBE: NEGATIVE
FLUBV RNA RESP QL NAA+PROBE: NEGATIVE
SARS-COV-2 RNA RESP QL NAA+PROBE: NEGATIVE

## 2022-03-08 ENCOUNTER — SOCIAL WORK (OUTPATIENT)
Dept: BEHAVIORAL/MENTAL HEALTH CLINIC | Facility: CLINIC | Age: 52
End: 2022-03-08
Payer: COMMERCIAL

## 2022-03-08 DIAGNOSIS — F33.1 MODERATE EPISODE OF RECURRENT MAJOR DEPRESSIVE DISORDER (HCC): ICD-10-CM

## 2022-03-08 DIAGNOSIS — F43.10 PTSD (POST-TRAUMATIC STRESS DISORDER): Primary | ICD-10-CM

## 2022-03-08 PROCEDURE — 90834 PSYTX W PT 45 MINUTES: CPT | Performed by: SOCIAL WORKER

## 2022-03-08 NOTE — PSYCH
10:30am-11:15am    Assessment/Plan: f/u next month     There are no diagnoses linked to this encounter  Subjective: therapist met w/pt for individual session  Pt shared there was about a week where he experienced heightened symptoms including: irritability, racing thoughts, anxiety, flashbacks  Therapist and pt discussed what triggered him and how it was hard at first to get out of his head  Pt stated that eventually he reached out a positive support and was able to work through it and come to feel some relief  Therapist continued to process what happened w/pt and how to continues to work on managing his symptoms effectively when triggered in the future  Patient ID: Sidra Lopez is a 45 y o  male  HPI 45 minutes    Review of Systems      Objective:  Pt appeared to be calm and easily engaged         Physical Exam  Pt denied any SI/HI/Ah/VH

## 2022-03-21 ENCOUNTER — OFFICE VISIT (OUTPATIENT)
Dept: INTERNAL MEDICINE CLINIC | Facility: CLINIC | Age: 52
End: 2022-03-21
Payer: COMMERCIAL

## 2022-03-21 VITALS
DIASTOLIC BLOOD PRESSURE: 70 MMHG | OXYGEN SATURATION: 99 % | RESPIRATION RATE: 16 BRPM | WEIGHT: 177 LBS | HEART RATE: 69 BPM | TEMPERATURE: 98 F | HEIGHT: 66 IN | BODY MASS INDEX: 28.45 KG/M2 | SYSTOLIC BLOOD PRESSURE: 122 MMHG

## 2022-03-21 DIAGNOSIS — E78.2 MIXED HYPERLIPIDEMIA: Primary | ICD-10-CM

## 2022-03-21 DIAGNOSIS — F33.1 MODERATE EPISODE OF RECURRENT MAJOR DEPRESSIVE DISORDER (HCC): ICD-10-CM

## 2022-03-21 PROBLEM — U07.1 COVID-19 VIRUS INFECTION: Status: RESOLVED | Noted: 2020-12-14 | Resolved: 2022-03-21

## 2022-03-21 PROCEDURE — 3008F BODY MASS INDEX DOCD: CPT | Performed by: PHYSICIAN ASSISTANT

## 2022-03-21 PROCEDURE — 3725F SCREEN DEPRESSION PERFORMED: CPT | Performed by: PHYSICIAN ASSISTANT

## 2022-03-21 PROCEDURE — 1036F TOBACCO NON-USER: CPT | Performed by: PHYSICIAN ASSISTANT

## 2022-03-21 PROCEDURE — 99213 OFFICE O/P EST LOW 20 MIN: CPT | Performed by: PHYSICIAN ASSISTANT

## 2022-03-21 NOTE — PROGRESS NOTES
Assessment/Plan:   Patient Instructions   No changes in current medications at this time  Will discuss labs with patient when available  Will then add new labs to do for follow-up in 6 months  Follow-up sooner as needed  Quality Measures: BMI Counseling: Body mass index is 28 57 kg/m²  The BMI is above normal  Nutrition recommendations include decreasing portion sizes, encouraging healthy choices of fruits and vegetables, consuming healthier snacks, moderation in carbohydrate intake and reducing intake of cholesterol  Exercise recommendations include exercising 3-5 times per week  Rationale for BMI follow-up plan is due to patient being overweight or obese  Return in about 6 months (around 9/21/2022) for Next scheduled follow up  There are no diagnoses linked to this encounter  Subjective:      Patient ID: Yvrose Vázquez is a 45 y o  male  Follow-up, patient had labs done but they are not present in the EMR at this time  Hyperlipidemia on atorvastatin 20 mg daily  Tolerating the medication  Will discuss results with patient when available  Depression/ PTSD:  On venlafaxine and feels it is helpful  Some stiffness noted of right long finger  Not limiting        ALLERGIES:  Allergies   Allergen Reactions    Celecoxib Anaphylaxis    Diclofenac Shortness Of Breath       CURRENT MEDICATIONS:    Current Outpatient Medications:     albuterol (PROVENTIL HFA,VENTOLIN HFA) 90 mcg/act inhaler, TAKE 2 PUFFS BY MOUTH EVERY 6 HOURS AS NEEDED FOR WHEEZE, Disp: 8 5 Inhaler, Rfl: 2    Ascorbic Acid (vitamin C) 1000 MG tablet, Take 1,000 mg by mouth daily, Disp: , Rfl:     atorvastatin (LIPITOR) 20 mg tablet, Take 1 tablet (20 mg total) by mouth daily, Disp: 90 tablet, Rfl: 3    Cholecalciferol (VITAMIN D3 PO), Take by mouth, Disp: , Rfl:     Flaxseed Oil OIL, Use 1,200 mg daily , Disp: , Rfl:     multivitamin (THERAGRAN) TABS, Take 1 tablet by mouth daily, Disp: , Rfl:    Omega-3 Fatty Acids (FISH OIL PO), Take 1,000 mg by mouth daily , Disp: , Rfl:     Saw Palmetto, Serenoa repens, (SAW PALMETTO PO), Take by mouth, Disp: , Rfl:     venlafaxine (EFFEXOR-XR) 75 mg 24 hr capsule, TAKE 1 CAPSULE BY MOUTH EVERY DAY, Disp: 90 capsule, Rfl: 1    ACTIVE PROBLEM LIST:  Patient Active Problem List   Diagnosis    Cervical somatic dysfunction    Depression    Disc degeneration, lumbar    Hyperlipidemia    Pain of finger of left hand    Skin cyst    PTSD (post-traumatic stress disorder)    Moderate episode of recurrent major depressive disorder ([facility] [address] )    COVID-19 virus infection    Bilateral inguinal hernia    Carpal tunnel syndrome on left       PAST MEDICAL HISTORY:  Past Medical History:   Diagnosis Date    Bronchitis     Cough     Pt states he has a morning cough from some sinus drainage- pt reports having a cold week of 2/8/21    COVID-19 12/2020    Pt states he only lost taste and smell- no other symptoms reported      Disc degeneration, lumbar     Diverticulosis     Facet syndrome     Hyperlipidemia     Meningitis     as child    Other muscle spasm     Parapsoriasis     Pneumonia     Psoriasis     Sacroiliitis (HCC)     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       PAST SURGICAL HISTORY:  Past Surgical History:   Procedure Laterality Date    FACIAL/NECK BIOPSY Left 8/4/2020    Procedure: EXCISION CYST NECK;  Surgeon: Elizabet Randolph MD;  [facility]: [facility];  Service: Plastics    FLAP LOCAL HEAD / NECK Left 8/4/2020    Procedure: COMPLEX CLOSURE VS ADJACENT TISSUE REARRANGEMENT NECK;  Surgeon: Elizabet Randoplh MD;  [facility];  Service: Plastics    HERNIA REPAIR      HERNIA REPAIR Bilateral 2/22/2021    Procedure: REPAIR HERNIA INGUINAL, LAPAROSCOPIC BILATERAL WITH MESH;  Surgeon: Vesna Guerrero MD;  Location: [facility];  Service: General    PYLOROMYOTOMY      ROTATOR CUFF REPAIR Left times 2 per pt    TONSILLECTOMY AND ADENOIDECTOMY      TOOTH EXTRACTION         FAMILY HISTORY:  Family History   Problem Relation Age of Onset    Cancer Mother     Lung cancer Mother         CARCINOID TUMOR    Hypertension Mother     Hypothyroidism Mother     Kidney cancer Mother     Thyroid cancer Mother     Diabetes Maternal Grandmother     Alzheimer's disease Maternal Grandfather     Coronary artery disease Maternal Grandfather     Other Maternal Grandfather         PACEMAKER       SOCIAL HISTORY:  Social History     Socioeconomic History    Marital status: /Civil Union     Spouse name: Not on file    Number of children: 1    Years of education: Not on file    Highest education level: Not on file   Occupational History    Occupation: COMPUTER CONSULTING     Comment: FULL-TIME EMPLOYMENT   Tobacco Use    Smoking status: Former Smoker     Types: Cigarettes    Smokeless tobacco: Never Used    Tobacco comment: Pt quit 2008   Vaping Use    Vaping Use: Never used   Substance and Sexual Activity    Alcohol use: Yes     Comment: BEER - DESCRIBES AS INFREQUENT    Drug use: Yes     Types: Marijuana     Comment: Medical marijuana    Sexual activity: Yes     Partners: Female     Comment: did not ask   Other Topics Concern    Not on file   Social History Narrative    LIVING INDEPENDENTLY WITH SPOUSE    ONE SON    Unemployed    Hobbies-exercising, yd work, video games, camping     Social Determinants of Health     Financial Resource Strain: Not on file   Food Insecurity: Not on file   Transportation Needs: Not on file   Physical Activity: Not on file   Stress: Not on file   Social Connections: Not on file   Intimate Partner Violence: Not on file   Housing Stability: Not on file       Review of Systems   Constitutional: Negative for activity change, chills, fatigue and fever  HENT: Negative for congestion  Eyes: Negative for discharge     Respiratory: Negative for cough, chest tightness and shortness of breath  Cardiovascular: Negative for chest pain, palpitations and leg swelling  Gastrointestinal: Negative for abdominal pain, blood in stool, constipation, diarrhea, nausea and vomiting  Endocrine: Negative for polydipsia, polyphagia and polyuria  Genitourinary: Negative for difficulty urinating  Musculoskeletal: Positive for arthralgias  Negative for myalgias  Skin: Negative for rash  Allergic/Immunologic: Negative for immunocompromised state  Neurological: Negative for dizziness, syncope, weakness, light-headedness and headaches  Hematological: Negative for adenopathy  Does not bruise/bleed easily  Psychiatric/Behavioral: Negative for dysphoric mood, sleep disturbance and suicidal ideas  The patient is not nervous/anxious  Objective:  Vitals:    03/21/22 0817   BP: 122/70   BP Location: Left arm   Patient Position: Sitting   Cuff Size: Standard   Pulse: 69   Resp: 16   Temp: 98 °F (36 7 °C)   TempSrc: Tympanic   SpO2: 99%   Weight: 80 3 kg (177 lb)   Height: 5' 6" (1 676 m)     Body mass index is 28 57 kg/m²  Physical Exam  Vitals and nursing note reviewed  Constitutional:       General: He is not in acute distress  Appearance: He is well-developed  HENT:      Head: Normocephalic and atraumatic  Eyes:      Extraocular Movements: Extraocular movements intact  Pupils: Pupils are equal, round, and reactive to light  Neck:      Thyroid: No thyromegaly  Vascular: No carotid bruit or JVD  Cardiovascular:      Rate and Rhythm: Normal rate and regular rhythm  Heart sounds: Normal heart sounds  Pulmonary:      Effort: Pulmonary effort is normal  No respiratory distress  Breath sounds: Normal breath sounds  Musculoskeletal:      Cervical back: Neck supple  Right lower leg: No edema  Left lower leg: No edema  Lymphadenopathy:      Cervical: No cervical adenopathy  Skin:     General: Skin is warm and dry        Findings: No rash    Neurological:      General: No focal deficit present  Mental Status: He is alert and oriented to person, place, and time  Mental status is at baseline  Psychiatric:         Mood and Affect: Mood normal          Behavior: Behavior normal            RESULTS:    Recent Results (from the past 1008 hour(s))   Covid19 and INFLUENZA A/B PCR    Collection Time: 02/24/22  1:21 PM    Specimen: Nasopharyngeal Swab   Result Value Ref Range    SARS-CoV-2 Negative Negative    INFLUENZA A PCR Negative Negative    INFLUENZA B PCR Negative Negative       This note was created with voice recognition software  Phonic, grammatical and spelling errors may be present within the note as a result

## 2022-03-21 NOTE — PATIENT INSTRUCTIONS
No changes in current medications at this time  Will discuss labs with patient when available  Will then add new labs to do for follow-up in 6 months  Follow-up sooner as needed

## 2022-04-11 ENCOUNTER — SOCIAL WORK (OUTPATIENT)
Dept: BEHAVIORAL/MENTAL HEALTH CLINIC | Facility: CLINIC | Age: 52
End: 2022-04-11
Payer: COMMERCIAL

## 2022-04-11 DIAGNOSIS — F33.1 MODERATE EPISODE OF RECURRENT MAJOR DEPRESSIVE DISORDER (HCC): ICD-10-CM

## 2022-04-11 DIAGNOSIS — F43.10 PTSD (POST-TRAUMATIC STRESS DISORDER): Primary | ICD-10-CM

## 2022-04-11 PROCEDURE — 90834 PSYTX W PT 45 MINUTES: CPT | Performed by: SOCIAL WORKER

## 2022-04-12 NOTE — PSYCH
9: 00am-9:45am    Assessment/Plan: f/u in a month     There are no diagnoses linked to this encounter  Subjective: Therapist met w/pt for individual session  Pt shared that he was talking to a cousin of his who informed him of a family hx of bipolar  He stated that he fixated on that and did some research on it and was wondering if he had it  Therapist and pt reviewed the different symptoms he experiences and that therapist doesnt feel that pt has bipolar  Therapist and pt discussed his symptoms are more similar to an anxiety or a PTSD diagnosis  Pt agreed but stated that he got stuck on this  Therapist pointed out that pt tends to fixate on things so discussion was held around ways pt can work on grounding himself rather than staying [facility] on certain things  Patient ID: Nico De Paz is a 45 y o  male  HPI 45 minutes    Review of Systems      Objective: Pt appeared to be in a good mood         Physical Exam  Pt denied any SI/Hi/Ah/VH

## 2022-05-09 ENCOUNTER — SOCIAL WORK (OUTPATIENT)
Dept: BEHAVIORAL/MENTAL HEALTH CLINIC | Facility: CLINIC | Age: 52
End: 2022-05-09
Payer: COMMERCIAL

## 2022-05-09 DIAGNOSIS — F43.10 PTSD (POST-TRAUMATIC STRESS DISORDER): Primary | ICD-10-CM

## 2022-05-09 DIAGNOSIS — F33.1 MODERATE EPISODE OF RECURRENT MAJOR DEPRESSIVE DISORDER (HCC): ICD-10-CM

## 2022-05-09 PROCEDURE — 90834 PSYTX W PT 45 MINUTES: CPT | Performed by: SOCIAL WORKER

## 2022-05-09 NOTE — PSYCH
9: 00am-9:45am    Assessment/Plan: f/u in a month     There are no diagnoses linked to this encounter  Subjective: Therapist met w/pt for individual session  Pt shared that overall he feels as if he is doing well  He stated that there have been a few things that he has been thinking about but he doesn't feel as if he is ruminating like he has in the past  So decrease w/racing thoughts, anxiety(worry)  Pt shared he has several trips scheduled w/his family that he is looking forward to which always helps him emotionally  Patient ID: Hiwot Ashley is a 45 y o  male  HPI 45 minutes    Review of Systems      Objective: Pt appeared to be in a good mood         Physical Exam  Pt denied any SI/HI/Ah/VH

## 2022-06-14 ENCOUNTER — SOCIAL WORK (OUTPATIENT)
Dept: BEHAVIORAL/MENTAL HEALTH CLINIC | Facility: CLINIC | Age: 52
End: 2022-06-14
Payer: COMMERCIAL

## 2022-06-14 DIAGNOSIS — F43.10 PTSD (POST-TRAUMATIC STRESS DISORDER): Primary | ICD-10-CM

## 2022-06-14 DIAGNOSIS — F33.1 MODERATE EPISODE OF RECURRENT MAJOR DEPRESSIVE DISORDER (HCC): ICD-10-CM

## 2022-06-14 PROCEDURE — 90834 PSYTX W PT 45 MINUTES: CPT | Performed by: SOCIAL WORKER

## 2022-06-14 NOTE — PSYCH
10:00am-10:45am    Assessment/Plan: f/u in one month     There are no diagnoses linked to this encounter  Subjective: Therapist met w/pt for individual session  Pt shared that he still gets heightened anxiety symptoms at times including racing thoughts but feels as if it doesn't last as long and isn't happening as frequently  Pt shared that he has been very busy and knows that that always helps as well  He stated that he has been spending a lot of time with his family and busy with a new game that just came out  Therapist and pt discussed that overall he feels more balanced and although he is triggered at times he is able to implement positive coping skills to better manage those triggers  Patient ID: Viki Collins is a 45 y o  male  HPI 45 minutes    Review of Systems      Objective: Pt appeared to be in a good mood         Physical Exam  Pt denied any SI/HI/Ah/Vh

## 2022-06-22 DIAGNOSIS — F32.A DEPRESSION, UNSPECIFIED DEPRESSION TYPE: ICD-10-CM

## 2022-06-22 RX ORDER — VENLAFAXINE HYDROCHLORIDE 75 MG/1
CAPSULE, EXTENDED RELEASE ORAL
Qty: 90 CAPSULE | Refills: 1 | Status: SHIPPED | OUTPATIENT
Start: 2022-06-22

## 2022-07-25 ENCOUNTER — SOCIAL WORK (OUTPATIENT)
Dept: BEHAVIORAL/MENTAL HEALTH CLINIC | Facility: CLINIC | Age: 52
End: 2022-07-25
Payer: COMMERCIAL

## 2022-07-25 DIAGNOSIS — F43.10 PTSD (POST-TRAUMATIC STRESS DISORDER): Primary | ICD-10-CM

## 2022-07-25 DIAGNOSIS — F33.1 MODERATE EPISODE OF RECURRENT MAJOR DEPRESSIVE DISORDER (HCC): ICD-10-CM

## 2022-07-25 PROCEDURE — 90834 PSYTX W PT 45 MINUTES: CPT | Performed by: SOCIAL WORKER

## 2022-07-25 NOTE — PSYCH
9: 00am-9:45am    Assessment/Plan: f/u in one month     There are no diagnoses linked to this encounter  Subjective: Therapist met w/pt for individual session  Pt shared that overall he is doing well  He identified a few situations that triggered his anxiety  He stated that the anniversary of what led him to initially seek treatment so he is trying not to project but stay more in the moment  He stated he is trying to make the most out of his summer and enjoy his time with his family  Pt shared overall he doesn't feel as irritable or on edge especially when he is around that friend that triggered some of his PTSD symptoms a couple years ago  Patient ID: Mar Rodriguez is a 45 y o  male  HPI 45 minutes    Review of Systems      Objective: pt appeared to be in a good mood and was easily engaged         Physical Exam  Pt denied any SI/HI/Ah/VH

## 2022-08-29 ENCOUNTER — SOCIAL WORK (OUTPATIENT)
Dept: BEHAVIORAL/MENTAL HEALTH CLINIC | Facility: CLINIC | Age: 52
End: 2022-08-29
Payer: COMMERCIAL

## 2022-08-29 DIAGNOSIS — F32.A DEPRESSION, UNSPECIFIED DEPRESSION TYPE: ICD-10-CM

## 2022-08-29 DIAGNOSIS — F43.10 PTSD (POST-TRAUMATIC STRESS DISORDER): Primary | ICD-10-CM

## 2022-08-29 PROCEDURE — 90834 PSYTX W PT 45 MINUTES: CPT | Performed by: SOCIAL WORKER

## 2022-08-29 NOTE — PSYCH
9: 00am-9:45am    Assessment/Plan: f/u in one month     There are no diagnoses linked to this encounter  Subjective: Therapist met w/pt for individual session  Pt shared that his mood has been up and down  He identified one stressor that happened at the end of last week and how it triggered a lot of emotion for him  He verbalized how it still upsets him and realizes he needs to talk to his wife more about this and work on ways to resolve the issue with there son and parenting  Therapist and pt discussed that it is almost 2 years from the incident that originally brought him into therapy  Discussion was held around progress he has made and ongoing ways pt can work on implementing coping skills  Patient ID: Radha Painting is a 45 y o  male  HPI  45minutes    Review of Systems      Objective: Pt appeared to be in a good mood and was easily engaged         Physical Exam  Pt denied any SI/HI/AH/VH

## 2022-09-03 DIAGNOSIS — E78.2 MIXED HYPERLIPIDEMIA: ICD-10-CM

## 2022-09-06 RX ORDER — ATORVASTATIN CALCIUM 20 MG/1
TABLET, FILM COATED ORAL
Qty: 90 TABLET | Refills: 3 | Status: SHIPPED | OUTPATIENT
Start: 2022-09-06

## 2022-09-26 ENCOUNTER — SOCIAL WORK (OUTPATIENT)
Dept: BEHAVIORAL/MENTAL HEALTH CLINIC | Facility: CLINIC | Age: 52
End: 2022-09-26
Payer: COMMERCIAL

## 2022-09-26 DIAGNOSIS — F32.A DEPRESSION, UNSPECIFIED DEPRESSION TYPE: ICD-10-CM

## 2022-09-26 DIAGNOSIS — F43.10 PTSD (POST-TRAUMATIC STRESS DISORDER): Primary | ICD-10-CM

## 2022-09-26 PROCEDURE — 90834 PSYTX W PT 45 MINUTES: CPT | Performed by: SOCIAL WORKER

## 2022-09-26 NOTE — PSYCH
9: 00am-9:45am    Assessment/Plan: f/u in one month     There are no diagnoses linked to this encounter  Subjective: Therapist met w/pt for individual session  Symptoms include: racing thoughts, feeling on edge  Pt shared that a few weeks ago he was still anxious about a situation that occurred  Therapist and pt continued to process the feelings that came up for him and how he continues to work on managing those feelings effectively  Therapist and pt discussed how distraction tends to be pt's go to coping skill and how sometimes other coping skills may be more effective  Patient ID: Remy Moreau is a 45 y o  male  HPI 45 minutes    Review of Systems      Objective: Pt seemed to be in a good mood         Physical Exam  pt denied any SI/HI/Ah/Vh

## 2022-09-28 LAB
ALBUMIN SERPL-MCNC: 4.5 G/DL (ref 3.6–5.1)
ALBUMIN/GLOB SERPL: 2.4 (CALC) (ref 1–2.5)
ALP SERPL-CCNC: 73 U/L (ref 35–144)
ALT SERPL-CCNC: 30 U/L (ref 9–46)
AST SERPL-CCNC: 25 U/L (ref 10–35)
BILIRUB SERPL-MCNC: 0.4 MG/DL (ref 0.2–1.2)
BUN SERPL-MCNC: 14 MG/DL (ref 7–25)
BUN/CREAT SERPL: NORMAL (CALC) (ref 6–22)
CALCIUM SERPL-MCNC: 9.7 MG/DL (ref 8.6–10.3)
CHLORIDE SERPL-SCNC: 107 MMOL/L (ref 98–110)
CHOLEST SERPL-MCNC: 168 MG/DL
CHOLEST/HDLC SERPL: 3.4 (CALC)
CO2 SERPL-SCNC: 26 MMOL/L (ref 20–32)
CREAT SERPL-MCNC: 0.97 MG/DL (ref 0.7–1.3)
GFR/BSA.PRED SERPLBLD CYS-BASED-ARV: 94 ML/MIN/1.73M2
GLOBULIN SER CALC-MCNC: 1.9 G/DL (CALC) (ref 1.9–3.7)
GLUCOSE SERPL-MCNC: 93 MG/DL (ref 65–99)
HDLC SERPL-MCNC: 49 MG/DL
LDLC SERPL CALC-MCNC: 101 MG/DL (CALC)
NONHDLC SERPL-MCNC: 119 MG/DL (CALC)
POTASSIUM SERPL-SCNC: 4.6 MMOL/L (ref 3.5–5.3)
PROT SERPL-MCNC: 6.4 G/DL (ref 6.1–8.1)
SODIUM SERPL-SCNC: 141 MMOL/L (ref 135–146)
TRIGL SERPL-MCNC: 89 MG/DL

## 2022-09-29 ENCOUNTER — OFFICE VISIT (OUTPATIENT)
Dept: INTERNAL MEDICINE CLINIC | Facility: CLINIC | Age: 52
End: 2022-09-29
Payer: COMMERCIAL

## 2022-09-29 VITALS
WEIGHT: 182 LBS | HEART RATE: 72 BPM | HEIGHT: 66 IN | BODY MASS INDEX: 29.25 KG/M2 | TEMPERATURE: 98.5 F | DIASTOLIC BLOOD PRESSURE: 80 MMHG | RESPIRATION RATE: 16 BRPM | SYSTOLIC BLOOD PRESSURE: 124 MMHG | OXYGEN SATURATION: 99 %

## 2022-09-29 DIAGNOSIS — F33.1 MODERATE EPISODE OF RECURRENT MAJOR DEPRESSIVE DISORDER (HCC): ICD-10-CM

## 2022-09-29 DIAGNOSIS — E78.2 MIXED HYPERLIPIDEMIA: Primary | ICD-10-CM

## 2022-09-29 DIAGNOSIS — J30.9 ALLERGIC RHINITIS, UNSPECIFIED SEASONALITY, UNSPECIFIED TRIGGER: ICD-10-CM

## 2022-09-29 PROBLEM — K40.20 BILATERAL INGUINAL HERNIA: Chronic | Status: RESOLVED | Noted: 2021-02-22 | Resolved: 2022-09-29

## 2022-09-29 PROCEDURE — 3725F SCREEN DEPRESSION PERFORMED: CPT | Performed by: PHYSICIAN ASSISTANT

## 2022-09-29 PROCEDURE — 99214 OFFICE O/P EST MOD 30 MIN: CPT | Performed by: PHYSICIAN ASSISTANT

## 2022-09-29 NOTE — PATIENT INSTRUCTIONS
Continue current medications  Follow-up with counseling as you are doing  For ongoing allergy symptoms continue Flonase 1 spray each nostril twice daily  For morning congestion you can consider starting Zyrtec 10 mg at bedtime  For intermittent wheezing start using your inhaler but keep me informed as to effectiveness of this  Schedule follow-up in 6 months to be annual physical, follow-up sooner as needed

## 2022-09-29 NOTE — PROGRESS NOTES
Assessment/Plan:   Patient Instructions   Continue current medications  Follow-up with counseling as you are doing  For ongoing allergy symptoms continue Flonase 1 spray each nostril twice daily  For morning congestion you can consider starting Zyrtec 10 mg at bedtime  For intermittent wheezing start using your inhaler but keep me informed as to effectiveness of this  Schedule follow-up in 6 months to be annual physical, follow-up sooner as needed  Quality Measures:       Return in about 6 months (around 3/29/2023) for Annual physical          Diagnoses and all orders for this visit:    Mixed hyperlipidemia  -     Comprehensive metabolic panel; Future  -     Lipid panel; Future    Moderate episode of recurrent major depressive disorder (HCC)  -     CBC and differential; Future    Allergic rhinitis, unspecified seasonality, unspecified trigger        Subjective:      Patient ID: Remy Moreau is a 45 y o  male  Follow-up, labs reviewed with patient    Hyperlipidemia:  Much better control since he has been taking his atorvastatin regularly  Denies side effects  He will continue the medication  Depression:  On venlafaxine, also participating in counseling which he finds effective  Allergic rhinitis symptoms:  Admits to itchy watery eyes, runny nose, sneezing, generally this time a year  States he has morning cough because of thick secretions  Also starting to note morning wheezing and wheezing when he goes out into the cold air  He does have a albuterol inhaler however has not been using  We did discuss other treatment options  He is currently using his Flonase nasal inhaler, will consider adding Zyrtec at bedtime  We also did discuss singular        ALLERGIES:  Allergies   Allergen Reactions    Celecoxib Anaphylaxis    Diclofenac Shortness Of Breath       CURRENT MEDICATIONS:    Current Outpatient Medications:     albuterol (PROVENTIL HFA,VENTOLIN HFA) 90 mcg/act inhaler, TAKE 2 PUFFS BY MOUTH EVERY 6 HOURS AS NEEDED FOR WHEEZE, Disp: 8 5 Inhaler, Rfl: 2    atorvastatin (LIPITOR) 20 mg tablet, TAKE 1 TABLET BY MOUTH EVERY DAY, Disp: 90 tablet, Rfl: 3    Cholecalciferol (VITAMIN D3 PO), Take by mouth, Disp: , Rfl:     Flaxseed Oil OIL, Use 1,200 mg daily , Disp: , Rfl:     multivitamin (THERAGRAN) TABS, Take 1 tablet by mouth daily, Disp: , Rfl:     Omega-3 Fatty Acids (FISH OIL PO), Take 1,000 mg by mouth daily , Disp: , Rfl:     Saw Palmetto, Serenoa repens, (SAW PALMETTO PO), Take by mouth, Disp: , Rfl:     venlafaxine (EFFEXOR-XR) 75 mg 24 hr capsule, TAKE 1 CAPSULE BY MOUTH EVERY DAY, Disp: 90 capsule, Rfl: 1    ACTIVE PROBLEM LIST:  Patient Active Problem List   Diagnosis    Cervical somatic dysfunction    Depression    Disc degeneration, lumbar    Hyperlipidemia    Pain of finger of left hand    Skin cyst    PTSD (post-traumatic stress disorder)    Moderate episode of recurrent major depressive disorder (HCC)    Carpal tunnel syndrome on left       PAST MEDICAL HISTORY:  Past Medical History:   Diagnosis Date    Bilateral inguinal hernia 2/22/2021    Bronchitis     Cough     Pt states he has a morning cough from some sinus drainage- pt reports having a cold week of 2/8/21    COVID-19 12/2020    Pt states he only lost taste and smell- no other symptoms reported      COVID-19 virus infection 12/14/2020    Disc degeneration, lumbar     Diverticulosis     Facet syndrome     Hyperlipidemia     Meningitis     as child    Other muscle spasm     Parapsoriasis     Pneumonia     Psoriasis     Sacroiliitis (HCC)     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       PAST SURGICAL HISTORY:  Past Surgical History:   Procedure Laterality Date    FACIAL/NECK BIOPSY Left 8/4/2020    Procedure: EXCISION CYST NECK;  Surgeon: Mae Baron MD;  Location: [facility];  Service: Plastics    FLAP LOCAL HEAD / NECK Left 8/4/2020    Procedure: COMPLEX CLOSURE VS ADJACENT TISSUE REARRANGEMENT NECK;  Surgeon: Gris Taylor MD;  Location: [facility];  Service: Plastics    HERNIA REPAIR      HERNIA REPAIR Bilateral 2/22/2021    Procedure: REPAIR HERNIA INGUINAL, LAPAROSCOPIC BILATERAL WITH MESH;  Surgeon: Mike Hopkins MD;  Location: [facility];  Service: General    PYLOROMYOTOMY      ROTATOR CUFF REPAIR Left     times 2 per pt    TONSILLECTOMY AND ADENOIDECTOMY      TOOTH EXTRACTION         FAMILY HISTORY:  Family History   Problem Relation Age of Onset    Cancer Mother     Lung cancer Mother         CARCINOID TUMOR    Hypertension Mother     Hypothyroidism Mother     Kidney cancer Mother     Thyroid cancer Mother     Diabetes Maternal Grandmother     Alzheimer's disease Maternal Grandfather     Coronary artery disease Maternal Grandfather     Other Maternal Grandfather         PACEMAKER       SOCIAL HISTORY:  Social History     Socioeconomic History    Marital status: /Civil Union     Spouse name: Not on file    Number of children: 1    Years of education: Not on file    Highest education level: Not on file   Occupational History    Occupation: COMPUTER CONSULTING     Comment: FULL-TIME EMPLOYMENT   Tobacco Use    Smoking status: Former Smoker     Types: Cigarettes    Smokeless tobacco: Never Used    Tobacco comment: Pt quit 2008   Vaping Use    Vaping Use: Never used   Substance and Sexual Activity    Alcohol use: Yes     Comment: BEER - DESCRIBES AS INFREQUENT    Drug use: Yes     Types: Marijuana     Comment: Medical marijuana    Sexual activity: Yes     Partners: Female     Comment: did not ask   Other Topics Concern    Not on file   Social History Narrative    LIVING INDEPENDENTLY WITH SPOUSE    ONE SON    Unemployed    Hobbies-exercising, yd work, video games, camping     Social Determinants of Health     Financial Resource Strain: Not on file   Food Insecurity: Not on file Transportation Needs: Not on file   Physical Activity: Not on file   Stress: Not on file   Social Connections: Not on file   Intimate Partner Violence: Not on file   Housing Stability: Not on file       Review of Systems   Constitutional: Negative for activity change, chills, fatigue and fever  HENT: Positive for congestion, postnasal drip, rhinorrhea and sneezing  Negative for sore throat, tinnitus and voice change  Eyes: Negative for discharge, redness and itching  Respiratory: Positive for cough and wheezing  Negative for chest tightness and shortness of breath  Cardiovascular: Negative for chest pain, palpitations and leg swelling  Gastrointestinal: Negative for abdominal pain  Endocrine: Negative for polydipsia, polyphagia and polyuria  Genitourinary: Negative for difficulty urinating  Musculoskeletal: Negative for arthralgias and myalgias  Skin: Negative for rash  Allergic/Immunologic: Negative for immunocompromised state  Neurological: Negative for dizziness, syncope, weakness, light-headedness and headaches  Hematological: Negative for adenopathy  Does not bruise/bleed easily  Psychiatric/Behavioral: Negative for dysphoric mood, sleep disturbance and suicidal ideas  The patient is not nervous/anxious  Objective:  Vitals:    09/29/22 0754   BP: 124/80   BP Location: Right arm   Patient Position: Sitting   Cuff Size: Standard   Pulse: 72   Resp: 16   Temp: 98 5 °F (36 9 °C)   TempSrc: Tympanic   SpO2: 99%   Weight: 82 6 kg (182 lb)   Height: 5' 6" (1 676 m)     Body mass index is 29 38 kg/m²  Physical Exam  Vitals and nursing note reviewed  Constitutional:       General: He is not in acute distress  Appearance: He is well-developed  HENT:      Head: Normocephalic and atraumatic  Nose: Congestion present        Mouth/Throat:      Comments: Granular soft palate, increased clear postnasal drainage  Eyes:      Extraocular Movements: Extraocular movements intact  Pupils: Pupils are equal, round, and reactive to light  Comments: Granular conjunctiva   Neck:      Thyroid: No thyromegaly  Vascular: No carotid bruit or JVD  Cardiovascular:      Rate and Rhythm: Normal rate and regular rhythm  Heart sounds: Normal heart sounds  Pulmonary:      Effort: Pulmonary effort is normal  No respiratory distress  Comments: Scattered late inspiratory wheezing in the left upper lobe and bases  Forced expiratory wheezing is present  This does stimulate coughing  Musculoskeletal:      Cervical back: Neck supple  Right lower leg: No edema  Left lower leg: No edema  Lymphadenopathy:      Cervical: No cervical adenopathy  Skin:     General: Skin is warm and dry  Findings: No rash  Neurological:      General: No focal deficit present  Mental Status: He is alert and oriented to person, place, and time  Mental status is at baseline     Psychiatric:         Mood and Affect: Mood normal          Behavior: Behavior normal            RESULTS:    Recent Results (from the past 1008 hour(s))   Lipid panel    Collection Time: 09/28/22  8:37 AM   Result Value Ref Range    Total Cholesterol 168 <200 mg/dL    HDL 49 > OR = 40 mg/dL    Triglycerides 89 <150 mg/dL    LDL Calculated 101 (H) mg/dL (calc)    Chol HDLC Ratio 3 4 <5 0 (calc)    Non-HDL Cholesterol 119 <130 mg/dL (calc)   Comprehensive metabolic panel    Collection Time: 09/28/22  8:37 AM   Result Value Ref Range    Glucose, Random 93 65 - 99 mg/dL    BUN 14 7 - 25 mg/dL    Creatinine 0 97 0 70 - 1 30 mg/dL    eGFR 94 > OR = 60 mL/min/1 73m2    SL AMB BUN/CREATININE RATIO NOT APPLICABLE 6 - 22 (calc)    Sodium 141 135 - 146 mmol/L    Potassium 4 6 3 5 - 5 3 mmol/L    Chloride 107 98 - 110 mmol/L    CO2 26 20 - 32 mmol/L    Calcium 9 7 8 6 - 10 3 mg/dL    Protein, Total 6 4 6 1 - 8 1 g/dL    Albumin 4 5 3 6 - 5 1 g/dL    Globulin 1 9 1 9 - 3 7 g/dL (calc)    Albumin/Globulin Ratio 2 4 1 0 - 2 5 (calc)    TOTAL BILIRUBIN 0 4 0 2 - 1 2 mg/dL    Alkaline Phosphatase 73 35 - 144 U/L    AST 25 10 - 35 U/L    ALT 30 9 - 46 U/L       This note was created with voice recognition software  Phonic, grammatical and spelling errors may be present within the note as a result

## 2022-10-24 ENCOUNTER — SOCIAL WORK (OUTPATIENT)
Dept: BEHAVIORAL/MENTAL HEALTH CLINIC | Facility: CLINIC | Age: 52
End: 2022-10-24
Payer: COMMERCIAL

## 2022-10-24 DIAGNOSIS — F33.1 MODERATE EPISODE OF RECURRENT MAJOR DEPRESSIVE DISORDER (HCC): Primary | ICD-10-CM

## 2022-10-24 DIAGNOSIS — F43.10 PTSD (POST-TRAUMATIC STRESS DISORDER): ICD-10-CM

## 2022-10-24 PROCEDURE — 90834 PSYTX W PT 45 MINUTES: CPT | Performed by: SOCIAL WORKER

## 2022-10-24 NOTE — PSYCH
Visit Time    Visit Start Time: 9:00 AM  Visit Stop Time: 9:44 AM  Total Visit Duration: 44 minutes     Assessment/Plan: f/u in a month     There are no diagnoses linked to this encounter  Subjective: Therapist met w/pt for individual session  Pt shared that his son was suspended for the past two weeks  He identified an increase in stressors during that time but overall he feels as if he has been managing his anxiety well  Symptoms have included: racing thoughts, mood shifts, heart palpitations  Therapist and pt discussed how working on home projects are a good distraction and good coping skill for him  He stated he went to his PCP appointment a few weeks ago and overall his health is going well too which he is happy about  Patient ID: Dylon Rasmussen is a 47 y o  male  HPI    Review of Systems      Objective: Pt appeared to be in a good mood         Physical Exam  Pt denied any Si/Hi/Ah/VH

## 2022-11-09 DIAGNOSIS — J20.9 ACUTE BRONCHITIS WITH BRONCHOSPASM: ICD-10-CM

## 2022-11-09 RX ORDER — ALBUTEROL SULFATE 90 UG/1
2 AEROSOL, METERED RESPIRATORY (INHALATION) EVERY 6 HOURS PRN
Qty: 18 G | Refills: 1 | Status: SHIPPED | OUTPATIENT
Start: 2022-11-09

## 2022-11-29 ENCOUNTER — SOCIAL WORK (OUTPATIENT)
Dept: BEHAVIORAL/MENTAL HEALTH CLINIC | Facility: CLINIC | Age: 52
End: 2022-11-29

## 2022-11-29 DIAGNOSIS — F43.10 PTSD (POST-TRAUMATIC STRESS DISORDER): Primary | ICD-10-CM

## 2022-11-29 DIAGNOSIS — F33.1 MODERATE EPISODE OF RECURRENT MAJOR DEPRESSIVE DISORDER (HCC): ICD-10-CM

## 2022-11-30 NOTE — PSYCH
Assessment/Plan: f/u in a month     There are no diagnoses linked to this encounter  Subjective: Therapist met w/pt for individual session  Pt identified one situation that triggered his anxiety  He stated that he froze and ran through scenarios in his head  Therapist and pt discussed how he went into fight or flight response but was able to utilize grounding techniques so he didn't react  Therapist and pt discussed how proud he was of himself for not reacting and being able to take a step back because before he struggled which is what had initially led him to seek treatment  Patient ID: Joanie Phan is a 47 y o  male  HPI    Review of Systems      Objective: Pt appeared to be in a good mood and was easily engaged        Physical Exam  Pt denied any Si/HI/Ah/Vh

## 2022-12-16 DIAGNOSIS — F32.A DEPRESSION, UNSPECIFIED DEPRESSION TYPE: ICD-10-CM

## 2022-12-16 RX ORDER — VENLAFAXINE HYDROCHLORIDE 75 MG/1
CAPSULE, EXTENDED RELEASE ORAL
Qty: 90 CAPSULE | Refills: 1 | Status: SHIPPED | OUTPATIENT
Start: 2022-12-16

## 2022-12-27 ENCOUNTER — SOCIAL WORK (OUTPATIENT)
Dept: BEHAVIORAL/MENTAL HEALTH CLINIC | Facility: CLINIC | Age: 52
End: 2022-12-27

## 2022-12-27 DIAGNOSIS — F43.10 PTSD (POST-TRAUMATIC STRESS DISORDER): ICD-10-CM

## 2022-12-27 DIAGNOSIS — F32.A DEPRESSION, UNSPECIFIED DEPRESSION TYPE: Primary | ICD-10-CM

## 2022-12-27 NOTE — PSYCH
Assessment/Plan: f/u in one month     Diagnoses and all orders for this visit:    Depression, unspecified depression type    PTSD (post-traumatic stress disorder)          Subjective: Therapist met w/pt for individual session  Pt stated that his symptoms have been manageable  He stated that he hasn't really left the house much due to the cold weather  He stated that he has been focused on building some things for a game of his  He stated that that has been keeping him busy  Therapist and pt discussed how projects are helpful for pt and help keep his mind occupied  He stated that he found out this morning as he was leaving that he is starting family therapy next week  He stated that he isn't sure what it is going to entail or what prompted it but will update therapist next session  He stated that he doesn't have anxiety about it he just isn't sure what the goal is  Patient ID: Nancy Fuentes is a 47 y o  male  HPI    Review of Systems      Objective: Pt appeared to be in a good mood and was easily engaged        Physical Exam  Pt denied any SI/HI/AH/VH

## 2023-02-03 ENCOUNTER — SOCIAL WORK (OUTPATIENT)
Dept: BEHAVIORAL/MENTAL HEALTH CLINIC | Facility: CLINIC | Age: 53
End: 2023-02-03

## 2023-02-03 DIAGNOSIS — F33.1 MODERATE EPISODE OF RECURRENT MAJOR DEPRESSIVE DISORDER (HCC): Primary | ICD-10-CM

## 2023-02-03 DIAGNOSIS — F43.10 PTSD (POST-TRAUMATIC STRESS DISORDER): ICD-10-CM

## 2023-02-03 NOTE — PSYCH
Behavioral Health Psychotherapy Progress Note    Psychotherapy Provided: Individual Psychotherapy     No diagnosis found  DATA: Therapist met w/pt for individual session  Pt stated that overall things are "fine " He stated that he still gets "in his head" at times but feels that it is better  He stated he has had two couples counseling sessions and the last one really frustrated him  Therapist and pt discussed what triggered him and how he can continue to work on ways to ground himself if he does become triggered  During this session, this clinician used the following therapeutic modalities: Cognitive Behavioral Therapy and Solution-Focused Therapy    Substance Abuse was not addressed during this session  If the client is diagnosed with a co-occurring substance use disorder, please indicate any changes in the frequency or amount of use: n/a  Stage of change for addressing substance use diagnoses: No substance use/Not applicable    ASSESSMENT:  Aster Aguilar presents with a Euthymic/ normal mood  his affect is Normal range and intensity, which is congruent, with his mood and the content of the session  The client has made progress on their goals  Aster Aguilar presents with a none risk of suicide, none risk of self-harm, and minimal risk of harm to others  For any risk assessment that surpasses a "low" rating, a safety plan must be developed  A safety plan was indicated: none  If yes, describe in detail n/a    PLAN: Between sessions, Aster Aguilar will implement positive coping skills, continue to work on his projects at home  At the next session, the therapist will use Cognitive Behavioral Therapy and Solution-Focused Therapy to address anxiety  Behavioral Health Treatment Plan and Discharge Planning: Aster Aguilar is aware of and agrees to continue to work on their treatment plan  They have identified and are working toward their discharge goals       Visit start and stop times:    02/03/23  Start Time: 0900  Stop Time: 0945  Total Visit Time: 45 minutes

## 2023-03-07 ENCOUNTER — SOCIAL WORK (OUTPATIENT)
Dept: BEHAVIORAL/MENTAL HEALTH CLINIC | Facility: CLINIC | Age: 53
End: 2023-03-07

## 2023-03-07 DIAGNOSIS — F33.1 MODERATE EPISODE OF RECURRENT MAJOR DEPRESSIVE DISORDER (HCC): ICD-10-CM

## 2023-03-07 DIAGNOSIS — F43.10 PTSD (POST-TRAUMATIC STRESS DISORDER): Primary | ICD-10-CM

## 2023-03-07 NOTE — PSYCH
Behavioral Health Psychotherapy Progress Note    Psychotherapy Provided: Individual Psychotherapy     No diagnosis found  DATA: Therapist met w/pt for individual session  Pt stated that he just got off the phone w/another parent  He shared what his son did and how he is experiencing so many different feelings, the main one being disappointment  Pt expressed consequences for his son's behaviors  He stated that they also had parent teacher conferences and it reinforced how pts son hasn't been putting effort into things  Pt expressed anger but able to manage his anger  He stated that he has been channeling his anger/anxiety into doing things around the house  He stated that he feels as if he is better able to communicate w/this wife as well  During this session, this clinician used the following therapeutic modalities: Cognitive Behavioral Therapy    Substance Abuse was not addressed during this session  If the client is diagnosed with a co-occurring substance use disorder, please indicate any changes in the frequency or amount of use: n/a  Stage of change for addressing substance use diagnoses: No substance use/Not applicable    ASSESSMENT:  Julia Jang presents with a Anxious mood  his affect is Normal range and intensity, which is congruent, with his mood and the content of the session  The client has made progress on their goals  Julia Jang presents with a none risk of suicide, none risk of self-harm, and none risk of harm to others  For any risk assessment that surpasses a "low" rating, a safety plan must be developed  A safety plan was indicated: none  If yes, describe in detail n/a    PLAN: Between sessions, Julia Jang will use mindfulness and will use positive distraction strategies  At the next session, the therapist will use Cognitive Behavioral Therapy, Motivational Interviewing and Solution-Focused Therapy to address symptoms of anxiety      Behavioral Health Treatment Plan and Discharge Planning: Gaby Rincon is aware of and agrees to continue to work on their treatment plan  They have identified and are working toward their discharge goals       Visit start and stop times:    03/07/23  Start Time: 1101  Stop Time: 1148  Total Visit Time: 47 minutes

## 2023-03-29 ENCOUNTER — OFFICE VISIT (OUTPATIENT)
Dept: INTERNAL MEDICINE CLINIC | Facility: CLINIC | Age: 53
End: 2023-03-29

## 2023-03-29 VITALS
HEIGHT: 66 IN | RESPIRATION RATE: 16 BRPM | SYSTOLIC BLOOD PRESSURE: 120 MMHG | OXYGEN SATURATION: 99 % | TEMPERATURE: 98 F | DIASTOLIC BLOOD PRESSURE: 78 MMHG | BODY MASS INDEX: 29.25 KG/M2 | HEART RATE: 88 BPM | WEIGHT: 182 LBS

## 2023-03-29 DIAGNOSIS — L30.1 DYSHYDROSIS: ICD-10-CM

## 2023-03-29 DIAGNOSIS — E78.2 MIXED HYPERLIPIDEMIA: Primary | ICD-10-CM

## 2023-03-29 DIAGNOSIS — F43.10 PTSD (POST-TRAUMATIC STRESS DISORDER): ICD-10-CM

## 2023-03-29 DIAGNOSIS — M25.511 ACUTE PAIN OF RIGHT SHOULDER: ICD-10-CM

## 2023-03-29 DIAGNOSIS — F33.1 MODERATE EPISODE OF RECURRENT MAJOR DEPRESSIVE DISORDER (HCC): ICD-10-CM

## 2023-03-29 RX ORDER — CLOBETASOL PROPIONATE 0.5 MG/G
OINTMENT TOPICAL 2 TIMES DAILY
Qty: 30 G | Refills: 3 | Status: SHIPPED | OUTPATIENT
Start: 2023-03-29

## 2023-03-29 NOTE — PATIENT INSTRUCTIONS
Have labs done and I will discuss results with you when available  No medication changes at this time  Use clobetasol ointment as needed for outbreaks of dyshidrosis  Keep me informed of right shoulder pain if not steadily improving  Gradual follow-up in 6 months for annual physical, after seeing current labs I will add new labs to do for that visit as necessary
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
## 2023-04-03 ENCOUNTER — SOCIAL WORK (OUTPATIENT)
Dept: BEHAVIORAL/MENTAL HEALTH CLINIC | Facility: CLINIC | Age: 53
End: 2023-04-03

## 2023-04-03 ENCOUNTER — APPOINTMENT (OUTPATIENT)
Dept: LAB | Facility: HOSPITAL | Age: 53
End: 2023-04-03

## 2023-04-03 DIAGNOSIS — E78.2 MIXED HYPERLIPIDEMIA: Primary | ICD-10-CM

## 2023-04-03 DIAGNOSIS — E78.2 MIXED HYPERLIPIDEMIA: ICD-10-CM

## 2023-04-03 DIAGNOSIS — F33.1 MODERATE EPISODE OF RECURRENT MAJOR DEPRESSIVE DISORDER (HCC): Primary | ICD-10-CM

## 2023-04-03 DIAGNOSIS — F33.1 MODERATE EPISODE OF RECURRENT MAJOR DEPRESSIVE DISORDER (HCC): ICD-10-CM

## 2023-04-03 DIAGNOSIS — F43.10 PTSD (POST-TRAUMATIC STRESS DISORDER): ICD-10-CM

## 2023-04-03 LAB
ALBUMIN SERPL BCP-MCNC: 4.4 G/DL (ref 3.5–5)
ALP SERPL-CCNC: 65 U/L (ref 34–104)
ALT SERPL W P-5'-P-CCNC: 27 U/L (ref 7–52)
ANION GAP SERPL CALCULATED.3IONS-SCNC: 6 MMOL/L (ref 4–13)
AST SERPL W P-5'-P-CCNC: 24 U/L (ref 13–39)
BASOPHILS # BLD AUTO: 0.06 THOUSANDS/ÂΜL (ref 0–0.1)
BASOPHILS NFR BLD AUTO: 1 % (ref 0–1)
BILIRUB SERPL-MCNC: 0.32 MG/DL (ref 0.2–1)
BUN SERPL-MCNC: 19 MG/DL (ref 5–25)
CALCIUM SERPL-MCNC: 9.4 MG/DL (ref 8.4–10.2)
CHLORIDE SERPL-SCNC: 106 MMOL/L (ref 96–108)
CHOLEST SERPL-MCNC: 170 MG/DL
CO2 SERPL-SCNC: 30 MMOL/L (ref 21–32)
CREAT SERPL-MCNC: 0.98 MG/DL (ref 0.6–1.3)
EOSINOPHIL # BLD AUTO: 0.33 THOUSAND/ÂΜL (ref 0–0.61)
EOSINOPHIL NFR BLD AUTO: 5 % (ref 0–6)
ERYTHROCYTE [DISTWIDTH] IN BLOOD BY AUTOMATED COUNT: 12.2 % (ref 11.6–15.1)
GFR SERPL CREATININE-BSD FRML MDRD: 87 ML/MIN/1.73SQ M
GLUCOSE P FAST SERPL-MCNC: 99 MG/DL (ref 65–99)
HCT VFR BLD AUTO: 43.3 % (ref 36.5–49.3)
HDLC SERPL-MCNC: 52 MG/DL
HGB BLD-MCNC: 14.8 G/DL (ref 12–17)
IMM GRANULOCYTES # BLD AUTO: 0.02 THOUSAND/UL (ref 0–0.2)
IMM GRANULOCYTES NFR BLD AUTO: 0 % (ref 0–2)
LDLC SERPL CALC-MCNC: 97 MG/DL (ref 0–100)
LYMPHOCYTES # BLD AUTO: 2.8 THOUSANDS/ÂΜL (ref 0.6–4.47)
LYMPHOCYTES NFR BLD AUTO: 40 % (ref 14–44)
MCH RBC QN AUTO: 31.4 PG (ref 26.8–34.3)
MCHC RBC AUTO-ENTMCNC: 34.2 G/DL (ref 31.4–37.4)
MCV RBC AUTO: 92 FL (ref 82–98)
MONOCYTES # BLD AUTO: 0.4 THOUSAND/ÂΜL (ref 0.17–1.22)
MONOCYTES NFR BLD AUTO: 6 % (ref 4–12)
NEUTROPHILS # BLD AUTO: 3.4 THOUSANDS/ÂΜL (ref 1.85–7.62)
NEUTS SEG NFR BLD AUTO: 48 % (ref 43–75)
NONHDLC SERPL-MCNC: 118 MG/DL
NRBC BLD AUTO-RTO: 0 /100 WBCS
PLATELET # BLD AUTO: 298 THOUSANDS/UL (ref 149–390)
PMV BLD AUTO: 9.8 FL (ref 8.9–12.7)
POTASSIUM SERPL-SCNC: 4.2 MMOL/L (ref 3.5–5.3)
PROT SERPL-MCNC: 6.6 G/DL (ref 6.4–8.4)
RBC # BLD AUTO: 4.72 MILLION/UL (ref 3.88–5.62)
SODIUM SERPL-SCNC: 142 MMOL/L (ref 135–147)
TRIGL SERPL-MCNC: 107 MG/DL
WBC # BLD AUTO: 7.01 THOUSAND/UL (ref 4.31–10.16)

## 2023-04-03 NOTE — PSYCH
"Behavioral Health Psychotherapy Progress Note    Psychotherapy Provided: Individual Psychotherapy     1  Moderate episode of recurrent major depressive disorder ([address] )        2  PTSD (post-traumatic stress disorder)            DATA: Therapist met w/pt for individual session  Pt stated that he has felt more irritated/annoyed lately especially with his wife  He stated that they have two different parenting styles and he tends to always be the \"bad tiffany\" which has been exhausting for him  He has been struggling w/boredom and trying to find things to occupy his time  Therapist and pt discussed healthy ways to communicate that  He stated that marriage counseling has been nonexistent due to therapist cancelling  He stated that he is waiting to hear when the next one is  He stated his overall anxiety has been manageable  Therapist and pt discussed different things that he is trying to do to stay busy  During this session, this clinician used the following therapeutic modalities: Cognitive Behavioral Therapy, Motivational Interviewing and Solution-Focused Therapy    Substance Abuse was addressed during this session  If the client is diagnosed with a co-occurring substance use disorder, please indicate any changes in the frequency or amount of use: no change  Stage of change for addressing substance use diagnoses: No substance use/Not applicable    ASSESSMENT:  Maria Luisa Lynch presents with a Euthymic/ normal mood  his affect is Normal range and intensity, which is congruent, with his mood and the content of the session  The client has made progress on their goals  Maria Luisa Lynch presents with a minimal risk of suicide, minimal risk of self-harm, and none risk of harm to others  For any risk assessment that surpasses a \"low\" rating, a safety plan must be developed      A safety plan was indicated: none  If yes, describe in detail n/a    PLAN: Between sessions, Maria Luisa Lynch will continue to engage in " "hobbies, challenge anxious/negative thoughts  At the next session, the therapist will use Cognitive Behavioral Therapy to address symptoms of anxiety/depression  Behavioral Health Treatment Plan and Discharge Planning: Kenyon Guzman is aware of and agrees to continue to work on their treatment plan  They have identified and are working toward their discharge goals  Visit start and stop times:    23  Start Time: 0945  Stop Time: 1030  Total Visit Time: 45 minutesClient Name: Kenyon Guzman       Client YOB: 1970  : 1970    Treatment Team (include name and contact information):     Psychotherapist: Tyler Nascimento MD  [address]  [address] [address] [phone]    Type of Plan   * Child plans (children 15 yo and younger) must be completed and signed by the child's legal guardian   * Plans for all individuals 15 yo and above must be signed by the client  Plan Type: adolescent/adult (14 and over) Initial    My Personal Strengths are (in the client's own words):  \"Building things  \"    The stressors and triggers that may put me at risk are:  other (describe) being around unhealthy people    Coping skills I can use to keep myself calm and safe: Other (describe) \"I would leave  \"  \"I would play video games, just try to stay busy  \"    Coping skills/supports I can use to maintain abstinence from substance use:   N/A    The people that provide me with help and support: (Include name, contact, and how they can help)   Support person #1: Deann Grider    * Phone number: [phone]    * How can they help me? \"listen to me  \"     In the past, the following has helped me in times of crisis:    Being in a quiet space, Taking a walk or exercising and Listening to music      If it is an emergency and you need immediate help, call     If there is a possibility of danger to yourself or others, call the following crisis hotline resources:     Adult " Crisis Numbers  Suicide Prevention Hotline - Dial 9-8-8  [facility]: [address]: [address]: [phone]  [address] ([facility]): [phone]  [facility]: [address]: [address] [place] [address] St: [phone] (daytime)  [phone] (after hours, weekends, holidays)     Child/Adolescent Crisis Numbers   [facility] [place]: [address]: [phone]   [address]: [phone]   [address] ([facility]): [phone]    Please note: Some [facility] do not have a separate number for Child/Adolescent specific crisis  If your county is not listed under Child/Adolescent, please call the adult number for your county     National Talk to Text Line   All Ages - [phone]    In the event your feelings become unmanageable, and you cannot reach your support system, you will call 911 immediately or go to the nearest hospital emergency room

## 2023-05-03 ENCOUNTER — SOCIAL WORK (OUTPATIENT)
Dept: BEHAVIORAL/MENTAL HEALTH CLINIC | Facility: CLINIC | Age: 53
End: 2023-05-03

## 2023-05-03 DIAGNOSIS — F33.1 MODERATE EPISODE OF RECURRENT MAJOR DEPRESSIVE DISORDER (HCC): ICD-10-CM

## 2023-05-03 DIAGNOSIS — F41.9 ANXIETY: Primary | ICD-10-CM

## 2023-05-03 DIAGNOSIS — F43.10 PTSD (POST-TRAUMATIC STRESS DISORDER): ICD-10-CM
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
## 2023-05-19 ENCOUNTER — SOCIAL WORK (OUTPATIENT)
Dept: BEHAVIORAL/MENTAL HEALTH CLINIC | Facility: CLINIC | Age: 53
End: 2023-05-19

## 2023-05-19 DIAGNOSIS — F43.10 PTSD (POST-TRAUMATIC STRESS DISORDER): Primary | ICD-10-CM

## 2023-05-19 DIAGNOSIS — F33.1 MODERATE EPISODE OF RECURRENT MAJOR DEPRESSIVE DISORDER (HCC): ICD-10-CM
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
## 2023-05-20 ENCOUNTER — HOSPITAL ENCOUNTER (EMERGENCY)
Facility: HOSPITAL | Age: 53
Discharge: HOME | End: 2023-05-20
Attending: EMERGENCY MEDICINE

## 2023-05-20 VITALS
HEART RATE: 77 BPM | TEMPERATURE: 98.5 F | RESPIRATION RATE: 16 BRPM | OXYGEN SATURATION: 96 % | DIASTOLIC BLOOD PRESSURE: 89 MMHG | SYSTOLIC BLOOD PRESSURE: 135 MMHG

## 2023-05-20 DIAGNOSIS — R06.02 SOB (SHORTNESS OF BREATH): ICD-10-CM

## 2023-05-20 DIAGNOSIS — R07.89 CHEST TIGHTNESS: Primary | ICD-10-CM

## 2023-05-20 DIAGNOSIS — F41.9 ANXIETY: ICD-10-CM

## 2023-05-20 LAB
ANION GAP SERPL CALCULATED.3IONS-SCNC: 7 MMOL/L (ref 4–13)
ATRIAL RATE: 79 BPM
BASOPHILS # BLD AUTO: 0.03 THOUSANDS/ÂΜL (ref 0–0.1)
BASOPHILS NFR BLD AUTO: 0 % (ref 0–1)
BUN SERPL-MCNC: 9 MG/DL (ref 5–25)
CALCIUM SERPL-MCNC: 10 MG/DL (ref 8.4–10.2)
CARDIAC TROPONIN I PNL SERPL HS: 4 NG/L
CHLORIDE SERPL-SCNC: 104 MMOL/L (ref 96–108)
CO2 SERPL-SCNC: 25 MMOL/L (ref 21–32)
CREAT SERPL-MCNC: 1.01 MG/DL (ref 0.6–1.3)
EOSINOPHIL # BLD AUTO: 0.01 THOUSAND/ÂΜL (ref 0–0.61)
EOSINOPHIL NFR BLD AUTO: 0 % (ref 0–6)
ERYTHROCYTE [DISTWIDTH] IN BLOOD BY AUTOMATED COUNT: 12.1 % (ref 11.6–15.1)
GFR SERPL CREATININE-BSD FRML MDRD: 84 ML/MIN/1.73SQ M
GLUCOSE SERPL-MCNC: 100 MG/DL (ref 65–140)
HCT VFR BLD AUTO: 42.5 % (ref 36.5–49.3)
HGB BLD-MCNC: 14.7 G/DL (ref 12–17)
IMM GRANULOCYTES # BLD AUTO: 0.03 THOUSAND/UL (ref 0–0.2)
IMM GRANULOCYTES NFR BLD AUTO: 0 % (ref 0–2)
LYMPHOCYTES # BLD AUTO: 1.26 THOUSANDS/ÂΜL (ref 0.6–4.47)
LYMPHOCYTES NFR BLD AUTO: 13 % (ref 14–44)
MCH RBC QN AUTO: 30.6 PG (ref 26.8–34.3)
MCHC RBC AUTO-ENTMCNC: 34.6 G/DL (ref 31.4–37.4)
MCV RBC AUTO: 88 FL (ref 82–98)
MONOCYTES # BLD AUTO: 0.27 THOUSAND/ÂΜL (ref 0.17–1.22)
MONOCYTES NFR BLD AUTO: 3 % (ref 4–12)
NEUTROPHILS # BLD AUTO: 7.99 THOUSANDS/ÂΜL (ref 1.85–7.62)
NEUTS SEG NFR BLD AUTO: 84 % (ref 43–75)
NRBC BLD AUTO-RTO: 0 /100 WBCS
P AXIS: 70 DEGREES
PLATELET # BLD AUTO: 299 THOUSANDS/UL (ref 149–390)
PMV BLD AUTO: 9.4 FL (ref 8.9–12.7)
POTASSIUM SERPL-SCNC: 4.1 MMOL/L (ref 3.5–5.3)
PR INTERVAL: 152 MS
QRS AXIS: 84 DEGREES
QRSD INTERVAL: 92 MS
QT INTERVAL: 390 MS
QTC INTERVAL: 447 MS
RBC # BLD AUTO: 4.81 MILLION/UL (ref 3.88–5.62)
SODIUM SERPL-SCNC: 136 MMOL/L (ref 135–147)
T WAVE AXIS: 63 DEGREES
VENTRICULAR RATE: 79 BPM
WBC # BLD AUTO: 9.59 THOUSAND/UL (ref 4.31–10.16)

## 2023-05-20 RX ORDER — LORAZEPAM 2 MG/ML
1 INJECTION INTRAMUSCULAR ONCE
Status: COMPLETED | OUTPATIENT
Start: 2023-05-20 | End: 2023-05-20

## 2023-05-20 RX ORDER — LORAZEPAM 2 MG/ML
0.5 INJECTION INTRAMUSCULAR ONCE
Status: COMPLETED | OUTPATIENT
Start: 2023-05-20 | End: 2023-05-20

## 2023-05-20 RX ORDER — HYDROXYZINE HYDROCHLORIDE 25 MG/1
25 TABLET, FILM COATED ORAL EVERY 6 HOURS PRN
Qty: 12 TABLET | Refills: 0 | Status: SHIPPED | OUTPATIENT
Start: 2023-05-20 | End: 2023-05-31

## 2023-05-20 RX ADMIN — LORAZEPAM 1 MG: 2 INJECTION INTRAMUSCULAR; INTRAVENOUS at 13:21

## 2023-05-20 RX ADMIN — LORAZEPAM 0.5 MG: 2 INJECTION INTRAMUSCULAR; INTRAVENOUS at 15:56

## 2023-05-20 NOTE — ED PROVIDER NOTES
"History  Chief Complaint   Patient presents with   • Anxiety     Patient reports \"i've been in a 3 day panic attack  \" Reports starting couples therapy this week and that's when the anxiety started  Denies SI/HI  67-year-old male no reported past history presenting with anxiety  Patient reports worsening feelings of stress and anxiety over the last several days  Patient reports significant stressors at home as he recently started couples counseling patient reports persistent feelings of stress and anxiety with occasional bandlike feeling around the bottom of his chest associated with flushing and tachypnea when he feels very stressed  Denies any chest pain  Denies abdominal pain nausea vomiting diarrhea  Denies any injury  Patient reports that he has a therapist who he has already been in discussion with about starting anxiety medications  Patient here today due to overall worsening symptoms  Denies any other complaints  Chart reviewed  Past Medical History:  2/22/2021: Bilateral inguinal hernia  No date: Bronchitis  No date: Cough      Comment:  Pt states he has a morning cough from some sinus                drainage- pt reports having a cold week of 2/8/21 12/2020: COVID-19      Comment:  Pt states he only lost taste and smell- no other                symptoms reported  12/14/2020: COVID-19 virus infection  No date: Disc degeneration, lumbar  No date: Diverticulosis  No date: Facet syndrome  No date: Hyperlipidemia  No date: Meningitis      Comment:  as child  No date: Other muscle spasm  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 Taking?    Cholecalciferol (VITAMIN D3 PO)   Yes No   Sig: Take by " mouth   Flaxseed Oil OIL   Yes No   Sig: Use 1,200 mg daily    Omega-3 Fatty Acids (FISH OIL PO)   Yes No   Sig: Take 1,000 mg by mouth daily    Saw Palmetto, Serenoa repens, (SAW PALMETTO PO)   Yes No   Sig: Take by mouth   albuterol (PROVENTIL HFA,VENTOLIN HFA) 90 mcg/act inhaler   No No   Sig: Inhale 2 puffs every 6 (six) hours as needed for wheezing   atorvastatin (LIPITOR) 20 mg tablet   No No   Sig: TAKE 1 TABLET BY MOUTH EVERY DAY   clobetasol (TEMOVATE) 0 05 % ointment   No No   Sig: Apply topically 2 (two) times a day   multivitamin (THERAGRAN) TABS   Yes No   Sig: Take 1 tablet by mouth daily   venlafaxine (EFFEXOR-XR) 75 mg 24 hr capsule   No No   Sig: TAKE 1 CAPSULE BY MOUTH EVERY DAY      Facility-Administered Medications: None       Past Medical History:   Diagnosis Date   • Bilateral inguinal hernia 2/22/2021   • Bronchitis    • Cough     Pt states he has a morning cough from some sinus drainage- pt reports having a cold week of 2/8/21   • COVID-19 12/2020    Pt states he only lost taste and smell- no other symptoms reported     • COVID-19 virus infection 12/14/2020   • Disc degeneration, lumbar    • Diverticulosis    • Facet syndrome    • Hyperlipidemia    • Meningitis     as child   • Other muscle spasm    • Parapsoriasis    • Pneumonia    • Psoriasis    • Sacroiliitis ([facility] [address] )    • 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       Past Surgical History:   Procedure Laterality Date   • FACIAL/NECK BIOPSY Left 8/4/2020    Procedure: EXCISION CYST NECK;  Surgeon: Veronica Bryson MD;  Location: [facility];  Service: Plastics   • FLAP LOCAL HEAD / NECK Left 8/4/2020    Procedure: COMPLEX CLOSURE VS ADJACENT TISSUE REARRANGEMENT NECK;  Surgeon: Veronica Bryson MD;  Location: [facility];  Service: Plastics   • HERNIA REPAIR     • HERNIA REPAIR Bilateral 2/22/2021    Procedure: REPAIR HERNIA INGUINAL, LAPAROSCOPIC BILATERAL WITH MESH;  Surgeon: Jamaal Rachel Antwon Doll MD;  Location: [facility] OR;  Service: General   • PYLOROMYOTOMY     • ROTATOR CUFF REPAIR Left     times 2 per pt   • TONSILLECTOMY AND ADENOIDECTOMY     • TOOTH EXTRACTION         Family History   Problem Relation Age of Onset   • Cancer Mother    • Lung cancer Mother         CARCINOID TUMOR   • Hypertension Mother    • Hypothyroidism Mother    • Kidney cancer Mother    • Thyroid cancer Mother    • Diabetes Maternal Grandmother    • Alzheimer's disease Maternal Grandfather    • Coronary artery disease Maternal Grandfather    • Other Maternal Grandfather         PACEMAKER     I have reviewed and agree with the history as documented  E-Cigarette/Vaping   • E-Cigarette Use Never User      E-Cigarette/Vaping Substances     Social History     Tobacco Use   • Smoking status: Former     Types: Cigarettes   • Smokeless tobacco: Never   • Tobacco comments:     Pt quit 2008   Vaping Use   • Vaping Use: Never used   Substance Use Topics   • Alcohol use: Yes     Comment: BEER - DESCRIBES AS INFREQUENT   • Drug use: Yes     Types: Marijuana     Comment: Medical marijuana       Review of Systems   Constitutional: Negative for appetite change, chills, diaphoresis, fever and unexpected weight change  HENT: Negative for congestion and rhinorrhea  Eyes: Negative for photophobia and visual disturbance  Respiratory: Positive for chest tightness  Negative for cough and shortness of breath  Cardiovascular: Negative for chest pain, palpitations and leg swelling  Gastrointestinal: Negative for abdominal distention, abdominal pain, blood in stool, constipation, diarrhea, nausea and vomiting  Genitourinary: Negative for dysuria and hematuria  Musculoskeletal: Negative for back pain, joint swelling, neck pain and neck stiffness  Skin: Negative for color change, pallor, rash and wound  Neurological: Negative for dizziness, syncope, weakness, light-headedness and headaches     Psychiatric/Behavioral: Negative for agitation, hallucinations, self-injury and suicidal ideas  The patient is nervous/anxious  All other systems reviewed and are negative  Physical Exam  Physical Exam  Vitals and nursing note reviewed  Constitutional:       General: He is not in acute distress  Appearance: He is well-developed  He is not ill-appearing, toxic-appearing or diaphoretic  Comments: Anxious appearing   HENT:      Head: Normocephalic and atraumatic  Nose: Nose normal  No congestion or rhinorrhea  Mouth/Throat:      Mouth: Mucous membranes are moist       Pharynx: Oropharynx is clear  No oropharyngeal exudate or posterior oropharyngeal erythema  Eyes:      General: No scleral icterus  Right eye: No discharge  Left eye: No discharge  Extraocular Movements: Extraocular movements intact  Conjunctiva/sclera: Conjunctivae normal       Pupils: Pupils are equal, round, and reactive to light  Neck:      Vascular: No JVD  Trachea: No tracheal deviation  Comments: Supple  Normal range of motion  Cardiovascular:      Rate and Rhythm: Normal rate and regular rhythm  Heart sounds: Normal heart sounds  No murmur heard  No friction rub  No gallop  Comments: Normal rate and regular rhythm  Pulmonary:      Effort: Pulmonary effort is normal  No respiratory distress  Breath sounds: Normal breath sounds  No stridor  No wheezing or rales  Comments: Clear to auscultation bilaterally  Chest:      Chest wall: No tenderness  Abdominal:      General: Bowel sounds are normal  There is no distension  Palpations: Abdomen is soft  Tenderness: There is no abdominal tenderness  There is no right CVA tenderness, left CVA tenderness, guarding or rebound  Comments: Soft, nontender, nondistended  Normal bowel sounds throughout   Musculoskeletal:         General: No swelling, tenderness, deformity or signs of injury  Normal range of motion        Cervical back: Normal range of motion and neck supple  No rigidity  No muscular tenderness  Right lower leg: No edema  Left lower leg: No edema  Lymphadenopathy:      Cervical: No cervical adenopathy  Skin:     General: Skin is warm and dry  Coloration: Skin is not pale  Findings: No erythema or rash  Neurological:      General: No focal deficit present  Mental Status: He is alert  Mental status is at baseline  Sensory: No sensory deficit  Motor: No weakness or abnormal muscle tone  Coordination: Coordination normal       Gait: Gait normal       Comments: Alert  Strength and sensation grossly intact  Ambulatory without difficulty at baseline            Vital Signs  ED Triage Vitals [05/20/23 1234]   Temperature Pulse Respirations Blood Pressure SpO2   98 5 °F (36 9 °C) 98 18 139/91 98 %      Temp src Heart Rate Source Patient Position - Orthostatic VS BP Location FiO2 (%)   -- -- -- -- --      Pain Score       --           Vitals:    05/20/23 1234 05/20/23 1545   BP: 139/91 135/89   Pulse: 98 77         Visual Acuity      ED Medications  Medications   LORazepam (ATIVAN) injection 1 mg (1 mg Intravenous Given 5/20/23 1321)   LORazepam (ATIVAN) injection 0 5 mg (0 5 mg Intravenous Given 5/20/23 1556)       Diagnostic Studies  Results Reviewed     Procedure Component Value Units Date/Time    HS Troponin 0hr (reflex protocol) [[id number]]  (Normal) Collected: 05/20/23 1319    Lab Status: Final result Specimen: Blood from Arm, Right Updated: 05/20/23 1355     hs TnI 0hr 4 ng/L     Basic metabolic panel [[id number]] Collected: 05/20/23 1319    Lab Status: Final result Specimen: Blood from Arm, Right Updated: 05/20/23 1346     Sodium 136 mmol/L      Potassium 4 1 mmol/L      Chloride 104 mmol/L      CO2 25 mmol/L      ANION GAP 7 mmol/L      BUN 9 mg/dL      Creatinine 1 01 mg/dL      Glucose 100 mg/dL      Calcium 10 0 mg/dL      eGFR 84 ml/min/1 73sq m     Narrative:      National Kidney Disease Foundation guidelines for Chronic Kidney Disease (CKD):   •  Stage 1 with normal or high GFR (GFR > 90 mL/min/1 73 square meters)  •  Stage 2 Mild CKD (GFR = 60-89 mL/min/1 73 square meters)  •  Stage 3A Moderate CKD (GFR = 45-59 mL/min/1 73 square meters)  •  Stage 3B Moderate CKD (GFR = 30-44 mL/min/1 73 square meters)  •  Stage 4 Severe CKD (GFR = 15-29 mL/min/1 73 square meters)  •  Stage 5 End Stage CKD (GFR <15 mL/min/1 73 square meters)  Note: GFR calculation is accurate only with a steady state creatinine    CBC and differential [[id number]]  (Abnormal) Collected: 05/20/23 1319    Lab Status: Final result Specimen: Blood from Arm, Right Updated: 05/20/23 1331     WBC 9 59 Thousand/uL      RBC 4 81 Million/uL      Hemoglobin 14 7 g/dL      Hematocrit 42 5 %      MCV 88 fL      MCH 30 6 pg      MCHC 34 6 g/dL      RDW 12 1 %      MPV 9 4 fL      Platelets 162 Thousands/uL      nRBC 0 /100 WBCs      Neutrophils Relative 84 %      Immat GRANS % 0 %      Lymphocytes Relative 13 %      Monocytes Relative 3 %      Eosinophils Relative 0 %      Basophils Relative 0 %      Neutrophils Absolute 7 99 Thousands/µL      Immature Grans Absolute 0 03 Thousand/uL      Lymphocytes Absolute 1 26 Thousands/µL      Monocytes Absolute 0 27 Thousand/µL      Eosinophils Absolute 0 01 Thousand/µL      Basophils Absolute 0 03 Thousands/µL                  No orders to display              Procedures  Procedures         ED Course             HEART Risk Score    Flowsheet Row Most Recent Value   Heart Score Risk Calculator    History 0 Filed at: 05/20/2023 1503   ECG 0 Filed at: 05/20/2023 1503   Age 1 Filed at: 05/20/2023 1503   Risk Factors 1 Filed at: 05/20/2023 1503   Troponin 0 Filed at: 05/20/2023 1503   HEART Score 2 Filed at: 05/20/2023 1503                        SBIRT 20yo+    Flowsheet Row Most Recent Value   Initial Alcohol Screen: US AUDIT-C     1   How often do you have a drink containing alcohol? 0 Filed at: 05/20/2023 1234   2  How many drinks containing alcohol do you have on a typical day you are drinking? 0 Filed at: 05/20/2023 1234   3a  Male UNDER 65: How often do you have five or more drinks on one occasion? 0 Filed at: 05/20/2023 1234   3b  FEMALE Any Age, or MALE 65+: How often do you have 4 or more drinks on one occassion? 0 Filed at: 05/20/2023 1234   Audit-C Score 0 Filed at: 05/20/2023 1234   KHRIS: How many times in the past year have you    Used an illegal drug or used a prescription medication for non-medical reasons? Never Filed at: 05/20/2023 1234                    Medical Decision Making  50-year-old male no reported past history presenting with anxiety  Patient reports worsening anxiety and source of home stress over the last several days  Likely source of complaints  However patient does reports some chest discomfort and intermittent shortness of breath  Plan for cardiac evaluation including EKG and troponin  Basic labs  Symptom management with IV anxiolysis  Reassess  EKG interpreted by me normal sinus rhythm without acute ST abnormality  Labs interpreted by me without significant acute process  Symptoms improving with medications  Suspect significant anxiety component  Prescription sent to pharmacy  Discussed results and recommendations  Advised follow up PCP  Medication recommendations  Given instructions and return precautions  Patient/family at bedside acknowledged understanding of all written and verbal instructions and return precautions  Discharged  Amount and/or Complexity of Data Reviewed  Labs: ordered  Risk  Prescription drug management            Disposition  Final diagnoses:   Chest tightness   SOB (shortness of breath)   Anxiety     Time reflects when diagnosis was documented in both MDM as applicable and the Disposition within this note     Time User Action Codes Description Comment    5/20/2023  3:00 PM Braden Aiken Add [R07 89] Chest tightness     5/20/2023  3:00 PM Ara Dress Add [R06 02] SOB (shortness of breath)     5/20/2023  5:36 PM Ara Dress Add [F41 9] Anxiety       ED Disposition     ED Disposition   Discharge    Condition   Stable    Date/Time   Sat May 20, 2023  3:00 PM    Comment   Shira Pardeep discharge to home/self care  Follow-up Information     Follow up With Specialties Details Why Contact Info    Mauricio Lyman MD Internal Medicine Schedule an appointment as soon as possible for a visit in 1 week  [facility] [place]  [phone]            Discharge Medication List as of 5/20/2023  3:05 PM      START taking these medications    Details   hydrOXYzine HCL (ATARAX) 25 mg tablet Take 1 tablet (25 mg total) by mouth every 6 (six) hours as needed for anxiety, Starting Sat 5/20/2023, Normal         CONTINUE these medications which have NOT CHANGED    Details   albuterol (PROVENTIL HFA,VENTOLIN HFA) 90 mcg/act inhaler Inhale 2 puffs every 6 (six) hours as needed for wheezing, Starting Wed 11/9/2022, Normal      atorvastatin (LIPITOR) 20 mg tablet TAKE 1 TABLET BY MOUTH EVERY DAY, Normal      Cholecalciferol (VITAMIN D3 PO) Take by mouth, Historical Med      clobetasol (TEMOVATE) 0 05 % ointment Apply topically 2 (two) times a day, Starting Wed 3/29/2023, Normal      Flaxseed Oil OIL Use 1,200 mg daily , Historical Med      multivitamin (THERAGRAN) TABS Take 1 tablet by mouth daily, Historical Med      Omega-3 Fatty Acids (FISH OIL PO) Take 1,000 mg by mouth daily , Historical Med      Saw Palmetto, Serenoa repens, (SAW PALMETTO PO) Take by mouth, Historical Med      venlafaxine (EFFEXOR-XR) 75 mg 24 hr capsule TAKE 1 CAPSULE BY MOUTH EVERY DAY, Normal             No discharge procedures on file      PDMP Review     None          ED Provider  Electronically Signed by           Bennie Callejas MD  05/20/23 0597

## 2023-05-20 NOTE — DISCHARGE INSTRUCTIONS
Please follow up PCP and therapist  Recommend tylenol 650 mg and ibuprofen 600 mg every 6 hours as needed for pain  Please return for severe chest pain, significant shortness of breath, severely worsening symptoms, or any other concerning signs or symptoms  Please refer to the following documents for additional instructions and return precautions

## 2023-05-22 ENCOUNTER — HOSPITAL ENCOUNTER (EMERGENCY)
Facility: HOSPITAL | Age: 53
End: 2023-05-23
Attending: EMERGENCY MEDICINE

## 2023-05-22 ENCOUNTER — SOCIAL WORK (OUTPATIENT)
Dept: BEHAVIORAL/MENTAL HEALTH CLINIC | Facility: CLINIC | Age: 53
End: 2023-05-22

## 2023-05-22 ENCOUNTER — TELEPHONE (OUTPATIENT)
Dept: FAMILY MEDICINE CLINIC | Facility: CLINIC | Age: 53
End: 2023-05-22

## 2023-05-22 VITALS
HEART RATE: 65 BPM | SYSTOLIC BLOOD PRESSURE: 115 MMHG | RESPIRATION RATE: 16 BRPM | WEIGHT: 175 LBS | DIASTOLIC BLOOD PRESSURE: 64 MMHG | TEMPERATURE: 97.7 F | OXYGEN SATURATION: 98 % | BODY MASS INDEX: 28.25 KG/M2

## 2023-05-22 DIAGNOSIS — F41.9 ANXIETY: ICD-10-CM

## 2023-05-22 DIAGNOSIS — R45.851 DEPRESSION WITH SUICIDAL IDEATION: Primary | ICD-10-CM

## 2023-05-22 DIAGNOSIS — F32.A DEPRESSION WITH SUICIDAL IDEATION: Primary | ICD-10-CM

## 2023-05-22 DIAGNOSIS — F43.10 PTSD (POST-TRAUMATIC STRESS DISORDER): Primary | ICD-10-CM

## 2023-05-22 DIAGNOSIS — F33.1 MODERATE EPISODE OF RECURRENT MAJOR DEPRESSIVE DISORDER (HCC): ICD-10-CM

## 2023-05-22 LAB
AMPHETAMINES SERPL QL SCN: NEGATIVE
BARBITURATES UR QL: NEGATIVE
BENZODIAZ UR QL: NEGATIVE
COCAINE UR QL: NEGATIVE
ETHANOL EXG-MCNC: NORMAL MG/DL
METHADONE UR QL: NEGATIVE
OPIATES UR QL SCN: NEGATIVE
OXYCODONE+OXYMORPHONE UR QL SCN: NEGATIVE
PCP UR QL: NEGATIVE
THC UR QL: POSITIVE

## 2023-05-22 RX ORDER — HALOPERIDOL 5 MG/ML
5 INJECTION INTRAMUSCULAR
Status: CANCELLED | OUTPATIENT
Start: 2023-05-22

## 2023-05-22 RX ORDER — ATORVASTATIN CALCIUM 20 MG/1
20 TABLET, FILM COATED ORAL DAILY
Status: CANCELLED | OUTPATIENT
Start: 2023-05-23

## 2023-05-22 RX ORDER — RISPERIDONE 1 MG/1
0.5 TABLET ORAL
Status: CANCELLED | OUTPATIENT
Start: 2023-05-22

## 2023-05-22 RX ORDER — VENLAFAXINE HYDROCHLORIDE 75 MG/1
75 CAPSULE, EXTENDED RELEASE ORAL DAILY
Status: CANCELLED | OUTPATIENT
Start: 2023-05-23

## 2023-05-22 RX ORDER — CHLORAL HYDRATE 500 MG
1000 CAPSULE ORAL DAILY
Status: CANCELLED | OUTPATIENT
Start: 2023-05-23

## 2023-05-22 RX ORDER — ALBUTEROL SULFATE 90 UG/1
2 AEROSOL, METERED RESPIRATORY (INHALATION) ONCE
Status: DISCONTINUED | OUTPATIENT
Start: 2023-05-22 | End: 2023-05-23 | Stop reason: HOSPADM

## 2023-05-22 RX ORDER — ATORVASTATIN CALCIUM 20 MG/1
20 TABLET, FILM COATED ORAL
Status: DISCONTINUED | OUTPATIENT
Start: 2023-05-22 | End: 2023-05-23 | Stop reason: HOSPADM

## 2023-05-22 RX ORDER — RISPERIDONE 1 MG/1
1 TABLET ORAL
Status: CANCELLED | OUTPATIENT
Start: 2023-05-22

## 2023-05-22 RX ORDER — MAGNESIUM HYDROXIDE/ALUMINUM HYDROXICE/SIMETHICONE 120; 1200; 1200 MG/30ML; MG/30ML; MG/30ML
30 SUSPENSION ORAL EVERY 4 HOURS PRN
Status: CANCELLED | OUTPATIENT
Start: 2023-05-22

## 2023-05-22 RX ORDER — HYDROXYZINE HYDROCHLORIDE 25 MG/1
25 TABLET, FILM COATED ORAL EVERY 6 HOURS PRN
Status: DISCONTINUED | OUTPATIENT
Start: 2023-05-22 | End: 2023-05-23 | Stop reason: HOSPADM

## 2023-05-22 RX ORDER — CLOBETASOL PROPIONATE 0.5 MG/G
CREAM TOPICAL 2 TIMES DAILY
Status: DISCONTINUED | OUTPATIENT
Start: 2023-05-22 | End: 2023-05-23 | Stop reason: HOSPADM

## 2023-05-22 RX ORDER — LANOLIN ALCOHOL/MO/W.PET/CERES
6 CREAM (GRAM) TOPICAL
Status: DISCONTINUED | OUTPATIENT
Start: 2023-05-22 | End: 2023-05-23 | Stop reason: HOSPADM

## 2023-05-22 RX ORDER — RISPERIDONE 0.25 MG/1
0.25 TABLET ORAL
Status: CANCELLED | OUTPATIENT
Start: 2023-05-22

## 2023-05-22 RX ORDER — HYDROXYZINE HYDROCHLORIDE 25 MG/1
25 TABLET, FILM COATED ORAL
Status: CANCELLED | OUTPATIENT
Start: 2023-05-22

## 2023-05-22 RX ORDER — VENLAFAXINE HYDROCHLORIDE 75 MG/1
75 CAPSULE, EXTENDED RELEASE ORAL DAILY
Status: DISCONTINUED | OUTPATIENT
Start: 2023-05-23 | End: 2023-05-23 | Stop reason: HOSPADM

## 2023-05-22 RX ORDER — ALBUTEROL SULFATE 90 UG/1
2 AEROSOL, METERED RESPIRATORY (INHALATION) EVERY 6 HOURS PRN
Status: CANCELLED | OUTPATIENT
Start: 2023-05-22

## 2023-05-22 RX ORDER — LORAZEPAM 2 MG/ML
1 INJECTION INTRAMUSCULAR
Status: CANCELLED | OUTPATIENT
Start: 2023-05-22

## 2023-05-22 RX ORDER — LANOLIN ALCOHOL/MO/W.PET/CERES
3 CREAM (GRAM) TOPICAL
Status: CANCELLED | OUTPATIENT
Start: 2023-05-22

## 2023-05-22 RX ORDER — VITAMIN B COMPLEX
1 CAPSULE ORAL DAILY
COMMUNITY
End: 2023-05-31

## 2023-05-22 RX ADMIN — Medication 6 MG: at 21:37
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
## 2023-05-22 NOTE — LETTER
[address] [facility] [address]  [address] [place] [address]  Dept: [phone]           FBGFWA TRANSFER CONSENT    NAME Yaneth Bertrand                                1970                              MRN [id number]    I have been informed of my rights regarding examination, treatment, and transfer   by Dr Graciela Velazquez MD    Benefits: Continuity of care    Risks: Potential for delay in receiving treatment      Consent for Transfer:  I acknowledge that my medical condition has been evaluated and explained to me by the emergency department physician or other qualified medical person and/or my attending physician, who has recommended that I be transferred to the service of  Accepting Physician: Dr Jake Fagan at [address] Name, [address] : -[place], Older Adult Unit  The above potential benefits of such transfer, the potential risks associated with such transfer, and the probable risks of not being transferred have been explained to me, and I fully understand them  The doctor has explained that, in my case, the benefits of transfer outweigh the risks  I agree to be transferred  I authorize the performance of emergency medical procedures and treatments upon me in both transit and upon arrival at the receiving facility  Additionally, I authorize the release of any and all medical records to the receiving facility and request they be transported with me, if possible  I understand that the safest mode of transportation during a medical emergency is an ambulance and that the Hospital advocates the use of this mode of transport  Risks of traveling to the receiving facility by car, including absence of medical control, life sustaining equipment, such as oxygen, and medical personnel has been explained to me and I fully understand them  (SO CORRECT BOX BELOW)  [ X ]  I consent to the stated transfer and to be transported by ambulance/helicopter    [ ]  I consent to the stated transfer, but refuse transportation by ambulance and accept full responsibility for my transportation by car  I understand the risks of non-ambulance transfers and I exonerate the Hospital and its staff from any deterioration in my condition that results from this refusal     X___________________________________________    DATE  23  TIME________  Signature of patient or legally responsible individual signing on patient behalf           RELATIONSHIP TO PATIENT____Self_______________                                Provider Certification    NAME Roslyn Rojas                                  1970                              MRN [id number]    A medical screening exam was performed on the above named patient  Based on the examination:    Condition Necessitating Transfer The primary encounter diagnosis was Depression with suicidal ideation  A diagnosis of Anxiety was also pertinent to this visit  Patient Condition: The patient has been stabilized such that within reasonable medical probability, no material deterioration of the patient condition or the condition of the unborn child(mallika) is likely to result from the transfer    Reason for Transfer: Level of Care needed not available at this facility    Transfer Requirements: Facility -[place], Older Adult Unit   · Space available and qualified personnel available for treatment as acknowledged by Lalita Marti, CIS [facility] @ [phone]  · Agreed to accept transfer and to provide appropriate medical treatment as acknowledged by       Dr Zeeshan Crespo  · Appropriate medical records of the examination and treatment of the patient are provided at the time of transfer   [address] _A  A ______  · Transfer will be performed by qualified personnel from [facility]  and appropriate transfer equipment as required, including the use of necessary and appropriate life support measures      Provider Certification: I have examined the patient and explained the following risks and benefits of being transferred/refusing transfer to the patient/family:  General risk, such as traffic hazards, adverse weather conditions, rough terrain or turbulence, possible failure of equipment (including vehicle or aircraft), or consequences of actions of persons outside the control of the transport personnel      Based on these reasonable risks and benefits to the patient and/or the unborn child(mallika), and based upon the information available at the time of the patient’s examination, I certify that the medical benefits reasonably to be expected from the provision of appropriate medical treatments at another medical facility outweigh the increasing risks, if any, to the individual’s medical condition, and in the case of labor to the unborn child, from effecting the transfer      X____________________________________________ DATE 05/22/23        TIME_______      ORIGINAL - SEND TO MEDICAL RECORDS   COPY - SEND WITH PATIENT DURING TRANSFER

## 2023-05-22 NOTE — ED NOTES
CW faxed pt's [phone] and face sheet to [facility] of Intake for in-network review      ELISEO Dumont, CIS [facility]  05/22/23 17:59

## 2023-05-22 NOTE — ED NOTES
Patient changed into paper scrubs at this time, belongings inventoried and placed in locker 1  Due to low risk status, patient allowd tablet and cell phone at bedside        Marcin Umana RN  05/22/23 5651

## 2023-05-22 NOTE — TELEPHONE ENCOUNTER
Spoke with Mary Jo Renteria at the [facility]  Informed him that the Psychiatrist here is sending him over for eval and a possible 1 to 1   Patient is suicidal
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
## 2023-05-23 ENCOUNTER — HOSPITAL ENCOUNTER (INPATIENT)
Facility: HOSPITAL | Age: 53
LOS: 8 days | Discharge: HOME | End: 2023-05-31
Attending: PSYCHIATRY & NEUROLOGY | Admitting: PSYCHIATRY & NEUROLOGY

## 2023-05-23 DIAGNOSIS — Z72.0 TOBACCO ABUSE: ICD-10-CM

## 2023-05-23 DIAGNOSIS — E78.2 MIXED HYPERLIPIDEMIA: ICD-10-CM

## 2023-05-23 DIAGNOSIS — G47.00 INSOMNIA: ICD-10-CM

## 2023-05-23 DIAGNOSIS — J20.9 ACUTE BRONCHITIS WITH BRONCHOSPASM: ICD-10-CM

## 2023-05-23 DIAGNOSIS — E53.8 VITAMIN B12 DEFICIENCY: ICD-10-CM

## 2023-05-23 DIAGNOSIS — J30.9 ALLERGIC RHINITIS: ICD-10-CM

## 2023-05-23 DIAGNOSIS — F32.A DEPRESSION WITH SUICIDAL IDEATION: ICD-10-CM

## 2023-05-23 DIAGNOSIS — E78.5 DYSLIPIDEMIA: ICD-10-CM

## 2023-05-23 DIAGNOSIS — F32.A DEPRESSION, UNSPECIFIED DEPRESSION TYPE: Primary | ICD-10-CM

## 2023-05-23 DIAGNOSIS — R45.851 DEPRESSION WITH SUICIDAL IDEATION: ICD-10-CM

## 2023-05-23 RX ORDER — RISPERIDONE 1 MG/1
1 TABLET ORAL
Status: DISCONTINUED | OUTPATIENT
Start: 2023-05-23 | End: 2023-05-31 | Stop reason: HOSPADM

## 2023-05-23 RX ORDER — CHLORAL HYDRATE 500 MG
1000 CAPSULE ORAL DAILY
Status: DISCONTINUED | OUTPATIENT
Start: 2023-05-23 | End: 2023-05-31 | Stop reason: HOSPADM

## 2023-05-23 RX ORDER — TRAZODONE HYDROCHLORIDE 50 MG/1
50 TABLET ORAL
Status: DISCONTINUED | OUTPATIENT
Start: 2023-05-23 | End: 2023-05-23

## 2023-05-23 RX ORDER — HYDROXYZINE 50 MG/1
50 TABLET, FILM COATED ORAL
Status: DISCONTINUED | OUTPATIENT
Start: 2023-05-23 | End: 2023-05-31 | Stop reason: HOSPADM

## 2023-05-23 RX ORDER — LORAZEPAM 2 MG/ML
1 INJECTION INTRAMUSCULAR
Status: DISCONTINUED | OUTPATIENT
Start: 2023-05-23 | End: 2023-05-31 | Stop reason: HOSPADM

## 2023-05-23 RX ORDER — RISPERIDONE 0.5 MG/1
0.5 TABLET ORAL
Status: DISCONTINUED | OUTPATIENT
Start: 2023-05-23 | End: 2023-05-31 | Stop reason: HOSPADM

## 2023-05-23 RX ORDER — LORAZEPAM 0.5 MG/1
0.5 TABLET ORAL EVERY 6 HOURS PRN
Status: DISCONTINUED | OUTPATIENT
Start: 2023-05-23 | End: 2023-05-31 | Stop reason: HOSPADM

## 2023-05-23 RX ORDER — LANOLIN ALCOHOL/MO/W.PET/CERES
3 CREAM (GRAM) TOPICAL
Status: DISCONTINUED | OUTPATIENT
Start: 2023-05-23 | End: 2023-05-24

## 2023-05-23 RX ORDER — ATORVASTATIN CALCIUM 20 MG/1
20 TABLET, FILM COATED ORAL DAILY
Status: DISCONTINUED | OUTPATIENT
Start: 2023-05-23 | End: 2023-05-31 | Stop reason: HOSPADM

## 2023-05-23 RX ORDER — LANOLIN ALCOHOL/MO/W.PET/CERES
3 CREAM (GRAM) TOPICAL
Status: DISCONTINUED | OUTPATIENT
Start: 2023-05-23 | End: 2023-05-23 | Stop reason: SDUPTHER

## 2023-05-23 RX ORDER — RISPERIDONE 0.25 MG/1
0.25 TABLET ORAL
Status: DISCONTINUED | OUTPATIENT
Start: 2023-05-23 | End: 2023-05-31 | Stop reason: HOSPADM

## 2023-05-23 RX ORDER — HALOPERIDOL 5 MG/ML
5 INJECTION INTRAMUSCULAR
Status: DISCONTINUED | OUTPATIENT
Start: 2023-05-23 | End: 2023-05-31 | Stop reason: HOSPADM

## 2023-05-23 RX ORDER — MAGNESIUM HYDROXIDE/ALUMINUM HYDROXICE/SIMETHICONE 120; 1200; 1200 MG/30ML; MG/30ML; MG/30ML
30 SUSPENSION ORAL EVERY 4 HOURS PRN
Status: DISCONTINUED | OUTPATIENT
Start: 2023-05-23 | End: 2023-05-31 | Stop reason: HOSPADM

## 2023-05-23 RX ORDER — VENLAFAXINE HYDROCHLORIDE 75 MG/1
75 CAPSULE, EXTENDED RELEASE ORAL DAILY
Status: DISCONTINUED | OUTPATIENT
Start: 2023-05-23 | End: 2023-05-27

## 2023-05-23 RX ORDER — TRAZODONE HYDROCHLORIDE 50 MG/1
50 TABLET ORAL
Status: DISCONTINUED | OUTPATIENT
Start: 2023-05-23 | End: 2023-05-24

## 2023-05-23 RX ORDER — ACETAMINOPHEN 325 MG/1
650 TABLET ORAL EVERY 6 HOURS PRN
Status: DISCONTINUED | OUTPATIENT
Start: 2023-05-23 | End: 2023-05-31 | Stop reason: HOSPADM

## 2023-05-23 RX ORDER — ALBUTEROL SULFATE 90 UG/1
2 AEROSOL, METERED RESPIRATORY (INHALATION) EVERY 6 HOURS PRN
Status: DISCONTINUED | OUTPATIENT
Start: 2023-05-23 | End: 2023-05-31 | Stop reason: HOSPADM

## 2023-05-23 RX ORDER — HYDROXYZINE HYDROCHLORIDE 25 MG/1
25 TABLET, FILM COATED ORAL
Status: DISCONTINUED | OUTPATIENT
Start: 2023-05-23 | End: 2023-05-23

## 2023-05-23 RX ADMIN — Medication 3 MG: at 20:41

## 2023-05-23 RX ADMIN — OMEGA-3 FATTY ACIDS CAP 1000 MG 1000 MG: 1000 CAP at 15:26

## 2023-05-23 RX ADMIN — ACETAMINOPHEN 650 MG: 325 TABLET ORAL at 20:41

## 2023-05-23 RX ADMIN — VENLAFAXINE HYDROCHLORIDE 75 MG: 75 CAPSULE, EXTENDED RELEASE ORAL at 15:26

## 2023-05-23 RX ADMIN — TRAZODONE HYDROCHLORIDE 50 MG: 50 TABLET ORAL at 22:59

## 2023-05-23 RX ADMIN — ATORVASTATIN CALCIUM 20 MG: 20 TABLET, FILM COATED ORAL at 15:26

## 2023-05-23 NOTE — ED NOTES
Insurance Authorization for admission:   Phone call placed to [employer]  Phone number: [phone]  Spoke to [employer]      8 days approved  Level of care: 201  Review on 5/30/23  Authorization # [id number]  UR is Andrés Montiel @ [phone]  EVS (Eligibility Verification System) called - [phone]  Automated system indicates: MA ineligible  Insurance Authorization for Transportation:      None needed for SDM      Viry Francis CIS   05/22/23 21:11
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
## 2023-05-23 NOTE — TREATMENT PLAN
TREATMENT PLAN REVIEW - [address] 47 y o  1970 male MRN: [id number]    [facility] [address]  Room / Bed: Nara Meyer 210/[facility] 210-02 Encounter: [id number]          Admit Date/Time:  5/23/2023  9:22 AM    Treatment Team: Attending Provider: Amber Salas MD; Certified Nursing Assistant: Fernando Garnica; Registered Nurse: Anh Alvarado RN; Patient Care Assistant: Gerda Salazar; Certified Nursing Assistant: Andre Amador    Diagnosis: Principal Problem:     Moderate episode of recurrent major depressive disorder (HCC)  Active Problems:    Disc degeneration, lumbar    Hyperlipidemia    PTSD (post-traumatic stress disorder)      Patient Strengths/Assets: compliant with medication, negotiates basic needs, patient is on a voluntary commitment    Patient Barriers/Limitations: difficulty adapting, marital/family conflict, h/o trauma    Short Term Goals: decrease in depressive symptoms, decrease in anxiety symptoms, decrease in suicidal thoughts, ability to stay safe on the unit, improvement in reality testing, improvement in reasoning ability, improvement in self care, sleep improvement, improvement in appetite, mood stabilization    Long Term Goals: improvement in depression, improvement in anxiety, stabilization of mood, free of suicidal thoughts, improvement in reasoning ability, improved insight, acceptance of need for psychiatric medications, acceptance of need for psychiatric follow up after discharge, adequate self care, adequate sleep, adequate appetite, adequate oral intake, appropriate interaction with peers, stable living arrangements upon discharge    Progress Towards Goals: starting psychiatric medications as prescribed    Recommended Treatment: medication management, patient medication education, group therapy, milieu therapy, continued Behavioral Health psychiatric evaluation/assessment process, medical follow up with medical team    Treatment Frequency: daily medication monitoring, group and milieu therapy daily, monitoring through interdisciplinary rounds, monitoring through weekly patient care conferences    Expected Discharge Date: 7 midnights    Discharge Plan: will be determined    Treatment Plan Created/Updated By: Jeanine Hood MD
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
## 2023-05-23 NOTE — ED NOTES
Patient is accepted at [facility] Unit  Patient is accepted by Dr Jacky Heaton per Yissel Reyes  Transportation is arranged with [employer]  Transportation is scheduled for 07:55 AM    Patient may go to the floor at 23:30       CW informed Niki Fransico of Intake of pt's ETA  Nurse report is to be called to [phone] prior to patient transfer      Viry Palomares CIS ll  05/22/23 20:23

## 2023-05-23 NOTE — NURSING NOTE
"Patient visible on the unit, walking in the halls, pleasant and cooperative  Denies anxiety, si,hi and hallucinations  Patient is vague when asked about depression  He states that \" it comes and goes depending on my thoughts, I just need to keep busy\" Pt stated that it comes and goes with his PTSD  Offered patient support and reassurance  Pt denies any needs at this time   Will continue to monitor  "
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
## 2023-05-23 NOTE — PROGRESS NOTES
Patient admitted with the following belongings:    2 pr socks  2 pr underwear  1 pr leanna jeans  Green t-shirt  Eyeglasses    Locked in cabinet:  Orange bag with black strings  Superman pajama bottoms with string  Nadeen Chew sneakers with laces  Nadeen Chew hoodie  [place] hoodie  Black sunglasses    Contraband:  Cell phone (cracked screen)  Tablet (cracked screen)  Apple watch  Wireless ear buds  White charging block  Portable battery charging device  Car keys    Security:  Wallet  $51 in cash  Pa 's license  3 credit cards  Debit card  Medical card    Belongings reviewed with patient and patient signed form and security bag

## 2023-05-24 LAB
25(OH)D3 SERPL-MCNC: 57.7 NG/ML (ref 30–100)
ANION GAP SERPL CALCULATED.3IONS-SCNC: 9 MMOL/L (ref 4–13)
BASOPHILS # BLD AUTO: 0.04 THOUSANDS/ÂΜL (ref 0–0.1)
BASOPHILS NFR BLD AUTO: 1 % (ref 0–1)
BUN SERPL-MCNC: 13 MG/DL (ref 5–25)
CALCIUM SERPL-MCNC: 9.1 MG/DL (ref 8.4–10.2)
CHLORIDE SERPL-SCNC: 106 MMOL/L (ref 96–108)
CHOLEST SERPL-MCNC: 161 MG/DL
CO2 SERPL-SCNC: 23 MMOL/L (ref 21–32)
CREAT SERPL-MCNC: 1.12 MG/DL (ref 0.6–1.3)
EOSINOPHIL # BLD AUTO: 0.25 THOUSAND/ÂΜL (ref 0–0.61)
EOSINOPHIL NFR BLD AUTO: 4 % (ref 0–6)
ERYTHROCYTE [DISTWIDTH] IN BLOOD BY AUTOMATED COUNT: 12.1 % (ref 11.6–15.1)
FOLATE SERPL-MCNC: >22.3 NG/ML
GFR SERPL CREATININE-BSD FRML MDRD: 74 ML/MIN/1.73SQ M
GLUCOSE P FAST SERPL-MCNC: 91 MG/DL (ref 65–99)
GLUCOSE SERPL-MCNC: 91 MG/DL (ref 65–140)
HCT VFR BLD AUTO: 44.8 % (ref 36.5–49.3)
HDLC SERPL-MCNC: 44 MG/DL
HGB BLD-MCNC: 14.9 G/DL (ref 12–17)
IMM GRANULOCYTES # BLD AUTO: 0.01 THOUSAND/UL (ref 0–0.2)
IMM GRANULOCYTES NFR BLD AUTO: 0 % (ref 0–2)
LDLC SERPL CALC-MCNC: 93 MG/DL (ref 0–100)
LYMPHOCYTES # BLD AUTO: 2.71 THOUSANDS/ÂΜL (ref 0.6–4.47)
LYMPHOCYTES NFR BLD AUTO: 48 % (ref 14–44)
MCH RBC QN AUTO: 30.3 PG (ref 26.8–34.3)
MCHC RBC AUTO-ENTMCNC: 33.3 G/DL (ref 31.4–37.4)
MCV RBC AUTO: 91 FL (ref 82–98)
MONOCYTES # BLD AUTO: 0.43 THOUSAND/ÂΜL (ref 0.17–1.22)
MONOCYTES NFR BLD AUTO: 8 % (ref 4–12)
NEUTROPHILS # BLD AUTO: 2.22 THOUSANDS/ÂΜL (ref 1.85–7.62)
NEUTS SEG NFR BLD AUTO: 39 % (ref 43–75)
NONHDLC SERPL-MCNC: 117 MG/DL
NRBC BLD AUTO-RTO: 0 /100 WBCS
PLATELET # BLD AUTO: 274 THOUSANDS/UL (ref 149–390)
PMV BLD AUTO: 9.9 FL (ref 8.9–12.7)
POTASSIUM SERPL-SCNC: 3.9 MMOL/L (ref 3.5–5.3)
RBC # BLD AUTO: 4.91 MILLION/UL (ref 3.88–5.62)
SODIUM SERPL-SCNC: 138 MMOL/L (ref 135–147)
TRIGL SERPL-MCNC: 121 MG/DL
VIT B12 SERPL-MCNC: 371 PG/ML (ref 180–914)
WBC # BLD AUTO: 5.66 THOUSAND/UL (ref 4.31–10.16)

## 2023-05-24 RX ORDER — POLYETHYLENE GLYCOL 3350 17 G/17G
17 POWDER, FOR SOLUTION ORAL DAILY PRN
Status: DISCONTINUED | OUTPATIENT
Start: 2023-05-24 | End: 2023-05-31 | Stop reason: HOSPADM

## 2023-05-24 RX ORDER — ACETAMINOPHEN 325 MG/1
975 TABLET ORAL EVERY 6 HOURS PRN
Status: DISCONTINUED | OUTPATIENT
Start: 2023-05-24 | End: 2023-05-31 | Stop reason: HOSPADM

## 2023-05-24 RX ORDER — TRAZODONE HYDROCHLORIDE 50 MG/1
50 TABLET ORAL
Status: DISCONTINUED | OUTPATIENT
Start: 2023-05-24 | End: 2023-05-31 | Stop reason: HOSPADM

## 2023-05-24 RX ADMIN — VENLAFAXINE HYDROCHLORIDE 75 MG: 75 CAPSULE, EXTENDED RELEASE ORAL at 08:55

## 2023-05-24 RX ADMIN — TRAZODONE HYDROCHLORIDE 50 MG: 50 TABLET ORAL at 22:08

## 2023-05-24 RX ADMIN — OMEGA-3 FATTY ACIDS CAP 1000 MG 1000 MG: 1000 CAP at 08:55

## 2023-05-24 RX ADMIN — ACETAMINOPHEN 650 MG: 325 TABLET ORAL at 18:29

## 2023-05-24 RX ADMIN — ATORVASTATIN CALCIUM 20 MG: 20 TABLET, FILM COATED ORAL at 08:55

## 2023-05-24 NOTE — PSYCH
"Behavioral Health Psychotherapy Progress Note    Psychotherapy Provided: Individual Psychotherapy     No diagnosis found  DATA: Therapist met w/pt for individual session  Pt stated that he called this morning to see if therapist had any availability because he is really struggling  He stated that he went to the ER on Saturday and since then he has felt \"off  \" He verbalized not feeling safe and not feeling connected to the supports in his life and didn't know \"who to turn to  \"  Pt stated that since the couples counseling session on Thursday part of his past trauma has come up and doesn't know what to do  Therapist recommended inpt  Pt agreed  Therapist informed  and [place] in office reached out to let the ER he was coming  Pt agreed to drive directly to the ER and was able to contract for safety until he got to the hospital      During this session, this clinician used the following therapeutic modalities: Solution-Focused Therapy and Supportive Psychotherapy    Substance Abuse was not addressed during this session  If the client is diagnosed with a co-occurring substance use disorder, please indicate any changes in the frequency or amount of use: unknown  Stage of change for addressing substance use diagnoses: No substance use/Not applicable    ASSESSMENT:  Ervin Dorsey presents with a Depressed mood  his affect is Normal range and intensity, which is congruent, with his mood and the content of the session  The client has made progress on their goals  Ervin Dorsey presents with a minimal risk of suicide, moderate risk of self-harm, and low risk of harm to others  For any risk assessment that surpasses a \"low\" rating, a safety plan must be developed  A safety plan was indicated: yes  If yes, describe in detail Pt will go to ER    PLAN: Between sessions, Ervin Dorsey will enter inpt tx   At the next session, the therapist will use Cognitive Behavioral Therapy, " Motivational Interviewing, Solution-Focused Therapy and Supportive Psychotherapy to address trauma, anxiety, and depression  Behavioral Health Treatment Plan and Discharge Planning: Manju Steel is aware of and agrees to continue to work on their treatment plan  They have identified and are working toward their discharge goals       Visit start and stop times:    05/22/23  Start Time: 1300  Stop Time: 1333  Total Visit Time: 33 minutes

## 2023-05-24 NOTE — NURSING NOTE
Patient compliant with medications  Bright and pleasant upon interactions  Denies SI/HI  Denies anxiety endorses mild depression  Patient goal-oriented for treatment  No acute behaviors noted  Q 7 min safety checks maintained  Fall precautions maintained  Monitoring continues

## 2023-05-24 NOTE — PROGRESS NOTES
05/24/23 2044   Activity/Group Checklist   Group Community meeting   Attendance Attended   Attendance Duration (min) 46-60   Interactions Interacted appropriately   Affect/Mood Appropriate   Goals Achieved Identified feelings; Able to listen to others; Able to engage in interactions; Able to self-disclose; Able to recieve feedback

## 2023-05-24 NOTE — NURSING NOTE
traZODone (DESYREL) tablet 50 mg given PO at 2259 for complaint of insomnia was effective  Patient observed sleeping during most Q 7 minute safety checks  No SI/HI/AH/VH noted  Patient shows no s/s of distress  No complaints of pain or aspiration risks  Non-labored breathing  Monitoring continues  Fluids at bedside to promote hydration

## 2023-05-24 NOTE — PROGRESS NOTES
"Progress Note - Merle Kaiser 47 y o  male MRN: [id number]    Unit/Bed#: [facility]02 Encounter: [id number]        Subjective:   Patient seen and examined at bedside after reviewing the chart and discussing the case with the caring staff  Patient examined at bedside  Patient has no acute complaints  Physical Exam   Vitals: Blood pressure 141/71, pulse 69, temperature 97 8 °F (36 6 °C), temperature source Temporal, resp  rate 18, height 5' 6\" (1 676 m), weight 78 7 kg (173 lb 9 6 oz), SpO2 98 %  ,Body mass index is 28 02 kg/m²  Constitutional: Patient in no acute distress  HEENT: PERR, EOMI, MMM  Cardiovascular: Normal rate and regular rhythm  Pulmonary/Chest: Effort normal and breath sounds normal    Abdomen: Soft, + BS, NT    Assessment/Plan:  Merle Kaiser is a(n) 47y o  year old male with MDD      1  Dyslipidemia  Continue atorvastatin 20 mg daily, fish oil 1000 mg daily  3   Arthralgia/headache  Patient may take Tylenol as needed  4   Insomnia  Patient is on melatonin at bedtime  The patient was discussed with Dr Olivia Zhou and he is in agreement with the above note    "

## 2023-05-24 NOTE — PLAN OF CARE
Problem: Depression  Goal: Treatment Goal: Demonstrate behavioral control of depressive symptoms, verbalize feelings of improved mood/affect, and adopt new coping skills prior to discharge  Outcome: Progressing  Goal: Verbalize thoughts and feelings  Description: Interventions:  - Assess and re-assess patient's level of risk   - Engage patient in 1:1 interactions, daily, for a minimum of 15 minutes   - Encourage patient to express feelings, fears, frustrations, hopes   Outcome: Progressing  Goal: Refrain from harming self  Description: Interventions:  - Monitor patient closely, per order   - Supervise medication ingestion, monitor effects and side effects   Outcome: Progressing  Goal: Refrain from isolation  Description: Interventions:  - Develop a trusting relationship   - Encourage socialization   Outcome: Progressing  Goal: Refrain from self-neglect  Outcome: Progressing  Goal: Attend and participate in unit activities, including therapeutic, recreational, and educational groups  Description: Interventions:  - Provide therapeutic and educational activities daily, encourage attendance and participation, and document same in the medical record   Outcome: Progressing  Goal: Complete daily ADLs, including personal hygiene independently, as able  Description: Interventions:  - Observe, teach, and assist patient with ADLS  -  Monitor and promote a balance of rest/activity, with adequate nutrition and elimination   Outcome: Progressing     Problem: Anxiety  Goal: Anxiety is at manageable level  Description: Interventions:  - Assess and monitor patient's anxiety level  - Monitor for signs and symptoms (heart palpitations, chest pain, shortness of breath, headaches, nausea, feeling jumpy, restlessness, irritable, apprehensive)  - Collaborate with interdisciplinary team and initiate plan and interventions as ordered    - [place] patient to unit/surroundings  - Explain treatment plan  - Encourage participation in care  - Encourage verbalization of concerns/fears  - Identify coping mechanisms  - Assist in developing anxiety-reducing skills  - Administer/offer alternative therapies  - Limit or eliminate stimulants  Outcome: Progressing

## 2023-05-24 NOTE — PROGRESS NOTES
Progress Note - Behavioral Health   Juan Lomas 47 y o  male MRN: [id number]  Unit/Bed#: [facility] 210-02 Encounter: [id number]    Assessment/Plan   Principal Problem: Moderate episode of recurrent major depressive disorder (HCC)  Active Problems:    Disc degeneration, lumbar    Hyperlipidemia    PTSD (post-traumatic stress disorder)      Behavior over the last 24 hours:  unchanged  Sleep: poor  Appetite: normal  Medication side effects: No  ROS: no complaints and all other systems are negative    Subjective: Michelle De La Torre was seen today for psychiatric follow-up  Patient calm, cooperative  He is visible on the unit social with peers  Patient paces the hallways periodically  According to nursing staff patient's mood continues to be controlled on the unit, with no aggression or agitation toward staff or peers  He is compliant with current medication regimen  Patient endorses some sleep disturbance  He denies any SI/HI/AVH, he does not appear to responding to internal stimuli  mental Status Evaluation:  Appearance:  age appropriate and casually dressed   Behavior:  calm, cooperative   Speech:  normal pitch and normal volume   Mood:  anxious and depressed   Affect:  constricted   Thought Process:  goal directed   Associations: intact associations   Thought Content:  no overt delusions   Perceptual Disturbances: denied AVH, does not appera internally preoccupied   Risk Potential: Suicidal Ideations none  Homicidal Ideations none  Potential for Aggression No   Sensorium:  person, place and time/date   Memory:  recent and remote memory grossly intact   Consciousness:  alert and awake    Attention: attention span and concentration were age appropriate   Insight:  limited   Judgment: fair   Gait/Station: normal gait/station   Motor Activity: no abnormal movements     Progress Toward Goals: Unchanged  Patient initiated on trazodone 50 mg at bedtime for sleep    He is compliant with current psychotropic medication regimen  Will continue current psychotropic medication regimen  No discharge date at this time  Recommended Treatment: Continue with group therapy, milieu therapy and occupational therapy  Risks, benefits and possible side effects of Medications:   Risks, benefits, and possible side effects of medications explained to patient and patient verbalizes understanding  Medications:   all current active meds have been reviewed and current meds:   Current Facility-Administered Medications   Medication Dose Route Frequency   • acetaminophen (TYLENOL) tablet 650 mg  650 mg Oral Q6H PRN   • acetaminophen (TYLENOL) tablet 975 mg  975 mg Oral Q6H PRN   • albuterol (PROVENTIL HFA,VENTOLIN HFA) inhaler 2 puff  2 puff Inhalation Q6H PRN   • aluminum-magnesium hydroxide-simethicone (MYLANTA) oral suspension 30 mL  30 mL Oral Q4H PRN   • atorvastatin (LIPITOR) tablet 20 mg  20 mg Oral Daily   • fish oil capsule 1,000 mg  1,000 mg Oral Daily   • haloperidol lactate (HALDOL) injection 5 mg  5 mg Intramuscular Q4H PRN Max 4/day   • hydrOXYzine HCL (ATARAX) tablet 50 mg  50 mg Oral Q6H PRN Max 4/day   • LORazepam (ATIVAN) injection 1 mg  1 mg Intramuscular Q6H PRN Max 3/day   • LORazepam (ATIVAN) tablet 0 5 mg  0 5 mg Oral Q6H PRN   • nicotine polacrilex (NICORETTE) gum 4 mg  4 mg Oral Q2H PRN   • polyethylene glycol (MIRALAX) packet 17 g  17 g Oral Daily PRN   • risperiDONE (RisperDAL) tablet 0 25 mg  0 25 mg Oral Q4H PRN Max 6/day   • risperiDONE (RisperDAL) tablet 0 5 mg  0 5 mg Oral Q4H PRN Max 3/day   • risperiDONE (RisperDAL) tablet 1 mg  1 mg Oral Q2H PRN Max 3/day   • traZODone (DESYREL) tablet 50 mg  50 mg Oral HS   • venlafaxine (EFFEXOR-XR) 24 hr capsule 75 mg  75 mg Oral Daily     Labs: I have personally reviewed all pertinent laboratory/tests results     Most Recent Labs:   Lab Results   Component Value Date    ALB 4 4 04/03/2023    ALKPHOS 65 04/03/2023    ALT 27 04/03/2023    AST 24 04/03/2023    BUN 13 05/24/2023    CALCIUM 9 1 05/24/2023    CHOLESTEROL 161 05/24/2023     05/24/2023    CO2 23 05/24/2023    CREATININE 1 12 05/24/2023    FREET4 0 80 09/13/2019    GLUC 91 05/24/2023    GLUF 91 05/24/2023    HCT 44 8 05/24/2023    HDL 44 05/24/2023    HGB 14 9 05/24/2023    K 3 9 05/24/2023    LDLCALC 93 05/24/2023    NEUTROABS 2 22 05/24/2023    NONHDLC 117 05/24/2023     05/24/2023    RBC 4 91 05/24/2023    RDW 12 1 05/24/2023    SODIUM 138 05/24/2023    TBILI 0 32 04/03/2023    TP 6 6 04/03/2023    TRIG 121 05/24/2023    SIJ2EDQGURGU 1 870 09/13/2019    WBC 5 66 05/24/2023       Counseling / Coordination of Care  Total floor / unit time spent today 25 minutes

## 2023-05-24 NOTE — NURSING NOTE
Patient social and out in the community  Rates depression as mild and anxiety as none  Denies any suicidal thoughts or hallucinations  Pleasant during assessment  Compliant with medications and snacks  Medication education given  traZODone (DESYREL) tablet 50 mg given PO at 2259 for complaint of insomnia  Care Plan to be reviewed and amended  Maintained on q 7 minute checks  Will continue to monitor

## 2023-05-24 NOTE — PROGRESS NOTES
05/24/23 1100   Activity/Group Checklist   Group Life Skills   Attendance Attended   Attendance Duration (min) 46-60   Interactions Interacted appropriately   Affect/Mood Appropriate   Goals Achieved Identified feelings; Discussed coping strategies; Able to listen to others; Able to engage in interactions; Able to reflect/comment on own behavior;Able to self-disclose; Able to recieve feedback

## 2023-05-25 ENCOUNTER — TELEPHONE (OUTPATIENT)
Dept: PSYCHIATRY | Facility: CLINIC | Age: 53
End: 2023-05-25

## 2023-05-25 ENCOUNTER — TELEPHONE (OUTPATIENT)
Dept: OTHER | Facility: OTHER | Age: 53
End: 2023-05-25

## 2023-05-25 RX ADMIN — ATORVASTATIN CALCIUM 20 MG: 20 TABLET, FILM COATED ORAL at 08:38

## 2023-05-25 RX ADMIN — TRAZODONE HYDROCHLORIDE 50 MG: 50 TABLET ORAL at 22:07

## 2023-05-25 RX ADMIN — VENLAFAXINE HYDROCHLORIDE 75 MG: 75 CAPSULE, EXTENDED RELEASE ORAL at 08:38

## 2023-05-25 RX ADMIN — OMEGA-3 FATTY ACIDS CAP 1000 MG 1000 MG: 1000 CAP at 08:38

## 2023-05-25 NOTE — PROGRESS NOTES
"Progress Note - Sanam Vega 47 y o  male MRN: [id number]    Unit/Bed#: [facility] Encounter: [id number]        Subjective:   Patient seen and examined at bedside after reviewing the chart and discussing the case with the caring staff  Patient examined at bedside  Patient has no acute complaints  Physical Exam   Vitals: Blood pressure 129/66, pulse 74, temperature (!) 97 1 °F (36 2 °C), temperature source Temporal, resp  rate 18, height 5' 6\" (1 676 m), weight 78 7 kg (173 lb 9 6 oz), SpO2 98 %  ,Body mass index is 28 02 kg/m²  Constitutional: Patient in no acute distress  HEENT: PERR, EOMI, MMM  Cardiovascular: Normal rate and regular rhythm  Pulmonary/Chest: Effort normal and breath sounds normal    Abdomen: Soft, + BS, NT    Assessment/Plan:  Sanam Vega is a(n) 47y o  year old male with MDD      1  Dyslipidemia  Continue atorvastatin 20 mg daily, fish oil 1000 mg daily  3   Arthralgia/headache  Patient may take Tylenol as needed  4   Insomnia  Patient is on melatonin at bedtime  The patient was discussed with Dr Bogdan Osman and he is in agreement with the above note    "

## 2023-05-25 NOTE — NURSING NOTE
Presents with WDL affect,brightens upon approach:endorses mild depression and anxiety  Rashaad Hebert denies SI,HI,AH,VH  He is visible on the unit,attends groups,is compliant with medications and unit rules  We discussed ways to increase coping skills  Will continue to educate,monitor,and provide safe,therapeutic milieu

## 2023-05-25 NOTE — PROGRESS NOTES
"Progress Note - [address] 47 y o  male MRN: [id number]  Unit/Bed#: [facility] 210-02 Encounter: [id number]    Assessment/Plan   Principal Problem: Moderate episode of recurrent major depressive disorder (HCC)  Active Problems:    Disc degeneration, lumbar    Hyperlipidemia    PTSD (post-traumatic stress disorder)      Behavior over the last 24 hours:  improving  Sleep: normal  Appetite: normal  Medication side effects: No  ROS: no complaints and all other systems are negative    Subjective: Bill seen today for psychiatric follow-up  Patient calm, cooperative  He is visible on the unit social with peers  Patient endorses depression, per patient \"I have been thinking about what happened and some general concerns \"  Patient reassured  According to nursing staff patient compliant with his medication regimen  He denies any sleep disturbance, SI/HI/AVH  He does not appear to be responding to internal stimuli  Mental Status Evaluation:  Appearance:  age appropriate and casually dressed   Behavior:  calm, cooperative   Speech:  normal pitch and normal volume   Mood:  depressed   Affect:  constricted   Thought Process:  goal directed   Associations: intact associations   Thought Content:  no overt delusions   Perceptual Disturbances: no overt delusions   Risk Potential: Suicidal Ideations none  Homicidal Ideations none  Potential for Aggression No   Sensorium:  person, place and time/date   Memory:  recent and remote memory grossly intact   Consciousness:  alert and awake    Attention: attention span and concentration were age appropriate   Insight:  limited   Judgment: fair   Gait/Station: normal gait/station   Motor Activity: no abnormal movements     Progress Toward Goals: Improving  Patient tolerating trazodone for sleep  Patient endorses good sleep overnight  He is compliant with current psychotropic medication regimen    He denies side effects from current psychotropic medication " regimen  Will continue current psychotropic medication regimen  No discharge date at this time  Recommended Treatment: Continue with group therapy, milieu therapy and occupational therapy  Risks, benefits and possible side effects of Medications:   Risks, benefits, and possible side effects of medications explained to patient and patient verbalizes understanding  Medications:   all current active meds have been reviewed and current meds:   Current Facility-Administered Medications   Medication Dose Route Frequency   • acetaminophen (TYLENOL) tablet 650 mg  650 mg Oral Q6H PRN   • acetaminophen (TYLENOL) tablet 975 mg  975 mg Oral Q6H PRN   • albuterol (PROVENTIL HFA,VENTOLIN HFA) inhaler 2 puff  2 puff Inhalation Q6H PRN   • aluminum-magnesium hydroxide-simethicone (MYLANTA) oral suspension 30 mL  30 mL Oral Q4H PRN   • atorvastatin (LIPITOR) tablet 20 mg  20 mg Oral Daily   • fish oil capsule 1,000 mg  1,000 mg Oral Daily   • haloperidol lactate (HALDOL) injection 5 mg  5 mg Intramuscular Q4H PRN Max 4/day   • hydrOXYzine HCL (ATARAX) tablet 50 mg  50 mg Oral Q6H PRN Max 4/day   • LORazepam (ATIVAN) injection 1 mg  1 mg Intramuscular Q6H PRN Max 3/day   • LORazepam (ATIVAN) tablet 0 5 mg  0 5 mg Oral Q6H PRN   • nicotine polacrilex (NICORETTE) gum 4 mg  4 mg Oral Q2H PRN   • polyethylene glycol (MIRALAX) packet 17 g  17 g Oral Daily PRN   • risperiDONE (RisperDAL) tablet 0 25 mg  0 25 mg Oral Q4H PRN Max 6/day   • risperiDONE (RisperDAL) tablet 0 5 mg  0 5 mg Oral Q4H PRN Max 3/day   • risperiDONE (RisperDAL) tablet 1 mg  1 mg Oral Q2H PRN Max 3/day   • traZODone (DESYREL) tablet 50 mg  50 mg Oral HS   • venlafaxine (EFFEXOR-XR) 24 hr capsule 75 mg  75 mg Oral Daily     Labs: I have personally reviewed all pertinent laboratory/tests results     Most Recent Labs:   Lab Results   Component Value Date    ALB 4 4 04/03/2023    ALKPHOS 65 04/03/2023    ALT 27 04/03/2023    AST 24 04/03/2023    BUN 13 05/24/2023    CALCIUM 9 1 05/24/2023    CHOLESTEROL 161 05/24/2023     05/24/2023    CO2 23 05/24/2023    CREATININE 1 12 05/24/2023    FREET4 0 80 09/13/2019    GLUC 91 05/24/2023    GLUF 91 05/24/2023    HCT 44 8 05/24/2023    HDL 44 05/24/2023    HGB 14 9 05/24/2023    K 3 9 05/24/2023    LDLCALC 93 05/24/2023    NEUTROABS 2 22 05/24/2023    NONHDLC 117 05/24/2023     05/24/2023    RBC 4 91 05/24/2023    RDW 12 1 05/24/2023    SODIUM 138 05/24/2023    TBILI 0 32 04/03/2023    TP 6 6 04/03/2023    TRIG 121 05/24/2023    [id number] 1 870 09/13/2019    WBC 5 66 05/24/2023       Counseling / Coordination of Care  Total floor / unit time spent today 25 minutes

## 2023-05-25 NOTE — PROGRESS NOTES
05/25/23 3505   Activity/Group Checklist   Group Community meeting   Attendance Attended   Attendance Duration (min) 46-60   Interactions Interacted appropriately   Affect/Mood Appropriate   Goals Achieved Identified feelings; Able to listen to others; Able to engage in interactions; Able to self-disclose; Able to recieve feedback

## 2023-05-25 NOTE — TELEPHONE ENCOUNTER
[facility] would like a call back from the doctor or clinical team regarding patient admission and would like to follow up with the clinical team
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
## 2023-05-25 NOTE — PROGRESS NOTES
05/24/23 0998   Team Meeting   Meeting Type Daily Rounds   Team Members Present   Team Members Present Physician;Nurse;;Occupational Therapist   Physician Team Member Dr Ana Montano MD; NAMITA Stafford   Nursing Team Member Lew Vaughan, MARIBEL; Trevor Mccarty, RN   Care Management Team Member Miracle Mac MS, [facility]   OT Team Member [place], [place]   Patient/Family Present   Patient Present No   Patient's Family Present No   New admission, 11, denies all, ptsd, si, bright, family stressors, outpatient therapy in place with pcp sw, reports depression, slept

## 2023-05-25 NOTE — SOCIAL WORK
JUDE placed call to PT PCP [facility] [phone], left message with answering service, she will notify team of PT admission  Call ended mutually  JUDE placed call to PT wife Jina Frenching [phone] for family check in  Left message requesting return call  JUDE sent psych referral via "[employer] pending response

## 2023-05-25 NOTE — PROGRESS NOTES
05/24/23 1430   Team Meeting   Meeting Type Tx Team Meeting   Team Members Present   Team Members Present Physician;Nurse;   Physician Team Member Dr Yue Rodrigues MD   Nursing Team Member Felisa Meade, MARIBEL   Care Management Team Member Tanna Montoya MS, [facility], [facility]   Patient/Family Present   Patient Present Yes   Patient's Family Present No     Reviewed TX plan and goals, all in agreement and signed

## 2023-05-25 NOTE — NURSING NOTE
Pt is pleasant & socializes with other patients  Pt is cooperative & compliant with medications  Pt up ad [place] & gait is steady  Pt c/o mild depression & mild anxiety  Pt denies any hallucinations, suicidal or homicidal ideations  Q 7 min checks maintained to monitor pt's behavior & safety

## 2023-05-25 NOTE — NURSING NOTE
Patient observed sleeping during most Q 7 minute safety checks  No SI/HI/AH/VH noted overnight  Patient shows no s/s of distress  No complaints of pain  Non-labored breathing  Monitoring continues  Fluids at bedside to promote hydration

## 2023-05-25 NOTE — PLAN OF CARE
Problem: Depression  Goal: Treatment Goal: Demonstrate behavioral control of depressive symptoms, verbalize feelings of improved mood/affect, and adopt new coping skills prior to discharge  Outcome: Progressing  Goal: Verbalize thoughts and feelings  Description: Interventions:  - Assess and re-assess patient's level of risk   - Engage patient in 1:1 interactions, daily, for a minimum of 15 minutes   - Encourage patient to express feelings, fears, frustrations, hopes   Interventions:  - Assess and re-assess patient's lethality and potential for self-injury  - Engage patient in 1:1 interactions, daily, for a minimum of 15 minutes  - Encourage patient to express feelings, fears, frustrations, hopes  - Establish rapport/trust with patient   Outcome: Progressing  Goal: Refrain from harming self  Description: Interventions:  - Monitor patient closely, per order   - Supervise medication ingestion, monitor effects and side effects   Outcome: Progressing  Goal: Refrain from isolation  Description: Interventions:  - Develop a trusting relationship   - Encourage socialization   Outcome: Progressing  Goal: Refrain from self-neglect  Outcome: Progressing  Goal: Attend and participate in unit activities, including therapeutic, recreational, and educational groups  Description: Interventions:  - Provide therapeutic and educational activities daily, encourage attendance and participation, and document same in the medical record   Interventions:  - Provide therapeutic and educational activities daily, encourage attendance and participation, and document same in the medical record  - Obtain collateral information, encourage visitation and family involvement in care   Outcome: Progressing  Goal: Complete daily ADLs, including personal hygiene independently, as able  Description: Interventions:  - Observe, teach, and assist patient with ADLS  -  Monitor and promote a balance of rest/activity, with adequate nutrition and elimination Interventions:  - Observe, teach, and assist patient with ADLS  - Monitor and promote a balance of rest/activity, with adequate nutrition and elimination  Outcome: Progressing     Problem: Anxiety  Goal: Anxiety is at manageable level  Description: Interventions:  - Assess and monitor patient's anxiety level  - Monitor for signs and symptoms (heart palpitations, chest pain, shortness of breath, headaches, nausea, feeling jumpy, restlessness, irritable, apprehensive)  - Collaborate with interdisciplinary team and initiate plan and interventions as ordered    - [place] patient to unit/surroundings  - Explain treatment plan  - Encourage participation in care  - Encourage verbalization of concerns/fears  - Identify coping mechanisms  - Assist in developing anxiety-reducing skills  - Administer/offer alternative therapies  - Limit or eliminate stimulants  Outcome: Progressing     Problem: Risk for Self Injury/Neglect  Goal: Verbalize thoughts and feelings  Description: Interventions:  - Assess and re-assess patient's level of risk   - Engage patient in 1:1 interactions, daily, for a minimum of 15 minutes   - Encourage patient to express feelings, fears, frustrations, hopes   Interventions:  - Assess and re-assess patient's lethality and potential for self-injury  - Engage patient in 1:1 interactions, daily, for a minimum of 15 minutes  - Encourage patient to express feelings, fears, frustrations, hopes  - Establish rapport/trust with patient   Outcome: Progressing  Goal: Attend and participate in unit activities, including therapeutic, recreational, and educational groups  Description: Interventions:  - Provide therapeutic and educational activities daily, encourage attendance and participation, and document same in the medical record   Interventions:  - Provide therapeutic and educational activities daily, encourage attendance and participation, and document same in the medical record  - Obtain collateral information, encourage visitation and family involvement in care   Outcome: Progressing  Goal: Complete daily ADLs, including personal hygiene independently, as able  Description: Interventions:  - Observe, teach, and assist patient with ADLS  -  Monitor and promote a balance of rest/activity, with adequate nutrition and elimination   Interventions:  - Observe, teach, and assist patient with ADLS  - Monitor and promote a balance of rest/activity, with adequate nutrition and elimination  Outcome: Progressing  Goal: Treatment Goal: Remain safe during length of stay, learn and adopt new coping skills, and be free of self-injurious ideation, impulses and acts at the time of discharge  Outcome: Progressing  Goal: Refrain from harming self  Description: Interventions:  - Monitor patient closely, per order  - Develop a trusting relationship  - Supervise medication ingestion, monitor effects and side effects   Outcome: Progressing  Goal: Recognize maladaptive responses and adopt new coping mechanisms  Outcome: Progressing

## 2023-05-25 NOTE — SOCIAL WORK
CM received message back via [employer] from [employer] pt is [facility] Claud[facility]hpande [facility]

## 2023-05-25 NOTE — PLAN OF CARE
Problem: Ineffective Coping  Goal: Cooperates with admission process  Description: Interventions:   - Complete admission process  Outcome: Progressing     Problem: Depression  Goal: Verbalize thoughts and feelings  Description: Interventions:  - Assess and re-assess patient's level of risk   - Engage patient in 1:1 interactions, daily, for a minimum of 15 minutes   - Encourage patient to express feelings, fears, frustrations, hopes   Interventions:  - Assess and re-assess patient's lethality and potential for self-injury  - Engage patient in 1:1 interactions, daily, for a minimum of 15 minutes  - Encourage patient to express feelings, fears, frustrations, hopes  - Establish rapport/trust with patient   Outcome: Progressing     Problem: Depression  Goal: Refrain from harming self  Description: Interventions:  - Monitor patient closely, per order   - Supervise medication ingestion, monitor effects and side effects   Outcome: Progressing     Problem: Depression  Goal: Complete daily ADLs, including personal hygiene independently, as able  Description: Interventions:  - Observe, teach, and assist patient with ADLS  -  Monitor and promote a balance of rest/activity, with adequate nutrition and elimination   Interventions:  - Observe, teach, and assist patient with ADLS  - Monitor and promote a balance of rest/activity, with adequate nutrition and elimination  Outcome: Progressing     Problem: Anxiety  Goal: Anxiety is at manageable level  Description: Interventions:  - Assess and monitor patient's anxiety level  - Monitor for signs and symptoms (heart palpitations, chest pain, shortness of breath, headaches, nausea, feeling jumpy, restlessness, irritable, apprehensive)  - Collaborate with interdisciplinary team and initiate plan and interventions as ordered    - [place] patient to unit/surroundings  - Explain treatment plan  - Encourage participation in care  - Encourage verbalization of concerns/fears  - Identify coping mechanisms  - Assist in developing anxiety-reducing skills  - Administer/offer alternative therapies  - Limit or eliminate stimulants  Outcome: Progressing     Problem: Risk for Self Injury/Neglect  Goal: Verbalize thoughts and feelings  Description: Interventions:  - Assess and re-assess patient's level of risk   - Engage patient in 1:1 interactions, daily, for a minimum of 15 minutes   - Encourage patient to express feelings, fears, frustrations, hopes   Interventions:  - Assess and re-assess patient's lethality and potential for self-injury  - Engage patient in 1:1 interactions, daily, for a minimum of 15 minutes  - Encourage patient to express feelings, fears, frustrations, hopes  - Establish rapport/trust with patient   Outcome: Progressing     Problem: Risk for Self Injury/Neglect  Goal: Complete daily ADLs, including personal hygiene independently, as able  Description: Interventions:  - Observe, teach, and assist patient with ADLS  -  Monitor and promote a balance of rest/activity, with adequate nutrition and elimination   Interventions:  - Observe, teach, and assist patient with ADLS  - Monitor and promote a balance of rest/activity, with adequate nutrition and elimination  Outcome: Progressing     Problem: Risk for Self Injury/Neglect  Goal: Treatment Goal: Remain safe during length of stay, learn and adopt new coping skills, and be free of self-injurious ideation, impulses and acts at the time of discharge  Outcome: Progressing     Problem: Risk for Self Injury/Neglect  Goal: Refrain from harming self  Description: Interventions:  - Monitor patient closely, per order  - Develop a trusting relationship  - Supervise medication ingestion, monitor effects and side effects   Outcome: Progressing

## 2023-05-25 NOTE — PROGRESS NOTES
05/25/23 8873   Team Meeting   Meeting Type Daily Rounds   Team Members Present   Team Members Present Physician;Nurse;   Physician Team Member Dr Tracy Malcolm MD; NAMITA Elise   Nursing Team Member Suzanna Bello, MARIBEL; Ross Trevino RN   Care Management Team Member Anthony Savage MS, [facility]   OT Team Member Crystal Walker, [place]   Patient/Family Present   Patient Present No   Patient's Family Present No   Prn at bedtime, denies si/hi/ah/vh, slept, mild anxiety and depression  D/c wed  Will follow up with sl psych

## 2023-05-25 NOTE — NURSING NOTE
Patient social and out in the community  Rates depression and anxiety as mild  Denies any suicidal thoughts or hallucinations  Pleasant during assessment  Compliant with medications and snacks  Medication education given  Care Plan to be reviewed and amended  Maintained on q 7 minute checks  Will continue to monitor

## 2023-05-26 RX ADMIN — OMEGA-3 FATTY ACIDS CAP 1000 MG 1000 MG: 1000 CAP at 08:05

## 2023-05-26 RX ADMIN — TRAZODONE HYDROCHLORIDE 50 MG: 50 TABLET ORAL at 21:07

## 2023-05-26 RX ADMIN — VENLAFAXINE HYDROCHLORIDE 75 MG: 75 CAPSULE, EXTENDED RELEASE ORAL at 08:06

## 2023-05-26 RX ADMIN — ATORVASTATIN CALCIUM 20 MG: 20 TABLET, FILM COATED ORAL at 08:06

## 2023-05-26 NOTE — PROGRESS NOTES
Progress Note - Behavioral Health   Annemarie [facility] 47 y o  male MRN: [id number]  Unit/Bed#: [facility] 210-02 Encounter: [id number]    Assessment/Plan   Principal Problem: Moderate episode of recurrent major depressive disorder (HCC)  Active Problems:    Disc degeneration, lumbar    Hyperlipidemia    PTSD (post-traumatic stress disorder)      Behavior over the last 24 hours:  unchanged  Sleep: normal  Appetite: normal  Medication side effects: No  ROS: no complaints and all other systems are negative    Subjective: Arkaren Roquethers was seen today for psychiatric follow-up  Patient calm, cooperative  He is visible on the unit social with peers  He brightens up upon approach  Patient appears discharge preoccupied  According to nursing staff patient compliant with his medication regimen  He denies any SI/HI/AVH, he does not appear to responding to internal stimuli  Mental Status Evaluation:  Appearance:  age appropriate and casually dressed   Behavior:  calm, cooperative   Speech:  normal pitch and normal volume   Mood:  depressed   Affect:  constricted   Thought Process:  goal directed   Associations: intact associations   Thought Content:  no overt delusions   Perceptual Disturbances: denied AVH, does not appear internally preoccupied   Risk Potential: Suicidal Ideations none  Homicidal Ideations none  Potential for Aggression No   Sensorium:  person, place and time/date   Memory:  recent and remote memory grossly intact   Consciousness:  alert and awake    Attention: attention span and concentration were age appropriate   Insight:  limited   Judgment: fair   Gait/Station: normal gait/station   Motor Activity: no abnormal movements     Progress Toward Goals: Unchanged  Patient compliant with current psychotropic medication regimen  He denies side effects with current psychotropic medication regimen  Will continue current psychotropic medication regimen  No discharge date at this time      Recommended Treatment: Continue with group therapy, milieu therapy and occupational therapy  Risks, benefits and possible side effects of Medications:   Risks, benefits, and possible side effects of medications explained to patient and patient verbalizes understanding  Medications:   all current active meds have been reviewed and current meds:   Current Facility-Administered Medications   Medication Dose Route Frequency   • acetaminophen (TYLENOL) tablet 650 mg  650 mg Oral Q6H PRN   • acetaminophen (TYLENOL) tablet 975 mg  975 mg Oral Q6H PRN   • albuterol (PROVENTIL HFA,VENTOLIN HFA) inhaler 2 puff  2 puff Inhalation Q6H PRN   • aluminum-magnesium hydroxide-simethicone (MYLANTA) oral suspension 30 mL  30 mL Oral Q4H PRN   • atorvastatin (LIPITOR) tablet 20 mg  20 mg Oral Daily   • fish oil capsule 1,000 mg  1,000 mg Oral Daily   • haloperidol lactate (HALDOL) injection 5 mg  5 mg Intramuscular Q4H PRN Max 4/day   • hydrOXYzine HCL (ATARAX) tablet 50 mg  50 mg Oral Q6H PRN Max 4/day   • LORazepam (ATIVAN) injection 1 mg  1 mg Intramuscular Q6H PRN Max 3/day   • LORazepam (ATIVAN) tablet 0 5 mg  0 5 mg Oral Q6H PRN   • nicotine polacrilex (NICORETTE) gum 4 mg  4 mg Oral Q2H PRN   • polyethylene glycol (MIRALAX) packet 17 g  17 g Oral Daily PRN   • risperiDONE (RisperDAL) tablet 0 25 mg  0 25 mg Oral Q4H PRN Max 6/day   • risperiDONE (RisperDAL) tablet 0 5 mg  0 5 mg Oral Q4H PRN Max 3/day   • risperiDONE (RisperDAL) tablet 1 mg  1 mg Oral Q2H PRN Max 3/day   • traZODone (DESYREL) tablet 50 mg  50 mg Oral HS   • venlafaxine (EFFEXOR-XR) 24 hr capsule 75 mg  75 mg Oral Daily     Labs: I have personally reviewed all pertinent laboratory/tests results     Most Recent Labs:   Lab Results   Component Value Date    ALB 4 4 04/03/2023    ALKPHOS 65 04/03/2023    ALT 27 04/03/2023    AST 24 04/03/2023    BUN 13 05/24/2023    CALCIUM 9 1 05/24/2023    CHOLESTEROL 161 05/24/2023     05/24/2023    CO2 23 05/24/2023    CREATININE 1 12 05/24/2023    FREET4 0 80 09/13/2019    GLUC 91 05/24/2023    GLUF 91 05/24/2023    HCT 44 8 05/24/2023    HDL 44 05/24/2023    HGB 14 9 05/24/2023    K 3 9 05/24/2023    LDLCALC 93 05/24/2023    NEUTROABS 2 22 05/24/2023    NONHDLC 117 05/24/2023     05/24/2023    RBC 4 91 05/24/2023    RDW 12 1 05/24/2023    SODIUM 138 05/24/2023    TBILI 0 32 04/03/2023    TP 6 6 04/03/2023    TRIG 121 05/24/2023    [id number] 1 870 09/13/2019    WBC 5 66 05/24/2023       Counseling / Coordination of Care  Total floor / unit time spent today 25 minutes

## 2023-05-26 NOTE — TELEPHONE ENCOUNTER
I called Maida Krishnan and left her a message, just stating the MRN, but NO patient information  I asked if she'd be willing to give Jose Weaver a call back to discuss this patient

## 2023-05-26 NOTE — SOCIAL WORK
Returned call to pt's wife Jeffery Barraza ([phone]) to answer questions pertaining to discharge  CM reiterated the phone number for nursing for the medical and daily routine questions the pt has  Pt's CM will follow up with wife on Tuesday  Call ended mutually

## 2023-05-26 NOTE — NURSING NOTE
Patient was pleasant and cooperative  Patient social with staff and peers  Staff support provided  Q 7 minute safety checks maintained  Patient denied SI/HI  Patient compliant with medications and groups  Patient expressed interest in discussing discharge planning and inquired how long he would be here  Patient remained pleasant, cooperative, and social throughout the shift  Staff will continue to monitor and support

## 2023-05-26 NOTE — PROGRESS NOTES
"Progress Note - Sanam Vega 47 y o  male MRN: [id number]    Unit/Bed#: [facility] Encounter: [id number]        Subjective:   Patient seen and examined at bedside after reviewing the chart and discussing the case with the caring staff  Patient examined at bedside  Patient has no acute complaints  Physical Exam   Vitals: Blood pressure (P) 140/87, pulse (P) 60, temperature (P) 97 9 °F (36 6 °C), temperature source (P) Temporal, resp  rate (P) 17, height 5' 6\" (1 676 m), weight 78 7 kg (173 lb 9 6 oz), SpO2 (P) 97 %  ,Body mass index is 28 02 kg/m²  Constitutional: Patient in no acute distress  HEENT: PERR, EOMI, MMM  Cardiovascular: Normal rate and regular rhythm  Pulmonary/Chest: Effort normal and breath sounds normal    Abdomen: Non distended  Assessment/Plan:  Sanam Vega is a(n) 47y o  year old male with MDD      1  Dyslipidemia  Continue atorvastatin 20 mg daily, fish oil 1000 mg daily  2   Arthralgia/headache  Patient may take Tylenol as needed  3   Insomnia  Patient is on trazodone at bedtime  The patient was discussed with Dr Bogdan Osman and he is in agreement with the above note    "

## 2023-05-26 NOTE — PROGRESS NOTES
05/26/23 0710   Activity/Group Checklist   Group Community meeting   Attendance Attended   Attendance Duration (min) 46-60   Interactions Interacted appropriately   Affect/Mood Appropriate   Goals Achieved Identified feelings; Able to listen to others; Able to engage in interactions; Able to self-disclose; Able to recieve feedback no

## 2023-05-27 RX ORDER — VENLAFAXINE HYDROCHLORIDE 150 MG/1
150 CAPSULE, EXTENDED RELEASE ORAL DAILY
Status: DISCONTINUED | OUTPATIENT
Start: 2023-05-28 | End: 2023-05-31 | Stop reason: HOSPADM

## 2023-05-27 RX ADMIN — ATORVASTATIN CALCIUM 20 MG: 20 TABLET, FILM COATED ORAL at 08:21

## 2023-05-27 RX ADMIN — TRAZODONE HYDROCHLORIDE 50 MG: 50 TABLET ORAL at 21:39

## 2023-05-27 RX ADMIN — OMEGA-3 FATTY ACIDS CAP 1000 MG 1000 MG: 1000 CAP at 08:22

## 2023-05-27 RX ADMIN — VENLAFAXINE HYDROCHLORIDE 75 MG: 75 CAPSULE, EXTENDED RELEASE ORAL at 08:21

## 2023-05-27 NOTE — NURSING NOTE
Pt is pleasant & socializes with other patients  Pt is cooperative & compliant with medications  Pt denies any depression or anxiety  Pt denies any hallucinations, suicidal or homicidal ideations  Q 7 min checks maintained to monitor pt's behavior & safety  Pt up ad clinton & gait is steady

## 2023-05-27 NOTE — NURSING NOTE
Patient visible on the unit  Pleasant and cooperative, social with peers  Compliant with evening medications  Denies SI, HI,hallucinations    Mild and anxiety and depression  Enjoys reading for relaxation  Continuous safety checks maintained

## 2023-05-27 NOTE — PROGRESS NOTES
"Progress Note - Angel Villarreal 47 y o  male MRN: [id number]    Unit/Bed#: [facility] 210-02 Encounter: [id number]        Subjective:   Patient seen and examined at bedside after reviewing the chart and discussing the case with the caring staff  Patient has no acute complaints  Physical Exam   Vitals: Blood pressure 155/67, pulse 83, temperature 97 9 °F (36 6 °C), temperature source Temporal, resp  rate 18, height 5' 6\" (1 676 m), weight 78 7 kg (173 lb 9 6 oz), SpO2 100 %  ,Body mass index is 28 02 kg/m²  Constitutional: Patient in no acute distress  HEENT: PERR, EOMI, MMM  Cardiovascular: Normal rate and regular rhythm  Pulmonary/Chest: Effort normal and breath sounds normal    Abdomen: Non distended  Assessment/Plan:  Angel Villarreal is a(n) 47y o  year old male with MDD      1  Dyslipidemia  Continue atorvastatin 20 mg daily, fish oil 1000 mg daily  2   Arthralgia/headache  Patient may take Tylenol as needed  3   Insomnia  Patient is on trazodone at bedtime  The patient was discussed with Dr Rexford Blizzard and he is in agreement with the above note    "

## 2023-05-27 NOTE — PLAN OF CARE
Problem: Depression  Goal: Verbalize thoughts and feelings  Description: Interventions:  - Assess and re-assess patient's level of risk   - Engage patient in 1:1 interactions, daily, for a minimum of 15 minutes   - Encourage patient to express feelings, fears, frustrations, hopes   Interventions:  - Assess and re-assess patient's lethality and potential for self-injury  - Engage patient in 1:1 interactions, daily, for a minimum of 15 minutes  - Encourage patient to express feelings, fears, frustrations, hopes  - Establish rapport/trust with patient   Outcome: Progressing  Goal: Refrain from harming self  Description: Interventions:  - Monitor patient closely, per order   - Supervise medication ingestion, monitor effects and side effects   Outcome: Progressing     Problem: Risk for Self Injury/Neglect  Goal: Verbalize thoughts and feelings  Description: Interventions:  - Assess and re-assess patient's level of risk   - Engage patient in 1:1 interactions, daily, for a minimum of 15 minutes   - Encourage patient to express feelings, fears, frustrations, hopes   Interventions:  - Assess and re-assess patient's lethality and potential for self-injury  - Engage patient in 1:1 interactions, daily, for a minimum of 15 minutes  - Encourage patient to express feelings, fears, frustrations, hopes  - Establish rapport/trust with patient   Outcome: Progressing

## 2023-05-27 NOTE — PROGRESS NOTES
Progress Note - [address] 47 y o  male MRN: [id number]  Unit/Bed#: [facility] 210-02 Encounter: [id number]  This note was not shared with the patient due to reasonable likelihood of causing patient harm     Assessment/Plan   Principal Problem: Moderate episode of recurrent major depressive disorder (HCC)  Active Problems:    Disc degeneration, lumbar    Hyperlipidemia    PTSD (post-traumatic stress disorder)      Behavior over the last 24 hours:  some improvement  Sleep: slept better  Appetite: normal  Medication side effects: No  ROS: no complaints and all other systems are negative    Mental Status Evaluation:  Appearance:  age appropriate and casually dressed   Behavior:  calm, cooperative   Speech:  normal pitch and normal volume   Mood:  depressed   Affect:  mood-congruent   Thought Process:  goal directed   Associations: intact associations   Thought Content:  no overt delusions   Perceptual Disturbances: denied AVH, does not appera internally preoccupied   Risk Potential: Suicidal Ideations none  Homicidal Ideations none  Potential for Aggression No   Sensorium:  person, place and time/date   Memory:  recent and remote memory grossly intact   Consciousness:  alert and awake    Attention: attention span and concentration were age appropriate   Insight:  limited   Judgment: fair   Gait/Station: normal gait/station   Motor Activity: no abnormal movements     Progress Toward Goals: Some improvement  He is compliant with current psychotropic medication regimen  Will continue current psychotropic medication regimen  No discharge date at this time  Recommended Treatment: Continue with group therapy, milieu therapy and occupational therapy  Increase Effexor to 150 mg po daily  Risks, benefits and possible side effects of Medications:   Risks, benefits, and possible side effects of medications explained to patient and patient verbalizes understanding        Medications:   all current active meds have been reviewed and current meds:   Current Facility-Administered Medications   Medication Dose Route Frequency   • acetaminophen (TYLENOL) tablet 650 mg  650 mg Oral Q6H PRN   • acetaminophen (TYLENOL) tablet 975 mg  975 mg Oral Q6H PRN   • albuterol (PROVENTIL HFA,VENTOLIN HFA) inhaler 2 puff  2 puff Inhalation Q6H PRN   • aluminum-magnesium hydroxide-simethicone (MYLANTA) oral suspension 30 mL  30 mL Oral Q4H PRN   • atorvastatin (LIPITOR) tablet 20 mg  20 mg Oral Daily   • fish oil capsule 1,000 mg  1,000 mg Oral Daily   • haloperidol lactate (HALDOL) injection 5 mg  5 mg Intramuscular Q4H PRN Max 4/day   • hydrOXYzine HCL (ATARAX) tablet 50 mg  50 mg Oral Q6H PRN Max 4/day   • LORazepam (ATIVAN) injection 1 mg  1 mg Intramuscular Q6H PRN Max 3/day   • LORazepam (ATIVAN) tablet 0 5 mg  0 5 mg Oral Q6H PRN   • nicotine (NICODERM CQ) 7 mg/24hr TD 24 hr patch 1 patch  1 patch Transdermal Daily   • nicotine polacrilex (NICORETTE) gum 4 mg  4 mg Oral Q2H PRN   • polyethylene glycol (MIRALAX) packet 17 g  17 g Oral Daily PRN   • risperiDONE (RisperDAL) tablet 0 25 mg  0 25 mg Oral Q4H PRN Max 6/day   • risperiDONE (RisperDAL) tablet 0 5 mg  0 5 mg Oral Q4H PRN Max 3/day   • risperiDONE (RisperDAL) tablet 1 mg  1 mg Oral Q2H PRN Max 3/day   • traZODone (DESYREL) tablet 50 mg  50 mg Oral HS   • venlafaxine (EFFEXOR-XR) 24 hr capsule 75 mg  75 mg Oral Daily     Labs: I have personally reviewed all pertinent laboratory/tests results     Most Recent Labs:   Lab Results   Component Value Date    ALB 4 4 04/03/2023    ALKPHOS 65 04/03/2023    ALT 27 04/03/2023    AST 24 04/03/2023    BUN 13 05/24/2023    CALCIUM 9 1 05/24/2023    CHOLESTEROL 161 05/24/2023     05/24/2023    CO2 23 05/24/2023    CREATININE 1 12 05/24/2023    FREET4 0 80 09/13/2019    GLUC 91 05/24/2023    GLUF 91 05/24/2023    HCT 44 8 05/24/2023    HDL 44 05/24/2023    HGB 14 9 05/24/2023    K 3 9 05/24/2023    LDLCALC 93 05/24/2023 NEUTROABS 2 22 05/24/2023    [place] 117 05/24/2023     05/24/2023    RBC 4 91 05/24/2023    RDW 12 1 05/24/2023    SODIUM 138 05/24/2023    TBILI 0 32 04/03/2023    TP 6 6 04/03/2023    TRIG 121 05/24/2023    [id number] 1 870 09/13/2019    WBC 5 66 05/24/2023       Counseling / Coordination of Care  Total floor / unit time spent today 25 minutes

## 2023-05-27 NOTE — NURSING NOTE
Pt asleep throughout the night  No respiratory distress noted  To continue  Q7 min safety checks and monitor

## 2023-05-28 RX ORDER — LORATADINE 10 MG/1
10 TABLET ORAL DAILY
Status: DISCONTINUED | OUTPATIENT
Start: 2023-05-28 | End: 2023-05-31 | Stop reason: HOSPADM

## 2023-05-28 RX ADMIN — ACETAMINOPHEN 650 MG: 325 TABLET ORAL at 20:23

## 2023-05-28 RX ADMIN — ATORVASTATIN CALCIUM 20 MG: 20 TABLET, FILM COATED ORAL at 08:12

## 2023-05-28 RX ADMIN — VENLAFAXINE HYDROCHLORIDE 150 MG: 150 CAPSULE, EXTENDED RELEASE ORAL at 08:12

## 2023-05-28 RX ADMIN — TRAZODONE HYDROCHLORIDE 50 MG: 50 TABLET ORAL at 21:43

## 2023-05-28 RX ADMIN — LORATADINE 10 MG: 10 TABLET ORAL at 12:59

## 2023-05-28 RX ADMIN — OMEGA-3 FATTY ACIDS CAP 1000 MG 1000 MG: 1000 CAP at 08:12

## 2023-05-28 NOTE — PROGRESS NOTES
Progress Note - [address] 47 y o  male MRN: [id number]  Unit/Bed#: [facility] 210-02 Encounter: [id number]  This note was not shared with the patient due to reasonable likelihood of causing patient harm     Behavior over the last 24 hours: some improvement  Sleep: slept better  Appetite: normal  Medication side effects: No  ROS: no complaints and all other systems are negative    Mental Status Evaluation:  Appearance:  age appropriate and casually dressed   Behavior:  calm, cooperative   Speech:  normal pitch and normal volume   Mood:  euthymic   Affect:  mood-congruent   Thought Process:  goal directed   Associations: intact associations   Thought Content:  no overt delusions   Perceptual Disturbances: None   Risk Potential: Suicidal Ideations none  Homicidal Ideations none  Potential for Aggression No   Sensorium:  person, place and time/date   Memory:  recent and remote memory grossly intact   Consciousness:  alert and awake    Attention: attention span and concentration were age appropriate   Insight:  fair   Judgment: fair   Gait/Station: normal gait/station   Motor Activity: no abnormal movements     Principal Problem: Moderate episode of recurrent major depressive disorder (HCC)  Active Problems:    Disc degeneration, lumbar    Hyperlipidemia    PTSD (post-traumatic stress disorder)    Some improvement  He is compliant with current psychotropic medication regimen  Will continue current psychotropic medication regimen  No discharge date at this time  Recommended Treatment: Continue with group therapy, milieu therapy and occupational therapy  Risks, benefits and possible side effects of Medications:   Risks, benefits, and possible side effects of medications explained to patient and patient verbalizes understanding        Medications:   all current active meds have been reviewed and current meds:   Current Facility-Administered Medications   Medication Dose Route Frequency   • acetaminophen (TYLENOL) tablet 650 mg  650 mg Oral Q6H PRN   • acetaminophen (TYLENOL) tablet 975 mg  975 mg Oral Q6H PRN   • albuterol (PROVENTIL HFA,VENTOLIN HFA) inhaler 2 puff  2 puff Inhalation Q6H PRN   • aluminum-magnesium hydroxide-simethicone (MYLANTA) oral suspension 30 mL  30 mL Oral Q4H PRN   • atorvastatin (LIPITOR) tablet 20 mg  20 mg Oral Daily   • fish oil capsule 1,000 mg  1,000 mg Oral Daily   • haloperidol lactate (HALDOL) injection 5 mg  5 mg Intramuscular Q4H PRN Max 4/day   • hydrOXYzine HCL (ATARAX) tablet 50 mg  50 mg Oral Q6H PRN Max 4/day   • loratadine (CLARITIN) tablet 10 mg  10 mg Oral Daily   • LORazepam (ATIVAN) injection 1 mg  1 mg Intramuscular Q6H PRN Max 3/day   • LORazepam (ATIVAN) tablet 0 5 mg  0 5 mg Oral Q6H PRN   • nicotine polacrilex (NICORETTE) gum 4 mg  4 mg Oral Q2H PRN   • polyethylene glycol (MIRALAX) packet 17 g  17 g Oral Daily PRN   • risperiDONE (RisperDAL) tablet 0 25 mg  0 25 mg Oral Q4H PRN Max 6/day   • risperiDONE (RisperDAL) tablet 0 5 mg  0 5 mg Oral Q4H PRN Max 3/day   • risperiDONE (RisperDAL) tablet 1 mg  1 mg Oral Q2H PRN Max 3/day   • traZODone (DESYREL) tablet 50 mg  50 mg Oral HS   • venlafaxine (EFFEXOR-XR) 24 hr capsule 150 mg  150 mg Oral Daily     Labs: I have personally reviewed all pertinent laboratory/tests results     Most Recent Labs:   Lab Results   Component Value Date    ALB 4 4 04/03/2023    ALKPHOS 65 04/03/2023    ALT 27 04/03/2023    AST 24 04/03/2023    BUN 13 05/24/2023    CALCIUM 9 1 05/24/2023    CHOLESTEROL 161 05/24/2023     05/24/2023    CO2 23 05/24/2023    CREATININE 1 12 05/24/2023    FREET4 0 80 09/13/2019    GLUC 91 05/24/2023    GLUF 91 05/24/2023    HCT 44 8 05/24/2023    HDL 44 05/24/2023    HGB 14 9 05/24/2023    K 3 9 05/24/2023    LDLCALC 93 05/24/2023    NEUTROABS 2 22 05/24/2023    NONHDLC 117 05/24/2023     05/24/2023    RBC 4 91 05/24/2023    RDW 12 1 05/24/2023    SODIUM 138 05/24/2023    TBILI 0 32 04/03/2023    TP 6 6 04/03/2023    TRIG 121 05/24/2023    [id number] 1 870 09/13/2019    WBC 5 66 05/24/2023       Counseling / Coordination of Care  Total floor / unit time spent today 25 minutes

## 2023-05-28 NOTE — NURSING NOTE
Pt up ad clinton & gait is steady  Pt is pleasant & socializes with other patients  Pt is cooperative & compliant with medications  Pt denies any hallucinations, suicidal or homicidal ideations  Pt denies any depression or anxiety  Q 7 min checks maintained to monitor pt's behavior & safety

## 2023-05-28 NOTE — PROGRESS NOTES
"Progress Note - Aissatou Davis 47 y o  male MRN: [id number]    Unit/Bed#: [facility] 210-02 Encounter: [id number]        Subjective:   Patient seen and examined at bedside after reviewing the chart and discussing the case with the caring staff  Patient requesting to be started on Claritin which he was taking at home  He otherwise denies any complaints  Physical Exam   Vitals: Blood pressure 103/61, pulse 66, temperature 98 3 °F (36 8 °C), temperature source Temporal, resp  rate 18, height 5' 6\" (1 676 m), weight 78 7 kg (173 lb 9 6 oz), SpO2 100 %  ,Body mass index is 28 02 kg/m²  Constitutional: Patient in no acute distress  HEENT: PERR, EOMI, MMM  Cardiovascular: Normal rate  Pulmonary/Chest: No respiratory distress  Abdomen: Non distended  Assessment/Plan:  Aissatou Davis is a(n) 47y o  year old male with MDD      1  Dyslipidemia  Continue atorvastatin 20 mg daily, fish oil 1000 mg daily  2   Arthralgia/headache  Patient may take Tylenol as needed  3   Insomnia  Patient is on trazodone at bedtime  4   Seasonal allergies  Patient started on loratadine 10 mg daily  The patient was discussed with Dr Max Lopez and he is in agreement with the above note    "

## 2023-05-28 NOTE — NURSING NOTE
Patient observed in room reading  Calm and cooperative  Denies SI, HI, hallucinations  Mild anxiety and depression, due to being hospitalized  Will continue to monitor and access  Q 7 minute safety checks maintained

## 2023-05-29 RX ADMIN — VENLAFAXINE HYDROCHLORIDE 150 MG: 150 CAPSULE, EXTENDED RELEASE ORAL at 08:53

## 2023-05-29 RX ADMIN — LORATADINE 10 MG: 10 TABLET ORAL at 08:53

## 2023-05-29 RX ADMIN — ATORVASTATIN CALCIUM 20 MG: 20 TABLET, FILM COATED ORAL at 08:53

## 2023-05-29 RX ADMIN — OMEGA-3 FATTY ACIDS CAP 1000 MG 1000 MG: 1000 CAP at 08:53

## 2023-05-29 RX ADMIN — TRAZODONE HYDROCHLORIDE 50 MG: 50 TABLET ORAL at 20:48

## 2023-05-29 NOTE — NURSING NOTE
Patient visible on the unit  Pleasant and cooperative  Social with peers  Denies SI, HI, hallucinations, anxiety, and depression  Requested and received Tylenol for a headache with good results  Compliant with medications  Q 7 minute checks maintained  Will continue to monitor and access

## 2023-05-29 NOTE — PROGRESS NOTES
Progress Note - Behavioral Health   Britt Love 47 y o  male MRN: [id number]  Unit/Bed#: [facility] 210-02 Encounter: [id number]    Assessment/Plan   Principal Problem: Moderate episode of recurrent major depressive disorder (HCC)  Active Problems:    Disc degeneration, lumbar    Hyperlipidemia    PTSD (post-traumatic stress disorder)      Subjective:Patient was seen today for continuation of care, records reviewed and  patient was discussed with the morning case review team   No behavioral changes over the past 24 hours, reports compliance with all medication and no side effects from recently increased Effexor  mg daily  He is hopeful for discharge Wednesday, reports stability with manageable anxiety and depression at this time  Patient denies endorsing any suicidal or homicidal ideation  Remains medication compliance  Denies any side effects from medications      Psychiatric Review of Systems:    Sleep: slept off and on  Appetite: normal  Medication side effects: No   ROS: all other systems are negative    Vitals:  Vitals:    05/29/23 0750   BP: 106/61   Pulse: 55   Resp: 14   Temp: 97 9 °F (36 6 °C)   SpO2: 99%       Mental Status Evaluation:    Appearance:  age appropriate, casually dressed   Behavior:  pleasant, cooperative, calm   Speech:  normal rate and volume   Mood:  improved   Affect:  appropriate   Thought Process:  organized, logical   Associations: intact associations   Thought Content:  no overt delusions   Perceptual Disturbances: none   Risk Potential: Suicidal ideation - None  Homicidal ideation - None  Potential for aggression - No   Sensorium:  oriented to person, place and time/date   Memory:  recent and remote memory grossly intact   Consciousness:  alert and awake   Attention: attention span and concentration are age appropriate   Insight:  age appropriate   Judgment: fair   Gait/Station: normal gait/station   Motor Activity: no abnormal movements     Laboratory results:    I have personally reviewed all pertinent laboratory/tests results    Most Recent Labs:   Lab Results   Component Value Date    ALB 4 4 04/03/2023    ALKPHOS 65 04/03/2023    ALT 27 04/03/2023    AST 24 04/03/2023    BUN 13 05/24/2023    CALCIUM 9 1 05/24/2023    CHOLESTEROL 161 05/24/2023     05/24/2023    CO2 23 05/24/2023    CREATININE 1 12 05/24/2023    FREET4 0 80 09/13/2019    GLUC 91 05/24/2023    GLUF 91 05/24/2023    HCT 44 8 05/24/2023    HDL 44 05/24/2023    HGB 14 9 05/24/2023    K 3 9 05/24/2023    LDLCALC 93 05/24/2023    NEUTROABS 2 22 05/24/2023    NONHDLC 117 05/24/2023     05/24/2023    RBC 4 91 05/24/2023    RDW 12 1 05/24/2023    SODIUM 138 05/24/2023    TBILI 0 32 04/03/2023    TP 6 6 04/03/2023    TRIG 121 05/24/2023    [id number] 1 870 09/13/2019    WBC 5 66 05/24/2023         Recommended Treatment:     -Continue Effexor 150 mg daily and trazodone 50 mg at bedtime        All current active medications have been reviewed  Encourage group therapy, milieu therapy and occupational therapy  Continue treatment with group therapy, milieu therapy and occupational therapy  Behavioral Health checks every 7 minutes  Continue current medications:  Current Facility-Administered Medications   Medication Dose Route Frequency Provider Last Rate   • acetaminophen  650 mg Oral Q6H PRN Kasia Ellis MD     • acetaminophen  975 mg Oral Q6H PRN Essence Pfeiffer PA-C     • albuterol  2 puff Inhalation Q6H PRN Eleanor Caceres MD     • aluminum-magnesium hydroxide-simethicone  30 mL Oral Q4H PRN Eleanor Caceres MD     • atorvastatin  20 mg Oral Daily Eleanor Caceres MD     • fish oil  1,000 mg Oral Daily Eleanor Caceres MD     • haloperidol lactate  5 mg Intramuscular Q4H PRN Max 4/day Eleanor Caceres MD     • hydrOXYzine HCL  50 mg Oral Q6H PRN Max 4/day Carline Cary MD     • loratadine  10 mg Oral Daily Essence Pfeiffer PA-C     • LORazepam  1 mg Intramuscular Q6H PRN Max 3/day Eleanor Caceres, MD     • LORazepam  0 5 mg Oral Q6H PRN Lanney Goldmann, MD     • nicotine polacrilex  4 mg Oral Q2H PRN Ned Fernandez MD     • polyethylene glycol  17 g Oral Daily PRN Essence Pfeiffer PA-C     • risperiDONE  0 25 mg Oral Q4H PRN Max 6/day Ned Fernandez MD     • risperiDONE  0 5 mg Oral Q4H PRN Max 3/day Ned Fernandez MD     • risperiDONE  1 mg Oral Q2H PRN Max 3/day Ned Fernandez MD     • traZODone  50 mg Oral HS NAMITA Barcenas     • venlafaxine  150 mg Oral Daily Lanney Goldmann, MD         Risks / Benefits of Treatment:     Risks, benefits, and possible side effects of medications explained to patient  Patient has limited understanding of risks and benefits of treatment at this time, but agrees to take medications as prescribed  Counseling / Coordination of Care:     Patient's progress reviewed with nursing staff  Medications, treatment progress and treatment plan reviewed with patient  Supportive counseling provided to the patient            NAMITA Mota

## 2023-05-29 NOTE — NURSING NOTE
"Patient visible in milieu, pleasant and cooperative in interaction  Social with staff and peers  Patient denies anxiety/depression, SI/HI, hallucinations  Remains medication compliant and on 7\" checks for safety and behaviors    "

## 2023-05-29 NOTE — PROGRESS NOTES
"Progress Note - Ramon Walden 47 y o  male MRN: [id number]    Unit/Bed#: [facility] Encounter: [id number]        Subjective:   Patient seen and examined at bedside after reviewing the chart and discussing the case with the caring staff  Patient denies any acute complaints  Physical Exam   Vitals: Blood pressure 106/61, pulse 55, temperature 97 9 °F (36 6 °C), temperature source Temporal, resp  rate 14, height 5' 6\" (1 676 m), weight 78 7 kg (173 lb 9 6 oz), SpO2 99 %  ,Body mass index is 28 02 kg/m²  Constitutional: Patient in no acute distress  HEENT: PERR, EOMI, MMM  Cardiovascular: Normal rate  Pulmonary/Chest: No respiratory distress  Abdomen: Non distended  Assessment/Plan:  Ramon Walden is a(n) 47y o  year old male with MDD      1  Dyslipidemia  Continue atorvastatin 20 mg daily, fish oil 1000 mg daily  2   Arthralgia/headache  Patient may take Tylenol as needed  3   Insomnia  Patient is on trazodone at bedtime  4   Seasonal allergies  Patient started on loratadine 10 mg daily  The patient was discussed with Dr Blaze Tsang and he is in agreement with the above note    "

## 2023-05-30 PROBLEM — F33.1 MODERATE EPISODE OF RECURRENT MAJOR DEPRESSIVE DISORDER (HCC): Status: RESOLVED | Noted: 2020-09-23 | Resolved: 2023-05-30

## 2023-05-30 RX ORDER — CHLORAL HYDRATE 500 MG
1000 CAPSULE ORAL DAILY
Qty: 30 CAPSULE | Refills: 0 | Status: SHIPPED | OUTPATIENT
Start: 2023-05-30

## 2023-05-30 RX ORDER — VENLAFAXINE HYDROCHLORIDE 150 MG/1
150 CAPSULE, EXTENDED RELEASE ORAL DAILY
Qty: 30 CAPSULE | Refills: 0 | Status: SHIPPED | OUTPATIENT
Start: 2023-05-31

## 2023-05-30 RX ORDER — ALBUTEROL SULFATE 90 UG/1
2 AEROSOL, METERED RESPIRATORY (INHALATION) EVERY 6 HOURS PRN
Qty: 18 G | Refills: 0 | Status: SHIPPED | OUTPATIENT
Start: 2023-05-30

## 2023-05-30 RX ORDER — LORATADINE 10 MG/1
10 TABLET ORAL DAILY
Qty: 30 TABLET | Refills: 0 | Status: SHIPPED | OUTPATIENT
Start: 2023-05-31

## 2023-05-30 RX ORDER — TRAZODONE HYDROCHLORIDE 50 MG/1
50 TABLET ORAL
Qty: 30 TABLET | Refills: 0 | Status: SHIPPED | OUTPATIENT
Start: 2023-05-30

## 2023-05-30 RX ORDER — CYANOCOBALAMIN 1000 UG/ML
1000 INJECTION, SOLUTION INTRAMUSCULAR; SUBCUTANEOUS
Status: DISCONTINUED | OUTPATIENT
Start: 2023-05-30 | End: 2023-05-31 | Stop reason: HOSPADM

## 2023-05-30 RX ORDER — CYANOCOBALAMIN 1000 UG/ML
1000 INJECTION, SOLUTION INTRAMUSCULAR; SUBCUTANEOUS
Qty: 1 ML | Refills: 0 | Status: SHIPPED | OUTPATIENT
Start: 2023-05-30

## 2023-05-30 RX ORDER — ATORVASTATIN CALCIUM 20 MG/1
20 TABLET, FILM COATED ORAL DAILY
Qty: 90 TABLET | Refills: 0 | Status: SHIPPED | OUTPATIENT
Start: 2023-05-30

## 2023-05-30 RX ADMIN — VENLAFAXINE HYDROCHLORIDE 150 MG: 150 CAPSULE, EXTENDED RELEASE ORAL at 08:34

## 2023-05-30 RX ADMIN — TRAZODONE HYDROCHLORIDE 50 MG: 50 TABLET ORAL at 21:22

## 2023-05-30 RX ADMIN — LORATADINE 10 MG: 10 TABLET ORAL at 08:34

## 2023-05-30 RX ADMIN — CYANOCOBALAMIN 1000 MCG: 1000 INJECTION, SOLUTION INTRAMUSCULAR; SUBCUTANEOUS at 13:36

## 2023-05-30 RX ADMIN — OMEGA-3 FATTY ACIDS CAP 1000 MG 1000 MG: 1000 CAP at 08:34

## 2023-05-30 RX ADMIN — ATORVASTATIN CALCIUM 20 MG: 20 TABLET, FILM COATED ORAL at 08:34

## 2023-05-30 NOTE — DISCHARGE SUMMARY
Discharge Summary - [address] 47 y o  male MRN: [id number]  Unit/Bed#: [facility]-02 Encounter: [id number]     Admission Date: 5/23/2023         Discharge Date: 5/31/2023  Attending Psychiatrist: Nohelia Samuels MD    Reason for Admission/HPI: Major depressive disorder, single episode, unspecified [F32 9]  Major depressive disorder [F32 9]    Patient is a 47 y o  male presented with a history of depression, anxiety and PTSD who was admitted to the inpatient adult psychiatric unit on a voluntary 201 commitment basis due to depression, anxiety and unstable mood  Patient presented to ED referred by his therapist due to  extreme anxiety, panic-like attacks since last week  On evaluation in the inpatient psychiatric unit Sienna Santana presents anxious, frustrated but able to be engaged in appropriate interview  Patient reports that he has been struggling with high anxiety, racing thoughts, poor sleep and worsening of panic attacks since last Wednesday  States the trigger is the longstanding marital conflict with his wife for the past 3 years  Denies any current suicidal ideation or wish to die and he is able to contract for safety appropriately  Patient admits he has extensive history of trauma in the past which has been working with his therapist  He has been taking Effexor for many years but denies any additional psychiatric medication trial  Denies any history of manic episode, homicidal ideation, paranoia or hallucination  Denies alcohol use or other illicit drug use but medical marijuana  Patient agreed to be compliant with medication and treatment plan  Hospital Course: The patient was admitted to the inpatient psychiatric unit and started on every 7 minutes precautions  During the hospitalization the patient was attending individual therapy, group therapy, milieu therapy and occupational therapy  Psychiatric medications were titrated over the hospital stay   To address depressive symptoms and insomnia the patient was started on antidepressant Effexor XR and hypnotic medication Trazodone  Medication doses were titrated during the hospital course  Prior to beginning of treatment medications risks and benefits and possible side effects including risk of suicidality and serotonin syndrome related to treatment with antidepressants and risk of impaired next-day mental alertness, complex sleep-related behavior and dependence related to treatment with hypnotic medications were reviewed with the patient  The patient verbalized understanding and agreement for treatment  Patient's symptoms improved gradually over the hospital course  At the end of treatment the patient was doing well  Mood was stable at the time of discharge  The patient denied suicidal ideation, intent or plan at the time of discharge and denied homicidal ideation, intent or plan at the time of discharge  There was no overt psychosis at the time of discharge  Sleep and appetite were improved  The patient was tolerating medications and was not reporting any significant side effects at the time of discharge  Since the patient was doing well at the end of the hospitalization, treatment team felt that the patient could be safely discharged to outpatient care  The outpatient follow up with Dr Kolton Willingham and PCP was arranged by the unit  upon discharge      Mental Status at time of Discharge:     Appearance:  age appropriate and casually dressed   Behavior:  calm, cooperative   Speech:  normal pitch and normal volume   Mood:  euthymic   Affect:  mood-congruent   Thought Process:  logical   Thought Content:  no overt delusions   Perceptual Disturbances: denied AVH, does not appear internally preoccupied   Risk Potential: none   Sensorium:  person, place and time/date   Cognition:  recent and remote memory grossly intact   Consciousness:  alert and awake    Attention: attention span and concentration were age appropriate   Insight:  good   Judgment: good   Gait/Station: normal gait/station   Motor Activity: no abnormal movements     Admission Diagnosis:Major depressive disorder, single episode, unspecified [F32 9]  Major depressive disorder [F32 9]    Discharge Diagnosis:   Principal Problem (Resolved):     Moderate episode of recurrent major depressive disorder (HCC)  Active Problems:    Disc degeneration, lumbar    Hyperlipidemia    PTSD (post-traumatic stress disorder)        Lab results:  Admission on 05/23/2023   Component Date Value   • Sodium 05/24/2023 138    • Potassium 05/24/2023 3 9    • Chloride 05/24/2023 106    • CO2 05/24/2023 23    • ANION GAP 05/24/2023 9    • BUN 05/24/2023 13    • Creatinine 05/24/2023 1 12    • Glucose 05/24/2023 91    • Glucose, Fasting 05/24/2023 91    • Calcium 05/24/2023 9 1    • eGFR 05/24/2023 74    • WBC 05/24/2023 5 66    • RBC 05/24/2023 4 91    • Hemoglobin 05/24/2023 14 9    • Hematocrit 05/24/2023 44 8    • MCV 05/24/2023 91    • MCH 05/24/2023 30 3    • MCHC 05/24/2023 33 3    • RDW 05/24/2023 12 1    • MPV 05/24/2023 9 9    • Platelets 45/56/1454 274    • nRBC 05/24/2023 0    • Neutrophils Relative 05/24/2023 39 (L)    • Immat GRANS % 05/24/2023 0    • Lymphocytes Relative 05/24/2023 48 (H)    • Monocytes Relative 05/24/2023 8    • Eosinophils Relative 05/24/2023 4    • Basophils Relative 05/24/2023 1    • Neutrophils Absolute 05/24/2023 2 22    • Immature Grans Absolute 05/24/2023 0 01    • Lymphocytes Absolute 05/24/2023 2 71    • Monocytes Absolute 05/24/2023 0 43    • Eosinophils Absolute 05/24/2023 0 25    • Basophils Absolute 05/24/2023 0 04    • Folate 05/24/2023 >22 3    • Cholesterol 05/24/2023 161    • Triglycerides 05/24/2023 121    • HDL, Direct 05/24/2023 44    • LDL Calculated 05/24/2023 93    • Non-HDL-Chol (CHOL-HDL) 05/24/2023 117    • Vitamin B-12 05/24/2023 371    • Vit D, 25-Hydroxy 05/24/2023 57 7        Discharge Medications:  Current Discharge Medication List      START taking these medications    Details   traZODone (DESYREL) 50 mg tablet Take 1 tablet (50 mg total) by mouth daily at bedtime  Qty: 30 tablet, Refills: 0    Associated Diagnoses: Insomnia            Current Discharge Medication List      STOP taking these medications       hydrOXYzine HCL (ATARAX) 25 mg tablet Comments:   Reason for Stopping:              Current Discharge Medication List      CONTINUE these medications which have CHANGED    Details   venlafaxine (EFFEXOR-XR) 150 mg 24 hr capsule Take 1 capsule (150 mg total) by mouth daily Do not start before May 31, 2023  Qty: 30 capsule, Refills: 0    Associated Diagnoses: Depression, unspecified depression type            Current Discharge Medication List      CONTINUE these medications which have NOT CHANGED    Details   albuterol (PROVENTIL HFA,VENTOLIN HFA) 90 mcg/act inhaler Inhale 2 puffs every 6 (six) hours as needed for wheezing  Qty: 18 g, Refills: 1    Comments: Substitution to a formulary equivalent within the same pharmaceutical class is authorized  Associated Diagnoses: Acute bronchitis with bronchospasm      atorvastatin (LIPITOR) 20 mg tablet TAKE 1 TABLET BY MOUTH EVERY DAY  Qty: 90 tablet, Refills: 3    Associated Diagnoses: Mixed hyperlipidemia      b complex vitamins capsule Take 1 capsule by mouth daily      Cholecalciferol (VITAMIN D3 PO) Take by mouth      clobetasol (TEMOVATE) 0 05 % ointment Apply topically 2 (two) times a day  Qty: 30 g, Refills: 3    Associated Diagnoses: Dyshydrosis      Flaxseed Oil OIL Use 1,200 mg daily       multivitamin (THERAGRAN) TABS Take 1 tablet by mouth daily      Omega-3 Fatty Acids (FISH OIL PO) Take 1,000 mg by mouth daily       Saw Palmetto, Serenoa repens, (SAW PALMETTO PO) Take by mouth              Discharge instructions/Information to patient and family:   See after visit summary for information provided to patient and family        Provisions for Follow-Up Care:  See after visit summary for information related to follow-up care and any pertinent home health orders  Discharge Statement     I spent 30 minutes discharging the patient  This time was spent on the day of discharge  I had direct contact with the patient on the day of discharge  Additional documentation is required if more than 30 minutes were spent on discharge:    I reviewed with Emani Brand importance of compliance with medications and outpatient treatment after discharge  I discussed the medication regimen and possible side effects of the medications with Emani Brand prior to discharge  At the time of discharge he was tolerating psychiatric medications  I discussed outpatient follow up with Francisco J Ham  I reviewed with Emani Brand crisis plan and safety plan upon discharge

## 2023-05-30 NOTE — PROGRESS NOTES
05/30/23 1330   Activity/Group Checklist   Group Life Skills   Attendance Attended   Attendance Duration (min) 46-60   Interactions Interacted appropriately   Affect/Mood Appropriate   Goals Achieved Identified feelings; Discussed coping strategies; Able to listen to others; Able to engage in interactions; Able to reflect/comment on own behavior;Able to manage/cope with feelings; Able to self-disclose; Able to recieve feedback

## 2023-05-30 NOTE — PROGRESS NOTES
Denied any depression or anxiety  No suicidal ideations tonight  Patient social and out in the community  Pleasant during assessment  Compliant with medications and snacks  Medication education given  Care Plan to be reviewed and amended  Maintained on q 7 minute checks  Will continue to monitor

## 2023-05-30 NOTE — PROGRESS NOTES
"Progress Note - Faustino Santoro 47 y o  male MRN: [id number]    Unit/Bed#: [facility] Encounter: [id number]        Subjective:   Patient seen and examined at bedside after reviewing the chart and discussing the case with the caring staff  Patient denies any acute complaints  Patient's vitamin B12 levels were found to be low 371  Patient is a scheduled for discharge tomorrow 5/31/2023  Patient is requesting all his prescriptions  I reviewed and reconciled patient's problem list and medications  Physical Exam   Vitals: Blood pressure 131/81, pulse 85, temperature 97 6 °F (36 4 °C), temperature source Temporal, resp  rate 18, height 5' 6\" (1 676 m), weight 78 7 kg (173 lb 9 6 oz), SpO2 100 %  ,Body mass index is 28 02 kg/m²  Constitutional: Patient in no acute distress  HEENT: PERR, EOMI, MMM  Cardiovascular: Normal rate  Pulmonary/Chest: No respiratory distress  Abdomen: Non distended  Assessment/Plan:  Faustino Santoro is a(n) 47y o  year old male with MDD  Medical clearance  Patient is medically cleared for discharge  All scripts will be sent out for the patient      1  Dyslipidemia  Continue atorvastatin 20 mg daily, fish oil 1000 mg daily  2   Arthralgia/headache  Patient may take Tylenol as needed  3   Insomnia  Patient is on trazodone at bedtime  4   Seasonal allergies  Patient started on loratadine 10 mg daily  5   New vitamin B-12 deficiency  Patient will be put on monthly B12 injections    "

## 2023-05-30 NOTE — BH TRANSITION RECORD
Contact Information: If you have any questions, concerns, pended studies, tests and/or procedures, or emergencies regarding your inpatient behavioral health visit  Please contact [facility] [phone] and ask to speak to a , nurse or physician  A contact is available 24 hours/ 7 days a week at this number  Summary of Procedures Performed During your Stay:  Below is a list of major procedures performed during your hospital stay and a summary of results:  - No major procedures performed  Pending Studies (From admission, onward)    None        Please follow up on the above pending studies with your PCP and/or referring provider

## 2023-05-30 NOTE — PROGRESS NOTES
Progress Note - Behavioral Health   Shira Roberto 47 y o  male MRN: [id number]  Unit/Bed#: [facility]-02 Encounter: [id number]    Assessment/Plan   Principal Problem: Moderate episode of recurrent major depressive disorder (HCC)  Active Problems:    Disc degeneration, lumbar    Hyperlipidemia    PTSD (post-traumatic stress disorder)      Behavior over the last 24 hours:  improved  Sleep: normal  Appetite: normal  Medication side effects: No  ROS: no complaints and all other systems are negative    Subjective: Amauri Turner was seen today for psychiatric follow-up  Patient calm, cooperative  He is visible on the unit social with peers  Patient continues to exhibit a controlled mood on the unit  He is compliant medication regimen  He continues to appear less depressed and anxious  He denies any sleep disturbance, SI/HI/AVH  He does not appear to responding to internal stimuli  Mental Status Evaluation:  Appearance:  age appropriate and casually dressed   Behavior:  calm, cooperative   Speech:  normal pitch and normal volume   Mood:  improved   Affect:  mood-congruent   Thought Process:  logical   Associations: intact associations   Thought Content:  no overt delusions   Perceptual Disturbances: denied AVH, does not appear internally preoccupied   Risk Potential: Suicidal Ideations none  Homicidal Ideations none  Potential for Aggression No   Sensorium:  person, place and time/date   Memory:  recent and remote memory grossly intact   Consciousness:  alert and awake    Attention: attention span and concentration were age appropriate   Insight:  fair   Judgment: fair   Gait/Station: normal gait/station   Motor Activity: no abnormal movements     Progress Toward Goals: Improved  Patient appears less depressed and anxious  He is compliant with current psychotropic medication regimen  He denies side effects from current psychotropic medication regimen  Will continue current psychotropic medication regimen  Patient to be discharged on 5/31/2023  Recommended Treatment: Continue with group therapy, milieu therapy and occupational therapy  Risks, benefits and possible side effects of Medications:   Risks, benefits, and possible side effects of medications explained to patient and patient verbalizes understanding  Medications:   all current active meds have been reviewed and current meds:   Current Facility-Administered Medications   Medication Dose Route Frequency   • acetaminophen (TYLENOL) tablet 650 mg  650 mg Oral Q6H PRN   • acetaminophen (TYLENOL) tablet 975 mg  975 mg Oral Q6H PRN   • albuterol (PROVENTIL HFA,VENTOLIN HFA) inhaler 2 puff  2 puff Inhalation Q6H PRN   • aluminum-magnesium hydroxide-simethicone (MYLANTA) oral suspension 30 mL  30 mL Oral Q4H PRN   • atorvastatin (LIPITOR) tablet 20 mg  20 mg Oral Daily   • fish oil capsule 1,000 mg  1,000 mg Oral Daily   • haloperidol lactate (HALDOL) injection 5 mg  5 mg Intramuscular Q4H PRN Max 4/day   • hydrOXYzine HCL (ATARAX) tablet 50 mg  50 mg Oral Q6H PRN Max 4/day   • loratadine (CLARITIN) tablet 10 mg  10 mg Oral Daily   • LORazepam (ATIVAN) injection 1 mg  1 mg Intramuscular Q6H PRN Max 3/day   • LORazepam (ATIVAN) tablet 0 5 mg  0 5 mg Oral Q6H PRN   • nicotine polacrilex (NICORETTE) gum 4 mg  4 mg Oral Q2H PRN   • polyethylene glycol (MIRALAX) packet 17 g  17 g Oral Daily PRN   • risperiDONE (RisperDAL) tablet 0 25 mg  0 25 mg Oral Q4H PRN Max 6/day   • risperiDONE (RisperDAL) tablet 0 5 mg  0 5 mg Oral Q4H PRN Max 3/day   • risperiDONE (RisperDAL) tablet 1 mg  1 mg Oral Q2H PRN Max 3/day   • traZODone (DESYREL) tablet 50 mg  50 mg Oral HS   • venlafaxine (EFFEXOR-XR) 24 hr capsule 150 mg  150 mg Oral Daily     Labs: I have personally reviewed all pertinent laboratory/tests results     Most Recent Labs:   Lab Results   Component Value Date    ALB 4 4 04/03/2023    ALKPHOS 65 04/03/2023    ALT 27 04/03/2023    AST 24 04/03/2023    BUN 13 05/24/2023    CALCIUM 9 1 05/24/2023    CHOLESTEROL 161 05/24/2023     05/24/2023    CO2 23 05/24/2023    CREATININE 1 12 05/24/2023    FREET4 0 80 09/13/2019    GLUC 91 05/24/2023    GLUF 91 05/24/2023    HCT 44 8 05/24/2023    HDL 44 05/24/2023    HGB 14 9 05/24/2023    K 3 9 05/24/2023    LDLCALC 93 05/24/2023    NEUTROABS 2 22 05/24/2023    NONHDLC 117 05/24/2023     05/24/2023    RBC 4 91 05/24/2023    RDW 12 1 05/24/2023    SODIUM 138 05/24/2023    TBILI 0 32 04/03/2023    TP 6 6 04/03/2023    TRIG 121 05/24/2023    [id number] 1 870 09/13/2019    WBC 5 66 05/24/2023       Counseling / Coordination of Care  Total floor / unit time spent today 25 minutes

## 2023-05-30 NOTE — DISCHARGE INSTR - OTHER ORDERS
You are being discharged to your home located at [place] [id number], Phone: [phone]  Triggers you have identified during your hospitalization that led to your admission distressed mood, includes regression in mental health  Coping skills you have identified during your hospitalization include talking with others, exorcising, stretching, podcasts  If you are unable to deal with your distressed mood alone please contact your provider with [facility] at [phone], with primary care with Dr Tae Gilman MD at [phone]  If that is not effective and you continue to have (ex: suicidal ideation, homicidal ideation, distressed mood, overwhelmed, in crisis) please contact (Crisis #) [facility] [phone] Q6132512, dial 911 or go to the nearest emergency center  [facility] Crisis Hotline: 1 128.945.2649  *National Suicide Prevention Lifeline:  8-901.714.9102  *Alcohol Anonymous: 116.215.8353  *[place] [facility] & Alcohol Commission: (123) 843-8506  [address] [facility]  on [id number] [place] [address] ([facility]) HELPLINE: 862.869.3785/Website: www cheyanne org  *Substance Abuse and [facility]) [employer], which is a confidential, free, 24-hour-a-day, 365-day-a-year, information service for individuals and family members facing mental health and/or substance use disorders  This service provides referrals to local treatment facilities, support groups, and community-based organizations  Callers can also order free publications and other information  Call 9-667.154.4619/Website: www [facility] gov  *[facility] 2-1-1: This is a toll free, confidential, 24-hour-a-day service which connects you to a community  in your area who can help you find services and resources that are available to you locally and provide critical services that can improve and save lives    Call: 211  Amparo Hamm: [email or web address] Pharm[employer]icente garcia/       Leslye or edwina Alonzo [facility] Nurse Navigators, will be calling you after your discharge, on the phone number that you provided  They will be available as an additional support, if needed     If you wish to speak with one of them, you may contact Arturo Zapata at [phone] or Marium Paulino at [phone]

## 2023-05-30 NOTE — SOCIAL WORK
CM placed call to PT wife Sandraan Sheets and updated her on PT status and plan of care, PT wife in agreement with all  Reviewed discharge plan for tomorrow along with follow up care and supports  PT wife in agreement with all  Reported that she spoke with PT yesterday and PT sounded calm and was cooperative in conversation  PT wife will pick PT up tomorrow at 11am  Call ended mutually  [phone]

## 2023-05-30 NOTE — NURSING NOTE
Presents with WDL affect  pleasant,cooperative,attends groups,converses freely with both staff and peers,is compliant with medications and unit rules  He denies depression,anxiety,SI,HI,AH,VH  We discussed s/s of impending psychological decompensation and when to seek assistance  Will continue to educate,monitor,and provide safe,therapeutic milieu

## 2023-05-30 NOTE — PLAN OF CARE
Problem: Ineffective Coping  Goal: Participates in unit activities  Description: Interventions:  - Provide therapeutic environment   - Provide required programming   - Redirect inappropriate behaviors   Outcome: Progressing     Problem: Ineffective Coping  Goal: Patient/Family participate in treatment and DC plans  Description: Interventions:  - Provide therapeutic environment  Outcome: Progressing     Problem: Depression  Goal: Verbalize thoughts and feelings  Description: Interventions:  - Assess and re-assess patient's level of risk   - Engage patient in 1:1 interactions, daily, for a minimum of 15 minutes   - Encourage patient to express feelings, fears, frustrations, hopes   Interventions:  - Assess and re-assess patient's lethality and potential for self-injury  - Engage patient in 1:1 interactions, daily, for a minimum of 15 minutes  - Encourage patient to express feelings, fears, frustrations, hopes  - Establish rapport/trust with patient   Outcome: Progressing     Problem: Depression  Goal: Refrain from harming self  Description: Interventions:  - Monitor patient closely, per order   - Supervise medication ingestion, monitor effects and side effects   Outcome: Progressing     Problem: Depression  Goal: Refrain from isolation  Description: Interventions:  - Develop a trusting relationship   - Encourage socialization   Outcome: Progressing     Problem: Depression  Goal: Refrain from self-neglect  Outcome: Progressing     Problem: Depression  Goal: Attend and participate in unit activities, including therapeutic, recreational, and educational groups  Description: Interventions:  - Provide therapeutic and educational activities daily, encourage attendance and participation, and document same in the medical record   Interventions:  - Provide therapeutic and educational activities daily, encourage attendance and participation, and document same in the medical record  - Obtain collateral information, encourage visitation and family involvement in care   Outcome: Progressing     Problem: Depression  Goal: Complete daily ADLs, including personal hygiene independently, as able  Description: Interventions:  - Observe, teach, and assist patient with ADLS  -  Monitor and promote a balance of rest/activity, with adequate nutrition and elimination   Interventions:  - Observe, teach, and assist patient with ADLS  - Monitor and promote a balance of rest/activity, with adequate nutrition and elimination  Outcome: Progressing     Problem: Anxiety  Goal: Anxiety is at manageable level  Description: Interventions:  - Assess and monitor patient's anxiety level  - Monitor for signs and symptoms (heart palpitations, chest pain, shortness of breath, headaches, nausea, feeling jumpy, restlessness, irritable, apprehensive)  - Collaborate with interdisciplinary team and initiate plan and interventions as ordered    - [place] patient to unit/surroundings  - Explain treatment plan  - Encourage participation in care  - Encourage verbalization of concerns/fears  - Identify coping mechanisms  - Assist in developing anxiety-reducing skills  - Administer/offer alternative therapies  - Limit or eliminate stimulants  Outcome: Progressing     Problem: Risk for Self Injury/Neglect  Goal: Verbalize thoughts and feelings  Description: Interventions:  - Assess and re-assess patient's level of risk   - Engage patient in 1:1 interactions, daily, for a minimum of 15 minutes   - Encourage patient to express feelings, fears, frustrations, hopes   Interventions:  - Assess and re-assess patient's lethality and potential for self-injury  - Engage patient in 1:1 interactions, daily, for a minimum of 15 minutes  - Encourage patient to express feelings, fears, frustrations, hopes  - Establish rapport/trust with patient   Outcome: Progressing     Problem: Risk for Self Injury/Neglect  Goal: Attend and participate in unit activities, including therapeutic, recreational, and educational groups  Description: Interventions:  - Provide therapeutic and educational activities daily, encourage attendance and participation, and document same in the medical record   Interventions:  - Provide therapeutic and educational activities daily, encourage attendance and participation, and document same in the medical record  - Obtain collateral information, encourage visitation and family involvement in care   Outcome: Progressing     Problem: Risk for Self Injury/Neglect  Goal: Complete daily ADLs, including personal hygiene independently, as able  Description: Interventions:  - Observe, teach, and assist patient with ADLS  -  Monitor and promote a balance of rest/activity, with adequate nutrition and elimination   Interventions:  - Observe, teach, and assist patient with ADLS  - Monitor and promote a balance of rest/activity, with adequate nutrition and elimination  Outcome: Progressing     Problem: Risk for Self Injury/Neglect  Goal: Treatment Goal: Remain safe during length of stay, learn and adopt new coping skills, and be free of self-injurious ideation, impulses and acts at the time of discharge  Outcome: Progressing     Problem: Risk for Self Injury/Neglect  Goal: Refrain from harming self  Description: Interventions:  - Monitor patient closely, per order  - Develop a trusting relationship  - Supervise medication ingestion, monitor effects and side effects   Outcome: Progressing     Problem: DISCHARGE PLANNING - CARE MANAGEMENT  Goal: Discharge to post-acute care or home with appropriate resources  Description: INTERVENTIONS:  - Conduct assessment to determine patient/family and health care team treatment goals, and need for post-acute services based on payer coverage, community resources, and patient preferences, and barriers to discharge  - Address psychosocial, clinical, and financial barriers to discharge as identified in assessment in conjunction with the patient/family and health care team  - Arrange appropriate level of post-acute services according to patient’s   needs and preference and payer coverage in collaboration with the physician and health care team  - Communicate with and update the patient/family, physician, and health care team regarding progress on the discharge plan  - Arrange appropriate transportation to post-acute venues  Outcome: Progressing

## 2023-05-30 NOTE — PROGRESS NOTES
05/30/23 0731   Activity/Group Checklist   Group Community meeting   Attendance Attended   Attendance Duration (min) 46-60   Interactions Interacted appropriately   Affect/Mood Appropriate   Goals Achieved Identified feelings; Able to listen to others; Able to engage in interactions; Able to self-disclose; Able to recieve feedback

## 2023-05-30 NOTE — NURSING NOTE
Patient presents as pleasant, cooperative, appropriate in interaction  Denies any level of depression/ anxiety/ A/V hallucinations or lethality  Denied current stressors or issues adversely affecting d/c scheduled for tomorrow  Stated he has been speaking with his wife in an attempt to address pre-admission problems  Cooperative with medication administration/ treatment requests  Participates in structured groups  Will continue to support positive gains

## 2023-05-30 NOTE — NURSING NOTE
Patient observed sleeping during shift  No acute behaviors overnight  No change in medical condition or complaints voiced  Maintained on q 7 minute safety checks  Will continue to monitor

## 2023-05-30 NOTE — PLAN OF CARE
Problem: Depression  Goal: Treatment Goal: Demonstrate behavioral control of depressive symptoms, verbalize feelings of improved mood/affect, and adopt new coping skills prior to discharge  Outcome: Progressing  Goal: Verbalize thoughts and feelings  Description: Interventions:  - Assess and re-assess patient's level of risk   - Engage patient in 1:1 interactions, daily, for a minimum of 15 minutes   - Encourage patient to express feelings, fears, frustrations, hopes   Interventions:  - Assess and re-assess patient's lethality and potential for self-injury  - Engage patient in 1:1 interactions, daily, for a minimum of 15 minutes  - Encourage patient to express feelings, fears, frustrations, hopes  - Establish rapport/trust with patient   Outcome: Progressing  Goal: Refrain from harming self  Description: Interventions:  - Monitor patient closely, per order   - Supervise medication ingestion, monitor effects and side effects   Outcome: Progressing  Goal: Refrain from isolation  Description: Interventions:  - Develop a trusting relationship   - Encourage socialization   Outcome: Progressing  Goal: Refrain from self-neglect  Outcome: Progressing  Goal: Attend and participate in unit activities, including therapeutic, recreational, and educational groups  Description: Interventions:  - Provide therapeutic and educational activities daily, encourage attendance and participation, and document same in the medical record   Interventions:  - Provide therapeutic and educational activities daily, encourage attendance and participation, and document same in the medical record  - Obtain collateral information, encourage visitation and family involvement in care   Outcome: Progressing  Goal: Complete daily ADLs, including personal hygiene independently, as able  Description: Interventions:  - Observe, teach, and assist patient with ADLS  -  Monitor and promote a balance of rest/activity, with adequate nutrition and elimination Interventions:  - Observe, teach, and assist patient with ADLS  - Monitor and promote a balance of rest/activity, with adequate nutrition and elimination  Outcome: Progressing     Problem: Anxiety  Goal: Anxiety is at manageable level  Description: Interventions:  - Assess and monitor patient's anxiety level  - Monitor for signs and symptoms (heart palpitations, chest pain, shortness of breath, headaches, nausea, feeling jumpy, restlessness, irritable, apprehensive)  - Collaborate with interdisciplinary team and initiate plan and interventions as ordered    - [place] patient to unit/surroundings  - Explain treatment plan  - Encourage participation in care  - Encourage verbalization of concerns/fears  - Identify coping mechanisms  - Assist in developing anxiety-reducing skills  - Administer/offer alternative therapies  - Limit or eliminate stimulants  Outcome: Progressing     Problem: Risk for Self Injury/Neglect  Goal: Verbalize thoughts and feelings  Description: Interventions:  - Assess and re-assess patient's level of risk   - Engage patient in 1:1 interactions, daily, for a minimum of 15 minutes   - Encourage patient to express feelings, fears, frustrations, hopes   Interventions:  - Assess and re-assess patient's lethality and potential for self-injury  - Engage patient in 1:1 interactions, daily, for a minimum of 15 minutes  - Encourage patient to express feelings, fears, frustrations, hopes  - Establish rapport/trust with patient   Outcome: Progressing  Goal: Attend and participate in unit activities, including therapeutic, recreational, and educational groups  Description: Interventions:  - Provide therapeutic and educational activities daily, encourage attendance and participation, and document same in the medical record   Interventions:  - Provide therapeutic and educational activities daily, encourage attendance and participation, and document same in the medical record  - Obtain collateral information, encourage visitation and family involvement in care   Outcome: Progressing  Goal: Complete daily ADLs, including personal hygiene independently, as able  Description: Interventions:  - Observe, teach, and assist patient with ADLS  -  Monitor and promote a balance of rest/activity, with adequate nutrition and elimination   Interventions:  - Observe, teach, and assist patient with ADLS  - Monitor and promote a balance of rest/activity, with adequate nutrition and elimination  Outcome: Progressing  Goal: Treatment Goal: Remain safe during length of stay, learn and adopt new coping skills, and be free of self-injurious ideation, impulses and acts at the time of discharge  Outcome: Progressing  Goal: Refrain from harming self  Description: Interventions:  - Monitor patient closely, per order  - Develop a trusting relationship  - Supervise medication ingestion, monitor effects and side effects   Outcome: Progressing  Goal: Recognize maladaptive responses and adopt new coping mechanisms  Outcome: Progressing

## 2023-05-30 NOTE — PROGRESS NOTES
05/30/23 0930   Team Meeting   Meeting Type Daily Rounds   Team Members Present   Team Members Present Physician;Nurse;;Occupational Therapist   Physician Team Member Dr Gregg Velasquez MD; [place]   Nursing Team Member Fracisco Flynn, RN; Ida Mcmahon, RN; Herman Mcnally, RN   Care Management Team Member MS [place], [facility], [facility]; ALISSA Alanis   OT Team Member Jammie Andrade [place]   Patient/Family Present   Patient Present No   Patient's Family Present No   D/c to home tomorrow, will follow up with [facility] psych  Denies si/hi/ah/vh anxiety and depression controlled

## 2023-05-31 VITALS
TEMPERATURE: 97.3 F | HEART RATE: 76 BPM | DIASTOLIC BLOOD PRESSURE: 82 MMHG | BODY MASS INDEX: 27.9 KG/M2 | OXYGEN SATURATION: 99 % | RESPIRATION RATE: 16 BRPM | HEIGHT: 66 IN | SYSTOLIC BLOOD PRESSURE: 120 MMHG | WEIGHT: 173.6 LBS

## 2023-05-31 RX ADMIN — LORATADINE 10 MG: 10 TABLET ORAL at 08:53

## 2023-05-31 RX ADMIN — OMEGA-3 FATTY ACIDS CAP 1000 MG 1000 MG: 1000 CAP at 08:53

## 2023-05-31 RX ADMIN — ATORVASTATIN CALCIUM 20 MG: 20 TABLET, FILM COATED ORAL at 08:54

## 2023-05-31 RX ADMIN — VENLAFAXINE HYDROCHLORIDE 150 MG: 150 CAPSULE, EXTENDED RELEASE ORAL at 08:54

## 2023-05-31 NOTE — PROGRESS NOTES
"Progress Note - Valeriano Ang 47 y o  male MRN: [id number]    Unit/Bed#: [facility] 210-02 Encounter: [id number]        Subjective:   Patient seen and examined at bedside after reviewing the chart and discussing the case with the caring staff  Patient denies any acute complaints  Patient's vitamin B12 levels were found to be low 371  Patient is a scheduled for discharge today  Patient is requesting all his prescriptions  I reviewed and reconciled patient's problem list and medications  Physical Exam   Vitals: Blood pressure 120/82, pulse 76, temperature (!) 97 3 °F (36 3 °C), temperature source Temporal, resp  rate 16, height 5' 6\" (1 676 m), weight 78 7 kg (173 lb 9 6 oz), SpO2 99 %  ,Body mass index is 28 02 kg/m²  Constitutional: Patient in no acute distress  HEENT: PERR, EOMI, MMM  Cardiovascular: Normal rate  Pulmonary/Chest: No respiratory distress  Abdomen: Non distended  Assessment/Plan:  Valeriano Ang is a(n) 47y o  year old male with MDD  Medical clearance  Patient is medically cleared for discharge  All scripts will be sent out for the patient      1  Dyslipidemia  Continue atorvastatin 20 mg daily, fish oil 1000 mg daily  2   Arthralgia/headache  Patient may take Tylenol as needed  3   Insomnia  Patient is on trazodone at bedtime  4   Seasonal allergies  Patient started on loratadine 10 mg daily  5   New vitamin B-12 deficiency  Patient will be put on monthly B12 injections    "

## 2023-05-31 NOTE — PROGRESS NOTES
Pt discharged with list of belongings:    Kinsey Martinez x2 pair  Jeans  Green t-shirt  Brandon Johnson long sleeve shirt x2  Socks x2 pair  Underwear x4  Blue shirt  Orange bag   [place]man pants with string  [place] hoodie  [employer] sneakers with laces  Grey hoodie  Black sunglasses  Cell phone - cracked screen  Tablet - cracked screen  Apple watch  Charging block  Car keys  Wireless ear buds  Car keys  Portable battery w/ charging cords    Wallet returned from hospital safe to patient    Pt signed and agreed to belongings on list

## 2023-05-31 NOTE — NURSING NOTE
"Patient has been visible in the milieu  He is pleasant and appropriate  Social with staff and peers  He has been compliant with his scheduled medications  His appetite is good- 100% of meals  He denies anxiety and depression  Denies Si/Hi and hallucinations  Pt endorses feeling excited and \"beyond ready\" to discharge this morning  He offers no current complaints/ concerns  Plan of care continues  Q7 minute safety checks in progress     "

## 2023-05-31 NOTE — NURSING NOTE
Pt ambulated from unit accompanied by SHANIQUE CLARKE reviewed w/ pt and he verbalizes understanding  All belongings sent w/ pt

## 2023-05-31 NOTE — PLAN OF CARE
Problem: Ineffective Coping  Goal: Identifies healthy coping skills  Outcome: Progressing  Goal: Participates in unit activities  Description: Interventions:  - Provide therapeutic environment   - Provide required programming   - Redirect inappropriate behaviors   Outcome: Progressing  Goal: Patient/Family participate in treatment and DC plans  Description: Interventions:  - Provide therapeutic environment  Outcome: Progressing     Problem: Anxiety  Goal: Anxiety is at manageable level  Description: Interventions:  - Assess and monitor patient's anxiety level  - Monitor for signs and symptoms (heart palpitations, chest pain, shortness of breath, headaches, nausea, feeling jumpy, restlessness, irritable, apprehensive)  - Collaborate with interdisciplinary team and initiate plan and interventions as ordered    - [place] patient to unit/surroundings  - Explain treatment plan  - Encourage participation in care  - Encourage verbalization of concerns/fears  - Identify coping mechanisms  - Assist in developing anxiety-reducing skills  - Administer/offer alternative therapies  - Limit or eliminate stimulants  Outcome: Progressing     Problem: Risk for Self Injury/Neglect  Goal: Refrain from harming self  Description: Interventions:  - Monitor patient closely, per order  - Develop a trusting relationship  - Supervise medication ingestion, monitor effects and side effects   Outcome: Progressing  Goal: Recognize maladaptive responses and adopt new coping mechanisms  Outcome: Progressing

## 2023-06-01 NOTE — PROGRESS NOTES
05/31/23 0930   Team Meeting   Meeting Type Daily Rounds   Team Members Present   Team Members Present Physician;Nurse;   Physician Team Member Dr Macy Willingham MD; NAMITA Wren   Nursing Team Member MARIBEL Bejarano; Alice Scanlon, MARIBEL; Faustino Millan, RN   Care Management Team Member Delmy Aguirre MS, [facility]; ALISSA Akers   Patient/Family Present   Patient Present No   Patient's Family Present No   D/c home today at 1100  Will follow up with [facility]

## 2023-06-06 ENCOUNTER — SOCIAL WORK (OUTPATIENT)
Dept: BEHAVIORAL/MENTAL HEALTH CLINIC | Facility: CLINIC | Age: 53
End: 2023-06-06
Payer: COMMERCIAL

## 2023-06-06 DIAGNOSIS — F32.A DEPRESSION, UNSPECIFIED DEPRESSION TYPE: Primary | ICD-10-CM

## 2023-06-06 DIAGNOSIS — F43.10 PTSD (POST-TRAUMATIC STRESS DISORDER): ICD-10-CM

## 2023-06-06 PROCEDURE — 90834 PSYTX W PT 45 MINUTES: CPT | Performed by: SOCIAL WORKER

## 2023-06-06 NOTE — PSYCH
"Behavioral Health Psychotherapy Progress Note    Psychotherapy Provided: Individual Psychotherapy     No diagnosis found  DATA: Therapist met w/pt for individual session  Pt shared that he is feeling better than when he was last in session  He stated that the time away was the most helpful for him and he isn't feeling as intensely  He stated that he feels calmer and due to that he has been able to communicate better  He stated that he hasn't been home too long to address some of the issues that triggered him  He stated he did take some time to reflect while he was inpt and identify goals he wants to work towards individually  He stated he went camping with his family and other family's last weekend and has camping trips planned every weekend in June which is helpful overall emotionally for him  Pt denied any SI/HI and stated that he is feeling more stable  During this session, this clinician used the following therapeutic modalities: Cognitive Behavioral Therapy, Motivational Interviewing and Solution-Focused Therapy    Substance Abuse was not addressed during this session  If the client is diagnosed with a co-occurring substance use disorder, please indicate any changes in the frequency or amount of use: unknown  Stage of change for addressing substance use diagnoses: No substance use/Not applicable    ASSESSMENT:  Kaycee Rodriguez presents with a Euthymic/ normal mood  his affect is Normal range and intensity, which is congruent, with his mood and the content of the session  The client has made progress on their goals  Kaycee Rodriguez presents with a minimal risk of suicide, minimal risk of self-harm, and none risk of harm to others  For any risk assessment that surpasses a \"low\" rating, a safety plan must be developed      A safety plan was indicated: none  If yes, describe in detail n/a    PLAN: Between sessions, Kaycee Rodriguez will work on effectively communicating, engaging in positive " jason, use calming strategies  At the next session, the therapist will use Cognitive Behavioral Therapy, Motivational Interviewing and Solution-Focused Therapy to address symptoms of depression  Behavioral Health Treatment Plan and Discharge Planning: Joy Trejo is aware of and agrees to continue to work on their treatment plan  They have identified and are working toward their discharge goals       Visit start and stop times:    06/06/23  Start Time: 1030  Stop Time: 1117  Total Visit Time: 47 minutes

## 2023-06-08 NOTE — TELEPHONE ENCOUNTER
Contacted Patient in regards to appointment on 6/20 needed to c/x and r/s  Appointment has been c/x and lvm for patient to contact intake department

## 2023-06-13 ENCOUNTER — OFFICE VISIT (OUTPATIENT)
Dept: INTERNAL MEDICINE CLINIC | Facility: CLINIC | Age: 53
End: 2023-06-13
Payer: COMMERCIAL

## 2023-06-13 VITALS
OXYGEN SATURATION: 96 % | SYSTOLIC BLOOD PRESSURE: 110 MMHG | BODY MASS INDEX: 30.22 KG/M2 | RESPIRATION RATE: 12 BRPM | WEIGHT: 188 LBS | HEIGHT: 66 IN | DIASTOLIC BLOOD PRESSURE: 62 MMHG | HEART RATE: 78 BPM

## 2023-06-13 DIAGNOSIS — F41.9 ANXIETY: Primary | ICD-10-CM

## 2023-06-13 DIAGNOSIS — F32.A DEPRESSION, UNSPECIFIED DEPRESSION TYPE: ICD-10-CM

## 2023-06-13 DIAGNOSIS — E53.8 LOW VITAMIN B12 LEVEL: ICD-10-CM

## 2023-06-13 PROCEDURE — 99213 OFFICE O/P EST LOW 20 MIN: CPT | Performed by: PHYSICIAN ASSISTANT

## 2023-06-13 NOTE — PATIENT INSTRUCTIONS
Continue current dose of medication  Check your B complex vitamin, make sure there is vitamin B12 at least 1000 units or micrograms daily  If not you should obtain a separate vitamin B12 with that dose in it  Take daily  We will recheck your vitamin B12 level with your next blood work  Follow-up as scheduled, sooner as needed

## 2023-06-13 NOTE — PROGRESS NOTES
Assessment/Plan:   Patient Instructions   Continue current dose of medication  Check your B complex vitamin, make sure there is vitamin B12 at least 1000 units or micrograms daily  If not you should obtain a separate vitamin B12 with that dose in it  Take daily  We will recheck your vitamin B12 level with your next blood work  Follow-up as scheduled, sooner as needed  Quality Measures:       Return for Next scheduled follow up  Diagnoses and all orders for this visit:    Anxiety    Depression, unspecified depression type    Low vitamin B12 level  -     Vitamin B12; Future    Other orders  -     B Complex Vitamins (B COMPLEX 50 PO); Take by mouth          Subjective:      Patient ID: Darby Myrick is a 47 y o  male  Follow-up/transition of care    Patient states he was hospitalized at [facility] [address]/mental health unit from 5/23 for 8 days  He states it was a bad day to have panic attacks as it was the [facility] holiday weekend and nothing much was done until the staff came back from the holiday  He states his venlafaxine dose was increased to 150 mg daily which was the dose he had been on previously  Trazodone was added at night to aid him in sleeping  He feels prior to going to the hospital he was not able to control his overall feeling of nervousness and panic  He denies any suicidal thoughts  He admits psychosocial stressors at home which she has been working on with his therapist and his wife  Hospital labs have been reviewed        ALLERGIES:  Allergies   Allergen Reactions   • [place] Oil - Food Allergy Anaphylaxis   • Celecoxib Anaphylaxis   • Diclofenac Shortness Of Breath   • Flavoring Agent - Food Allergy Anaphylaxis   • Corn Oil - Food Allergy - Food Allergy Other (See Comments)   • Starch GI Intolerance and Other (See Comments)       CURRENT MEDICATIONS:    Current Outpatient Medications:   •  albuterol (PROVENTIL HFA,VENTOLIN HFA) 90 mcg/act inhaler, Inhale 2 puffs every 6 (six) hours as needed for wheezing, Disp: 18 g, Rfl: 0  •  atorvastatin (LIPITOR) 20 mg tablet, Take 1 tablet (20 mg total) by mouth daily, Disp: 90 tablet, Rfl: 0  •  B Complex Vitamins (B COMPLEX 50 PO), Take by mouth, Disp: , Rfl:   •  Cholecalciferol (VITAMIN D3 PO), Take by mouth, Disp: , Rfl:   •  clobetasol (TEMOVATE) 0 05 % ointment, Apply topically 2 (two) times a day (Patient taking differently: Apply topically if needed), Disp: 30 g, Rfl: 3  •  loratadine (CLARITIN) 10 mg tablet, Take 1 tablet (10 mg total) by mouth daily Do not start before May 31, 2023 , Disp: 30 tablet, Rfl: 0  •  multivitamin (THERAGRAN) TABS, Take 1 tablet by mouth daily, Disp: , Rfl:   •  Omega-3 Fatty Acids (fish oil) 1,000 mg, Take 1 capsule (1,000 mg total) by mouth daily, Disp: 30 capsule, Rfl: 0  •  traZODone (DESYREL) 50 mg tablet, Take 1 tablet (50 mg total) by mouth daily at bedtime, Disp: 30 tablet, Rfl: 0  •  venlafaxine (EFFEXOR-XR) 150 mg 24 hr capsule, Take 1 capsule (150 mg total) by mouth daily Do not start before May 31, 2023 , Disp: 30 capsule, Rfl: 0    ACTIVE PROBLEM LIST:  Patient Active Problem List   Diagnosis   • Cervical somatic dysfunction   • Depression   • Disc degeneration, lumbar   • Hyperlipidemia   • Pain of finger of left hand   • Skin cyst   • PTSD (post-traumatic stress disorder)   • Carpal tunnel syndrome on left   • Dyshydrosis       PAST MEDICAL HISTORY:  Past Medical History:   Diagnosis Date   • Alcohol abuse    • Bilateral inguinal hernia 02/22/2021   • Bronchitis    • Cough     Pt states he has a morning cough from some sinus drainage- pt reports having a cold week of 2/8/21   • COVID-19 12/2020    Pt states he only lost taste and smell- no other symptoms reported     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   • Tobacco abuse        PAST SURGICAL HISTORY:  Past Surgical History:   Procedure Laterality Date   • FACIAL/NECK BIOPSY Left 8/4/2020    Procedure: EXCISION CYST NECK;  Surgeon: Artur Lambert MD;  Location: [facility];  Service: Plastics   • FLAP LOCAL HEAD / NECK Left 8/4/2020    Procedure: COMPLEX CLOSURE VS ADJACENT TISSUE REARRANGEMENT NECK;  Surgeon: Artur Lambert MD;  Location: [facility];  Service: Plastics   • HERNIA REPAIR     • HERNIA REPAIR Bilateral 2/22/2021    Procedure: REPAIR HERNIA INGUINAL, LAPAROSCOPIC BILATERAL WITH MESH;  Surgeon: Saul Leigh MD;  Location: [facility];  Service: General   • PYLOROMYOTOMY     • ROTATOR CUFF REPAIR Left     times 2 per pt   • TONSILLECTOMY AND ADENOIDECTOMY     • TOOTH EXTRACTION         FAMILY HISTORY:  Family History   Problem Relation Age of Onset   • Cancer Mother    • Lung cancer Mother         CARCINOID TUMOR   • Hypertension Mother    • Hypothyroidism Mother    • Kidney cancer Mother    • Thyroid cancer Mother    • No Known Problems Father         Motor vehicle accident   • Diabetes Maternal Grandmother    • Alzheimer's disease Maternal Grandfather    • Coronary artery disease Maternal Grandfather    • Other Maternal Grandfather         PACEMAKER       SOCIAL HISTORY:  Social History     Socioeconomic History   • Marital status: /Civil Union     Spouse name: Not on file   • Number of children: 1   • Years of education: Not on file   • Highest education level: Not on file   Occupational History   • Occupation: COMPUTER CONSULTING     Comment: FULL-TIME EMPLOYMENT   Tobacco Use   • Smoking status: Former     Types: Cigarettes   • Smokeless tobacco: Never   • Tobacco comments:     Pt quit 2008   Vaping Use   • Vaping Use: Never used   Substance and Sexual "Activity   • Alcohol use: Yes     Comment: BEER - DESCRIBES AS INFREQUENT   • Drug use: Yes     Types: Marijuana     Comment: Medical marijuana   • Sexual activity: Yes     Partners: Female     Comment: did not ask   Other Topics Concern   • Not on file   Social History Narrative    LIVING INDEPENDENTLY WITH SPOUSE    ONE SON    Unemployed    Hobbies-exercising, yd work, video games, camping     Social Determinants of Health     Financial Resource Strain: Not on file   Food Insecurity: Not on file   Transportation Needs: Not on file   Physical Activity: Not on file   Stress: Not on file   Social Connections: Not on file   Intimate Partner Violence: Not on file   Housing Stability: Not on file       Review of Systems   Constitutional: Negative for fatigue and fever  HENT: Negative for congestion  Eyes: Negative for visual disturbance  Respiratory: Negative for shortness of breath  Cardiovascular: Negative for chest pain  Gastrointestinal: Negative for abdominal pain  Genitourinary: Negative for frequency  Musculoskeletal: Negative for arthralgias  Skin: Negative for rash  Neurological: Negative for light-headedness and headaches  Psychiatric/Behavioral: Negative for agitation, behavioral problems, dysphoric mood, sleep disturbance and suicidal ideas  The patient is not nervous/anxious  Objective:  Vitals:    06/13/23 0801   BP: 110/62   BP Location: Left arm   Patient Position: Sitting   Pulse: 78   Resp: 12   SpO2: 96%   Weight: 85 3 kg (188 lb)   Height: 5' 6\" (1 676 m)     Body mass index is 30 34 kg/m²  Physical Exam  Vitals and nursing note reviewed  Constitutional:       General: He is not in acute distress  Appearance: He is well-developed  HENT:      Head: Normocephalic and atraumatic  Eyes:      Extraocular Movements: Extraocular movements intact  Pupils: Pupils are equal, round, and reactive to light  Neck:      Thyroid: No thyromegaly        Vascular: No " carotid bruit or JVD  Cardiovascular:      Rate and Rhythm: Normal rate and regular rhythm  Heart sounds: Normal heart sounds  Pulmonary:      Effort: Pulmonary effort is normal  No respiratory distress  Breath sounds: Normal breath sounds  Musculoskeletal:      Cervical back: Neck supple  Right lower leg: No edema  Left lower leg: No edema  Lymphadenopathy:      Cervical: No cervical adenopathy  Skin:     General: Skin is warm and dry  Findings: No rash  Neurological:      General: No focal deficit present  Mental Status: He is alert and oriented to person, place, and time  Mental status is at baseline  Psychiatric:         Mood and Affect: Mood normal          Behavior: Behavior normal          Thought Content: Thought content normal          Judgment: Judgment normal            RESULTS:    In chart    This note was created with voice recognition software  Phonic, grammatical and spelling errors may be present within the note as a result

## 2023-06-23 ENCOUNTER — SOCIAL WORK (OUTPATIENT)
Dept: BEHAVIORAL/MENTAL HEALTH CLINIC | Facility: CLINIC | Age: 53
End: 2023-06-23
Payer: COMMERCIAL

## 2023-06-23 DIAGNOSIS — F32.A DEPRESSION, UNSPECIFIED DEPRESSION TYPE: ICD-10-CM

## 2023-06-23 DIAGNOSIS — F43.10 PTSD (POST-TRAUMATIC STRESS DISORDER): Primary | ICD-10-CM

## 2023-06-23 PROCEDURE — 90832 PSYTX W PT 30 MINUTES: CPT | Performed by: SOCIAL WORKER

## 2023-06-23 NOTE — PSYCH
"Behavioral Health Psychotherapy Progress Note    Psychotherapy Provided: Individual Psychotherapy     No diagnosis found  DATA: Therapist met w/pt for individual session  Pt stated that nothing has happened  Therapist asked pt about any potential triggers over the past several weeks but pt denied  Pt stated that they have been going camping every weekend and in some ways that helps because it gets him out of the house and changes the regular routine  He stated that his wife and son are going to the beach next week and he plans on staying home  He stated that the beach is too much of a trigger and is glad that he can stay home  Therapist and pt discussed things that pt can do when he is home  During this session, this clinician used the following therapeutic modalities: Cognitive Behavioral Therapy, Motivational Interviewing and Solution-Focused Therapy    Substance Abuse was not addressed during this session  If the client is diagnosed with a co-occurring substance use disorder, please indicate any changes in the frequency or amount of use: unknown  Stage of change for addressing substance use diagnoses: No substance use/Not applicable    ASSESSMENT:  Beni Pak presents with a Euthymic/ normal mood  his affect is Normal range and intensity, which is congruent, with his mood and the content of the session  The client has made progress on their goals  Beni Pak presents with a minimal risk of suicide, minimal risk of self-harm, and none risk of harm to others  For any risk assessment that surpasses a \"low\" rating, a safety plan must be developed  A safety plan was indicated: none  If yes, describe in detail n/a    PLAN: Between sessions, Beni Pak will continue to work on effective communication, use calming strategies   At the next session, the therapist will use Client-centered Therapy, Cognitive Behavioral Therapy, Motivational Interviewing and Solution-Focused Therapy to " address symptoms of depression and PTSD  Behavioral Health Treatment Plan and Discharge Planning: Edd Gray is aware of and agrees to continue to work on their treatment plan  They have identified and are working toward their discharge goals      Visit start and stop times:    06/23/23  Start Time: 1000  Stop Time: 1025  Total Visit Time: 25 minutes

## 2023-07-18 ENCOUNTER — OFFICE VISIT (OUTPATIENT)
Dept: PSYCHIATRY | Facility: CLINIC | Age: 53
End: 2023-07-18
Payer: COMMERCIAL

## 2023-07-18 DIAGNOSIS — F43.10 PTSD (POST-TRAUMATIC STRESS DISORDER): Primary | ICD-10-CM

## 2023-07-18 DIAGNOSIS — F32.A DEPRESSION, UNSPECIFIED DEPRESSION TYPE: ICD-10-CM

## 2023-07-18 PROCEDURE — 99204 OFFICE O/P NEW MOD 45 MIN: CPT | Performed by: PSYCHIATRY & NEUROLOGY

## 2023-07-18 RX ORDER — VENLAFAXINE HYDROCHLORIDE 150 MG/1
150 CAPSULE, EXTENDED RELEASE ORAL DAILY
Qty: 30 CAPSULE | Refills: 2 | Status: SHIPPED | OUTPATIENT
Start: 2023-07-18

## 2023-07-18 RX ORDER — VENLAFAXINE HYDROCHLORIDE 150 MG/1
150 CAPSULE, EXTENDED RELEASE ORAL DAILY
Qty: 30 CAPSULE | Refills: 0 | Status: SHIPPED | OUTPATIENT
Start: 2023-07-18 | End: 2023-07-18 | Stop reason: SDUPTHER
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
## 2023-07-26 ENCOUNTER — SOCIAL WORK (OUTPATIENT)
Dept: BEHAVIORAL/MENTAL HEALTH CLINIC | Facility: CLINIC | Age: 53
End: 2023-07-26
Payer: COMMERCIAL

## 2023-07-26 DIAGNOSIS — F32.A DEPRESSION, UNSPECIFIED DEPRESSION TYPE: Primary | ICD-10-CM

## 2023-07-26 DIAGNOSIS — F43.10 PTSD (POST-TRAUMATIC STRESS DISORDER): ICD-10-CM

## 2023-07-26 PROCEDURE — 90834 PSYTX W PT 45 MINUTES: CPT | Performed by: SOCIAL WORKER

## 2023-07-28 NOTE — PSYCH
Behavioral Health Psychotherapy Progress Note    Psychotherapy Provided: Individual Psychotherapy     1. Depression, unspecified depression type        2. PTSD (post-traumatic stress disorder)            DATA: Therapist met w/pt for individual session. Pt stated that he met w/the psychiatrist and everything went well. He stated that he knew that they would be talking about his past which tends to create some stress/anxiety for him but he was able to use calming and grounding skills during session. Pt stated overall things have been pretty good. He stated that they are camping this weekend and overall this summer has been calmer. Discussion was held around family counseling. Pt stated he was going to talk to his wife about it and hopefully get that started once she isn't traveling as much. During this session, this clinician used the following therapeutic modalities: Cognitive Behavioral Therapy, Motivational Interviewing and Solution-Focused Therapy    Substance Abuse was not addressed during this session. If the client is diagnosed with a co-occurring substance use disorder, please indicate any changes in the frequency or amount of use: unknown. Stage of change for addressing substance use diagnoses: No substance use/Not applicable    ASSESSMENT:  Mehul Kaur presents with a Euthymic/ normal mood. his affect is Normal range and intensity, which is congruent, with his mood and the content of the session. The client has made progress on their goals. Mehul Kaur presents with a minimal risk of suicide, minimal risk of self-harm, and none risk of harm to others. For any risk assessment that surpasses a "low" rating, a safety plan must be developed. A safety plan was indicated: none  If yes, describe in detail n/a    PLAN: Between sessions, Mehul Kaur will continue to use grounding skills and engage in positive hobbies.  At the next session, the therapist will use Cognitive Behavioral Therapy, Motivational Interviewing and Solution-Focused Therapy to address symptoms of anxiety and PTSD. Behavioral Health Treatment Plan and Discharge Planning: Antoniaelvia Starr is aware of and agrees to continue to work on their treatment plan. They have identified and are working toward their discharge goals.   Visit start and stop times:    07/26/23  Start Time: 1015  Stop Time: 1100  Total Visit Time: 45 minutes

## 2023-08-09 DIAGNOSIS — F32.A DEPRESSION, UNSPECIFIED DEPRESSION TYPE: ICD-10-CM

## 2023-08-21 RX ORDER — VENLAFAXINE HYDROCHLORIDE 150 MG/1
150 CAPSULE, EXTENDED RELEASE ORAL DAILY
Qty: 90 CAPSULE | Refills: 1 | Status: SHIPPED | OUTPATIENT
Start: 2023-08-21

## 2023-08-29 ENCOUNTER — SOCIAL WORK (OUTPATIENT)
Dept: BEHAVIORAL/MENTAL HEALTH CLINIC | Facility: CLINIC | Age: 53
End: 2023-08-29
Payer: COMMERCIAL

## 2023-08-29 DIAGNOSIS — F43.10 PTSD (POST-TRAUMATIC STRESS DISORDER): Primary | ICD-10-CM

## 2023-08-29 DIAGNOSIS — F32.A DEPRESSION, UNSPECIFIED DEPRESSION TYPE: ICD-10-CM

## 2023-08-29 PROCEDURE — 90834 PSYTX W PT 45 MINUTES: CPT | Performed by: SOCIAL WORKER

## 2023-08-29 NOTE — PSYCH
Behavioral Health Psychotherapy Progress Note    Psychotherapy Provided: Individual Psychotherapy     1. PTSD (post-traumatic stress disorder)        2. Depression, unspecified depression type            DATA: Therapist met w/pt for individual session. Pt stated that he was triggered last week. Therapist and pt discussed what happened and how he was able to remove himself and use calming strategies. He stated that he also drank alcohol to help "numb" his thoughts. He stated that he wasn't have flashbacks but was feeling a lot for the people involved. Pt shared that he did talk to his wife about it which was helpful rather than internalizing it. During this session, this clinician used the following therapeutic modalities: Cognitive Behavioral Therapy, Motivational Interviewing and Solution-Focused Therapy    Substance Abuse was addressed during this session. If the client is diagnosed with a co-occurring substance use disorder, please indicate any changes in the frequency or amount of use: 1x about 5 drinks. Stage of change for addressing substance use diagnoses: No substance use/Not applicable    ASSESSMENT:  Princess Bishop presents with a Anxious mood. his affect is Normal range and intensity, which is congruent, with his mood and the content of the session. The client has made progress on their goals. Princess Bishop presents with a minimal risk of suicide, minimal risk of self-harm, and minimal risk of harm to others. For any risk assessment that surpasses a "low" rating, a safety plan must be developed. A safety plan was indicated: none  If yes, describe in detail n/a    PLAN: Between sessions, Princess Bishop will continue to work on using grounding skills. At the next session, the therapist will use Cognitive Behavioral Therapy, Motivational Interviewing and Solution-Focused Therapy to address symptoms of anxiety/depression.     Behavioral Health Treatment Plan and Discharge Planning: Radha Ley Louisa Juárez is aware of and agrees to continue to work on their treatment plan. They have identified and are working toward their discharge goals.      Visit start and stop times:    08/29/23  Start Time: 0900  Stop Time: 0944  Total Visit Time: 44 minutes

## 2023-09-26 ENCOUNTER — TELEPHONE (OUTPATIENT)
Dept: PSYCHIATRY | Facility: CLINIC | Age: 53
End: 2023-09-26

## 2023-09-26 NOTE — TELEPHONE ENCOUNTER
Patient is calling regarding cancelling an appointment.     Date/Time: 9/26/2023  At 8:30 am    Reason: would not make it on time    Patient was rescheduled: YES [x] NO []  If yes, when was Patient reschedule for: 10/16/2023  At 11:30 am    Patient requesting call back to reschedule: YES [] NO [x]

## 2023-09-27 ENCOUNTER — TELEMEDICINE (OUTPATIENT)
Dept: BEHAVIORAL/MENTAL HEALTH CLINIC | Facility: CLINIC | Age: 53
End: 2023-09-27
Payer: COMMERCIAL

## 2023-09-27 DIAGNOSIS — F43.10 PTSD (POST-TRAUMATIC STRESS DISORDER): ICD-10-CM

## 2023-09-27 DIAGNOSIS — F32.A DEPRESSION, UNSPECIFIED DEPRESSION TYPE: Primary | ICD-10-CM

## 2023-09-27 PROCEDURE — 90834 PSYTX W PT 45 MINUTES: CPT | Performed by: SOCIAL WORKER

## 2023-10-03 NOTE — PSYCH
Visit start and stop times:    09/27/23  Start Time: 0900  Stop Time: 0945  Total Visit Time: 45 minutes  Virtual Regular Visit  Therapist met w/pt for individual session. Pt stated that he has been more stressed than anxious this past month. He identified several things in his house needing repair which has created a lot of financial stress. He stated that he feels as if they(him and his wife) are navigating through these stressors in a good way. He stated that there were a few times where he was triggered by his old friend but continues to remind himself that he can only control himself not what others do. Therapist and pt continued to discuss strategies to help him continue to manage triggers as they arise. Verification of patient location:    Patient is located at Home in the following state in which I hold an active license [place]      Assessment/Plan: f/u in one month  Problem List Items Addressed This Visit        Other    Depression - Primary    PTSD (post-traumatic stress disorder)              Reason for visit is No chief complaint on file. Encounter provider Eden Pat    Provider located at [address] [place] [address]      Recent Visits  Date Type Provider Dept   09/27/23 Telemedicine [address] [place] [address] recent visits within past 7 days and meeting all other requirements  Future Appointments  No visits were found meeting these conditions. Showing future appointments within next 150 days and meeting all other requirements       The patient was identified by name and date of birth. Ngoc Benoit was informed that this is a telemedicine visit and [facility] He agrees to proceed. .  My office door was closed. No one else was in the room.   He acknowledged consent and understanding of privacy and security of the video platform. The patient has agreed to participate and understands they can discontinue the visit at any time. Patient is aware this is a billable service. Hemanth Dunaway is a 47 y.o. male  . HPI     Past Medical History:   Diagnosis Date   • Alcohol abuse    • Bilateral inguinal hernia 02/22/2021   • Bronchitis    • Cough     Pt states he has a morning cough from some sinus drainage- pt reports having a cold week of 2/8/21   • COVID-19 12/2020    Pt states he only lost taste and smell- no other symptoms reported.    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   • Tobacco abuse        Past Surgical History:   Procedure Laterality Date   • FACIAL/NECK BIOPSY Left 8/4/2020    Procedure: EXCISION CYST NECK;  Surgeon: Temo Martin MD;  Location: [facility];  Service: Plastics   • FLAP LOCAL HEAD / NECK Left 8/4/2020    Procedure: COMPLEX CLOSURE VS ADJACENT TISSUE REARRANGEMENT NECK;  Surgeon: Temo Martin MD;  Location: [facility];  Service: Plastics   • HERNIA REPAIR     • HERNIA REPAIR Bilateral 2/22/2021    Procedure: REPAIR HERNIA INGUINAL, LAPAROSCOPIC BILATERAL WITH MESH;  Surgeon: Ruben Griffin MD;  Location: [facility];  Service: General   • PYLOROMYOTOMY     • ROTATOR CUFF REPAIR Left     times 2 per pt   • TONSILLECTOMY AND ADENOIDECTOMY     • TOOTH EXTRACTION         Current Outpatient Medications   Medication Sig Dispense Refill   • albuterol (PROVENTIL HFA,VENTOLIN HFA) 90 mcg/act inhaler Inhale 2 puffs every 6 (six) hours as needed for wheezing 18 g 0   • atorvastatin (LIPITOR) 20 mg tablet Take 1 tablet (20 mg total) by mouth daily 90 tablet 0   • B Complex Vitamins (B COMPLEX 50 PO) Take by mouth     • Cholecalciferol (VITAMIN D3 PO) Take by mouth     • clobetasol (TEMOVATE) 0.05 % ointment Apply topically 2 (two) times a day (Patient taking differently: Apply topically if needed) 30 g 3   • loratadine (CLARITIN) 10 mg tablet Take 1 tablet (10 mg total) by mouth daily Do not start before May 31, 2023. 30 tablet 0   • multivitamin (THERAGRAN) TABS Take 1 tablet by mouth daily     • Omega-3 Fatty Acids (fish oil) 1,000 mg Take 1 capsule (1,000 mg total) by mouth daily 30 capsule 0   • traZODone (DESYREL) 50 mg tablet Take 1 tablet (50 mg total) by mouth daily at bedtime 30 tablet 0   • venlafaxine (EFFEXOR-XR) 150 mg 24 hr capsule TAKE 1 CAPSULE BY MOUTH EVERY DAY 90 capsule 1     No current facility-administered medications for this visit. Allergies   Allergen Reactions   • [place] Oil - Food Allergy Anaphylaxis   • Celecoxib Anaphylaxis   • Diclofenac Shortness Of Breath   • Flavoring Agent - Food Allergy Anaphylaxis   • Corn Oil - Food Allergy - Food Allergy Other (See Comments)     Clinically insignificant   • Starch GI Intolerance and Other (See Comments)       Review of Systems    Video Exam    There were no vitals filed for this visit.     Physical Exam Pt denied any SI/HI/Ah/VH

## 2023-10-04 ENCOUNTER — OFFICE VISIT (OUTPATIENT)
Dept: INTERNAL MEDICINE CLINIC | Facility: CLINIC | Age: 53
End: 2023-10-04
Payer: COMMERCIAL

## 2023-10-04 VITALS
BODY MASS INDEX: 29.41 KG/M2 | TEMPERATURE: 98.6 F | HEIGHT: 66 IN | OXYGEN SATURATION: 99 % | RESPIRATION RATE: 16 BRPM | DIASTOLIC BLOOD PRESSURE: 80 MMHG | SYSTOLIC BLOOD PRESSURE: 122 MMHG | WEIGHT: 183 LBS | HEART RATE: 98 BPM

## 2023-10-04 DIAGNOSIS — E78.2 MIXED HYPERLIPIDEMIA: ICD-10-CM

## 2023-10-04 DIAGNOSIS — F43.10 PTSD (POST-TRAUMATIC STRESS DISORDER): ICD-10-CM

## 2023-10-04 DIAGNOSIS — F32.A DEPRESSION, UNSPECIFIED DEPRESSION TYPE: ICD-10-CM

## 2023-10-04 DIAGNOSIS — J30.9 ALLERGIC RHINITIS, UNSPECIFIED SEASONALITY, UNSPECIFIED TRIGGER: ICD-10-CM

## 2023-10-04 DIAGNOSIS — Z00.00 ANNUAL PHYSICAL EXAM: Primary | ICD-10-CM

## 2023-10-04 LAB
ALBUMIN SERPL-MCNC: 4.2 G/DL (ref 3.6–5.1)
ALBUMIN/GLOB SERPL: 2.2 (CALC) (ref 1–2.5)
ALP SERPL-CCNC: 67 U/L (ref 35–144)
ALT SERPL-CCNC: 21 U/L (ref 9–46)
AST SERPL-CCNC: 22 U/L (ref 10–35)
BILIRUB SERPL-MCNC: 0.3 MG/DL (ref 0.2–1.2)
BUN SERPL-MCNC: 15 MG/DL (ref 7–25)
BUN/CREAT SERPL: NORMAL (CALC) (ref 6–22)
CALCIUM SERPL-MCNC: 9.1 MG/DL (ref 8.6–10.3)
CHLORIDE SERPL-SCNC: 107 MMOL/L (ref 98–110)
CHOLEST SERPL-MCNC: 167 MG/DL
CHOLEST/HDLC SERPL: 3.3 (CALC)
CO2 SERPL-SCNC: 28 MMOL/L (ref 20–32)
CREAT SERPL-MCNC: 1.01 MG/DL (ref 0.7–1.3)
GFR/BSA.PRED SERPLBLD CYS-BASED-ARV: 89 ML/MIN/1.73M2
GLOBULIN SER CALC-MCNC: 1.9 G/DL (CALC) (ref 1.9–3.7)
GLUCOSE SERPL-MCNC: 93 MG/DL (ref 65–99)
HDLC SERPL-MCNC: 50 MG/DL
LDLC SERPL CALC-MCNC: 100 MG/DL (CALC)
NONHDLC SERPL-MCNC: 117 MG/DL (CALC)
POTASSIUM SERPL-SCNC: 4.3 MMOL/L (ref 3.5–5.3)
PROT SERPL-MCNC: 6.1 G/DL (ref 6.1–8.1)
SODIUM SERPL-SCNC: 141 MMOL/L (ref 135–146)
TRIGL SERPL-MCNC: 82 MG/DL
VIT B12 SERPL-MCNC: 842 PG/ML (ref 200–1100)

## 2023-10-04 PROCEDURE — 99396 PREV VISIT EST AGE 40-64: CPT | Performed by: PHYSICIAN ASSISTANT

## 2023-10-04 RX ORDER — AZELASTINE 1 MG/ML
1 SPRAY, METERED NASAL 2 TIMES DAILY
Qty: 30 ML | Refills: 3 | Status: SHIPPED | OUTPATIENT
Start: 2023-10-04

## 2023-10-04 NOTE — PATIENT INSTRUCTIONS
General medical exam remains good. Continue current medication. Follow-up with psychiatry as you have planned. Discuss her medication with them. Restart flaxseed oil for your tendinitis type symptoms. If you want you can give trial of OTC Astelin nasal spray (Astepro) 1 spray each nostril twice daily as I demonstrated and instructed for your allergies. Stop any Claritin, Allegra, Zyrtec, Xyzal.  Remember it will take up to 5 days to start working. Keep me informed of left elbow pain. Wellness Visit for Adults   AMBULATORY CARE:   A wellness visit  is when you see your healthcare provider to get screened for health problems. Your healthcare provider will also give you advice on how to stay healthy. Write down your questions so you remember to ask them. Ask your healthcare provider how often you should have a wellness visit. What happens at a wellness visit:  Your healthcare provider will ask about your health, and your family history of health problems. This includes high blood pressure, heart disease, and cancer. He or she will ask if you have symptoms that concern you, if you smoke, and about your mood. You may also be asked about your intake of medicines, supplements, food, and alcohol. Any of the following may be done: Your weight  will be checked. Your height may also be checked so your body mass index (BMI) can be calculated. Your BMI shows if you are at a healthy weight. Your blood pressure  and heart rate will be checked. Your temperature may also be checked. Blood and urine tests  may be done. Blood tests may be done to check your cholesterol levels. Abnormal cholesterol levels increase your risk for heart disease and stroke. You may also need a blood or urine test to check for diabetes if you are at increased risk. Urine tests may be done to look for signs of an infection or kidney disease. A physical exam  includes checking your heartbeat and lungs with a stethoscope.  Your healthcare provider may also check your skin to look for sun damage. Screening tests  may be recommended. A screening test is done to check for diseases that may not cause symptoms. The screening tests you may need depend on your age, gender, family history, and lifestyle habits. For example, colorectal screening may be recommended if you are 48years old or older. Screening tests you need if you are a woman:   A Pap smear  is used to screen for cervical cancer. Pap smears are usually done every 3 to 5 years depending on your age. You may need them more often if you have had abnormal Pap smear test results in the past. Ask your healthcare provider how often you should have a Pap smear. A mammogram  is an x-ray of your breasts to screen for breast cancer. Experts recommend mammograms every 2 years starting at age 48 years. You may need a mammogram at age 52 years or younger if you have an increased risk for breast cancer. Talk to your healthcare provider about when you should start having mammograms and how often you need them. Vaccines you may need:   Get an influenza vaccine  every year. The influenza vaccine protects you from the flu. Several types of viruses cause the flu. The viruses change over time, so new vaccines are made each year. Get a tetanus-diphtheria (Td) booster vaccine  every 10 years. This vaccine protects you against tetanus and diphtheria. Tetanus is a severe infection that may cause painful muscle spasms and lockjaw. Diphtheria is a severe bacterial infection that causes a thick covering in the back of your mouth and throat. Get a human papillomavirus (HPV) vaccine  if you are female and aged 23 to 32 or male 23 to 24 and never received it. This vaccine protects you from HPV infection. HPV is the most common infection spread by sexual contact. HPV may also cause vaginal, penile, and anal cancers. Get a pneumococcal vaccine  if you are aged 72 years or older.  The pneumococcal vaccine is an injection given to protect you from pneumococcal disease. Pneumococcal disease is an infection caused by pneumococcal bacteria. The infection may cause pneumonia, meningitis, or an ear infection. Get a shingles vaccine  if you are 60 or older, even if you have had shingles before. The shingles vaccine is an injection to protect you from the varicella-zoster virus. This is the same virus that causes chickenpox. Shingles is a painful rash that develops in people who had chickenpox or have been exposed to the virus. How to eat healthy:  My Plate is a model for planning healthy meals. It shows the types and amounts of foods that should go on your plate. Fruits and vegetables make up about half of your plate, and grains and protein make up the other half. A serving of dairy is included on the side of your plate. The amount of calories and serving sizes you need depends on your age, gender, weight, and height. Examples of healthy foods are listed below:  Eat a variety of vegetables  such as dark green, red, and orange vegetables. You can also include canned vegetables low in sodium (salt) and frozen vegetables without added butter or sauces. Eat a variety of fresh fruits , canned fruit in 100% juice, frozen fruit, and dried fruit. Include whole grains. At least half of the grains you eat should be whole grains. Examples include whole-wheat bread, wheat pasta, brown rice, and whole-grain cereals such as oatmeal.    Eat a variety of protein foods such as seafood (fish and shellfish), lean meat, and poultry without skin (turkey and chicken). Examples of lean meats include pork leg, shoulder, or tenderloin, and beef round, sirloin, tenderloin, and extra lean ground beef. Other protein foods include eggs and egg substitutes, beans, peas, soy products, nuts, and seeds. Choose low-fat dairy products such as skim or 1% milk or low-fat yogurt, cheese, and cottage cheese.     Limit unhealthy fats  such as butter, hard margarine, and shortening. Exercise:  Exercise at least 30 minutes per day on most days of the week. Some examples of exercise include walking, biking, dancing, and swimming. You can also fit in more physical activity by taking the stairs instead of the elevator or parking farther away from stores. Include muscle strengthening activities 2 days each week. Regular exercise provides many health benefits. It helps you manage your weight, and decreases your risk for type 2 diabetes, heart disease, stroke, and high blood pressure. Exercise can also help improve your mood. Ask your healthcare provider about the best exercise plan for you. General health and safety guidelines:   Do not smoke. Nicotine and other chemicals in cigarettes and cigars can cause lung damage. Ask your healthcare provider for information if you currently smoke and need help to quit. E-cigarettes or smokeless tobacco still contain nicotine. Talk to your healthcare provider before you use these products. Limit alcohol. A drink of alcohol is 12 ounces of beer, 5 ounces of wine, or 1½ ounces of liquor. Lose weight, if needed. Being overweight increases your risk of certain health conditions. These include heart disease, high blood pressure, type 2 diabetes, and certain types of cancer. Protect your skin. Do not sunbathe or use tanning beds. Use sunscreen with a SPF 15 or higher. Apply sunscreen at least 15 minutes before you go outside. Reapply sunscreen every 2 hours. Wear protective clothing, hats, and sunglasses when you are outside. Drive safely. Always wear your seatbelt. Make sure everyone in your car wears a seatbelt. A seatbelt can save your life if you are in an accident. Do not use your cell phone when you are driving. This could distract you and cause an accident. Pull over if you need to make a call or send a text message. Practice safe sex.   Use latex condoms if are sexually active and have more than one partner. Your healthcare provider may recommend screening tests for sexually transmitted infections (STIs). Wear helmets, lifejackets, and protective gear. Always wear a helmet when you ride a bike or motorcycle, go skiing, or play sports that could cause a head injury. Wear protective equipment when you play sports. Wear a lifejacket when you are on a boat or doing water sports. © Copyright [employer] 2023 Information is for End User's use only and may not be sold, redistributed or otherwise used for commercial purposes. The above information is an  only. It is not intended as medical advice for individual conditions or treatments. Talk to your doctor, nurse or pharmacist before following any medical regimen to see if it is safe and effective for you. Gregorio Jean Baptiste

## 2023-10-04 NOTE — PROGRESS NOTES
Assessment/Plan:   General medical exam remains good. Continue current medication. Follow-up with psychiatry as you have planned. Discuss her medication with them. Restart flaxseed oil for your tendinitis type symptoms. If you want you can give trial of OTC Astelin nasal spray (Astepro) 1 spray each nostril twice daily as I demonstrated and instructed for your allergies. Stop any Claritin, Allegra, Zyrtec, Xyzal.  Remember it will take up to 5 days to start working. Keep me informed of left elbow pain. Quality Measures:       Return in about 6 months (around 4/4/2024) for Next scheduled follow up. Diagnoses and all orders for this visit:    Annual physical exam    Mixed hyperlipidemia  -     Comprehensive metabolic panel; Future  -     Lipid panel; Future    Depression, unspecified depression type    PTSD (post-traumatic stress disorder)    Allergic rhinitis, unspecified seasonality, unspecified trigger  -     azelastine (ASTELIN) 0.1 % nasal spray; 1 spray into each nostril 2 (two) times a day Use in each nostril as directed  -     CBC and differential; Future          Subjective:      Patient ID: Mindi Cabezas is a 47 y.o. male. 58-year-old male presents for his annual physical.    Hyperlipidemia: Labs show good control on atorvastatin 20 mg daily. Continue the atorvastatin. Depression/PTSD: On venlafaxine, followed by psychiatry. Needs to make follow-up appointment. Allergic rhinitis: Allergies bothering him this fall. Runny nose, postnasal drip, sneezing, itchy watery eyes. Currently using Claritin with some benefit. We did discuss Astelin and patient is interested in using. This will be prescribed. Patient complaining of left elbow pain x2 weeks. Has been doing heavy yard work. Not exactly sure of mode of injury. He will monitor for few more weeks, if persistent he will contact me.     EMR has been reviewed, clarified, and updated with patient today.      ALLERGIES:  Allergies   Allergen Reactions   • Roscoe Oil - Food Allergy Anaphylaxis   • Celecoxib Anaphylaxis   • Diclofenac Shortness Of Breath   • Flavoring Agent - Food Allergy Anaphylaxis   • Corn Oil - Food Allergy - Food Allergy Other (See Comments)     Clinically insignificant   • Starch GI Intolerance and Other (See Comments)       CURRENT MEDICATIONS:    Current Outpatient Medications:   •  albuterol (PROVENTIL HFA,VENTOLIN HFA) 90 mcg/act inhaler, Inhale 2 puffs every 6 (six) hours as needed for wheezing, Disp: 18 g, Rfl: 0  •  atorvastatin (LIPITOR) 20 mg tablet, Take 1 tablet (20 mg total) by mouth daily, Disp: 90 tablet, Rfl: 0  •  azelastine (ASTELIN) 0.1 % nasal spray, 1 spray into each nostril 2 (two) times a day Use in each nostril as directed, Disp: 30 mL, Rfl: 3  •  clobetasol (TEMOVATE) 0.05 % ointment, Apply topically 2 (two) times a day (Patient taking differently: Apply topically if needed), Disp: 30 g, Rfl: 3  •  loratadine (CLARITIN) 10 mg tablet, Take 1 tablet (10 mg total) by mouth daily Do not start before May 31, 2023., Disp: 30 tablet, Rfl: 0  •  multivitamin (THERAGRAN) TABS, Take 1 tablet by mouth daily, Disp: , Rfl:   •  Omega-3 Fatty Acids (fish oil) 1,000 mg, Take 1 capsule (1,000 mg total) by mouth daily, Disp: 30 capsule, Rfl: 0  •  venlafaxine (EFFEXOR-XR) 150 mg 24 hr capsule, TAKE 1 CAPSULE BY MOUTH EVERY DAY, Disp: 90 capsule, Rfl: 1  •  B Complex Vitamins (B COMPLEX 50 PO), Take by mouth (Patient not taking: Reported on 10/4/2023), Disp: , Rfl:   •  Cholecalciferol (VITAMIN D3 PO), Take by mouth (Patient not taking: Reported on 10/4/2023), Disp: , Rfl:   •  traZODone (DESYREL) 50 mg tablet, Take 1 tablet (50 mg total) by mouth daily at bedtime (Patient not taking: Reported on 10/4/2023), Disp: 30 tablet, Rfl: 0    ACTIVE PROBLEM LIST:  Patient Active Problem List   Diagnosis   • Cervical somatic dysfunction   • Depression   • Disc degeneration, lumbar   • Hyperlipidemia   • Pain of finger of left hand   • Skin cyst   • PTSD (post-traumatic stress disorder)   • Carpal tunnel syndrome on left   • Dyshydrosis       PAST MEDICAL HISTORY:  Past Medical History:   Diagnosis Date   • Alcohol abuse    • Bilateral inguinal hernia 02/22/2021   • Bronchitis    • Cough     Pt states he has a morning cough from some sinus drainage- pt reports having a cold week of 2/8/21   • COVID-19 12/2020    Pt states he only lost taste and smell- no other symptoms reported.    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   • Tobacco abuse        PAST SURGICAL HISTORY:  Past Surgical History:   Procedure Laterality Date   • FACIAL/NECK BIOPSY Left 8/4/2020    Procedure: EXCISION CYST NECK;  Surgeon: Syd Rivera MD;  Location: [facility];  Service: Plastics   • FLAP LOCAL HEAD / NECK Left 8/4/2020    Procedure: COMPLEX CLOSURE VS ADJACENT TISSUE REARRANGEMENT NECK;  Surgeon: Syd Rivera MD;  Location: [facility];  Service: Plastics   • HERNIA REPAIR     • HERNIA REPAIR Bilateral 2/22/2021    Procedure: REPAIR HERNIA INGUINAL, LAPAROSCOPIC BILATERAL WITH MESH;  Surgeon: Gracy Rae MD;  Location: [facility];  Service: General   • PYLOROMYOTOMY     • ROTATOR CUFF REPAIR Left     times 2 per pt   • TONSILLECTOMY AND ADENOIDECTOMY     • TOOTH EXTRACTION         FAMILY HISTORY:  Family History   Problem Relation Age of Onset   • Cancer Mother    • Lung cancer Mother         CARCINOID TUMOR   • Hypertension Mother    • Hypothyroidism Mother    • Kidney cancer Mother    • Thyroid cancer Mother    • No Known Problems Father         Motor vehicle accident   • Diabetes Maternal Grandmother    • Alzheimer's disease Maternal Grandfather    • Coronary artery disease Maternal Grandfather    • Other Maternal Grandfather         PACEMAKER       SOCIAL HISTORY:  Social History     Socioeconomic History   • Marital status: /Civil Union     Spouse name: Not on file   • Number of children: 1   • Years of education: Not on file   • Highest education level: Not on file   Occupational History   • Occupation: COMPUTER CONSULTING     Comment: FULL-TIME EMPLOYMENT   Tobacco Use   • Smoking status: Former     Types: Cigarettes   • Smokeless tobacco: Never   • Tobacco comments:     Pt quit 2008   Vaping Use   • Vaping Use: Never used   Substance and Sexual Activity   • Alcohol use: Yes     Comment: BEER - DESCRIBES AS INFREQUENT   • Drug use: Yes     Types: Marijuana     Comment: Medical marijuana   • Sexual activity: Yes     Partners: Female     Comment: did not ask   Other Topics Concern   • Not on file   Social History Narrative    LIVING INDEPENDENTLY WITH SPOUSE    ONE SON    Unemployed    Hobbies-exercising, yd work, video games, camping     Social Determinants of Health     Financial Resource Strain: Not on file   Food Insecurity: Not on file   Transportation Needs: Not on file   Physical Activity: Not on file   Stress: Not on file   Social Connections: Not on file   Intimate Partner Violence: Not on file   Housing Stability: Not on file       Review of Systems   Constitutional: Negative for activity change, chills, fatigue and fever. HENT: Positive for congestion, postnasal drip and rhinorrhea. Eyes: Negative for discharge and visual disturbance. Respiratory: Negative for cough, chest tightness and shortness of breath. Cardiovascular: Negative for chest pain, palpitations and leg swelling. Gastrointestinal: Negative for abdominal pain, blood in stool, constipation, diarrhea, nausea and vomiting.    Endocrine: Negative for polydipsia, polyphagia and polyuria. Genitourinary: Negative for difficulty urinating. Musculoskeletal: Negative for arthralgias and myalgias. Skin: Negative for rash. Allergic/Immunologic: Negative for immunocompromised state. Neurological: Negative for dizziness, syncope, weakness, light-headedness and headaches. Hematological: Negative for adenopathy. Does not bruise/bleed easily. Psychiatric/Behavioral: Negative for dysphoric mood, sleep disturbance and suicidal ideas. The patient is not nervous/anxious. Objective:  Vitals:    10/04/23 0740   BP: 122/80   BP Location: Right arm   Patient Position: Sitting   Cuff Size: Standard   Pulse: 98   Resp: 16   Temp: 98.6 °F (37 °C)   TempSrc: Tympanic   SpO2: 99%   Weight: 83 kg (183 lb)   Height: 5' 6" (1.676 m)     Body mass index is 29.54 kg/m². Physical Exam  Vitals and nursing note reviewed. Constitutional:       General: He is not in acute distress. Appearance: Normal appearance. He is well-developed. He is not ill-appearing. Comments: 69-year-old male presents for his annual physical, well-developed well-nourished in no acute distress. HENT:      Head: Normocephalic. Right Ear: Tympanic membrane, ear canal and external ear normal.      Left Ear: Tympanic membrane, ear canal and external ear normal.      Nose: Nose normal.      Mouth/Throat:      Mouth: Mucous membranes are moist.      Pharynx: Oropharynx is clear. No oropharyngeal exudate. Eyes:      General: No scleral icterus. Right eye: No discharge. Left eye: No discharge. Extraocular Movements: Extraocular movements intact. Conjunctiva/sclera: Conjunctivae normal.      Pupils: Pupils are equal, round, and reactive to light. Neck:      Thyroid: No thyromegaly. Vascular: No carotid bruit or JVD. Trachea: No tracheal deviation. Cardiovascular:      Rate and Rhythm: Normal rate and regular rhythm. Pulses: Normal pulses.       Heart sounds: Normal heart sounds. No murmur heard. No friction rub. No gallop. Pulmonary:      Effort: Pulmonary effort is normal. No respiratory distress. Breath sounds: Normal breath sounds. No wheezing or rales. Chest:      Chest wall: No tenderness. Abdominal:      General: Bowel sounds are normal. There is no distension. Palpations: Abdomen is soft. There is no hepatomegaly, splenomegaly or mass. Tenderness: There is no abdominal tenderness. There is no right CVA tenderness, left CVA tenderness, guarding or rebound. Genitourinary:     Comments: Circumcised adult male. No lesions of the phallus, scrotum, or testes. No inguinal hernias. Rectal exam: Normal rectal tone. Prostate normal size shape and consistency without dominant nodules. Hemoccult negative. Musculoskeletal:         General: No tenderness or deformity. Normal range of motion. Cervical back: Normal range of motion and neck supple. Right lower leg: No edema. Left lower leg: No edema. Lymphadenopathy:      Cervical: No cervical adenopathy. Skin:     General: Skin is warm and dry. Findings: No rash. Neurological:      General: No focal deficit present. Mental Status: He is alert and oriented to person, place, and time. Cranial Nerves: No cranial nerve deficit. Sensory: No sensory deficit. Motor: No weakness or abnormal muscle tone. Coordination: Coordination normal.      Gait: Gait normal.      Deep Tendon Reflexes: Reflexes are normal and symmetric. Reflexes normal.   Psychiatric:         Mood and Affect: Mood normal.         Behavior: Behavior normal.         Thought Content:  Thought content normal.         Judgment: Judgment normal.           RESULTS:  Total Cholesterol   Date/Time Value Ref Range Status   10/03/2023 07:33  <200 mg/dL Final     Triglycerides   Date/Time Value Ref Range Status   10/03/2023 07:33 AM 82 <150 mg/dL Final     HDL   Date/Time Value Ref Range Status   10/03/2023 07:33 AM 50 > OR = 40 mg/dL Final     LDL Calculated   Date/Time Value Ref Range Status   10/03/2023 07:33  (H) mg/dL (calc) Final     Comment:     Reference range: <100     Desirable range <100 mg/dL for primary prevention;    <70 mg/dL for patients with CHD or diabetic patients   with > or = 2 CHD risk factors. LDL-C is now calculated using the Freedom-Reina   calculation, which is a validated novel method providing   better accuracy than the Friedewald equation in the   estimation of LDL-C. Mak Lo et al. [facility] Poor. 3829;373(29): 0501-6522   (http://[employer]OND. [employer]/faq/VSZ364)       Hemoglobin   Date/Time Value Ref Range Status   05/24/2023 06:04 AM 14.9 12.0 - 17.0 g/dL Final     Hematocrit   Date/Time Value Ref Range Status   05/24/2023 06:04 AM 44.8 36.5 - 49.3 % Final     Platelets   Date/Time Value Ref Range Status   05/24/2023 06:04  149 - 390 Thousands/uL Final     Prostate Specific Antigen Total   Date/Time Value Ref Range Status   09/07/2021 07:39 AM 0.3 < OR = 4.0 ng/mL Final     Comment:     The total PSA value from this assay system is   standardized against the WHO standard. The test   result will be approximately 20% lower when compared   to the equimolar-standardized total PSA (Carlos Enrique   Salena). Comparison of serial PSA results should be   interpreted with this fact in mind. This test was performed using the Siemens   chemiluminescent method. Values obtained from   different assay methods cannot be used  interchangeably. PSA levels, regardless of  value, should not be interpreted as absolute  evidence of the presence or absence of disease.        TSH 3RD GENERATON   Date/Time Value Ref Range Status   09/13/2019 09:19 AM 1.870 0.358 - 3.740 uIU/mL Final     Comment:       Using supplements with high doses of biotin 20 to more than 300 times greater than the adequate daily intake for adults of 30 mcg/day as established by the [place] [employer], can cause falsely depress results. Free T4   Date/Time Value Ref Range Status   09/13/2019 09:19 AM 0.80 0.76 - 1.46 ng/dL Final     Comment:       Specimen collection should occur prior to Sulfasalazine administration due to the potential for falsely elevated results. Sodium   Date/Time Value Ref Range Status   10/03/2023 07:33  135 - 146 mmol/L Final     BUN   Date/Time Value Ref Range Status   10/03/2023 07:33 AM 15 7 - 25 mg/dL Final     Creatinine   Date/Time Value Ref Range Status   10/03/2023 07:33 AM 1.01 0.70 - 1.30 mg/dL Final   05/24/2023 06:04 AM 1.12 0.60 - 1.30 mg/dL Final     Comment:     Standardized to IDMS reference method      In chart    This note was created with voice recognition software. Phonic, grammatical and spelling errors may be present within the note as a result.

## 2023-10-16 ENCOUNTER — OFFICE VISIT (OUTPATIENT)
Dept: PSYCHIATRY | Facility: CLINIC | Age: 53
End: 2023-10-16
Payer: COMMERCIAL

## 2023-10-16 DIAGNOSIS — F43.10 PTSD (POST-TRAUMATIC STRESS DISORDER): Primary | ICD-10-CM

## 2023-10-16 PROCEDURE — 99213 OFFICE O/P EST LOW 20 MIN: CPT | Performed by: PSYCHIATRY & NEUROLOGY

## 2023-10-16 RX ORDER — VENLAFAXINE HYDROCHLORIDE 37.5 MG/1
112.5 CAPSULE, EXTENDED RELEASE ORAL DAILY
Qty: 90 CAPSULE | Refills: 3 | Status: SHIPPED | OUTPATIENT
Start: 2023-10-16

## 2023-10-16 NOTE — PSYCH
Ji De La Torre 1970 [id number]     The patient was seen for continuing care and pharmacotherapy. He reports that he has a stable mood and his relationship with his wife and his son has improved. Generally sleeping and eating well except for occasional insomnia. He has not had any new health problems. He is interested in being eventually off of Effexor which he has taken for almost 10 years. In the past he has experienced significant withdrawal symptoms and agrees to start titrating down gradually. ROS:  Delayed ejaculation  Current Mental Status Evaluation:  Appearance:  Adequate hygiene and grooming and Good eye contact   Behavior:  Calm, Cooperative, and Pleasant   Mood:  Euthymic   Affect: appropriate and broad   Speech: Normal volume and Normal rate   Thought Process:  Goal directed and coherent   Thought Content:  Does not verbalize delusional material   Perceptual Disturbances: Denies hallucinations and does not appear to be responding to internal stimuli   Risk Potential: No suicidal or homicidal ideation   Orientation:         Diagnosis ICD-10-CM Associated Orders   1.  PTSD (post-traumatic stress disorder)  F43.10 venlafaxine (EFFEXOR-XR) 37.5 mg 24 hr capsule             Current Outpatient Medications   Medication Instructions    albuterol (PROVENTIL HFA,VENTOLIN HFA) 90 mcg/act inhaler 2 puffs, Inhalation, Every 6 hours PRN    atorvastatin (LIPITOR) 20 mg, Oral, Daily    azelastine (ASTELIN) 0.1 % nasal spray 1 spray, Nasal, 2 times daily, Use in each nostril as directed    B Complex Vitamins (B COMPLEX 50 PO) Take by mouth    Cholecalciferol (VITAMIN D3 PO) Take by mouth    clobetasol (TEMOVATE) 0.05 % ointment Topical, 2 times daily    fish oil 1,000 mg, Oral, Daily    loratadine (CLARITIN) 10 mg, Oral, Daily    multivitamin (THERAGRAN) TABS 1 tablet, Oral, Daily    venlafaxine (EFFEXOR-XR) 112.5 mg, Oral, Daily          Treatment Recommendations- Risks Benefits    We will reduce venlafaxine XR to 112.5 mg daily until his next visit which will be scheduled for 3 months. Risks, Benefits And Possible Side Effects Of Medications:  Risks, benefits, and possible side effects of medications explained to patient and patient verbalizes understanding    VIRTUAL VISIT [address] verbally agrees to participate in [employer]. Pt is aware that [employer] could be limited without vital signs or the ability to perform a full hands-on physical exam. Priya Norton understands he or the provider may request at any time to terminate the video visit and request the patient to seek care or treatment in person.      Esther Finn MD 10/16/23

## 2023-10-25 ENCOUNTER — SOCIAL WORK (OUTPATIENT)
Dept: BEHAVIORAL/MENTAL HEALTH CLINIC | Facility: CLINIC | Age: 53
End: 2023-10-25
Payer: COMMERCIAL

## 2023-10-25 DIAGNOSIS — F32.A DEPRESSION, UNSPECIFIED DEPRESSION TYPE: ICD-10-CM

## 2023-10-25 DIAGNOSIS — F43.10 PTSD (POST-TRAUMATIC STRESS DISORDER): Primary | ICD-10-CM

## 2023-10-25 PROCEDURE — 90834 PSYTX W PT 45 MINUTES: CPT | Performed by: SOCIAL WORKER

## 2023-10-29 NOTE — PSYCH
Behavioral Health Psychotherapy Progress Note    Psychotherapy Provided: Individual Psychotherapy     1. PTSD (post-traumatic stress disorder)        2. Depression, unspecified depression type            DATA: Therapist met w/pt for individual session. He stated he met w/the psychiatrist and he is beginning to wean off of effexor which is something he has wanted to do. He stated that he feels "out of body" but other than that he is okay. He stated that things with his family are doing good. He denied any depressive moments. He stated that there were some triggers to his anxiety over the past few weeks but has been able to manage it. During this session, this clinician used the following therapeutic modalities: Client-centered Therapy and Cognitive Behavioral Therapy    Substance Abuse was addressed during this session. If the client is diagnosed with a co-occurring substance use disorder, please indicate any changes in the frequency or amount of use: "rarely." Stage of change for addressing substance use diagnoses: No substance use/Not applicable    ASSESSMENT:  America Payne presents with a Euthymic/ normal mood. his affect is Normal range and intensity, which is congruent, with his mood and the content of the session. The client has made progress on their goals. America Payne presents with a minimal risk of suicide, minimal risk of self-harm, and none risk of harm to others. For any risk assessment that surpasses a "low" rating, a safety plan must be developed. A safety plan was indicated: no  If yes, describe in detail n/a    PLAN: Between sessions, America Payne will continue to challenge his anxious thoughts and communicate how he feels w/his loved ones. At the next session, the therapist will use Client-centered Therapy and Cognitive Behavioral Therapy to address symptoms of anxiety and depression.     Behavioral Health Treatment Plan and Discharge Planning: America Payne is aware of and agrees to continue to work on their treatment plan. They have identified and are working toward their discharge goals.  yes    Visit start and stop times:    10/25/23  Start Time: 0900  Stop Time: 0945  Total Visit Time: 45 minutes

## 2023-12-13 DIAGNOSIS — E78.2 MIXED HYPERLIPIDEMIA: ICD-10-CM

## 2023-12-13 RX ORDER — ATORVASTATIN CALCIUM 20 MG/1
20 TABLET, FILM COATED ORAL DAILY
Qty: 90 TABLET | Refills: 1 | Status: SHIPPED | OUTPATIENT
Start: 2023-12-13

## 2023-12-26 ENCOUNTER — TELEMEDICINE (OUTPATIENT)
Dept: BEHAVIORAL/MENTAL HEALTH CLINIC | Facility: CLINIC | Age: 53
End: 2023-12-26
Payer: COMMERCIAL

## 2023-12-26 DIAGNOSIS — F43.10 PTSD (POST-TRAUMATIC STRESS DISORDER): ICD-10-CM

## 2023-12-26 DIAGNOSIS — F32.A DEPRESSION, UNSPECIFIED DEPRESSION TYPE: Primary | ICD-10-CM

## 2023-12-26 PROCEDURE — 90832 PSYTX W PT 30 MINUTES: CPT | Performed by: SOCIAL WORKER

## 2023-12-29 NOTE — PSYCH
Visit start and stop times:    12/23/23  Start Time: 1300  Stop Time: 1334  Total Visit Time: 34 minutes  Virtual Regular Visit  Therapist met w/pt for individual session.  Pt shared that things have been pretty good.  He stated that he has been busy which tends to help him.  He stated that he has several things scheduled w/friends to keep himself busy and overall has been managing his symptoms(irritability, sadness, racing thoughts, anxiety).  He stated that there hasn't been much conflict w/his family and overall dealing w/his anxiety triggers.      Verification of patient location:    Patient is located at Home in the following state in which I hold an active license [place]      Assessment/Plan:    Problem List Items Addressed This Visit          Other    Depression - Primary    PTSD (post-traumatic stress disorder)              Reason for visit is No chief complaint on file.       Encounter provider Jaycee Schaffer    Provider located at [facility] [address] [facility] [address]  [address] [facility] [place] [id number] [phone]      Recent Visits  Date Type Provider Dept   12/26/23 Telemedicine Jaycee Schaffer  [facility] [address]   Showing recent visits within past 7 days and meeting all other requirements  Future Appointments  No visits were found meeting these conditions.  Showing future appointments within next 150 days and meeting all other requirements       The patient was identified by name and date of birth. Ruben LUIZ Ratliff was informed that this is a telemedicine visit and that the visit is being conducted throughthe Epic Embedded platform. He agrees to proceed..  My office door was closed. No one else was in the room.  He acknowledged consent and understanding of privacy and security of the video platform. The patient has agreed to participate and understands they can discontinue the visit at any  time.    Patient is aware this is a billable service.     Subjective  Ruben Ratliff is a 53 y.o. male  .      HPI     Past Medical History:   Diagnosis Date    Alcohol abuse     Bilateral inguinal hernia 02/22/2021    Bronchitis     Cough     Pt states he has a morning cough from some sinus drainage- pt reports having a cold week of 2/8/21    COVID-19 12/2020    Pt states he only lost taste and smell- no other symptoms reported.    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    Tobacco abuse        Past Surgical History:   Procedure Laterality Date    FACIAL/NECK BIOPSY Left 8/4/2020    Procedure: EXCISION CYST NECK;  Surgeon: Guy Capone MD;  Location: [facility];  Service: Plastics    FLAP LOCAL HEAD / NECK Left 8/4/2020    Procedure: COMPLEX CLOSURE VS ADJACENT TISSUE REARRANGEMENT NECK;  Surgeon: Guy Capone MD;  Location: [facility];  Service: Plastics    HERNIA REPAIR      HERNIA REPAIR Bilateral 2/22/2021    Procedure: REPAIR HERNIA INGUINAL, LAPAROSCOPIC BILATERAL WITH MESH;  Surgeon: Sebas Ceja MD;  Location: [facility];  Service: General    PYLOROMYOTOMY      ROTATOR CUFF REPAIR Left     times 2 per pt    TONSILLECTOMY AND ADENOIDECTOMY      TOOTH EXTRACTION         Current Outpatient Medications   Medication Sig Dispense Refill    albuterol (PROVENTIL HFA,VENTOLIN HFA) 90 mcg/act inhaler Inhale 2 puffs every 6 (six) hours as needed for wheezing 18 g 0    atorvastatin (LIPITOR) 20 mg tablet Take 1 tablet (20 mg total) by mouth daily 90 tablet 1    azelastine (ASTELIN) 0.1 % nasal spray 1 spray into each nostril 2 (two) times a day Use in each nostril  as directed 30 mL 3    B Complex Vitamins (B COMPLEX 50 PO) Take by mouth (Patient not taking: Reported on 10/4/2023)      Cholecalciferol (VITAMIN D3 PO) Take by mouth (Patient not taking: Reported on 10/4/2023)      clobetasol (TEMOVATE) 0.05 % ointment Apply topically 2 (two) times a day (Patient taking differently: Apply topically if needed) 30 g 3    loratadine (CLARITIN) 10 mg tablet Take 1 tablet (10 mg total) by mouth daily Do not start before May 31, 2023. 30 tablet 0    multivitamin (THERAGRAN) TABS Take 1 tablet by mouth daily      Omega-3 Fatty Acids (fish oil) 1,000 mg Take 1 capsule (1,000 mg total) by mouth daily 30 capsule 0    venlafaxine (EFFEXOR-XR) 37.5 mg 24 hr capsule Take 3 capsules (112.5 mg total) by mouth daily 90 capsule 3     No current facility-administered medications for this visit.        Allergies   Allergen Reactions    Celecoxib Anaphylaxis    Diclofenac Shortness Of Breath    Flavoring Agent - Food Allergy Anaphylaxis    Corn Oil - Food Allergy - Food Allergy Other (See Comments)     Clinically insignificant    Starch GI Intolerance and Other (See Comments)       Review of Systems    Video Exam    There were no vitals filed for this visit.    Physical Exam pt denied any SI/HI/AH/VH

## 2024-01-09 ENCOUNTER — TELEPHONE (OUTPATIENT)
Dept: PSYCHIATRY | Facility: CLINIC | Age: 54
End: 2024-01-09

## 2024-01-09 NOTE — TELEPHONE ENCOUNTER
Called and left message for patient to inform 1/17/24 8AM was cancelled due to provider being out of the office. Requested return call to reschedule. Please schedule upon return call. Thank you.

## 2024-01-22 ENCOUNTER — OFFICE VISIT (OUTPATIENT)
Dept: PSYCHIATRY | Facility: CLINIC | Age: 54
End: 2024-01-22
Payer: COMMERCIAL

## 2024-01-22 DIAGNOSIS — F43.10 PTSD (POST-TRAUMATIC STRESS DISORDER): ICD-10-CM

## 2024-01-22 PROCEDURE — 99213 OFFICE O/P EST LOW 20 MIN: CPT | Performed by: PSYCHIATRY & NEUROLOGY

## 2024-01-22 RX ORDER — VENLAFAXINE HYDROCHLORIDE 37.5 MG/1
75 CAPSULE, EXTENDED RELEASE ORAL DAILY
Qty: 60 CAPSULE | Refills: 2 | Status: SHIPPED | OUTPATIENT
Start: 2024-01-22

## 2024-01-22 NOTE — PSYCH
Ruben Ratliff 1970 [id number]     The patient was seen for continuing care and pharmacotherapy.For the most part,he has been doing well. Family members don't communicate with one another.  He has not had any new health problems and keeps himself physically active.  He had to venlafaxine tablets left before he could get his next refill and since he did not notice any changes, continue taking 2 a day and reports that he has more mental clarity and better energy on the lower dose.    ROS:  No specific symptoms  Current Mental Status Evaluation:  Appearance:  Adequate hygiene and grooming and Good eye contact   Behavior:  Calm and Cooperative   Mood:  Euthymic   Affect: appropriate   Speech: Normal volume and Normal rate   Thought Process:  Goal directed and coherent   Thought Content:  Does not verbalize delusional material   Perceptual Disturbances: Denies hallucinations and does not appear to be responding to internal stimuli   Risk Potential: No suicidal or homicidal ideation   Orientation:         Diagnosis ICD-10-CM Associated Orders   1. PTSD (post-traumatic stress disorder)  F43.10 venlafaxine (EFFEXOR-XR) 37.5 mg 24 hr capsule             Current Outpatient Medications   Medication Instructions    albuterol (PROVENTIL HFA,VENTOLIN HFA) 90 mcg/act inhaler 2 puffs, Inhalation, Every 6 hours PRN    atorvastatin (LIPITOR) 20 mg, Oral, Daily    azelastine (ASTELIN) 0.1 % nasal spray 1 spray, Nasal, 2 times daily, Use in each nostril as directed    clobetasol (TEMOVATE) 0.05 % ointment Topical, 2 times daily    fish oil 1,000 mg, Oral, Daily    multivitamin (THERAGRAN) TABS 1 tablet, Oral, Daily    venlafaxine (EFFEXOR-XR) 75 mg, Oral, Daily          Treatment Recommendations- Risks Benefits    Continue with venlafaxine extended release 75 mg daily.  Return to the office in 3 months  Risks, Benefits And Possible Side Effects Of Medications:  Risks, benefits, and possible side effects of medications  explained to patient and patient verbalizes understanding    VIRTUAL VISIT DISCLAIMER    Ruben DEE Gaudencio verbally agrees to participate in Virtual Care Services. Pt is aware that Virtual Care Services could be limited without vital signs or the ability to perform a full hands-on physical exam. Ruben Ratliff understands he or the provider may request at any time to terminate the video visit and request the patient to seek care or treatment in person.     Moy Denny MD 01/22/24

## 2024-01-24 ENCOUNTER — SOCIAL WORK (OUTPATIENT)
Dept: BEHAVIORAL/MENTAL HEALTH CLINIC | Facility: CLINIC | Age: 54
End: 2024-01-24
Payer: COMMERCIAL

## 2024-01-24 DIAGNOSIS — F43.10 PTSD (POST-TRAUMATIC STRESS DISORDER): Primary | ICD-10-CM

## 2024-01-24 PROCEDURE — 90834 PSYTX W PT 45 MINUTES: CPT | Performed by: SOCIAL WORKER

## 2024-01-24 NOTE — PSYCH
"Behavioral Health Psychotherapy Progress Note    Psychotherapy Provided: Individual Psychotherapy     1. PTSD (post-traumatic stress disorder)            DATA: Therapist met w/pt for individual session.  Pt shared that he met w/psychiatrist on Monday and everything seems to be okay. He stated that does feel more energetic with the medication change and is able to feel more emotions but not in a bad way.  He stated that he has been managing his anxiety but has felt more sad/down in regards to his family connections.  He stated that he continues to try to engage in his family members but when they are busy he then engages in his individual hobbies.  Pt continues to find interest and pleasure in his own hobbies and has been in contact w/his friends.     During this session, this clinician used the following therapeutic modalities: Client-centered Therapy, Cognitive Behavioral Therapy, and Supportive Psychotherapy    Substance Abuse was not addressed during this session. If the client is diagnosed with a co-occurring substance use disorder, please indicate any changes in the frequency or amount of use: unknown. Stage of change for addressing substance use diagnoses: No substance use/Not applicable    ASSESSMENT:  Marty Ratliff presents with a Euthymic/ normal mood.     his affect is Normal range and intensity, which is congruent, with his mood and the content of the session. The client has made progress on their goals.     Marty Ratliff presents with a none risk of suicide, none risk of self-harm, and none risk of harm to others.    For any risk assessment that surpasses a \"low\" rating, a safety plan must be developed.    A safety plan was indicated: no  If yes, describe in detail n/a    PLAN: Between sessions, Marty Ratliff will continue to work on continuing to engage in hobbies. At the next session, the therapist will use Client-centered Therapy and Cognitive Behavioral Therapy to address symptoms of " anxiety/depression.    Behavioral Health Treatment Plan and Discharge Planning: Marty LUIZ Ratliff is aware of and agrees to continue to work on their treatment plan. They have identified and are working toward their discharge goals. yes    Visit start and stop times:    01/24/24  Start Time: 0855  Stop Time: 0940  Total Visit Time: 45 minutes

## 2024-02-13 DIAGNOSIS — J30.9 ALLERGIC RHINITIS, UNSPECIFIED SEASONALITY, UNSPECIFIED TRIGGER: ICD-10-CM

## 2024-02-13 RX ORDER — AZELASTINE HYDROCHLORIDE 137 UG/1
SPRAY, METERED NASAL
Qty: 30 ML | Refills: 3 | Status: SHIPPED | OUTPATIENT
Start: 2024-02-13

## 2024-02-26 DIAGNOSIS — F43.10 PTSD (POST-TRAUMATIC STRESS DISORDER): ICD-10-CM

## 2024-02-27 RX ORDER — VENLAFAXINE HYDROCHLORIDE 37.5 MG/1
75 CAPSULE, EXTENDED RELEASE ORAL DAILY
Qty: 180 CAPSULE | Refills: 1 | Status: SHIPPED | OUTPATIENT
Start: 2024-02-27

## 2024-02-28 ENCOUNTER — SOCIAL WORK (OUTPATIENT)
Dept: BEHAVIORAL/MENTAL HEALTH CLINIC | Facility: CLINIC | Age: 54
End: 2024-02-28
Payer: COMMERCIAL

## 2024-02-28 DIAGNOSIS — F43.10 PTSD (POST-TRAUMATIC STRESS DISORDER): Primary | ICD-10-CM

## 2024-02-28 PROCEDURE — 90834 PSYTX W PT 45 MINUTES: CPT | Performed by: SOCIAL WORKER

## 2024-02-28 NOTE — PSYCH
"Behavioral Health Psychotherapy Progress Note    Psychotherapy Provided: Individual Psychotherapy     1. PTSD (post-traumatic stress disorder)            DATA: Therapist met w/pt for individual session.  Pt stated that he feels that this month has been one of the better months.  Pt stated that a few of the games he was waiting for came and he has been playing them which has been helpful for his overall mood.  He stated that he continues to feel good w/the decrease in medication.  Discussion was held around progress pt has made since he first started the medication over a decade ago.  Pt denied any PTSD symptoms currently and has reported being able to manage \"ups and downs\" better.     During this session, this clinician used the following therapeutic modalities: Client-centered Therapy, Cognitive Behavioral Therapy, and Solution-Focused Therapy    Substance Abuse was not addressed during this session. If the client is diagnosed with a co-occurring substance use disorder, please indicate any changes in the frequency or amount of use: unknown. Stage of change for addressing substance use diagnoses: No substance use/Not applicable    ASSESSMENT:  Marty Ratliff presents with a Euthymic/ normal mood.     his affect is Normal range and intensity, which is congruent, with his mood and the content of the session. The client has made progress on their goals.     Marty Ratliff presents with a none risk of suicide, none risk of self-harm, and none risk of harm to others.    For any risk assessment that surpasses a \"low\" rating, a safety plan must be developed.    A safety plan was indicated: no  If yes, describe in detail n/a    PLAN: Between sessions, Marty Ratliff will continue to engage in his hobbies. At the next session, the therapist will use Client-centered Therapy and Cognitive Behavioral Therapy to address symptoms of anxiety/depression.    Behavioral Health Treatment Plan and Discharge Planning: Marty Ratliff is " aware of and agrees to continue to work on their treatment plan. They have identified and are working toward their discharge goals. yes    Visit start and stop times:    02/28/24  Start Time: 0900  Stop Time: 0940  Total Visit Time: 40 minutes

## 2024-03-26 ENCOUNTER — SOCIAL WORK (OUTPATIENT)
Dept: BEHAVIORAL/MENTAL HEALTH CLINIC | Facility: CLINIC | Age: 54
End: 2024-03-26
Payer: COMMERCIAL

## 2024-03-26 DIAGNOSIS — F43.10 PTSD (POST-TRAUMATIC STRESS DISORDER): Primary | ICD-10-CM

## 2024-03-26 DIAGNOSIS — F32.A DEPRESSION, UNSPECIFIED DEPRESSION TYPE: ICD-10-CM

## 2024-03-26 PROCEDURE — 90834 PSYTX W PT 45 MINUTES: CPT | Performed by: SOCIAL WORKER

## 2024-03-28 ENCOUNTER — TELEPHONE (OUTPATIENT)
Dept: PSYCHIATRY | Facility: CLINIC | Age: 54
End: 2024-03-28

## 2024-03-28 NOTE — TELEPHONE ENCOUNTER
Spoke with Ruben Ratliff and pharmacist at [facility] -  patient is aware the prescription was refilled as requested and is ready for pick-up.

## 2024-03-28 NOTE — TELEPHONE ENCOUNTER
Received a VM from [facility]:  He was only given 16 tabs of venlafaxine and he's not able to get it filled; he will be out of medication soon.   [phone]

## 2024-04-02 NOTE — PSYCH
"Behavioral Health Psychotherapy Progress Note    Psychotherapy Provided: Individual Psychotherapy     1. PTSD (post-traumatic stress disorder)        2. Depression, unspecified depression type            DATA: Therapist met w/pt for individual session.  Pt stated that he continues to feel \"stable\" on the decrease of medication.  He stated that there hasn't been much conflict at home.  He identified some moments of anxiety but in general has been using positive coping skills including physical activity.  He stated that he has felt more isolated due to the winter and the weather but plans on starting camping soon and spending time w/friends this weekend.      During this session, this clinician used the following therapeutic modalities: Client-centered Therapy and Cognitive Behavioral Therapy    Substance Abuse was addressed during this session. If the client is diagnosed with a co-occurring substance use disorder, please indicate any changes in the frequency or amount of use: rarely. Stage of change for addressing substance use diagnoses: No substance use/Not applicable    ASSESSMENT:  Marty Ratliff presents with a Euthymic/ normal mood.     his affect is Normal range and intensity, which is congruent, with his mood and the content of the session. The client has made progress on their goals.     Marty Ratliff presents with a none risk of suicide, none risk of self-harm, and none risk of harm to others.    For any risk assessment that surpasses a \"low\" rating, a safety plan must be developed.    A safety plan was indicated: no  If yes, describe in detail n/a    PLAN: Between sessions, Marty Ratliff will continue to work on utilizing positive coping skills. At the next session, the therapist will use Client-centered Therapy and Cognitive Behavioral Therapy to address symptoms of anxiety and depression.    Behavioral Health Treatment Plan and Discharge Planning: Marty Ratliff is aware of and agrees to continue to " work on their treatment plan. They have identified and are working toward their discharge goals. yes    Visit start and stop times:    03/26/24  Start Time: 0900  Stop Time: 0940  Total Visit Time: 40 minutes

## 2024-04-03 LAB
ALBUMIN SERPL-MCNC: 4.4 G/DL (ref 3.6–5.1)
ALBUMIN/GLOB SERPL: 2 (CALC) (ref 1–2.5)
ALP SERPL-CCNC: 66 U/L (ref 35–144)
ALT SERPL-CCNC: 25 U/L (ref 9–46)
AST SERPL-CCNC: 23 U/L (ref 10–35)
BASOPHILS # BLD AUTO: 59 CELLS/UL (ref 0–200)
BASOPHILS NFR BLD AUTO: 1 %
BILIRUB SERPL-MCNC: 0.3 MG/DL (ref 0.2–1.2)
BUN SERPL-MCNC: 17 MG/DL (ref 7–25)
BUN/CREAT SERPL: NORMAL (CALC) (ref 6–22)
CALCIUM SERPL-MCNC: 9.6 MG/DL (ref 8.6–10.3)
CHLORIDE SERPL-SCNC: 106 MMOL/L (ref 98–110)
CHOLEST SERPL-MCNC: 196 MG/DL
CHOLEST/HDLC SERPL: 3.6 (CALC)
CO2 SERPL-SCNC: 30 MMOL/L (ref 20–32)
CREAT SERPL-MCNC: 1.04 MG/DL (ref 0.7–1.3)
EOSINOPHIL # BLD AUTO: 283 CELLS/UL (ref 15–500)
EOSINOPHIL NFR BLD AUTO: 4.8 %
ERYTHROCYTE [DISTWIDTH] IN BLOOD BY AUTOMATED COUNT: 12.5 % (ref 11–15)
GFR/BSA.PRED SERPLBLD CYS-BASED-ARV: 85 ML/MIN/1.73M2
GLOBULIN SER CALC-MCNC: 2.2 G/DL (CALC) (ref 1.9–3.7)
GLUCOSE SERPL-MCNC: 97 MG/DL (ref 65–99)
HCT VFR BLD AUTO: 44.7 % (ref 38.5–50)
HDLC SERPL-MCNC: 55 MG/DL
HGB BLD-MCNC: 15 G/DL (ref 13.2–17.1)
LDLC SERPL CALC-MCNC: 119 MG/DL (CALC)
LYMPHOCYTES # BLD AUTO: 2153.5 CELLS/UL (ref 850–3900)
LYMPHOCYTES NFR BLD AUTO: 36.5 %
MCH RBC QN AUTO: 30.2 PG (ref 27–33)
MCHC RBC AUTO-ENTMCNC: 33.6 G/DL (ref 32–36)
MCV RBC AUTO: 90.1 FL (ref 80–100)
MONOCYTES # BLD AUTO: 330 CELLS/UL (ref 200–950)
MONOCYTES NFR BLD AUTO: 5.6 %
NEUTROPHILS # BLD AUTO: 3074 CELLS/UL (ref 1500–7800)
NEUTROPHILS NFR BLD AUTO: 52.1 %
NONHDLC SERPL-MCNC: 141 MG/DL (CALC)
PLATELET # BLD AUTO: 293 THOUSAND/UL (ref 140–400)
PMV BLD REES-ECKER: 10 FL (ref 7.5–12.5)
POTASSIUM SERPL-SCNC: 4.8 MMOL/L (ref 3.5–5.3)
PROT SERPL-MCNC: 6.6 G/DL (ref 6.1–8.1)
RBC # BLD AUTO: 4.96 MILLION/UL (ref 4.2–5.8)
SODIUM SERPL-SCNC: 142 MMOL/L (ref 135–146)
TRIGL SERPL-MCNC: 114 MG/DL
WBC # BLD AUTO: 5.9 THOUSAND/UL (ref 3.8–10.8)

## 2024-04-04 ENCOUNTER — OFFICE VISIT (OUTPATIENT)
Dept: INTERNAL MEDICINE CLINIC | Facility: CLINIC | Age: 54
End: 2024-04-04
Payer: COMMERCIAL

## 2024-04-04 VITALS
HEIGHT: 66 IN | BODY MASS INDEX: 29.86 KG/M2 | HEART RATE: 86 BPM | WEIGHT: 185.8 LBS | SYSTOLIC BLOOD PRESSURE: 128 MMHG | TEMPERATURE: 98.5 F | DIASTOLIC BLOOD PRESSURE: 74 MMHG | OXYGEN SATURATION: 98 %

## 2024-04-04 DIAGNOSIS — Z12.5 SCREENING FOR PROSTATE CANCER: ICD-10-CM

## 2024-04-04 DIAGNOSIS — F43.10 PTSD (POST-TRAUMATIC STRESS DISORDER): ICD-10-CM

## 2024-04-04 DIAGNOSIS — M79.645 PAIN OF LEFT THUMB: ICD-10-CM

## 2024-04-04 DIAGNOSIS — E78.2 MIXED HYPERLIPIDEMIA: Primary | ICD-10-CM

## 2024-04-04 PROCEDURE — 99213 OFFICE O/P EST LOW 20 MIN: CPT | Performed by: PHYSICIAN ASSISTANT

## 2024-04-04 NOTE — PATIENT INSTRUCTIONS
Continue current medications.  Continue your stretching and exercise routine, go slow.  Diet modificationS to help your cholesterol.  Schedule follow-up in 6 months to be annual physical with repeat labs, follow-up sooner as needed.  Have x-ray of left thumb done, I will communicate results to you when available.  We can decide on orthopedic referral in the future if necessary.

## 2024-04-04 NOTE — PROGRESS NOTES
Assessment/Plan:   Patient Instructions   Continue current medications.  Continue your stretching and exercise routine, go slow.  Diet modificationS to help your cholesterol.  Schedule follow-up in 6 months to be annual physical with repeat labs, follow-up sooner as needed.  Have x-ray of left thumb done, I will communicate results to you when available.  We can decide on orthopedic referral in the future if necessary.     Quality Measures:       Return in about 6 months (around 10/4/2024) for Annual physical, Next scheduled follow up.         Diagnoses and all orders for this visit:    Mixed hyperlipidemia  -     Comprehensive metabolic panel; Future  -     Lipid panel; Future    Screening for prostate cancer  -     PSA, Total Screen; Future    PTSD (post-traumatic stress disorder)    Pain of left thumb  -     XR hand 3+ vw left; Future          Subjective:      Patient ID: Ruben Ratliff is a 53 y.o. male.    Follow-up, labs reviewed with patient    Hyperlipidemia: On atorvastatin 20 mg daily.  Minimal elevation in LDL cholesterol.  Lipids slightly higher than last determination.  Discussed diet modifications to decrease intake of processed foods, fried, fatty, greasy foods, cheese, red meat.  He will work on it.  He will continue the atorvastatin 20 mg at this point, we have discussed possibly increasing the dose to 40 mg.    PTSD/depression: Follows with therapist.  On venlafaxine which he finds effective.    Notes a tenderness below his right ear.  Ear feels a little congested.        ALLERGIES:  Allergies   Allergen Reactions   • Celecoxib Anaphylaxis   • Diclofenac Shortness Of Breath   • Flavoring Agent - Food Allergy Anaphylaxis   • Corn Oil - Food Allergy - Food Allergy Other (See Comments)     Clinically insignificant   • Starch GI Intolerance and Other (See Comments)       CURRENT MEDICATIONS:    Current Outpatient Medications:   •  albuterol (PROVENTIL HFA,VENTOLIN HFA) 90 mcg/act inhaler, Inhale 2  puffs every 6 (six) hours as needed for wheezing (Patient taking differently: Inhale 2 puffs every 6 (six) hours as needed for wheezing PRN), Disp: 18 g, Rfl: 0  •  atorvastatin (LIPITOR) 20 mg tablet, Take 1 tablet (20 mg total) by mouth daily, Disp: 90 tablet, Rfl: 1  •  Azelastine HCl 137 MCG/SPRAY SOLN, 1 SPRAY INTO EACH NOSTRIL 2 (TWO) TIMES A DAY USE IN EACH NOSTRIL AS DIRECTED, Disp: 30 mL, Rfl: 3  •  clobetasol (TEMOVATE) 0.05 % ointment, Apply topically 2 (two) times a day (Patient taking differently: Apply topically if needed), Disp: 30 g, Rfl: 3  •  multivitamin (THERAGRAN) TABS, Take 1 tablet by mouth daily, Disp: , Rfl:   •  Omega-3 Fatty Acids (fish oil) 1,000 mg, Take 1 capsule (1,000 mg total) by mouth daily, Disp: 30 capsule, Rfl: 0  •  venlafaxine (EFFEXOR-XR) 37.5 mg 24 hr capsule, TAKE 2 CAPSULES BY MOUTH DAILY., Disp: 180 capsule, Rfl: 1    ACTIVE PROBLEM LIST:  Patient Active Problem List   Diagnosis   • Cervical somatic dysfunction   • Depression   • Disc degeneration, lumbar   • Hyperlipidemia   • Pain of finger of left hand   • Skin cyst   • PTSD (post-traumatic stress disorder)   • Carpal tunnel syndrome on left   • Dyshydrosis       PAST MEDICAL HISTORY:  Past Medical History:   Diagnosis Date   • Alcohol abuse    • Bilateral inguinal hernia 02/22/2021   • Bronchitis    • Cough     Pt states he has a morning cough from some sinus drainage- pt reports having a cold week of 2/8/21   • COVID-19 12/2020    Pt states he only lost taste and smell- no other symptoms reported.   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   • Tobacco abuse        PAST SURGICAL  HISTORY:  Past Surgical History:   Procedure Laterality Date   • FACIAL/NECK BIOPSY Left 8/4/2020    Procedure: EXCISION CYST NECK;  Surgeon: Guy Capone MD;  [facility];  Service: Plastics   • FLAP LOCAL HEAD / NECK Left 8/4/2020    Procedure: COMPLEX CLOSURE VS ADJACENT TISSUE REARRANGEMENT NECK;  Surgeon: Guy Capone MD;  Location[facility];  Service: Plastics   • HERNIA REPAIR     • HERNIA REPAIR Bilateral 2/22/2021    Procedure: REPAIR HERNIA INGUINAL, LAPAROSCOPIC BILATERAL WITH MESH;  Surgeon: Sebas Ceja MD;  Location: [facility];  Service: General   • PYLOROMYOTOMY     • ROTATOR CUFF REPAIR Left     times 2 per pt   • TONSILLECTOMY AND ADENOIDECTOMY     • TOOTH EXTRACTION         FAMILY HISTORY:  Family History   Problem Relation Age of Onset   • Cancer Mother    • Lung cancer Mother         CARCINOID TUMOR   • Hypertension Mother    • Hypothyroidism Mother    • Kidney cancer Mother    • Thyroid cancer Mother    • No Known Problems Father         Motor vehicle accident   • Diabetes Maternal Grandmother    • Alzheimer's disease Maternal Grandfather    • Coronary artery disease Maternal Grandfather    • Other Maternal Grandfather         PACEMAKER       SOCIAL HISTORY:  Social History     Socioeconomic History   • Marital status: /Civil Union     Spouse name: Not on file   • Number of children: 1   • Years of education: Not on file   • Highest education level: Not on file   Occupational History   • Occupation: COMPUTER CONSULTING     Comment: FULL-TIME EMPLOYMENT   Tobacco Use   • Smoking status: Former     Types: Cigarettes   • Smokeless tobacco: Never   • Tobacco comments:     Pt quit 2008   Vaping Use   • Vaping status: Never Used   Substance and Sexual Activity   • Alcohol use: Yes     Comment: BEER - DESCRIBES AS INFREQUENT   • Drug use: Yes     Types: Marijuana     Comment: Medical marijuana   • Sexual activity: Yes     Partners: Female     Comment: did  "not ask   Other Topics Concern   • Not on file   Social History Narrative    LIVING INDEPENDENTLY WITH SPOUSE    ONE SON    Unemployed    Hobbies-exercising, yd work, video games, camping     Social Determinants of Health     Financial Resource Strain: Not on file   Food Insecurity: Not on file   Transportation Needs: Not on file   Physical Activity: Not on file   Stress: Not on file   Social Connections: Not on file   Intimate Partner Violence: Not on file   Housing Stability: Not on file       Review of Systems   Constitutional:  Negative for activity change, chills, fatigue and fever.   HENT:  Positive for sneezing. Negative for congestion.    Eyes:  Negative for discharge.   Respiratory:  Negative for cough, chest tightness and shortness of breath.    Cardiovascular:  Negative for chest pain, palpitations and leg swelling.   Gastrointestinal:  Negative for abdominal pain, blood in stool, constipation, diarrhea, nausea and vomiting.   Endocrine: Negative for polydipsia, polyphagia and polyuria.   Genitourinary:  Negative for difficulty urinating.   Musculoskeletal:  Negative for arthralgias and myalgias.   Skin:  Negative for rash.   Allergic/Immunologic: Negative for immunocompromised state.   Neurological:  Negative for dizziness, syncope, weakness, light-headedness and headaches.   Hematological:  Negative for adenopathy. Does not bruise/bleed easily.   Psychiatric/Behavioral:  Negative for dysphoric mood, sleep disturbance and suicidal ideas. The patient is not nervous/anxious.          Objective:  Vitals:    04/04/24 0752   BP: 128/74   BP Location: Right arm   Patient Position: Sitting   Cuff Size: Standard   Pulse: 86   Temp: 98.5 °F (36.9 °C)   TempSrc: Tympanic   SpO2: 98%   Weight: 84.3 kg (185 lb 12.8 oz)   Height: 5' 6\" (1.676 m)     Body mass index is 29.99 kg/m².     Physical Exam  Vitals and nursing note reviewed.   Constitutional:       General: He is not in acute distress.     Appearance: He is " well-developed. He is not ill-appearing.   HENT:      Head: Normocephalic and atraumatic.      Right Ear: Ear canal and external ear normal.      Left Ear: Tympanic membrane, ear canal and external ear normal.      Ears:      Comments: Right TM dull.     Nose: Nose normal.      Mouth/Throat:      Mouth: Mucous membranes are moist.      Pharynx: Oropharynx is clear.   Eyes:      Extraocular Movements: Extraocular movements intact.      Conjunctiva/sclera: Conjunctivae normal.      Pupils: Pupils are equal, round, and reactive to light.   Neck:      Thyroid: No thyromegaly.      Vascular: No carotid bruit or JVD.   Cardiovascular:      Rate and Rhythm: Normal rate and regular rhythm.      Heart sounds: Normal heart sounds.   Pulmonary:      Effort: Pulmonary effort is normal. No respiratory distress.      Comments: Few scattered lower basilar wheezes.  Musculoskeletal:      Cervical back: Neck supple.      Right lower leg: No edema.      Left lower leg: No edema.   Lymphadenopathy:      Cervical: No cervical adenopathy.   Skin:     General: Skin is warm and dry.      Findings: No rash.   Neurological:      General: No focal deficit present.      Mental Status: He is alert and oriented to person, place, and time. Mental status is at baseline.   Psychiatric:         Mood and Affect: Mood normal.         Behavior: Behavior normal.           RESULTS:  Total Cholesterol   Date/Time Value Ref Range Status   04/02/2024 07:33  <200 mg/dL Final     Triglycerides   Date/Time Value Ref Range Status   04/02/2024 07:33  <150 mg/dL Final     HDL   Date/Time Value Ref Range Status   04/02/2024 07:33 AM 55 > OR = 40 mg/dL Final     LDL Calculated   Date/Time Value Ref Range Status   04/02/2024 07:33  (H) mg/dL (calc) Final     Comment:     Reference range: <100     Desirable range <100 mg/dL for primary prevention;    <70 mg/dL for patients with CHD or diabetic patients   with > or = 2 CHD risk factors.     LDL-C is  now calculated using the Aman   calculation, which is a validated novel method providing   better accuracy than the Friedewald equation in the   estimation of LDL-C.   Freedom FARLEY et al. RUFUS. 2013;310(09): 0250-3073   (http://[employer]/faq/XPU918)       Hemoglobin   Date/Time Value Ref Range Status   04/02/2024 07:33 AM 15.0 13.2 - 17.1 g/dL Final   05/24/2023 06:04 AM 14.9 12.0 - 17.0 g/dL Final     Hematocrit   Date/Time Value Ref Range Status   05/24/2023 06:04 AM 44.8 36.5 - 49.3 % Final     HCT   Date/Time Value Ref Range Status   04/02/2024 07:33 AM 44.7 38.5 - 50.0 % Final     Platelet Count   Date/Time Value Ref Range Status   04/02/2024 07:33  140 - 400 Thousand/uL Final     Platelets   Date/Time Value Ref Range Status   05/24/2023 06:04  149 - 390 Thousands/uL Final     Prostate Specific Antigen Total   Date/Time Value Ref Range Status   09/07/2021 07:39 AM 0.3 < OR = 4.0 ng/mL Final     Comment:     The total PSA value from this assay system is   standardized against the WHO standard. The test   result will be approximately 20% lower when compared   to the equimolar-standardized total PSA (Carlos Enrique   Lamont). Comparison of serial PSA results should be   interpreted with this fact in mind.     This test was performed using the Siemens   chemiluminescent method. Values obtained from   different assay methods cannot be used  interchangeably. PSA levels, regardless of  value, should not be interpreted as absolute  evidence of the presence or absence of disease.       TSH 3RD GENERATON   Date/Time Value Ref Range Status   09/13/2019 09:19 AM 1.870 0.358 - 3.740 uIU/mL Final     Comment:       Using supplements with high doses of biotin 20 to more than 300 times greater than the adequate daily intake for adults of 30 mcg/day as established by the [place] [employer], can cause falsely depress results.     Free T4   Date/Time Value Ref Range Status   09/13/2019 09:19  AM 0.80 0.76 - 1.46 ng/dL Final     Comment:       Specimen collection should occur prior to Sulfasalazine administration due to the potential for falsely elevated results.     Sodium   Date/Time Value Ref Range Status   04/02/2024 07:33  135 - 146 mmol/L Final     BUN   Date/Time Value Ref Range Status   04/02/2024 07:33 AM 17 7 - 25 mg/dL Final     Creatinine   Date/Time Value Ref Range Status   04/02/2024 07:33 AM 1.04 0.70 - 1.30 mg/dL Final   05/24/2023 06:04 AM 1.12 0.60 - 1.30 mg/dL Final     Comment:     Standardized to IDMS reference method      In chart    This note was created with voice recognition software.  Phonic, grammatical and spelling errors may be present within the note as a result.

## 2024-04-05 ENCOUNTER — APPOINTMENT (OUTPATIENT)
Age: 54
End: 2024-04-05
Payer: COMMERCIAL

## 2024-04-05 DIAGNOSIS — M79.645 PAIN OF LEFT THUMB: ICD-10-CM

## 2024-04-05 PROCEDURE — 73130 X-RAY EXAM OF HAND: CPT

## 2024-04-24 ENCOUNTER — OFFICE VISIT (OUTPATIENT)
Dept: PSYCHIATRY | Facility: CLINIC | Age: 54
End: 2024-04-24
Payer: COMMERCIAL

## 2024-04-24 DIAGNOSIS — F43.10 PTSD (POST-TRAUMATIC STRESS DISORDER): ICD-10-CM

## 2024-04-24 PROCEDURE — 99213 OFFICE O/P EST LOW 20 MIN: CPT | Performed by: PSYCHIATRY & NEUROLOGY

## 2024-04-24 RX ORDER — VENLAFAXINE HYDROCHLORIDE 37.5 MG/1
37.5 CAPSULE, EXTENDED RELEASE ORAL DAILY
Start: 2024-04-24

## 2024-04-24 NOTE — PSYCH
"  Ruben Ratliff 1970 [id number]     The patient was seen for continuing care and pharmacotherapy.No significant change in his status,Relationship with wife and son is good.  Drinks a lot of coffee, 1.5 pots daily. \" I always have to have a drink in my hand\". No alcohol.Uses THC daily.Sleep and appetite are normal. For the past few days he has been taking venlafaxine 37.5 mg a day.  He reports that the only reason he had gone on venlafaxine was because he had just lost their infant child many years ago and now that he is feeling better, he does not think he needs to be on any.  He is fully aware of the possibility of recurrence of depressive symptoms and also his need to cut back on his caffeine intake.    ROS:  Seasonal allergies- \"caffeine crash\"  Current Mental Status Evaluation:  Appearance:  Adequate hygiene and grooming and Good eye contact   Behavior:  Calm and Pleasant   Mood:  Euthymic   Affect: appropriate and broad   Speech: Normal volume and Normal rate   Thought Process:  Goal directed and coherent   Thought Content:  Does not verbalize delusional material   Perceptual Disturbances: Denies hallucinations and does not appear to be responding to internal stimuli   Risk Potential: No suicidal or homicidal ideation   Orientation:         Diagnosis ICD-10-CM Associated Orders   1. PTSD (post-traumatic stress disorder)  F43.10 venlafaxine (EFFEXOR-XR) 37.5 mg 24 hr capsule             Current Outpatient Medications   Medication Instructions    albuterol (PROVENTIL HFA,VENTOLIN HFA) 90 mcg/act inhaler 2 puffs, Inhalation, Every 6 hours PRN    atorvastatin (LIPITOR) 20 mg, Oral, Daily    Azelastine HCl 137 MCG/SPRAY SOLN 1 SPRAY INTO EACH NOSTRIL 2 (TWO) TIMES A DAY USE IN EACH NOSTRIL AS DIRECTED    clobetasol (TEMOVATE) 0.05 % ointment Topical, 2 times daily    fish oil 1,000 mg, Oral, Daily    multivitamin (THERAGRAN) TABS 1 tablet, Oral, Daily    venlafaxine (EFFEXOR-XR) 37.5 mg, Oral, Daily    "       Treatment Recommendations- Risks Benefits    The patient will be taking 37.5 mg of venlafaxine daily for 2 more weeks and then every other day for another 2 weeks and discontinue.  F/U in 6 months.  He will call if there is any concern about recurrence of depression  Risks, Benefits And Possible Side Effects Of Medications:  Risks, benefits, and possible side effects of medications explained to patient and patient verbalizes understanding    VIRTUAL VISIT DISCLAIMER    Ruben Ratliff verbally agrees to participate in Virtual Care Services. Pt is aware that Virtual Care Services could be limited without vital signs or the ability to perform a full hands-on physical exam. Ruben Ratliff understands he or the provider may request at any time to terminate the video visit and request the patient to seek care or treatment in person.     Moy Denny MD 04/24/24

## 2024-05-06 ENCOUNTER — TELEPHONE (OUTPATIENT)
Dept: BEHAVIORAL/MENTAL HEALTH CLINIC | Facility: CLINIC | Age: 54
End: 2024-05-06

## 2024-05-07 ENCOUNTER — TELEMEDICINE (OUTPATIENT)
Dept: BEHAVIORAL/MENTAL HEALTH CLINIC | Facility: CLINIC | Age: 54
End: 2024-05-07
Payer: COMMERCIAL

## 2024-05-07 DIAGNOSIS — F43.10 PTSD (POST-TRAUMATIC STRESS DISORDER): Primary | ICD-10-CM

## 2024-05-07 DIAGNOSIS — F32.A DEPRESSION, UNSPECIFIED DEPRESSION TYPE: ICD-10-CM

## 2024-05-07 PROCEDURE — 90834 PSYTX W PT 45 MINUTES: CPT | Performed by: SOCIAL WORKER

## 2024-05-08 NOTE — PSYCH
"    Visit start and stop times:    05/07/24  Start Time: 1000  Stop Time: 1046  Total Visit Time: 46 minutes  Virtual Regular Visit  Therapist met w/pt for individual session.  Pt shared that he met w/psychiatrist a few weeks ago and continues the taper of medication.  He stated he has had some withdrawal symptoms but they have been improving.  He stated that due to the withdrawal symptoms he doesn't want to rush the taper and wants to slow it down a little bit.  Therapist assessed for increased anxiety and/or depression.  Pt stated that he just feels \"numb.\"  Therapist and pt discussed goals he is hoping to achieve over the next week or two.  He stated that he has been focused a lot on his gardening and has found that therapeutic.   Verification of patient location:    Patient is located at Home in the following state in which I hold an active license [place]      Assessment/Plan: f/u in one month    Problem List Items Addressed This Visit          Behavioral Health    Depression    PTSD (post-traumatic stress disorder) - Primary            Reason for visit is No chief complaint on file.       Encounter provider Jaycee Schaffer      Recent Visits  Date Type Provider Dept   05/07/24 Telemedicine Jaycee Schaffer [facility] [address]   05/06/24 Telephone Jaycee Schaffer [facility] Therapist [place]   Showing recent visits within past 7 days and meeting all other requirements  Future Appointments  No visits were found meeting these conditions.  Showing future appointments within next 150 days and meeting all other requirements       The patient was identified by name and date of birth. Ruben Ratliff was informed that this is a telemedicine visit and that the visit is being conducted throughthe Epic Embedded platform. He agrees to proceed..  My office door was closed. No one else was in the room.  He acknowledged consent and understanding of privacy and security of the video platform. The " patient has agreed to participate and understands they can discontinue the visit at any time.    Patient is aware this is a billable service.     Subjective  Ruben Ratliff is a 53 y.o. male  .      HPI     Past Medical History:   Diagnosis Date    Alcohol abuse     Bilateral inguinal hernia 02/22/2021    Bronchitis     Cough     Pt states he has a morning cough from some sinus drainage- pt reports having a cold week of 2/8/21    COVID-19 12/2020    Pt states he only lost taste and smell- no other symptoms reported.    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    Tobacco abuse        Past Surgical History:   Procedure Laterality Date    FACIAL/NECK BIOPSY Left 8/4/2020    Procedure: EXCISION CYST NECK;  Surgeon: Guy Capone MD;  [facility];  Service: Plastics    FLAP LOCAL HEAD / NECK Left 8/4/2020    Procedure: COMPLEX CLOSURE VS ADJACENT TISSUE REARRANGEMENT NECK;  Surgeon: Guy Capone MD;  [facility];  Service: Plastics    HERNIA REPAIR      HERNIA REPAIR Bilateral 2/22/2021    Procedure: REPAIR HERNIA INGUINAL, LAPAROSCOPIC BILATERAL WITH MESH;  Surgeon: Sebas Ceja MD;  [facility];  Service: General    PYLOROMYOTOMY      ROTATOR CUFF REPAIR Left     times 2 per pt    TONSILLECTOMY AND ADENOIDECTOMY      TOOTH EXTRACTION         Current Outpatient Medications   Medication Sig Dispense Refill    albuterol (PROVENTIL HFA,VENTOLIN HFA) 90 mcg/act inhaler Inhale 2 puffs every 6 (six) hours as needed for wheezing (Patient taking differently: Inhale 2 puffs every 6 (six) hours as needed for wheezing PRN) 18 g 0    atorvastatin  (LIPITOR) 20 mg tablet Take 1 tablet (20 mg total) by mouth daily 90 tablet 1    Azelastine HCl 137 MCG/SPRAY SOLN 1 SPRAY INTO EACH NOSTRIL 2 (TWO) TIMES A DAY USE IN EACH NOSTRIL AS DIRECTED 30 mL 3    clobetasol (TEMOVATE) 0.05 % ointment Apply topically 2 (two) times a day (Patient taking differently: Apply topically if needed) 30 g 3    multivitamin (THERAGRAN) TABS Take 1 tablet by mouth daily      Omega-3 Fatty Acids (fish oil) 1,000 mg Take 1 capsule (1,000 mg total) by mouth daily 30 capsule 0    venlafaxine (EFFEXOR-XR) 37.5 mg 24 hr capsule Take 1 capsule (37.5 mg total) by mouth daily       No current facility-administered medications for this visit.        Allergies   Allergen Reactions    Celecoxib Anaphylaxis    Diclofenac Shortness Of Breath    Flavoring Agent - Food Allergy Anaphylaxis    Corn Oil - Food Allergy - Food Allergy Other (See Comments)     Clinically insignificant    Starch GI Intolerance and Other (See Comments)       Review of Systems    Video Exam    There were no vitals filed for this visit.    Physical Exam Pt denied any SI/HI/Ah/Vh

## 2024-05-20 ENCOUNTER — TELEPHONE (OUTPATIENT)
Dept: PSYCHIATRY | Facility: CLINIC | Age: 54
End: 2024-05-20

## 2024-05-20 NOTE — TELEPHONE ENCOUNTER
Ruben left message that per discussion with Dr. Denny he can decrease Venlafaxine 37.5 mg   He has been taking 1 pill every other day and wants to know what the next dose should be.     Copy the below info from Dr. Sims's note dated 4/24/24:  Treatment Recommendations:  The patient will be taking 37.5 mg of venlafaxine daily for 2 more weeks and then every other day for another 2 weeks and discontinue.     Called and left Ruben detailed info above.  Provided direct nursing number if he needs to speak to nurse.   
Detail Level: Simple
Detail Level: Generalized
Detail Level: Zone

## 2024-06-04 ENCOUNTER — OFFICE VISIT (OUTPATIENT)
Dept: INTERNAL MEDICINE CLINIC | Facility: CLINIC | Age: 54
End: 2024-06-04
Payer: COMMERCIAL

## 2024-06-04 ENCOUNTER — TELEPHONE (OUTPATIENT)
Age: 54
End: 2024-06-04

## 2024-06-04 ENCOUNTER — APPOINTMENT (OUTPATIENT)
Age: 54
End: 2024-06-04
Payer: COMMERCIAL

## 2024-06-04 ENCOUNTER — TELEMEDICINE (OUTPATIENT)
Dept: BEHAVIORAL/MENTAL HEALTH CLINIC | Facility: CLINIC | Age: 54
End: 2024-06-04
Payer: COMMERCIAL

## 2024-06-04 VITALS
HEART RATE: 91 BPM | BODY MASS INDEX: 29.06 KG/M2 | OXYGEN SATURATION: 97 % | HEIGHT: 66 IN | WEIGHT: 180.8 LBS | DIASTOLIC BLOOD PRESSURE: 72 MMHG | SYSTOLIC BLOOD PRESSURE: 138 MMHG

## 2024-06-04 DIAGNOSIS — F43.10 PTSD (POST-TRAUMATIC STRESS DISORDER): Primary | ICD-10-CM

## 2024-06-04 DIAGNOSIS — M79.609 MUSCULOSKELETAL PAIN OF EXTREMITY: ICD-10-CM

## 2024-06-04 DIAGNOSIS — M79.642 LEFT HAND PAIN: ICD-10-CM

## 2024-06-04 DIAGNOSIS — R20.0 ARM NUMBNESS: ICD-10-CM

## 2024-06-04 DIAGNOSIS — F32.A DEPRESSION, UNSPECIFIED DEPRESSION TYPE: ICD-10-CM

## 2024-06-04 DIAGNOSIS — M79.642 PAIN IN LEFT HAND: Primary | ICD-10-CM

## 2024-06-04 PROCEDURE — 72050 X-RAY EXAM NECK SPINE 4/5VWS: CPT

## 2024-06-04 PROCEDURE — 90834 PSYTX W PT 45 MINUTES: CPT | Performed by: SOCIAL WORKER

## 2024-06-04 PROCEDURE — 99213 OFFICE O/P EST LOW 20 MIN: CPT | Performed by: PHYSICIAN ASSISTANT

## 2024-06-04 RX ORDER — METHOCARBAMOL 500 MG/1
500 TABLET, FILM COATED ORAL
Qty: 10 TABLET | Refills: 0 | Status: SHIPPED | OUTPATIENT
Start: 2024-06-04

## 2024-06-04 NOTE — ASSESSMENT & PLAN NOTE
Patient reports localized pain at the right trapezius, and in the right axilla  Will begin robaxin HS

## 2024-06-04 NOTE — ASSESSMENT & PLAN NOTE
Patient reports pain  of left thumb that has been ongoing for several months, now he also reports pain of the 3rd and 4th digits - denies any injury  Xray 04/05/24 with no abnormalities  Ambulatory referral to hand surgery

## 2024-06-04 NOTE — TELEPHONE ENCOUNTER
Patient is being referred to a orthopedics. Please schedule accordingly.    [facility]   [phone]

## 2024-06-04 NOTE — PROGRESS NOTES
"Ambulatory Visit  Name: Ruben Ratliff      : 1970      MRN: [id number]  Encounter Provider: Marsha Finney PA-C  Encounter Date: 2024   Encounter department: [facility] [place]    Assessment & Plan   1. Pain in left hand  Assessment & Plan:  Patient reports pain at ** of left thumb that has been ongoing for   Xray 24 with no abnormalities  Ambulatory referral to hand surgery  2. Left hand pain  -     Ambulatory Referral to Orthopedic Surgery; Future  3. Arm numbness  -     XR spine cervical complete 4 or 5 vw non injury; Future; Expected date: 2024  -     methocarbamol (ROBAXIN) 500 mg tablet; Take 1 tablet (500 mg total) by mouth daily at bedtime       History of Present Illness     Patient is a 53-year-old male that presents in the office today for evaluation of left thumb pain left third and fourth digit pain/cramping, left arm numbness, discomfort in the left axilla and trapezius.  Denies any injury.  Feels the need to constantly stretch arm and fingers.        Review of Systems   Musculoskeletal:  Positive for arthralgias and myalgias.     Medical History Reviewed by provider this encounter:       Objective     /72 (BP Location: Left arm, Patient Position: Sitting, Cuff Size: Standard)   Pulse 91   Ht 5' 6\" (1.676 m)   Wt 82 kg (180 lb 12.8 oz)   SpO2 97%   BMI 29.18 kg/m²     Physical Exam  Musculoskeletal:        Arms:        Administrative Statements   I have spent a total time of 20 minutes on 24 In caring for this patient including Diagnostic results, Risk factor reductions, Documenting in the medical record, Reviewing / ordering tests, medicine, procedures  , and Obtaining or reviewing history  .  "

## 2024-06-05 ENCOUNTER — OFFICE VISIT (OUTPATIENT)
Dept: OBGYN CLINIC | Facility: CLINIC | Age: 54
End: 2024-06-05
Payer: COMMERCIAL

## 2024-06-05 VITALS
HEIGHT: 66 IN | DIASTOLIC BLOOD PRESSURE: 82 MMHG | SYSTOLIC BLOOD PRESSURE: 147 MMHG | WEIGHT: 180 LBS | BODY MASS INDEX: 28.93 KG/M2

## 2024-06-05 DIAGNOSIS — M79.642 LEFT HAND PAIN: ICD-10-CM

## 2024-06-05 DIAGNOSIS — M79.609 MUSCULOSKELETAL PAIN OF EXTREMITY: Primary | ICD-10-CM

## 2024-06-05 DIAGNOSIS — M50.30 DEGENERATIVE DISC DISEASE, CERVICAL: Primary | ICD-10-CM

## 2024-06-05 DIAGNOSIS — G56.02 CARPAL TUNNEL SYNDROME ON LEFT: ICD-10-CM

## 2024-06-05 PROCEDURE — 99203 OFFICE O/P NEW LOW 30 MIN: CPT | Performed by: SURGERY

## 2024-06-05 NOTE — PROGRESS NOTES
Assessment    Left thumb IP joint pain  Left carpal tunnel syndrome   Left whole hand numbness      Plan    Initiate treatment with left thumb spica bracing at nighttime to see if this improves/relieves some of his symptoms.  Activities as tolerated.  OTC NSAIDs PRN.  Follow-up in 6 weeks for re-evaluation.  Consider repeat EMG study in the future if symptoms persist.        Subjective     HPI    Patient ID:  Ruben Ratliff is a right hand dominant 53 y.o. male here for evaluation of the left upper extremity.  According to the patient, he has a several month history of left thumb, mainly IP joint, pain and aching that came on without injury or trauma to the area.  He states his mother had her thumb IP joint fused in the past and this prompted him to pay more attention to his symptoms.  He states it typically does not radiate and is described as aching.  He has not had any formal treatment for it thus far.  He did bring this to his PCP attention who ordered x-rays which were reported as normal.  He was then referred here for further evaluation.  Additionally, he notes more recently twinges of pain in the left neck and left underarm area that radiate distally with associated whole hand numbness.  This is not constant and only happens on occasion, sometimes when he is driving.  He has not had any treatment for this either.  He does have an EMG in 2021 indicating left carpal tunnel syndrome.  He states he was unaware of this finding.      The following portions of the patient's history were reviewed and updated as appropriate: allergies, current medications, past family history, past medical history, past social history, past surgical history, and problem list.    Review of Systems     Objective    Imaging:  Left hand x-rays 4/5/2024    FINDINGS:     There is no acute fracture or dislocation.     No significant degenerative changes.     No lytic or blastic osseous lesion.     Soft tissues are unremarkable.      IMPRESSION:        No acute osseous abnormality.    Physical Exam   Vitals:    06/05/24 0857   BP: 147/82     General appearance:  NAD   Cardiac:  Regular rate  Lungs:  Unlabored breathing  Abdomen:  Non-distended    Orthopedic Examination:  Left hand / thumb    Inspection:  No open wounds or erythema.  No ecchymosis or swelling.  No muscle wasting.    Palpation:  + TTP thumb IP.  NTTP CMC joint, and A1 pulley.    Range-of-motion:  Normal thumb / wrist / finger ROM, full composite fist    Strength:  5/5 wrist flexion/extension, , intrinsics, thumb APB, opposition    Sensation:  SILT m/r/u nerve distribution    Special Tests:  + palpable subluxation of the ulnar nerve with left elbow ROM.  + Tinel's at the wrist, negative at the elbow.  Palpable radial pulse

## 2024-06-05 NOTE — PSYCH
"    Visit start and stop times:    06/03/24  Start Time: 0800  Stop Time: 0845  Total Visit Time: 45 minutes  Virtual Regular Visit  Therapist met w/pt for individual session.  Pt shared that he is now completely off his medication and feels \"better.\" Pt stated that at first he felt some side effects from not being on the medication but now feels more energetic and more like \"himself.\"  Pt stated that he hasn't had to use his medical marijuana as much and also hasn't had to drink caffeine as much either.  Therapist and pt discussed that he feels \"stable\" and is looking forward to the summer.  Pt denied any anxiety triggers.  He did share one situation that would have caused him to react poorly in anger but shared that he managed it well and is proud of himself.    Verification of patient location:    Patient is located at Home in the following state in which I hold an active license [place]      Assessment/Plan: f/u in one month    Problem List Items Addressed This Visit          Behavioral Health    Depression    PTSD (post-traumatic stress disorder) - Primary              Reason for visit is No chief complaint on file.       Encounter provider Jaycee Schaffer      Recent Visits  Date Type Provider Dept   06/04/24 Telemedicine Jaycee Schaffer [facility] [address]   Showing recent visits within past 7 days and meeting all other requirements  Future Appointments  No visits were found meeting these conditions.  Showing future appointments within next 150 days and meeting all other requirements       The patient was identified by name and date of birth. Ruben Ratliff was informed that this is a telemedicine visit and that the visit is being conducted throughthe Epic Embedded platform. He agrees to proceed..  My office door was closed. No one else was in the room.  He acknowledged consent and understanding of privacy and security of the video platform. The patient has agreed to participate and " understands they can discontinue the visit at any time.    Patient is aware this is a billable service.     Subjective  Ruben Ratliff is a 53 y.o. male  .      HPI     Past Medical History:   Diagnosis Date    Alcohol abuse     Bilateral inguinal hernia 02/22/2021    Bronchitis     Cough     Pt states he has a morning cough from some sinus drainage- pt reports having a cold week of 2/8/21    COVID-19 12/2020    Pt states he only lost taste and smell- no other symptoms reported.    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    Tobacco abuse        Past Surgical History:   Procedure Laterality Date    FACIAL/NECK BIOPSY Left 8/4/2020    Procedure: EXCISION CYST NECK;  Surgeon: Guy Capone MD;  Location: [facility];  Service: Plastics    FLAP LOCAL HEAD / NECK Left 8/4/2020    Procedure: COMPLEX CLOSURE VS ADJACENT TISSUE REARRANGEMENT NECK;  Surgeon: Guy Capone MD;  Location: [facility];  Service: Plastics    HERNIA REPAIR      HERNIA REPAIR Bilateral 2/22/2021    Procedure: REPAIR HERNIA INGUINAL, LAPAROSCOPIC BILATERAL WITH MESH;  Surgeon: Sebas Ceja MD;  Location: [facility];  Service: General    PYLOROMYOTOMY      ROTATOR CUFF REPAIR Left     times 2 per pt    TONSILLECTOMY AND ADENOIDECTOMY      TOOTH EXTRACTION         Current Outpatient Medications   Medication Sig Dispense Refill    albuterol (PROVENTIL HFA,VENTOLIN HFA) 90 mcg/act inhaler Inhale 2 puffs every 6 (six) hours as needed for wheezing (Patient taking differently: Inhale 2 puffs every 6 (six) hours as needed for wheezing PRN) 18 g 0    atorvastatin (LIPITOR) 20 mg tablet Take 1 tablet (20 mg  total) by mouth daily 90 tablet 1    Azelastine HCl 137 MCG/SPRAY SOLN 1 SPRAY INTO EACH NOSTRIL 2 (TWO) TIMES A DAY USE IN EACH NOSTRIL AS DIRECTED 30 mL 3    clobetasol (TEMOVATE) 0.05 % ointment Apply topically 2 (two) times a day (Patient taking differently: Apply topically if needed) 30 g 3    methocarbamol (ROBAXIN) 500 mg tablet Take 1 tablet (500 mg total) by mouth daily at bedtime 10 tablet 0    multivitamin (THERAGRAN) TABS Take 1 tablet by mouth daily      Omega-3 Fatty Acids (fish oil) 1,000 mg Take 1 capsule (1,000 mg total) by mouth daily (Patient not taking: Reported on 6/4/2024) 30 capsule 0    venlafaxine (EFFEXOR-XR) 37.5 mg 24 hr capsule Take 1 capsule (37.5 mg total) by mouth daily (Patient not taking: Reported on 6/4/2024)       No current facility-administered medications for this visit.        Allergies   Allergen Reactions    Celecoxib Anaphylaxis    Diclofenac Shortness Of Breath    Flavoring Agent - Food Allergy Anaphylaxis    Corn Oil - Food Allergy - Food Allergy Other (See Comments)     Clinically insignificant    Starch GI Intolerance and Other (See Comments)       Review of Systems    Video Exam    There were no vitals filed for this visit.    Physical Exam Pt denied any SI/HI/Ah/VH

## 2024-06-06 DIAGNOSIS — E78.2 MIXED HYPERLIPIDEMIA: ICD-10-CM

## 2024-06-06 DIAGNOSIS — M50.30 DDD (DEGENERATIVE DISC DISEASE), CERVICAL: Primary | ICD-10-CM

## 2024-06-06 RX ORDER — ATORVASTATIN CALCIUM 20 MG/1
20 TABLET, FILM COATED ORAL DAILY
Qty: 90 TABLET | Refills: 1 | Status: SHIPPED | OUTPATIENT
Start: 2024-06-06

## 2024-07-02 ENCOUNTER — TELEMEDICINE (OUTPATIENT)
Dept: BEHAVIORAL/MENTAL HEALTH CLINIC | Facility: CLINIC | Age: 54
End: 2024-07-02
Payer: COMMERCIAL

## 2024-07-02 DIAGNOSIS — F32.A DEPRESSION, UNSPECIFIED DEPRESSION TYPE: ICD-10-CM

## 2024-07-02 DIAGNOSIS — F43.10 PTSD (POST-TRAUMATIC STRESS DISORDER): Primary | ICD-10-CM

## 2024-07-02 PROCEDURE — 90834 PSYTX W PT 45 MINUTES: CPT | Performed by: SOCIAL WORKER

## 2024-07-08 NOTE — PSYCH
Behavioral Health Psychotherapy Progress Note    Psychotherapy Provided: Individual Psychotherapy     1. PTSD (post-traumatic stress disorder)        2. Depression, unspecified depression type                Visit start and stop times:    07/02/24  Start Time: 1000  Stop Time: 1040  Total Visit Time: 40 minutes  Virtual Regular Visit  Therapist met w/pt for individual session.  Pt shared that he continues to stay off of his medication and overall feels that it still has been a positive change.  He shared that there have been times where he has felt anxious/down but has been able to implement positive coping skills.  Pt shared that he has been staying busy by engaging in his hobbies.  He stated that he continues to be focused on his overall health so has been exercising, watching what he eats etc.      Verification of patient location:    Patient is located at Home in the following state in which I hold an active license [place]      Assessment/Plan: f/u in one month    Problem List Items Addressed This Visit       Depression    PTSD (post-traumatic stress disorder) - Primary            Reason for visit is No chief complaint on file.       Encounter provider Jaycee Schaffer      Recent Visits  Date Type Provider Dept   07/02/24 Telemedicine Jaycee Schaffer [facility] [address]   Showing recent visits within past 7 days and meeting all other requirements  Future Appointments  No visits were found meeting these conditions.  Showing future appointments within next 150 days and meeting all other requirements       The patient was identified by name and date of birth. Ruben Ratliff was informed that this is a telemedicine visit and that the visit is being conducted throughthe Epic Embedded platform. He agrees to proceed..  My office door was closed. No one else was in the room.  He acknowledged consent and understanding of privacy and security of the video platform. The patient has agreed to participate  and understands they can discontinue the visit at any time.    Patient is aware this is a billable service.     Subjective  Ruben Ratliff is a 53 y.o. male  .      HPI     Past Medical History:   Diagnosis Date    Alcohol abuse     Bilateral inguinal hernia 02/22/2021    Bronchitis     Cough     Pt states he has a morning cough from some sinus drainage- pt reports having a cold week of 2/8/21    COVID-19 12/2020    Pt states he only lost taste and smell- no other symptoms reported.    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    Tobacco abuse        Past Surgical History:   Procedure Laterality Date    FACIAL/NECK BIOPSY Left 8/4/2020    Procedure: EXCISION CYST NECK;  Surgeon: Guy Capone MD;  Location: [facility];  Service: Plastics    FLAP LOCAL HEAD / NECK Left 8/4/2020    Procedure: COMPLEX CLOSURE VS ADJACENT TISSUE REARRANGEMENT NECK;  Surgeon: Guy Capone MD;  Location: [facility];  Service: Plastics    HERNIA REPAIR      HERNIA REPAIR Bilateral 2/22/2021    Procedure: REPAIR HERNIA INGUINAL, LAPAROSCOPIC BILATERAL WITH MESH;  Surgeon: Sebas Ceja MD;  Location: [facility];  Service: General    PYLOROMYOTOMY      ROTATOR CUFF REPAIR Left     times 2 per pt    TONSILLECTOMY AND ADENOIDECTOMY      TOOTH EXTRACTION         Current Outpatient Medications   Medication Sig Dispense Refill    albuterol (PROVENTIL HFA,VENTOLIN HFA) 90 mcg/act inhaler Inhale 2 puffs every 6 (six) hours as needed for wheezing (Patient taking differently: Inhale 2 puffs every 6 (six) hours as needed for wheezing PRN) 18 g 0    atorvastatin (LIPITOR) 20 mg tablet TAKE 1 TABLET BY  MOUTH EVERY DAY 90 tablet 1    Azelastine HCl 137 MCG/SPRAY SOLN 1 SPRAY INTO EACH NOSTRIL 2 (TWO) TIMES A DAY USE IN EACH NOSTRIL AS DIRECTED 30 mL 3    clobetasol (TEMOVATE) 0.05 % ointment Apply topically 2 (two) times a day (Patient taking differently: Apply topically if needed) 30 g 3    methocarbamol (ROBAXIN) 500 mg tablet Take 1 tablet (500 mg total) by mouth daily at bedtime 10 tablet 0    multivitamin (THERAGRAN) TABS Take 1 tablet by mouth daily      Omega-3 Fatty Acids (fish oil) 1,000 mg Take 1 capsule (1,000 mg total) by mouth daily (Patient not taking: Reported on 6/4/2024) 30 capsule 0    venlafaxine (EFFEXOR-XR) 37.5 mg 24 hr capsule Take 1 capsule (37.5 mg total) by mouth daily (Patient not taking: Reported on 6/4/2024)       No current facility-administered medications for this visit.        Allergies   Allergen Reactions    Celecoxib Anaphylaxis    Diclofenac Shortness Of Breath    Flavoring Agent - Food Allergy Anaphylaxis    Corn Oil - Food Allergy - Food Allergy Other (See Comments)     Clinically insignificant    Starch GI Intolerance and Other (See Comments)       Review of Systems    Video Exam    There were no vitals filed for this visit.    Physical Exam Pt denied any SI/Hi/AH/VH

## 2024-07-08 NOTE — BH TREATMENT PLAN
"Outpatient Behavioral Health Psychotherapy Treatment Plan    Marty Ratliff  1970     Date of Initial Psychotherapy Assessment: 7/2/24  Date of Current Treatment Plan: 07/02/24  Treatment Plan Target Date: 1/2/25  Treatment Plan Expiration Date: TBD    Diagnosis:   1. PTSD (post-traumatic stress disorder)        2. Depression, unspecified depression type            Area(s) of Need: coping skills    Long Term Goal 1 (in the client's own words): \"I want to be able to manage my emotions even when not on medication.\"    Stage of Change: Action    Target Date for completion: TBD     Anticipated therapeutic modalities: CBT   People identified to complete this goal: pt      Objective 1: (identify the means of measuring success in meeting the objective): Pt will identify anxiety triggers that occur each week.       Objective 2: (identify the means of measuring success in meeting the objective): pt will implement 2 or more calming strategies.       I am currently under the care of a [facility] psychiatric provider: no    My [facility] psychiatric provider is: n/a    I am currently taking psychiatric medications: No    I feel that I will be ready for discharge from mental health care when I reach the following (measurable goal/objective): \"when I feel better.\"    For children and adults who have a legal guardian:   Has there been any change to custody orders and/or guardianship status? NA. If yes, attach updated documentation.    I have updated my Crisis Plan and have been offered a copy of this plan    Behavioral Health Treatment Plan  Luke: Diagnosis and Treatment Plan explained to Marty Ratliff acknowledges an understanding of their diagnosis. Marty Ratliff agrees to this treatment plan.    I have been offered a copy of this Treatment Plan. Yes    Marty Ratliff, 1970, actively participated in the creation of this treatment plan during a virtual session, using the Epic Embedded platform. "   Marty Ratliff  provided verbal consent on 7/2/2024 at 10:30 AM. The treatment plan was transcribed into the Electronic Health Record at a later time.

## 2024-07-09 ENCOUNTER — OFFICE VISIT (OUTPATIENT)
Dept: OBGYN CLINIC | Facility: CLINIC | Age: 54
End: 2024-07-09
Payer: COMMERCIAL

## 2024-07-09 VITALS
WEIGHT: 180 LBS | BODY MASS INDEX: 28.93 KG/M2 | SYSTOLIC BLOOD PRESSURE: 128 MMHG | DIASTOLIC BLOOD PRESSURE: 80 MMHG | HEIGHT: 66 IN

## 2024-07-09 DIAGNOSIS — M79.642 LEFT HAND PAIN: ICD-10-CM

## 2024-07-09 DIAGNOSIS — G56.02 CARPAL TUNNEL SYNDROME ON LEFT: Primary | ICD-10-CM

## 2024-07-09 PROCEDURE — 99213 OFFICE O/P EST LOW 20 MIN: CPT | Performed by: SURGERY

## 2024-07-09 NOTE — PROGRESS NOTES
"  Assessment    Left thumb IP joint pain  Left carpal tunnel syndrome   Left whole hand numbness      Plan    Activities as tolerated.  OTC NSAIDs PRN.  Follow up PRN  May return to brace use if symptoms return      Subjective     HPI    Patient ID:  Ruben Ratliff is a right hand dominant 53 y.o. male here for follow up of the left upper extremity.  Patient states he is feeling better overall since last visit. She states that he has been sleeping better so this could be a reason he is doing better over all. He states he still gets some IP thumb joint \"zingers\" but the constant ache is gone  He used the splint for the first few days but no much since the last visit  Still has some neck pain and numbness but that is improving since starting the muscle relaxers    Last visit: 6/5/2024  According to the patient, he has a several month history of left thumb, mainly IP joint, pain and aching that came on without injury or trauma to the area.  He states his mother had her thumb IP joint fused in the past and this prompted him to pay more attention to his symptoms.  He states it typically does not radiate and is described as aching.  He has not had any formal treatment for it thus far.  He did bring this to his PCP attention who ordered x-rays which were reported as normal.  He was then referred here for further evaluation.  Additionally, he notes more recently twinges of pain in the left neck and left underarm area that radiate distally with associated whole hand numbness.  This is not constant and only happens on occasion, sometimes when he is driving.  He has not had any treatment for this either.  He does have an EMG in 2021 indicating left carpal tunnel syndrome.  He states he was unaware of this finding.      The following portions of the patient's history were reviewed and updated as appropriate: allergies, current medications, past family history, past medical history, past social history, past surgical history, " and problem list.    Review of Systems     Objective    Imaging:  Left hand x-rays 4/5/2024    FINDINGS:     There is no acute fracture or dislocation.     No significant degenerative changes.     No lytic or blastic osseous lesion.     Soft tissues are unremarkable.     IMPRESSION:        No acute osseous abnormality.    Physical Exam   Vitals:    07/09/24 0840   BP: 128/80     General appearance:  NAD   Cardiac:  Regular rate  Lungs:  Unlabored breathing  Abdomen:  Non-distended    Orthopedic Examination: 6/5/2024  Left hand / thumb    Inspection:  No open wounds or erythema.  No ecchymosis or swelling.  No muscle wasting.    Palpation:  + TTP thumb IP.  NTTP CMC joint, and A1 pulley.    Range-of-motion:  Normal thumb / wrist / finger ROM, full composite fist    Strength:  5/5 wrist flexion/extension, , intrinsics, thumb APB, opposition    Sensation:  SILT m/r/u nerve distribution    Special Tests:  + palpable subluxation of the ulnar nerve with left elbow ROM.  + Tinel's at the wrist, negative at the elbow.  Palpable radial pulse

## 2024-08-09 ENCOUNTER — TELEMEDICINE (OUTPATIENT)
Dept: BEHAVIORAL/MENTAL HEALTH CLINIC | Facility: CLINIC | Age: 54
End: 2024-08-09
Payer: COMMERCIAL

## 2024-08-09 DIAGNOSIS — F43.10 PTSD (POST-TRAUMATIC STRESS DISORDER): Primary | ICD-10-CM

## 2024-08-09 DIAGNOSIS — F32.A DEPRESSION, UNSPECIFIED DEPRESSION TYPE: ICD-10-CM

## 2024-08-09 PROCEDURE — 90834 PSYTX W PT 45 MINUTES: CPT | Performed by: SOCIAL WORKER

## 2024-08-09 NOTE — PSYCH
Behavioral Health Psychotherapy Progress Note    Psychotherapy Provided: Individual Psychotherapy     1. PTSD (post-traumatic stress disorder)        2. Depression, unspecified depression type            Visit start and stop times:    08/09/24  Start Time: 0845  Stop Time: 0930  Total Visit Time: 45 minutes  Virtual Regular Visit  Therapist and pt discussed that things have been “up and down” this past month.  He identified a big trigger when he was away on vacation.  Therapist and pt processed what happened and different strategies he can implement when he is triggered again including different grounding skills.  Pt stated that he is still happy that he is no longer taking his medication and wants to learn how to behaviorally cope.      Verification of patient location:    Patient is located at Home in the following state in which I hold an active license [place]      Assessment/Plan: f/u in one month    Problem List Items Addressed This Visit       Depression    PTSD (post-traumatic stress disorder) - Primary                Reason for visit is No chief complaint on file.       Encounter provider Jaycee Schaffer      Recent Visits  No visits were found meeting these conditions.  Showing recent visits within past 7 days and meeting all other requirements  Today's Visits  Date Type Provider Dept   08/09/24 Telemedicine Jaycee Schaffer [facility] [address]   Showing today's visits and meeting all other requirements  Future Appointments  No visits were found meeting these conditions.  Showing future appointments within next 150 days and meeting all other requirements       The patient was identified by name and date of birth. Ruben Ratliff was informed that this is a telemedicine visit and that the visit is being conducted throughthe Epic Embedded platform. He agrees to proceed..  My office door was closed. No one else was in the room.  He acknowledged consent and understanding of privacy and security  of the video platform. The patient has agreed to participate and understands they can discontinue the visit at any time.    Patient is aware this is a billable service.     Subjective  Ruben Ratliff is a 53 y.o. male  .      HPI     Past Medical History:   Diagnosis Date    Alcohol abuse     Bilateral inguinal hernia 02/22/2021    Bronchitis     Cough     Pt states he has a morning cough from some sinus drainage- pt reports having a cold week of 2/8/21    COVID-19 12/2020    Pt states he only lost taste and smell- no other symptoms reported.    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    Tobacco abuse        Past Surgical History:   Procedure Laterality Date    FACIAL/NECK BIOPSY Left 8/4/2020    Procedure: EXCISION CYST NECK;  Surgeon: Guy Capone MD;  Location: [facility];  Service: Plastics    FLAP LOCAL HEAD / NECK Left 8/4/2020    Procedure: COMPLEX CLOSURE VS ADJACENT TISSUE REARRANGEMENT NECK;  Surgeon: Guy Capone MD;  Location: [facility];  Service: Plastics    HERNIA REPAIR      HERNIA REPAIR Bilateral 2/22/2021    Procedure: REPAIR HERNIA INGUINAL, LAPAROSCOPIC BILATERAL WITH MESH;  Surgeon: Sebas Ceja MD;  Location: [facility];  Service: General    PYLOROMYOTOMY      ROTATOR CUFF REPAIR Left     times 2 per pt    TONSILLECTOMY AND ADENOIDECTOMY      TOOTH EXTRACTION         Current Outpatient Medications   Medication Sig Dispense Refill    albuterol (PROVENTIL HFA,VENTOLIN HFA) 90 mcg/act inhaler Inhale 2 puffs every 6 (six) hours as needed for wheezing (Patient taking differently: Inhale 2 puffs every 6 (six) hours as needed for wheezing PRN) 18  g 0    atorvastatin (LIPITOR) 20 mg tablet TAKE 1 TABLET BY MOUTH EVERY DAY 90 tablet 1    Azelastine HCl 137 MCG/SPRAY SOLN 1 SPRAY INTO EACH NOSTRIL 2 (TWO) TIMES A DAY USE IN EACH NOSTRIL AS DIRECTED 30 mL 3    clobetasol (TEMOVATE) 0.05 % ointment Apply topically 2 (two) times a day (Patient taking differently: Apply topically if needed) 30 g 3    methocarbamol (ROBAXIN) 500 mg tablet Take 1 tablet (500 mg total) by mouth daily at bedtime 10 tablet 0    multivitamin (THERAGRAN) TABS Take 1 tablet by mouth daily      Omega-3 Fatty Acids (fish oil) 1,000 mg Take 1 capsule (1,000 mg total) by mouth daily (Patient not taking: Reported on 6/4/2024) 30 capsule 0    venlafaxine (EFFEXOR-XR) 37.5 mg 24 hr capsule Take 1 capsule (37.5 mg total) by mouth daily (Patient not taking: Reported on 6/4/2024)       No current facility-administered medications for this visit.        Allergies   Allergen Reactions    Celecoxib Anaphylaxis    Diclofenac Shortness Of Breath    Flavoring Agent - Food Allergy Anaphylaxis    Corn Oil - Food Allergy - Food Allergy Other (See Comments)     Clinically insignificant    Starch GI Intolerance and Other (See Comments)       Review of Systems    Video Exam    There were no vitals filed for this visit.    Physical Exam Pt denied any Si/HI/Ah/VH

## 2024-08-26 ENCOUNTER — TELEPHONE (OUTPATIENT)
Dept: PSYCHIATRY | Facility: CLINIC | Age: 54
End: 2024-08-26

## 2024-08-26 NOTE — TELEPHONE ENCOUNTER
Called and left message for patient to inform 10/30/24 was cancelled due to provider being out of the office. Requested return call to reschedule. Please schedule upon return call. Thank you.

## 2024-09-03 ENCOUNTER — TELEMEDICINE (OUTPATIENT)
Dept: BEHAVIORAL/MENTAL HEALTH CLINIC | Facility: CLINIC | Age: 54
End: 2024-09-03
Payer: COMMERCIAL

## 2024-09-03 DIAGNOSIS — F32.A DEPRESSION, UNSPECIFIED DEPRESSION TYPE: Primary | ICD-10-CM

## 2024-09-03 DIAGNOSIS — F43.10 PTSD (POST-TRAUMATIC STRESS DISORDER): ICD-10-CM

## 2024-09-03 PROCEDURE — 90834 PSYTX W PT 45 MINUTES: CPT | Performed by: SOCIAL WORKER

## 2024-09-03 NOTE — PSYCH
"Behavioral Health Psychotherapy Progress Note    Psychotherapy Provided: Individual Psychotherapy     1. Depression, unspecified depression type        2. PTSD (post-traumatic stress disorder)                Visit start and stop times:    09/03/24  Start Time: 0900  Stop Time: 0940  Total Visit Time: 40 minutes  Virtual Regular Visit  Therapist met w/pt for individual session. Pt expressed struggling with shoulder pain which has created a lot of stress/anxiety for him.  Therapist explored this with pt further and how he doesn't want this to affect his upcoming trip to [place] because he often feels like he \"ruins\" vacations.  Therapist continued to talk to pt about this and worked on identifying different ways to reframe these negative thoughts that have been presenting themselves.      Verification of patient location:    Patient is located at Home in the following state in which I hold an active license [place]      Assessment/Plan: f/u in a month    Problem List Items Addressed This Visit       Depression - Primary    PTSD (post-traumatic stress disorder)              Reason for visit is No chief complaint on file.       Encounter provider Jaycee Schaffer      Recent Visits  No visits were found meeting these conditions.  Showing recent visits within past 7 days and meeting all other requirements  Today's Visits  Date Type Provider Dept   09/03/24 Telemedicine Jaycee Schaffer [facility] [address]   Showing today's visits and meeting all other requirements  Future Appointments  No visits were found meeting these conditions.  Showing future appointments within next 150 days and meeting all other requirements       The patient was identified by name and date of birth. Ruben Ratliff was informed that this is a telemedicine visit and that the visit is being conducted throughthe Epic Embedded platform. He agrees to proceed..  My office door was closed. No one else was in the room.  He acknowledged " consent and understanding of privacy and security of the video platform. The patient has agreed to participate and understands they can discontinue the visit at any time.    Patient is aware this is a billable service.     Subjective  Ruben Ratliff is a 53 y.o. male  .      HPI     Past Medical History:   Diagnosis Date    Alcohol abuse     Bilateral inguinal hernia 02/22/2021    Bronchitis     Cough     Pt states he has a morning cough from some sinus drainage- pt reports having a cold week of 2/8/21    COVID-19 12/2020    Pt states he only lost taste and smell- no other symptoms reported.    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    Tobacco abuse        Past Surgical History:   Procedure Laterality Date    FACIAL/NECK BIOPSY Left 8/4/2020    Procedure: EXCISION CYST NECK;  Surgeon: Guy Capone MD;  [facility];  Service: Plastics    FLAP LOCAL HEAD / NECK Left 8/4/2020    Procedure: COMPLEX CLOSURE VS ADJACENT TISSUE REARRANGEMENT NECK;  Surgeon: Guy Capone MD;  [facility];  Service: Plastics    HERNIA REPAIR      HERNIA REPAIR Bilateral 2/22/2021    Procedure: REPAIR HERNIA INGUINAL, LAPAROSCOPIC BILATERAL WITH MESH;  Surgeon: Sebas Cjea MD;  [facility];  Service: General    PYLOROMYOTOMY      ROTATOR CUFF REPAIR Left     times 2 per pt    TONSILLECTOMY AND ADENOIDECTOMY      TOOTH EXTRACTION         Current Outpatient Medications   Medication Sig Dispense Refill    albuterol (PROVENTIL HFA,VENTOLIN HFA) 90 mcg/act inhaler Inhale 2 puffs every 6 (six) hours as needed for wheezing (Patient taking differently: Inhale 2 puffs  every 6 (six) hours as needed for wheezing PRN) 18 g 0    atorvastatin (LIPITOR) 20 mg tablet TAKE 1 TABLET BY MOUTH EVERY DAY 90 tablet 1    Azelastine HCl 137 MCG/SPRAY SOLN 1 SPRAY INTO EACH NOSTRIL 2 (TWO) TIMES A DAY USE IN EACH NOSTRIL AS DIRECTED 30 mL 3    clobetasol (TEMOVATE) 0.05 % ointment Apply topically 2 (two) times a day (Patient taking differently: Apply topically if needed) 30 g 3    methocarbamol (ROBAXIN) 500 mg tablet Take 1 tablet (500 mg total) by mouth daily at bedtime 10 tablet 0    multivitamin (THERAGRAN) TABS Take 1 tablet by mouth daily      Omega-3 Fatty Acids (fish oil) 1,000 mg Take 1 capsule (1,000 mg total) by mouth daily (Patient not taking: Reported on 6/4/2024) 30 capsule 0    venlafaxine (EFFEXOR-XR) 37.5 mg 24 hr capsule Take 1 capsule (37.5 mg total) by mouth daily (Patient not taking: Reported on 6/4/2024)       No current facility-administered medications for this visit.        Allergies   Allergen Reactions    Celecoxib Anaphylaxis    Diclofenac Shortness Of Breath    Flavoring Agent - Food Allergy Anaphylaxis    Corn Oil - Food Allergy - Food Allergy Other (See Comments)     Clinically insignificant    Starch GI Intolerance and Other (See Comments)       Review of Systems    Video Exam    There were no vitals filed for this visit.    Physical Exam Pt denied any SI/HI/Ah/VH

## 2024-09-05 ENCOUNTER — OFFICE VISIT (OUTPATIENT)
Dept: INTERNAL MEDICINE CLINIC | Facility: CLINIC | Age: 54
End: 2024-09-05
Payer: COMMERCIAL

## 2024-09-05 VITALS
SYSTOLIC BLOOD PRESSURE: 132 MMHG | OXYGEN SATURATION: 97 % | DIASTOLIC BLOOD PRESSURE: 82 MMHG | BODY MASS INDEX: 28.93 KG/M2 | HEART RATE: 70 BPM | HEIGHT: 66 IN | WEIGHT: 180 LBS

## 2024-09-05 DIAGNOSIS — M25.511 ACUTE PAIN OF RIGHT SHOULDER: Primary | ICD-10-CM

## 2024-09-05 DIAGNOSIS — J30.9 ALLERGIC RHINITIS, UNSPECIFIED SEASONALITY, UNSPECIFIED TRIGGER: ICD-10-CM

## 2024-09-05 PROCEDURE — 99213 OFFICE O/P EST LOW 20 MIN: CPT | Performed by: PHYSICIAN ASSISTANT

## 2024-09-05 RX ORDER — AZELASTINE HYDROCHLORIDE 137 UG/1
SPRAY, METERED NASAL
Qty: 30 ML | Refills: 2 | Status: SHIPPED | OUTPATIENT
Start: 2024-09-05

## 2024-09-05 NOTE — PROGRESS NOTES
Assessment/Plan:   Right shoulder pain:  Patient reports acute on chronic right shoulder pain  Will order Xray right shoulder  Ambulatory referral to sports medicine  Can use muscle relaxers that he has at home     Quality Measures:       No follow-ups on file.    No problem-specific Assessment & Plan notes found for this encounter.       Diagnoses and all orders for this visit:    Acute pain of right shoulder          Subjective:      Patient ID: Ruben Ratliff is a 53 y.o. male.    Patient is a pleasant 53-year-old male who presents to the office today for evaluation of right shoulder pain.  He reports chronic pain of right shoulder.  While on vacation in mid August took a ropes course and began to fall to the left and reached up with his right arm to stabilize himself.  At that time, he felt pain in his right shoulder that has been ongoing since.  He has been trying to limit using pain medication at home.  He reports that pain is somewhat better today, however has not resolved.  Pain is worse with movement away from the body.        ALLERGIES:  Allergies   Allergen Reactions    Celecoxib Anaphylaxis    Diclofenac Shortness Of Breath    Flavoring Agent - Food Allergy Anaphylaxis    Corn Oil - Food Allergy - Food Allergy Other (See Comments)     Clinically insignificant    Starch GI Intolerance and Other (See Comments)       CURRENT MEDICATIONS:    Current Outpatient Medications:     albuterol (PROVENTIL HFA,VENTOLIN HFA) 90 mcg/act inhaler, Inhale 2 puffs every 6 (six) hours as needed for wheezing (Patient taking differently: Inhale 2 puffs every 6 (six) hours as needed for wheezing PRN), Disp: 18 g, Rfl: 0    atorvastatin (LIPITOR) 20 mg tablet, TAKE 1 TABLET BY MOUTH EVERY DAY, Disp: 90 tablet, Rfl: 1    Azelastine HCl 137 MCG/SPRAY SOLN, 1 SPRAY INTO EACH NOSTRIL 2 (TWO) TIMES A DAY USE IN EACH NOSTRIL AS DIRECTED, Disp: 30 mL, Rfl: 3    clobetasol (TEMOVATE) 0.05 % ointment, Apply topically 2 (two) times a  day (Patient taking differently: Apply topically if needed), Disp: 30 g, Rfl: 3    methocarbamol (ROBAXIN) 500 mg tablet, Take 1 tablet (500 mg total) by mouth daily at bedtime, Disp: 10 tablet, Rfl: 0    multivitamin (THERAGRAN) TABS, Take 1 tablet by mouth daily, Disp: , Rfl:     Omega-3 Fatty Acids (fish oil) 1,000 mg, Take 1 capsule (1,000 mg total) by mouth daily (Patient not taking: Reported on 6/4/2024), Disp: 30 capsule, Rfl: 0    venlafaxine (EFFEXOR-XR) 37.5 mg 24 hr capsule, Take 1 capsule (37.5 mg total) by mouth daily (Patient not taking: Reported on 6/4/2024), Disp: , Rfl:     ACTIVE PROBLEM LIST:  Patient Active Problem List   Diagnosis    Cervical somatic dysfunction    Depression    Disc degeneration, lumbar    Hyperlipidemia    Left hand pain    Skin cyst    PTSD (post-traumatic stress disorder)    Carpal tunnel syndrome on left    Dyshydrosis    Musculoskeletal pain of extremity    Arm numbness       PAST MEDICAL HISTORY:  Past Medical History:   Diagnosis Date    Alcohol abuse     Bilateral inguinal hernia 02/22/2021    Bronchitis     Cough     Pt states he has a morning cough from some sinus drainage- pt reports having a cold week of 2/8/21    COVID-19 12/2020    Pt states he only lost taste and smell- no other symptoms reported.    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    Tobacco abuse        PAST SURGICAL HISTORY:  Past Surgical History:   Procedure Laterality Date    FACIAL/NECK BIOPSY Left 8/4/2020    Procedure: EXCISION CYST NECK;  Surgeon: Guy Capone MD;  [facility];  Service: Plastics    FLAP LOCAL HEAD / NECK Left 8/4/2020    Procedure:  COMPLEX CLOSURE VS ADJACENT TISSUE REARRANGEMENT NECK;  Surgeon: Guy Capone MD;  [facility];  Service: Plastics    HERNIA REPAIR      HERNIA REPAIR Bilateral 2/22/2021    Procedure: REPAIR HERNIA INGUINAL, LAPAROSCOPIC BILATERAL WITH MESH;  Surgeon: Sebas Ceja MD;  [facility];  Service: General    PYLOROMYOTOMY      ROTATOR CUFF REPAIR Left     times 2 per pt    TONSILLECTOMY AND ADENOIDECTOMY      TOOTH EXTRACTION         FAMILY HISTORY:  Family History   Problem Relation Age of Onset    Cancer Mother     Lung cancer Mother         CARCINOID TUMOR    Hypertension Mother     Hypothyroidism Mother     Kidney cancer Mother     Thyroid cancer Mother     No Known Problems Father         Motor vehicle accident    Diabetes Maternal Grandmother     Alzheimer's disease Maternal Grandfather     Coronary artery disease Maternal Grandfather     Other Maternal Grandfather         PACEMAKER       SOCIAL HISTORY:  Social History     Socioeconomic History    Marital status: /Civil Union     Spouse name: Not on file    Number of children: 1    Years of education: Not on file    Highest education level: Not on file   Occupational History    Occupation: COMPUTER CONSULTING     Comment: FULL-TIME EMPLOYMENT   Tobacco Use    Smoking status: Former     Types: Cigarettes    Smokeless tobacco: Never    Tobacco comments:     Pt quit 2008   Vaping Use    Vaping status: Never Used   Substance and Sexual Activity    Alcohol use: Yes     Comment: BEER - DESCRIBES AS INFREQUENT    Drug use: Yes     Types: Marijuana     Comment: Medical marijuana    Sexual activity: Yes     Partners: Female     Comment: did not ask   Other Topics Concern    Not on file   Social History Narrative    LIVING INDEPENDENTLY WITH SPOUSE    ONE SON    Unemployed    Hobbies-exercising, yd work, video games, camping     Social Determinants of Health     Financial Resource Strain: Not on file   Food Insecurity: Not on file    Transportation Needs: Not on file   Physical Activity: Not on file   Stress: Not on file   Social Connections: Not on file   Intimate Partner Violence: Not on file   Housing Stability: Not on file       Review of Systems   Musculoskeletal:  Positive for arthralgias and neck pain.         Objective:  There were no vitals filed for this visit.  There is no height or weight on file to calculate BMI.     Physical Exam  Constitutional:       Appearance: Normal appearance.   HENT:      Mouth/Throat:      Mouth: Mucous membranes are moist.   Cardiovascular:      Rate and Rhythm: Normal rate and regular rhythm.   Pulmonary:      Effort: Pulmonary effort is normal.      Breath sounds: Wheezing present.   Musculoskeletal:      Right upper arm: Tenderness present. No swelling, edema, deformity or bony tenderness.      Comments: Limited ROM of right shoulder 2/2 to pain     Neurological:      General: No focal deficit present.      Mental Status: He is alert and oriented to person, place, and time.   Psychiatric:         Mood and Affect: Mood normal.           RESULTS:  Total Cholesterol   Date/Time Value Ref Range Status   04/02/2024 07:33  <200 mg/dL Final     Triglycerides   Date/Time Value Ref Range Status   04/02/2024 07:33  <150 mg/dL Final     HDL   Date/Time Value Ref Range Status   04/02/2024 07:33 AM 55 > OR = 40 mg/dL Final     LDL Calculated   Date/Time Value Ref Range Status   04/02/2024 07:33  (H) mg/dL (calc) Final     Comment:     Reference range: <100     Desirable range <100 mg/dL for primary prevention;    <70 mg/dL for patients with CHD or diabetic patients   with > or = 2 CHD risk factors.     LDL-C is now calculated using the Aman   calculation, which is a validated novel method providing   better accuracy than the Friedewald equation in the   estimation of LDL-C.   [employer] FARLEY et al. RUFUS. 2013;310(19): 7070-9665   (http://education.The Society.com/faq/JCE043)        Hemoglobin   Date/Time Value Ref Range Status   04/02/2024 07:33 AM 15.0 13.2 - 17.1 g/dL Final   05/24/2023 06:04 AM 14.9 12.0 - 17.0 g/dL Final     Hematocrit   Date/Time Value Ref Range Status   05/24/2023 06:04 AM 44.8 36.5 - 49.3 % Final     HCT   Date/Time Value Ref Range Status   04/02/2024 07:33 AM 44.7 38.5 - 50.0 % Final     Platelet Count   Date/Time Value Ref Range Status   04/02/2024 07:33  140 - 400 Thousand/uL Final     Platelets   Date/Time Value Ref Range Status   05/24/2023 06:04  149 - 390 Thousands/uL Final     Prostate Specific Antigen Total   Date/Time Value Ref Range Status   09/07/2021 07:39 AM 0.3 < OR = 4.0 ng/mL Final     Comment:     The total PSA value from this assay system is   standardized against the WHO standard. The test   result will be approximately 20% lower when compared   to the equimolar-standardized total PSA (Carlos Enrique   [place]). Comparison of serial PSA results should be   interpreted with this fact in mind.     This test was performed using the Siemens   chemiluminescent method. Values obtained from   different assay methods cannot be used  interchangeably. PSA levels, regardless of  value, should not be interpreted as absolute  evidence of the presence or absence of disease.       TSH 3RD GENERATON   Date/Time Value Ref Range Status   09/13/2019 09:19 AM 1.870 0.358 - 3.740 uIU/mL Final     Comment:       Using supplements with high doses of biotin 20 to more than 300 times greater than the adequate daily intake for adults of 30 mcg/day as established by the [place] [employer], can cause falsely depress results.     Free T4   Date/Time Value Ref Range Status   09/13/2019 09:19 AM 0.80 0.76 - 1.46 ng/dL Final     Comment:       Specimen collection should occur prior to Sulfasalazine administration due to the potential for falsely elevated results.     Sodium   Date/Time Value Ref Range Status   04/02/2024 07:33  135 - 146 mmol/L Final     BUN    Date/Time Value Ref Range Status   04/02/2024 07:33 AM 17 7 - 25 mg/dL Final     Creatinine   Date/Time Value Ref Range Status   04/02/2024 07:33 AM 1.04 0.70 - 1.30 mg/dL Final   05/24/2023 06:04 AM 1.12 0.60 - 1.30 mg/dL Final     Comment:     Standardized to IDMS reference method      In chart    This note was created with voice recognition software.  Phonic, grammatical and spelling errors may be present within the note as a result.

## 2024-09-24 ENCOUNTER — APPOINTMENT (OUTPATIENT)
Dept: RADIOLOGY | Facility: CLINIC | Age: 54
End: 2024-09-24
Payer: COMMERCIAL

## 2024-09-24 ENCOUNTER — OFFICE VISIT (OUTPATIENT)
Dept: OBGYN CLINIC | Facility: CLINIC | Age: 54
End: 2024-09-24
Payer: COMMERCIAL

## 2024-09-24 VITALS
HEART RATE: 65 BPM | HEIGHT: 67 IN | WEIGHT: 181.8 LBS | DIASTOLIC BLOOD PRESSURE: 90 MMHG | BODY MASS INDEX: 28.53 KG/M2 | SYSTOLIC BLOOD PRESSURE: 157 MMHG

## 2024-09-24 DIAGNOSIS — M75.41 IMPINGEMENT SYNDROME OF RIGHT SHOULDER: Primary | ICD-10-CM

## 2024-09-24 DIAGNOSIS — G89.29 CHRONIC RIGHT SHOULDER PAIN: ICD-10-CM

## 2024-09-24 DIAGNOSIS — M25.511 CHRONIC RIGHT SHOULDER PAIN: ICD-10-CM

## 2024-09-24 PROCEDURE — 73030 X-RAY EXAM OF SHOULDER: CPT

## 2024-09-24 PROCEDURE — 99213 OFFICE O/P EST LOW 20 MIN: CPT | Performed by: STUDENT IN AN ORGANIZED HEALTH CARE EDUCATION/TRAINING PROGRAM

## 2024-09-24 RX ORDER — ACETAMINOPHEN 500 MG
1000 TABLET ORAL EVERY 8 HOURS PRN
Qty: 180 TABLET | Refills: 0 | Status: SHIPPED | OUTPATIENT
Start: 2024-09-24 | End: 2024-10-24

## 2024-09-24 RX ORDER — MELOXICAM 7.5 MG/1
7.5 TABLET ORAL DAILY
Status: CANCELLED | OUTPATIENT
Start: 2024-09-24

## 2024-09-24 NOTE — PROGRESS NOTES
Patient Name:  Ruben Ratliff  MRN:  [id number]    Assessment & Plan     1. Chronic right shoulder pain  -     XR shoulder 2+ vw right; Future; Expected date: 09/24/2024      Rightshoulder Impingement  Presentation, exam and imaging reviewed today in office with the patient.  He has findings consistent with right shoulder impingement with some concern for posterior labral injury versus SLAP tear.  Overall his presentation is relatively mild and I believe he will get better with physical therapy, which has been prescribed today.  MRI has been ordered for evaluation of labrum, cuff and bicep.  This will allow us to adjust therapy recommendations if needed and plan for surgical invention should he progress to that point.  Discussed medication management with patient including Tylenol, anti-inflammatories, and possible injection.  He has a reported allergy to anti-inflammatories so we will do 2 extra strength Tylenol tablets 3 times daily which he can take for the next 2 weeks in a standing fashion and then transition to as needed thereafter.  Follow-up 6 weeks for repeat evaluation    Chief Complaint     Right shoulder pain    History of the Present Illness     Ruben Ratliff is a right HD 53 y.o. male with Right shoulder pain that started one month ago in August when he was going through a ropes course.  Patient states he mis judged the direction of the rope and ended up hanging with his right shoulder elevated for a while causing significant pain.  Since that time he noticed anterior pain that increases with reaching for objects or lifting objects. Occasionally pain will radiate down to the elbow and middle, ring finger. He has been taking Robaxin and pain medication he was given previously from his dentist. Pain will increase at night time  depending on the activity level that was performed throughout the day. He denies going to physical therapy or taking any anti-inflammatories.  He has a prior history of left  "rotator cuff tear with subsequent surgery and feels that his current injury feels somewhat similar.    Review of Systems     Review of Systems   Constitutional:  Negative for chills and fever.   HENT:  Negative for ear pain and sore throat.    Eyes:  Negative for pain and visual disturbance.   Respiratory:  Negative for cough and shortness of breath.    Cardiovascular:  Negative for chest pain and palpitations.   Gastrointestinal:  Negative for abdominal pain and vomiting.   Genitourinary:  Negative for dysuria and hematuria.   Musculoskeletal:  Positive for arthralgias. Negative for back pain.   Skin:  Negative for color change and rash.   Neurological:  Negative for seizures and syncope.   All other systems reviewed and are negative.      Physical Exam     /90   Pulse 65   Ht 5' 7\" (1.702 m)   Wt 82.5 kg (181 lb 12.8 oz)   BMI 28.47 kg/m²     Right Shoulder:   Active range of motion   180 degrees forward flexion  60 external rotation  Back pocket internal rotation  compared to thoracic spine contralateral  Negative Belly Press  Barfield test is positive  Titus's test is positive   Speed's test is Positive  Positive Yergason's  No bony tenderness to AC joint  The patient is neurovascularly intact distally in the extremity.  90 ER laying supine  80 IR laying supine  Negative Ashley   Negative Jerk      Eyes:  Anicteric sclerae.  Neck:  Supple.  Lungs:  Normal respiratory effort.  Cardiovascular:  Capillary refill is less than 2 seconds.  Skin:  Intact without erythema.  Neurologic:  Sensation grossly intact to light touch.  Psychiatric:  Mood and affect are appropriate.    Data Review     I have personally reviewed pertinent films in PACS, and my interpretation follows:    X-rays taken today of the right shoulder were reviewed with the patient in clinic demonstrating AP, lateral, scapular Y and axillary view with no evidence of osseous injury.  There is no sign of fracture and joint spaces well-preserved.  " He has no significant AC arthrosis.  There is no migration of the humeral head to suggest chronic massive rotator cuff tear.  He is well centered within the glenohumeral joint on both the scapular Y and the axillary view.    Past Medical History:   Diagnosis Date    Alcohol abuse     Bilateral inguinal hernia 02/22/2021    Bronchitis     Cough     Pt states he has a morning cough from some sinus drainage- pt reports having a cold week of 2/8/21    COVID-19 12/2020    Pt states he only lost taste and smell- no other symptoms reported.    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    Tobacco abuse        Past Surgical History:   Procedure Laterality Date    FACIAL/NECK BIOPSY Left 8/4/2020    Procedure: EXCISION CYST NECK;  Surgeon: Guy Capone MD;  [facility];  Service: Plastics    FLAP LOCAL HEAD / NECK Left 8/4/2020    Procedure: COMPLEX CLOSURE VS ADJACENT TISSUE REARRANGEMENT NECK;  Surgeon: Guy Capone MD;  [facility];  Service: Plastics    HERNIA REPAIR      HERNIA REPAIR Bilateral 2/22/2021    Procedure: REPAIR HERNIA INGUINAL, LAPAROSCOPIC BILATERAL WITH MESH;  Surgeon: Sebas Ceja MD;  [facility];  Service: General    PYLOROMYOTOMY      ROTATOR CUFF REPAIR Left     times 2 per pt    TONSILLECTOMY AND ADENOIDECTOMY      TOOTH EXTRACTION         Allergies   Allergen Reactions    Celecoxib Anaphylaxis    Diclofenac Shortness Of Breath    Flavoring Agent - Food Allergy Anaphylaxis    Corn Oil - Food Allergy - Food Allergy Other (See Comments)     Clinically insignificant    Starch GI Intolerance and Other (See Comments)        Current Outpatient Medications on File Prior to Visit   Medication Sig Dispense Refill    albuterol (PROVENTIL HFA,VENTOLIN HFA) 90 mcg/act inhaler Inhale 2 puffs every 6 (six) hours as needed for wheezing (Patient taking differently: Inhale 2 puffs every 6 (six) hours as needed for wheezing PRN) 18 g 0    atorvastatin (LIPITOR) 20 mg tablet TAKE 1 TABLET BY MOUTH EVERY DAY 90 tablet 1    Azelastine HCl 137 MCG/SPRAY SOLN INSTILL 1 SPRAY IN EACH NOSTRIL TWICE A DAY AS DIRECTED 30 mL 2    clobetasol (TEMOVATE) 0.05 % ointment Apply topically 2 (two) times a day (Patient taking differently: Apply topically if needed) 30 g 3    Flaxseed, Linseed, (FLAX SEED OIL PO) Take by mouth      multivitamin (THERAGRAN) TABS Take 1 tablet by mouth daily      Omega-3 Fatty Acids (fish oil) 1,000 mg Take 1 capsule (1,000 mg total) by mouth daily 30 capsule 0    methocarbamol (ROBAXIN) 500 mg tablet Take 1 tablet (500 mg total) by mouth daily at bedtime (Patient not taking: Reported on 9/5/2024) 10 tablet 0    venlafaxine (EFFEXOR-XR) 37.5 mg 24 hr capsule Take 1 capsule (37.5 mg total) by mouth daily (Patient not taking: Reported on 6/4/2024)       No current facility-administered medications on file prior to visit.       Social History     Tobacco Use    Smoking status: Former     Types: Cigarettes    Smokeless tobacco: Never    Tobacco comments:     Pt quit 2008   Vaping Use    Vaping status: Never Used   Substance Use Topics    Alcohol use: Yes     Comment: BEER - DESCRIBES AS INFREQUENT    Drug use: Yes     Types: Marijuana     Comment: Medical marijuana       Family History   Problem Relation Age of Onset    Cancer Mother     Lung cancer Mother         CARCINOID TUMOR    Hypertension Mother     Hypothyroidism Mother     Kidney cancer Mother     Thyroid cancer Mother     No Known Problems Father         Motor vehicle accident    Diabetes Maternal Grandmother     Alzheimer's disease Maternal Grandfather     Coronary artery  disease Maternal Grandfather     Other Maternal Grandfather         PACEMAKER

## 2024-09-28 DIAGNOSIS — J30.9 ALLERGIC RHINITIS, UNSPECIFIED SEASONALITY, UNSPECIFIED TRIGGER: ICD-10-CM

## 2024-09-29 RX ORDER — AZELASTINE HYDROCHLORIDE 137 UG/1
SPRAY, METERED NASAL
Qty: 90 ML | Refills: 1 | Status: SHIPPED | OUTPATIENT
Start: 2024-09-29

## 2024-10-03 ENCOUNTER — EVALUATION (OUTPATIENT)
Dept: PHYSICAL THERAPY | Facility: CLINIC | Age: 54
End: 2024-10-03
Payer: COMMERCIAL

## 2024-10-03 DIAGNOSIS — M75.41 IMPINGEMENT SYNDROME OF RIGHT SHOULDER: ICD-10-CM

## 2024-10-03 DIAGNOSIS — S46.011S ROTATOR CUFF STRAIN, RIGHT, SEQUELA: ICD-10-CM

## 2024-10-03 DIAGNOSIS — S46.211S BICEPS STRAIN, RIGHT, SEQUELA: Primary | ICD-10-CM

## 2024-10-03 PROCEDURE — 97530 THERAPEUTIC ACTIVITIES: CPT

## 2024-10-03 PROCEDURE — 97161 PT EVAL LOW COMPLEX 20 MIN: CPT

## 2024-10-03 PROCEDURE — 97110 THERAPEUTIC EXERCISES: CPT

## 2024-10-04 ENCOUNTER — TELEMEDICINE (OUTPATIENT)
Dept: BEHAVIORAL/MENTAL HEALTH CLINIC | Facility: CLINIC | Age: 54
End: 2024-10-04
Payer: COMMERCIAL

## 2024-10-04 DIAGNOSIS — F32.A DEPRESSION, UNSPECIFIED DEPRESSION TYPE: Primary | ICD-10-CM

## 2024-10-04 DIAGNOSIS — F43.10 PTSD (POST-TRAUMATIC STRESS DISORDER): ICD-10-CM

## 2024-10-04 LAB
ALBUMIN SERPL-MCNC: 4.5 G/DL (ref 3.6–5.1)
ALBUMIN/GLOB SERPL: 2.6 (CALC) (ref 1–2.5)
ALP SERPL-CCNC: 62 U/L (ref 35–144)
ALT SERPL-CCNC: 26 U/L (ref 9–46)
AST SERPL-CCNC: 24 U/L (ref 10–35)
BILIRUB SERPL-MCNC: 0.4 MG/DL (ref 0.2–1.2)
BUN SERPL-MCNC: 14 MG/DL (ref 7–25)
BUN/CREAT SERPL: ABNORMAL (CALC) (ref 6–22)
CALCIUM SERPL-MCNC: 9.3 MG/DL (ref 8.6–10.3)
CHLORIDE SERPL-SCNC: 108 MMOL/L (ref 98–110)
CHOLEST SERPL-MCNC: 166 MG/DL
CHOLEST/HDLC SERPL: 3.4 (CALC)
CO2 SERPL-SCNC: 25 MMOL/L (ref 20–32)
CREAT SERPL-MCNC: 0.96 MG/DL (ref 0.7–1.3)
GFR/BSA.PRED SERPLBLD CYS-BASED-ARV: 94 ML/MIN/1.73M2
GLOBULIN SER CALC-MCNC: 1.7 G/DL (CALC) (ref 1.9–3.7)
GLUCOSE SERPL-MCNC: 100 MG/DL (ref 65–99)
HDLC SERPL-MCNC: 49 MG/DL
LDLC SERPL CALC-MCNC: 93 MG/DL (CALC)
NONHDLC SERPL-MCNC: 117 MG/DL (CALC)
POTASSIUM SERPL-SCNC: 4.4 MMOL/L (ref 3.5–5.3)
PROT SERPL-MCNC: 6.2 G/DL (ref 6.1–8.1)
PSA SERPL-MCNC: 0.29 NG/ML
SODIUM SERPL-SCNC: 142 MMOL/L (ref 135–146)
TRIGL SERPL-MCNC: 144 MG/DL

## 2024-10-04 PROCEDURE — 90834 PSYTX W PT 45 MINUTES: CPT | Performed by: SOCIAL WORKER

## 2024-10-07 ENCOUNTER — TELEPHONE (OUTPATIENT)
Age: 54
End: 2024-10-07

## 2024-10-07 DIAGNOSIS — M75.41 IMPINGEMENT SYNDROME OF RIGHT SHOULDER: Primary | ICD-10-CM

## 2024-10-07 NOTE — TELEPHONE ENCOUNTER
Caller: Patient    Doctor: Theo    Reason for call:     Patient is calling for a script for the right shoulder, please advise the patient.  Patient is stating he did start physical therapy, only 1 visit.    Call back#: [phone]

## 2024-10-07 NOTE — TELEPHONE ENCOUNTER
Caller: Patient    Doctor: Theo hannon  Reason for call: Patient explaining the purpose of his call when the call was dc'd.     Call back#: [phone]

## 2024-10-10 ENCOUNTER — OFFICE VISIT (OUTPATIENT)
Dept: PHYSICAL THERAPY | Facility: CLINIC | Age: 54
End: 2024-10-10
Payer: COMMERCIAL

## 2024-10-10 DIAGNOSIS — S46.211S BICEPS STRAIN, RIGHT, SEQUELA: ICD-10-CM

## 2024-10-10 DIAGNOSIS — S46.011S ROTATOR CUFF STRAIN, RIGHT, SEQUELA: ICD-10-CM

## 2024-10-10 DIAGNOSIS — M75.41 IMPINGEMENT SYNDROME OF RIGHT SHOULDER: Primary | ICD-10-CM

## 2024-10-10 PROCEDURE — 97110 THERAPEUTIC EXERCISES: CPT

## 2024-10-10 PROCEDURE — 97530 THERAPEUTIC ACTIVITIES: CPT

## 2024-10-10 NOTE — PROGRESS NOTES
"Daily Note     Today's date: 10/10/2024  Patient name: Ruben Ratliff  : 1970  MRN: [id number]  Referring provider: Abel Venegas MD  Dx:   Encounter Diagnosis     ICD-10-CM    1. Impingement syndrome of right shoulder  M75.41       2. Biceps strain, right, sequela  S46.211S       3. Rotator cuff strain, right, sequela  S46.011S           Start Time: 0930  Stop Time: 1015  Total time in clinic (min): 45 minutes    Subjective: Pt reports soreness after his HEP that resolves within 12 hours.       Objective: See treatment diary below      Assessment: Pt shows decreased control on bicep eccentrics w. Increased gaurding throughout the shoulder and lack of scapular ROM. Tolerated treatment well. Patient demonstrated fatigue post treatment and would benefit from continued PT      Plan: Continue per plan of care.      Precautions:   Patient Active Problem List   Diagnosis    Cervical somatic dysfunction    Depression    Disc degeneration, lumbar    Hyperlipidemia    Left hand pain    Skin cyst    PTSD (post-traumatic stress disorder)    Carpal tunnel syndrome on left    Dyshydrosis    Musculoskeletal pain of extremity    Arm numbness       POC expires Unit limit Auth Expiration date PT/OT/ST + Visit Limit?    4 2025 45                           Visit/Unit Tracking  AUTH Status:  Date 10/3 10/8              Used 1 2              Remaining  44                Access Code: [email or web address]     FOTO 10/3            Manuals 10/3 10/8                                                               Neuro Re-Ed                                                                                                        Ther Ex             Bicep curl Neutral /pron/sup  2x5  10#   x8 8# ea  2x5 8#              Shoulder ER/IR  3x12 YTB           Tricep extension             Wrist alan/sup  2x15 RTB           Chest press   3x10 10#                                                   Ther Activity             Rows   5\"/5\" tempo   3x10 " Black TB           UBE  2'/2'           education AL- POC and anatomy AL           Gait Training                                       Modalities

## 2024-10-17 ENCOUNTER — APPOINTMENT (OUTPATIENT)
Dept: PHYSICAL THERAPY | Facility: CLINIC | Age: 54
End: 2024-10-17
Payer: COMMERCIAL

## 2024-10-17 ENCOUNTER — HOSPITAL ENCOUNTER (OUTPATIENT)
Dept: RADIOLOGY | Age: 54
Discharge: HOME | End: 2024-10-17
Payer: COMMERCIAL

## 2024-10-17 ENCOUNTER — OFFICE VISIT (OUTPATIENT)
Dept: PHYSICAL THERAPY | Facility: CLINIC | Age: 54
End: 2024-10-17
Payer: COMMERCIAL

## 2024-10-17 DIAGNOSIS — M75.41 IMPINGEMENT SYNDROME OF RIGHT SHOULDER: ICD-10-CM

## 2024-10-17 DIAGNOSIS — M75.41 IMPINGEMENT SYNDROME OF RIGHT SHOULDER: Primary | ICD-10-CM

## 2024-10-17 DIAGNOSIS — S46.011S ROTATOR CUFF STRAIN, RIGHT, SEQUELA: ICD-10-CM

## 2024-10-17 DIAGNOSIS — S46.211S BICEPS STRAIN, RIGHT, SEQUELA: ICD-10-CM

## 2024-10-17 PROCEDURE — 73221 MRI JOINT UPR EXTREM W/O DYE: CPT

## 2024-10-17 PROCEDURE — 97530 THERAPEUTIC ACTIVITIES: CPT

## 2024-10-17 PROCEDURE — 97110 THERAPEUTIC EXERCISES: CPT

## 2024-10-17 NOTE — PROGRESS NOTES
"Daily Note     Today's date: 10/17/2024  Patient name: Ruben Ratliff  : 1970  MRN: [id number]  Referring provider: Abel Venegas MD  Dx:   Encounter Diagnosis     ICD-10-CM    1. Impingement syndrome of right shoulder  M75.41       2. Biceps strain, right, sequela  S46.211S       3. Rotator cuff strain, right, sequela  S46.011S                      Subjective: Pt reports his shoulder is sore from his MRI positioning this morning.       Objective: See treatment diary below      Assessment: Tolerated treatment well. Patient exhibited good technique with therapeutic exercises      Plan: Continue per plan of care.      Precautions:   Patient Active Problem List   Diagnosis    Cervical somatic dysfunction    Depression    Disc degeneration, lumbar    Hyperlipidemia    Left hand pain    Skin cyst    PTSD (post-traumatic stress disorder)    Carpal tunnel syndrome on left    Dyshydrosis    Musculoskeletal pain of extremity    Arm numbness       POC expires Unit limit Auth Expiration date PT/OT/ST + Visit Limit?    4 2025 45                           Visit/Unit Tracking  AUTH Status:  Date 10/3 10/8 10/17             Used 1 2 3             Remaining  44 43 42              Access Code: [email or web address]     FOTO 10/3            Manuals 10/3 10/8 10/17          PROM R   PETERSON                                                 Neuro Re-Ed                                                                                                        Ther Ex             Bicep curl Neutral /pron/sup  2x5  10#   x8 8# ea  2x5 8#    10# 2x5   10# 2x5          Shoulder ER/IR  3x12 YTB 3x12 YTB          Tricep extension             Wrist alan/sup  2x15 RTB 2x15  GTB          Chest press   3x10 10#  3x10 10#                                                 Ther Activity             Rows   5\"/5\" tempo   3x10 Black TB 5\"/5\" tempo   3x10 Black TB          UBE  2'/2' 2'/2'          education AL- POC and anatomy AL           Gait Training        "                                Modalities

## 2024-10-18 ENCOUNTER — APPOINTMENT (OUTPATIENT)
Dept: PHYSICAL THERAPY | Facility: CLINIC | Age: 54
End: 2024-10-18
Payer: COMMERCIAL

## 2024-10-24 ENCOUNTER — OFFICE VISIT (OUTPATIENT)
Dept: PHYSICAL THERAPY | Facility: CLINIC | Age: 54
End: 2024-10-24
Payer: COMMERCIAL

## 2024-10-24 DIAGNOSIS — M75.41 IMPINGEMENT SYNDROME OF RIGHT SHOULDER: Primary | ICD-10-CM

## 2024-10-24 DIAGNOSIS — S46.011S ROTATOR CUFF STRAIN, RIGHT, SEQUELA: ICD-10-CM

## 2024-10-24 DIAGNOSIS — S46.211S BICEPS STRAIN, RIGHT, SEQUELA: ICD-10-CM

## 2024-10-24 PROCEDURE — 97112 NEUROMUSCULAR REEDUCATION: CPT

## 2024-10-24 PROCEDURE — 97110 THERAPEUTIC EXERCISES: CPT

## 2024-10-24 PROCEDURE — 97530 THERAPEUTIC ACTIVITIES: CPT

## 2024-10-24 NOTE — PROGRESS NOTES
Daily Note     Today's date: 10/24/2024  Patient name: Ruben Ratliff  : 1970  MRN: [id number]  Referring provider: Abel Venegas MD  Dx:   Encounter Diagnosis     ICD-10-CM    1. Impingement syndrome of right shoulder  M75.41       2. Biceps strain, right, sequela  S46.211S       3. Rotator cuff strain, right, sequela  S46.011S                      Subjective: Pt states he was blowing leaves since last visit and didn't have any pain until he tried to take his shirt off and his shoulder cracked with pain following.      Objective: See treatment diary below      Assessment: Increased anterior R shoulder pain with gripping activities.  Corrected chest press to 45* angle to avoid further stress on anterior structures of R shoulder.  Pt demonstrates decreased motor control of scap stabilizers with newly added interventions.  Pt would benefit from continued PT.      Plan: Continue per plan of care.      Precautions:   Patient Active Problem List   Diagnosis    Cervical somatic dysfunction    Depression    Disc degeneration, lumbar    Hyperlipidemia    Left hand pain    Skin cyst    PTSD (post-traumatic stress disorder)    Carpal tunnel syndrome on left    Dyshydrosis    Musculoskeletal pain of extremity    Arm numbness       POC expires Unit limit Auth Expiration date PT/OT/ST + Visit Limit?    4 2025 45                           Visit/Unit Tracking  AUTH Status:  Date 10/3 10/8 10/17 10/24            Used 1 2 3 4            Remaining  44 43 42 41             Access Code: [email or web address]     FOTO 10/3            Manuals 10/3 10/8 10/17 10/24         PROM R   HA AF                                                Neuro Re-Ed             D2 flexion    Supine 2x10 GMB         Wall ball circles    RMB 2x20                                                                          Ther Ex             Bicep curl Neutral /pron/sup  2x5  10#   x8 8# ea  2x5 8#    10# 2x5   10# 2x5 10# 2x8         Shoulder ER/IR  3x12  "YTB 3x12 YTB 3x12 GTB         Tricep extension             Wrist alan/sup  2x15 RTB 2x15  GTB 2x15 GTB         Chest press   3x10 10#  3x10 10# 3x12 10#                                                Ther Activity             Rows   5\"/5\" tempo   3x10 Black TB 5\"/5\" tempo   3x10 Black TB 5\"/5\" temp 3x10 blk TB         UBE  2'/2' 2'/2' 2'/2'         education AL- POC and anatomy AL  AF         Gait Training                                       Modalities                                                "

## 2024-10-31 ENCOUNTER — OFFICE VISIT (OUTPATIENT)
Dept: PHYSICAL THERAPY | Facility: CLINIC | Age: 54
End: 2024-10-31
Payer: COMMERCIAL

## 2024-10-31 DIAGNOSIS — S46.211S BICEPS STRAIN, RIGHT, SEQUELA: ICD-10-CM

## 2024-10-31 DIAGNOSIS — S46.011S ROTATOR CUFF STRAIN, RIGHT, SEQUELA: ICD-10-CM

## 2024-10-31 DIAGNOSIS — M75.41 IMPINGEMENT SYNDROME OF RIGHT SHOULDER: Primary | ICD-10-CM

## 2024-10-31 PROCEDURE — 97112 NEUROMUSCULAR REEDUCATION: CPT

## 2024-10-31 PROCEDURE — 97530 THERAPEUTIC ACTIVITIES: CPT

## 2024-10-31 PROCEDURE — 97110 THERAPEUTIC EXERCISES: CPT

## 2024-10-31 NOTE — PROGRESS NOTES
Daily Note     Today's date: 10/31/2024  Patient name: Ruben Ratliff  : 1970  MRN: [id number]  Referring provider: Abel Venegas MD  Dx:   Encounter Diagnosis     ICD-10-CM    1. Impingement syndrome of right shoulder  M75.41       2. Biceps strain, right, sequela  S46.211S       3. Rotator cuff strain, right, sequela  S46.011S                      Subjective: Pt reports he was making the bed last night when he went to straighten the sheet and felt some discomfort in R shoulder.  Other than that, he has not had as much pain recently.        Objective: See treatment diary below      Assessment: Improving motor control this session.  Pt is now able to eccentrically control R biceps.  He does experience some lateral R shoulder discomfort with scaption and empty cups.  Performs charted interventions within tolerance.  Pt would benefit from continued PT.      Plan: Continue per plan of care.      Precautions:   Patient Active Problem List   Diagnosis    Cervical somatic dysfunction    Depression    Disc degeneration, lumbar    Hyperlipidemia    Left hand pain    Skin cyst    PTSD (post-traumatic stress disorder)    Carpal tunnel syndrome on left    Dyshydrosis    Musculoskeletal pain of extremity    Arm numbness       POC expires Unit limit Auth Expiration date PT/OT/ST + Visit Limit?    4 2025 45                           Visit/Unit Tracking  AUTH Status:  Date 10/3 10/8 10/17 10/24 10/31           Used 1 2 3 4 5           Remaining  44 43 42 41 40            Access Code: [email or web address]     FOTO 10/3            Manuals 10/3 10/8 10/17 10/24 10/31        PROM R   HA AF AF                                               Neuro Re-Ed             D2 flexion    Supine 2x10 GMB Supine 2x10 GMB        Wall ball circles    RMB 2x20 RMB 2x20                                                                         Ther Ex             Bicep curl Neutral /pron/sup  2x5  10#   x8 8# ea  2x5 8#    10# 2x5   10# 2x5 10# 2x8  "10# 2x10        Shoulder ER/IR  3x12 YTB 3x12 YTB 3x12 GTB 3x12 GTB        Tricep extension             Wrist alan/sup  2x15 RTB 2x15  GTB 2x15 GTB         Chest press   3x10 10#  3x10 10# 3x12 10# 3x12 10#        Empty cups     2# 2x10                                  Ther Activity             Rows   5\"/5\" tempo   3x10 Black TB 5\"/5\" tempo   3x10 Black TB 5\"/5\" temp 3x10 blk TB 5\"/5\" tempo 3x10 blk TB        UBE  2'/2' 2'/2' 2'/2' 3'/3'        education AL- POC and anatomy AL  AF AF        Gait Training                                       Modalities                                                  "

## 2024-11-05 ENCOUNTER — OFFICE VISIT (OUTPATIENT)
Dept: OBGYN CLINIC | Facility: CLINIC | Age: 54
End: 2024-11-05
Payer: COMMERCIAL

## 2024-11-05 VITALS
BODY MASS INDEX: 28.88 KG/M2 | RESPIRATION RATE: 18 BRPM | HEART RATE: 63 BPM | WEIGHT: 184 LBS | OXYGEN SATURATION: 98 % | SYSTOLIC BLOOD PRESSURE: 115 MMHG | HEIGHT: 67 IN | DIASTOLIC BLOOD PRESSURE: 76 MMHG

## 2024-11-05 DIAGNOSIS — M75.41 IMPINGEMENT SYNDROME OF RIGHT SHOULDER: Primary | ICD-10-CM

## 2024-11-05 PROCEDURE — 99213 OFFICE O/P EST LOW 20 MIN: CPT | Performed by: STUDENT IN AN ORGANIZED HEALTH CARE EDUCATION/TRAINING PROGRAM

## 2024-11-05 RX ORDER — ACETAMINOPHEN 500 MG
500 TABLET ORAL EVERY 6 HOURS PRN
COMMUNITY

## 2024-11-05 NOTE — PROGRESS NOTES
Patient Name:  Ruben Ratliff  MRN:  [id number]    Assessment & Plan     1. Impingement syndrome of right shoulder      Rightshoulder Impingement  MRI reviewed with patient today  Patient notes he is doing much better with PT and will plan to continue rehab.  We did discuss injections, specifically a bicep tendon sheath injection based on exam and imaging. If he desires this we'll have him see one of our non-op sports colleagues for an ultrasound guided tendon sheath injection.   Discussed oral medications.  He has an allergy to anti-inflammatories so he will continue to take Tylenol as needed.  Follow-up 6-8 weeks for repeat evaluation    Chief Complaint     Right shoulder pain    History of the Present Illness     Ruben Ratliff is a right HD 54 y.o. male presenting for follow-up of right shoulder pain.  He notes that things are significantly better since the time of his last visit.  He has been able to sleep on that side and has overall decreased symptoms compared to his first visit with us.  He notes he is primarily just taking Tylenol, which initially he was taking on a standing basis but has been able to decrease his frequency to just an as-needed basis for when he has increased levels of soreness.  He notes that he has some discomfort after physical therapy, but feels that generally it has been notably helpful in improving his shoulder.      Per prior history: Right shoulder pain that started one month ago in August when he was going through a ropes course.  Patient states he mis judged the direction of the rope and ended up hanging with his right shoulder elevated for a while causing significant pain.  Since that time he noticed anterior pain that increases with reaching for objects or lifting objects. Occasionally pain will radiate down to the elbow and middle, ring finger. He has been taking Robaxin and pain medication he was given previously from his dentist. Pain will increase at night time   "depending on the activity level that was performed throughout the day. He denies going to physical therapy or taking any anti-inflammatories.  He has a prior history of left rotator cuff tear with subsequent surgery and feels that his current injury feels somewhat similar.    Review of Systems     Review of Systems   Constitutional:  Negative for chills and fever.   HENT:  Negative for ear pain and sore throat.    Eyes:  Negative for pain and visual disturbance.   Respiratory:  Negative for cough and shortness of breath.    Cardiovascular:  Negative for chest pain and palpitations.   Gastrointestinal:  Negative for abdominal pain and vomiting.   Genitourinary:  Negative for dysuria and hematuria.   Musculoskeletal:  Positive for arthralgias. Negative for back pain.   Skin:  Negative for color change and rash.   Neurological:  Negative for seizures and syncope.   All other systems reviewed and are negative.      Physical Exam     /76   Pulse 63   Resp 18   Ht 5' 7\" (1.702 m)   Wt 83.5 kg (184 lb)   SpO2 98%   BMI 28.82 kg/m²     Right Shoulder:   Active range of motion   180 degrees forward flexion  60 external rotation  Back pocket internal rotation  compared to thoracic spine contralateral  Negative Belly Press  Barfield test shows minimal discomfort  [place]'s test is negative  Speed's test is Positive  Negative Yergason's  No bony tenderness to AC joint  The patient is neurovascularly intact distally in the extremity.  Negative Ashley   Negative Jerk      Eyes:  Anicteric sclerae.  Neck:  Supple.  Lungs:  Normal respiratory effort.  Cardiovascular:  Capillary refill is less than 2 seconds.  Skin:  Intact without erythema.  Neurologic:  Sensation grossly intact to light touch.  Psychiatric:  Mood and affect are appropriate.    Data Review     I have personally reviewed pertinent films in PACS, and my interpretation follows:    MRI demonstrates evidence of tendinosis to supraspinatus as well as edema around " bicep tendon with some intrasubstance increased signal.  Otherwise, muscles of cuff appear intact and there is no significant chondrosis of the glenohumeral joint.  There is arthrosis of the acromioclavicular joint.

## 2024-11-07 ENCOUNTER — OFFICE VISIT (OUTPATIENT)
Dept: PHYSICAL THERAPY | Facility: CLINIC | Age: 54
End: 2024-11-07
Payer: COMMERCIAL

## 2024-11-07 ENCOUNTER — RA CDI HCC (OUTPATIENT)
Dept: OTHER | Facility: HOSPITAL | Age: 54
End: 2024-11-07

## 2024-11-07 DIAGNOSIS — M75.41 IMPINGEMENT SYNDROME OF RIGHT SHOULDER: Primary | ICD-10-CM

## 2024-11-07 DIAGNOSIS — S46.011S ROTATOR CUFF STRAIN, RIGHT, SEQUELA: ICD-10-CM

## 2024-11-07 DIAGNOSIS — S46.211S BICEPS STRAIN, RIGHT, SEQUELA: ICD-10-CM

## 2024-11-07 PROCEDURE — 97112 NEUROMUSCULAR REEDUCATION: CPT

## 2024-11-07 PROCEDURE — 97110 THERAPEUTIC EXERCISES: CPT

## 2024-11-07 PROCEDURE — 97530 THERAPEUTIC ACTIVITIES: CPT

## 2024-11-07 NOTE — PROGRESS NOTES
Daily Note     Today's date: 2024  Patient name: Ruben Ratliff  : 1970  MRN: [id number]  Referring provider: Abel Venegas MD  Dx:   Encounter Diagnosis     ICD-10-CM    1. Impingement syndrome of right shoulder  M75.41       2. Biceps strain, right, sequela  S46.211S       3. Rotator cuff strain, right, sequela  S46.011S             Start Time: 0800  Stop Time: 0845  Total time in clinic (min): 45 minutes    Subjective: Pt reports he spent yesterday moving a pallet a pellet (1 ton)   and is feeling sore and fatigue.       Objective: See treatment diary below      Assessment: Pt tolerated transition to cable resistance, for high load-low rep training for OSR of tendon, well w. Increased stability through the motion. Bicep curls were deferred due to increased fatigue from his prior activities.   Performs charted interventions within tolerance.  Pt would benefit from continued PT.      Plan: Continue per plan of care.      Precautions:   Patient Active Problem List   Diagnosis    Cervical somatic dysfunction    Depression    Disc degeneration, lumbar    Hyperlipidemia    Left hand pain    Skin cyst    PTSD (post-traumatic stress disorder)    Carpal tunnel syndrome on left    Dyshydrosis    Musculoskeletal pain of extremity    Arm numbness       POC expires Unit limit Auth Expiration date PT/OT/ST + Visit Limit?    4 2025 45                           Visit/Unit Tracking  AUTH Status:  Date 10/3 10/8 10/17 10/24 10/31 11/7          Used 1 2 3 4 5 6          Remaining  44 43 42 41 40            Access Code: [email or web address]     FOTO 10/3     11/7       Manuals 10/3 10/8 10/17 10/24 10/31 11/7       PROM R   HA AF AF                                               Neuro Re-Ed             D2 flexion    Supine 2x10 GMB Supine 2x10 GMB Supine 2x10 GMB       Wall ball circles    RMB 2x20 RMB 2x20 GMB 2x20                                                                        Ther Ex             Bicep curl  "Neutral /pron/sup  2x5  10#   x8 8# ea  2x5 8#    10# 2x5   10# 2x5 10# 2x8 10# 2x10 10# 2x10       Shoulder ER/IR  3x12 YTB 3x12 YTB 3x12 GTB 3x12 GTB 3x8   4# Jeff       Tricep extension             Wrist alan/sup  2x15 RTB 2x15  GTB 2x15 GTB         Chest press   3x10 10#  3x10 10# 3x12 10# 3x12 10# 3x12 10#       Empty cups     2# 2x10 2#   2x10        Swimmers       2x5 2#                     Ther Activity             Rows   5\"/5\" tempo   3x10 Black TB 5\"/5\" tempo   3x10 Black TB 5\"/5\" temp 3x10 blk TB 5\"/5\" tempo 3x10 blk TB 5\"/5\" tempo 3x10   15#       UBE  2'/2' 2'/2' 2'/2' 3'/3' 3'/3'       education AL- POC and anatomy AL  AF AF        Gait Training                                       Modalities                                                  "

## 2024-11-08 ENCOUNTER — TELEMEDICINE (OUTPATIENT)
Dept: BEHAVIORAL/MENTAL HEALTH CLINIC | Facility: CLINIC | Age: 54
End: 2024-11-08
Payer: COMMERCIAL

## 2024-11-08 DIAGNOSIS — F32.A DEPRESSION, UNSPECIFIED DEPRESSION TYPE: ICD-10-CM

## 2024-11-08 DIAGNOSIS — F43.10 PTSD (POST-TRAUMATIC STRESS DISORDER): Primary | ICD-10-CM

## 2024-11-08 PROCEDURE — 90834 PSYTX W PT 45 MINUTES: CPT | Performed by: SOCIAL WORKER

## 2024-11-13 ENCOUNTER — OFFICE VISIT (OUTPATIENT)
Dept: INTERNAL MEDICINE CLINIC | Facility: CLINIC | Age: 54
End: 2024-11-13
Payer: COMMERCIAL

## 2024-11-13 VITALS
HEIGHT: 67 IN | HEART RATE: 87 BPM | DIASTOLIC BLOOD PRESSURE: 80 MMHG | BODY MASS INDEX: 28.72 KG/M2 | OXYGEN SATURATION: 97 % | WEIGHT: 183 LBS | SYSTOLIC BLOOD PRESSURE: 130 MMHG

## 2024-11-13 DIAGNOSIS — R73.01 IMPAIRED FASTING GLUCOSE: ICD-10-CM

## 2024-11-13 DIAGNOSIS — R53.83 FATIGUE, UNSPECIFIED TYPE: ICD-10-CM

## 2024-11-13 DIAGNOSIS — E78.2 MIXED HYPERLIPIDEMIA: ICD-10-CM

## 2024-11-13 DIAGNOSIS — Z00.00 ANNUAL PHYSICAL EXAM: Primary | ICD-10-CM

## 2024-11-13 PROCEDURE — 99396 PREV VISIT EST AGE 40-64: CPT | Performed by: PHYSICIAN ASSISTANT

## 2024-11-13 NOTE — PROGRESS NOTES
Adult Annual Physical  Name: Ruben Ratliff      : 1970      MRN: [id number]  Encounter Provider: Uli Dillard PA-C  Encounter Date: 2024   Encounter department: [facility] [place]    Assessment & Plan  Annual physical exam         Mixed hyperlipidemia  Good control on atorvastatin.  Patient will continue this medication.  We will continue to monitor.  Orders:    Comprehensive metabolic panel; Future    Lipid panel; Future    Impaired fasting glucose  Blood sugar elevated at 100.  Denies polyuria, polyphagia, polydipsia.  We will continue to monitor, will check A1c with next labs.  Orders:    Hemoglobin A1C; Future    Fatigue, unspecified type    Orders:    CBC and differential; Future      Immunizations and preventive care screenings were discussed with patient today. Appropriate education was printed on patient's after visit summary.        Counseling:  Dental Health: discussed importance of regular tooth brushing, flossing, and dental visits.  Exercise: the importance of regular exercise/physical activity was discussed. Recommend exercise 3-5 times per week for at least 30 minutes.          History of Present Illness     Adult Annual Physical:  Patient presents for annual physical. 54-year-old male presents for his annual physical.    Patient is having some iliopsoas pain of his right leg.  He is not sure how he caused it.  He we will discuss with his physical therapist.    Minimal elevation in blood sugar at 100.  We will check A1c with next visit.  Denies polyuria, polyphagia, polydipsia.    EMR has been reviewed, clarified, and updated with patient today..     Diet and Physical Activity:  - Diet/Nutrition: well balanced diet, consuming 3-5 servings of fruits/vegetables daily and limited junk food.  - Exercise: no formal exercise, walking and 3-4 times a week on average.    General Health:  - Sleep: sleeps well and 7-8 hours of sleep on average.  - Hearing: normal  hearing bilateral ears.  - Vision: most recent eye exam > 1 year ago and wears glasses.  - Dental: regular dental visits and brushes teeth twice daily.     Health:  - History of STDs: no.   - Urinary symptoms: none.     Advanced Care Planning:  - Has an advanced directive?: no    - Has a durable medical POA?: no    - ACP document given to patient?: no      Review of Systems   Constitutional:  Negative for activity change, chills, fatigue and fever.   HENT:  Negative for congestion.    Eyes:  Positive for visual disturbance (Wears glasses). Negative for discharge.   Respiratory:  Negative for cough, chest tightness and shortness of breath.    Cardiovascular:  Negative for chest pain, palpitations and leg swelling.   Gastrointestinal:  Negative for abdominal pain, blood in stool, constipation, diarrhea, nausea and vomiting.   Endocrine: Negative for polydipsia, polyphagia and polyuria.   Genitourinary:  Negative for difficulty urinating.   Musculoskeletal:  Positive for arthralgias. Negative for myalgias.   Skin:  Negative for rash.   Allergic/Immunologic: Negative for immunocompromised state.   Neurological:  Negative for dizziness, syncope, weakness, light-headedness and headaches.   Hematological:  Negative for adenopathy. Does not bruise/bleed easily.   Psychiatric/Behavioral:  Negative for dysphoric mood, sleep disturbance and suicidal ideas. The patient is not nervous/anxious.      Medical History Reviewed by provider this encounter:  Tobacco  Allergies  Meds  Problems  Med Hx  Surg Hx  Fam Hx     .  Current Outpatient Medications on File Prior to Visit   Medication Sig Dispense Refill    acetaminophen (TYLENOL) 500 mg tablet Take 500 mg by mouth every 6 (six) hours as needed for mild pain      albuterol (PROVENTIL HFA,VENTOLIN HFA) 90 mcg/act inhaler Inhale 2 puffs every 6 (six) hours as needed for wheezing 18 g 0    atorvastatin (LIPITOR) 20 mg tablet TAKE 1 TABLET BY MOUTH EVERY DAY 90 tablet 1     Azelastine HCl 137 MCG/SPRAY SOLN INSTILL 1 SPRAY IN EACH NOSTRIL TWICE A DAY AS DIRECTED 90 mL 1    clobetasol (TEMOVATE) 0.05 % ointment Apply topically 2 (two) times a day 30 g 3    Flaxseed, Linseed, (FLAX SEED OIL PO) Take by mouth      multivitamin (THERAGRAN) TABS Take 1 tablet by mouth daily      [DISCONTINUED] methocarbamol (ROBAXIN) 500 mg tablet Take 1 tablet (500 mg total) by mouth daily at bedtime (Patient not taking: Reported on 9/5/2024) 10 tablet 0    [DISCONTINUED] Omega-3 Fatty Acids (fish oil) 1,000 mg Take 1 capsule (1,000 mg total) by mouth daily (Patient not taking: Reported on 11/5/2024) 30 capsule 0    [DISCONTINUED] venlafaxine (EFFEXOR-XR) 37.5 mg 24 hr capsule Take 1 capsule (37.5 mg total) by mouth daily (Patient not taking: Reported on 6/4/2024)       No current facility-administered medications on file prior to visit.      Social History     Tobacco Use    Smoking status: Former     Types: Cigarettes    Smokeless tobacco: Never    Tobacco comments:     Pt quit 2008   Vaping Use    Vaping status: Never Used   Substance and Sexual Activity    Alcohol use: Yes     Comment: BEER - DESCRIBES AS INFREQUENT    Drug use: Yes     Types: Marijuana     Comment: Medical marijuana    Sexual activity: Yes     Partners: Female     Comment: did not ask       Objective     There were no vitals taken for this visit.    Physical Exam  Vitals and nursing note reviewed.   Constitutional:       General: He is not in acute distress.     Appearance: Normal appearance. He is well-developed. He is not ill-appearing.      Comments: Developed, well-nourished 54-year-old male appearing younger than stated age in no acute distress.   HENT:      Head: Normocephalic and atraumatic.      Right Ear: Tympanic membrane, ear canal and external ear normal.      Left Ear: Tympanic membrane, ear canal and external ear normal.      Nose: Nose normal.      Comments: Mild increased clear mucoid drainage     Mouth/Throat:       Mouth: Mucous membranes are moist.      Pharynx: Oropharynx is clear. No oropharyngeal exudate or posterior oropharyngeal erythema.   Eyes:      Extraocular Movements: Extraocular movements intact.      Conjunctiva/sclera: Conjunctivae normal.      Pupils: Pupils are equal, round, and reactive to light.   Neck:      Thyroid: No thyromegaly.      Vascular: No carotid bruit or JVD.   Cardiovascular:      Rate and Rhythm: Normal rate and regular rhythm.      Heart sounds: No murmur heard.  Pulmonary:      Effort: Pulmonary effort is normal. No respiratory distress.      Breath sounds: Normal breath sounds.   Chest:      Chest wall: No tenderness.   Abdominal:      General: Abdomen is flat. Bowel sounds are normal. There is no distension.      Palpations: Abdomen is soft. There is no hepatomegaly, splenomegaly or mass.      Tenderness: There is no abdominal tenderness. There is no right CVA tenderness, left CVA tenderness, guarding or rebound.      Hernia: No hernia is present.   Genitourinary:     Comments: Circumcised adult male.  No lesions of the phallus, scrotum, or testes.  No inguinal hernias.    Musculoskeletal:         General: No swelling. Normal range of motion.      Cervical back: Normal range of motion and neck supple. Tenderness (Bilateral trapezii) present.      Right lower leg: No edema.      Left lower leg: No edema.   Lymphadenopathy:      Cervical: No cervical adenopathy.   Skin:     General: Skin is warm and dry.      Findings: No rash.   Neurological:      General: No focal deficit present.      Mental Status: He is alert and oriented to person, place, and time. Mental status is at baseline.      Cranial Nerves: No cranial nerve deficit.      Sensory: No sensory deficit.      Motor: No weakness.      Coordination: Coordination normal.      Gait: Gait normal.      Deep Tendon Reflexes: Reflexes normal.   Psychiatric:         Mood and Affect: Mood normal.         Behavior: Behavior normal.          Thought Content: Thought content normal.         Judgment: Judgment normal.

## 2024-11-13 NOTE — PATIENT INSTRUCTIONS
"General Medical exam remains good.  Continue current medications.  Plan follow-up in 6 months with repeat labs for that visit, follow-up sooner as needed.  Recommend you use your inhaler for the next couple weeks to break up wheezing.          Patient Education     Routine physical for adults   The Basics   Written by the doctors and editors at [facility]   What is a physical? -- A physical is a routine visit, or \"check-up,\" with your doctor. You might also hear it called a \"wellness visit\" or \"preventive visit.\"  During each visit, the doctor will:   Ask about your physical and mental health   Ask about your habits, behaviors, and lifestyle   Do an exam   Give you vaccines if needed   Talk to you about any medicines you take   Give advice about your health   Answer your questions  Getting regular check-ups is an important part of taking care of your health. It can help your doctor find and treat any problems you have. But it's also important for preventing health problems.  A routine physical is different from a \"sick visit.\" A sick visit is when you see a doctor because of a health concern or problem. Since physicals are scheduled ahead of time, you can think about what you want to ask the doctor.  How often should I get a physical? -- It depends on your age and health. In general, for people age 21 years and older:   If you are younger than 50 years, you might be able to get a physical every 3 years.   If you are 50 years or older, your doctor might recommend a physical every year.  If you have an ongoing health condition, like diabetes or high blood pressure, your doctor will probably want to see you more often.  What happens during a physical? -- In general, each visit will include:   Physical exam - The doctor or nurse will check your height, weight, heart rate, and blood pressure. They will also look at your eyes and ears. They will ask about how you are feeling and whether you have any symptoms that bother " "you.   Medicines - It's a good idea to bring a list of all the medicines you take to each doctor visit. Your doctor will talk to you about your medicines and answer any questions. Tell them if you are having any side effects that bother you. You should also tell them if you are having trouble paying for any of your medicines.   Habits and behaviors - This includes:   Your diet   Your exercise habits   Whether you smoke, drink alcohol, or use drugs   Whether you are sexually active   Whether you feel safe at home  Your doctor will talk to you about things you can do to improve your health and lower your risk of health problems. They will also offer help and support. For example, if you want to quit smoking, they can give you advice and might prescribe medicines. If you want to improve your diet or get more physical activity, they can help you with this, too.   Lab tests, if needed - The tests you get will depend on your age and situation. For example, your doctor might want to check your:   Cholesterol   Blood sugar   Iron level   Vaccines - The recommended vaccines will depend on your age, health, and what vaccines you already had. Vaccines are very important because they can prevent certain serious or deadly infections.   Discussion of screening - \"Screening\" means checking for diseases or other health problems before they cause symptoms. Your doctor can recommend screening based on your age, risk, and preferences. This might include tests to check for:   Cancer, such as breast, prostate, cervical, ovarian, colorectal, prostate, lung, or skin cancer   Sexually transmitted infections, such as chlamydia and gonorrhea   Mental health conditions like depression and anxiety  Your doctor will talk to you about the different types of screening tests. They can help you decide which screenings to have. They can also explain what the results might mean.   Answering questions - The physical is a good time to ask the doctor or " nurse questions about your health. If needed, they can refer you to other doctors or specialists, too.  Adults older than 65 years often need other care, too. As you get older, your doctor will talk to you about:   How to prevent falling at home   Hearing or vision tests   Memory testing   How to take your medicines safely   Making sure that you have the help and support you need at home  All topics are updated as new evidence becomes available and our peer review process is complete.  This topic retrieved from [employer] on: May 02, 2024.  Topic 743169 Version 1.0  Release: 32.4.3 - C32.122  © 2024 UpToDate, Inc. and/or its affiliates. All rights reserved.  Consumer Information Use and Disclaimer   Disclaimer: This generalized information is a limited summary of diagnosis, treatment, and/or medication information. It is not meant to be comprehensive and should be used as a tool to help the user understand and/or assess potential diagnostic and treatment options. It does NOT include all information about conditions, treatments, medications, side effects, or risks that may apply to a specific patient. It is not intended to be medical advice or a substitute for the medical advice, diagnosis, or treatment of a health care provider based on the health care provider's examination and assessment of a patient's specific and unique circumstances. Patients must speak with a health care provider for complete information about their health, medical questions, and treatment options, including any risks or benefits regarding use of medications. This information does not endorse any treatments or medications as safe, effective, or approved for treating a specific patient. UpToDate, Inc. and its affiliates disclaim any warranty or liability relating to this information or the use thereof.The use of this information is governed by the Terms of Use, available at https://www.woltersWhoAPIuwer.com/en/know/clinical-effectiveness-terms. 2024©  [employer] and its affiliates and/or licensors. All rights reserved.  Copyright   © 2024 [employer] and/or its affiliates. All rights reserved.

## 2024-11-13 NOTE — ASSESSMENT & PLAN NOTE
Good control on atorvastatin.  Patient will continue this medication.  We will continue to monitor.  Orders:    Comprehensive metabolic panel; Future    Lipid panel; Future

## 2024-11-13 NOTE — PSYCH
Behavioral Health Psychotherapy Progress Note    Psychotherapy Provided: Individual Psychotherapy     1. PTSD (post-traumatic stress disorder)        2. Depression, unspecified depression type              Visit start and stop times:    11/8/24  Start Time: 0900  Stop Time: 0945  Total Visit Time: 45 minutes  Virtual Regular Visit  Therapist met w/pt for individual session.  Symptoms include: worry, frustration, sadness.  He stated that in general it has been a pretty good month he just wants his shoulder pain to go away.  He stated that it has been improving but he is being impatient w/the results. He shared that he has been going to physical therapy which has been helpful but still can't do a lot of things he would usually do around the house which is creating an increase in his MH symptoms.  Pt stated that being active does help him emotionally.  However, pt was able to shared that he has been attending a group every week where he can participate/play games he enjoys with others which is helping him.    Verification of patient location:    Patient is located at Home in the following state in which I hold an active license [place]      Assessment/Plan: f/u in a month    Problem List Items Addressed This Visit       Depression    PTSD (post-traumatic stress disorder) - Primary                Reason for visit is No chief complaint on file.       Encounter provider Jaycee Schaffer      Recent Visits  Date Type Provider Dept   11/08/24 Telemedicine Jaycee Schaffer [facility] [address]   Showing recent visits within past 7 days and meeting all other requirements  Future Appointments  No visits were found meeting these conditions.  Showing future appointments within next 150 days and meeting all other requirements       The patient was identified by name and date of birth. Ruben Ratliff was informed that this is a telemedicine visit and that the visit is being conducted throughthe Epic Embedded  platform. He agrees to proceed..  My office door was closed. No one else was in the room.  He acknowledged consent and understanding of privacy and security of the video platform. The patient has agreed to participate and understands they can discontinue the visit at any time.    Patient is aware this is a billable service.     Subjective  Ruben Ratliff is a 54 y.o. male  .      HPI     Past Medical History:   Diagnosis Date    Alcohol abuse     Bilateral inguinal hernia 02/22/2021    Bronchitis     Cough     Pt states he has a morning cough from some sinus drainage- pt reports having a cold week of 2/8/21    COVID-19 12/2020    Pt states he only lost taste and smell- no other symptoms reported.    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    Tobacco abuse        Past Surgical History:   Procedure Laterality Date    FACIAL/NECK BIOPSY Left 8/4/2020    Procedure: EXCISION CYST NECK;  Surgeon: Guy Capone MD;  Location: [facility];  Service: Plastics    FLAP LOCAL HEAD / NECK Left 8/4/2020    Procedure: COMPLEX CLOSURE VS ADJACENT TISSUE REARRANGEMENT NECK;  Surgeon: Guy Capone MD;  Location: [facility];  Service: Plastics    HERNIA REPAIR      HERNIA REPAIR Bilateral 2/22/2021    Procedure: REPAIR HERNIA INGUINAL, LAPAROSCOPIC BILATERAL WITH MESH;  Surgeon: Sebas Ceja MD;  Location: [facility];  Service: General    PYLOROMYOTOMY      ROTATOR CUFF REPAIR Left     times 2 per pt    TONSILLECTOMY AND ADENOIDECTOMY      TOOTH EXTRACTION         Current Outpatient Medications   Medication Sig Dispense Refill    acetaminophen (TYLENOL) 500 mg tablet Take 500 mg by  mouth every 6 (six) hours as needed for mild pain      albuterol (PROVENTIL HFA,VENTOLIN HFA) 90 mcg/act inhaler Inhale 2 puffs every 6 (six) hours as needed for wheezing (Patient taking differently: Inhale 2 puffs every 6 (six) hours as needed for wheezing PRN) 18 g 0    atorvastatin (LIPITOR) 20 mg tablet TAKE 1 TABLET BY MOUTH EVERY DAY 90 tablet 1    Azelastine HCl 137 MCG/SPRAY SOLN INSTILL 1 SPRAY IN EACH NOSTRIL TWICE A DAY AS DIRECTED 90 mL 1    clobetasol (TEMOVATE) 0.05 % ointment Apply topically 2 (two) times a day (Patient taking differently: Apply topically if needed) 30 g 3    Flaxseed, Linseed, (FLAX SEED OIL PO) Take by mouth      methocarbamol (ROBAXIN) 500 mg tablet Take 1 tablet (500 mg total) by mouth daily at bedtime (Patient not taking: Reported on 9/5/2024) 10 tablet 0    multivitamin (THERAGRAN) TABS Take 1 tablet by mouth daily      Omega-3 Fatty Acids (fish oil) 1,000 mg Take 1 capsule (1,000 mg total) by mouth daily (Patient not taking: Reported on 11/5/2024) 30 capsule 0    venlafaxine (EFFEXOR-XR) 37.5 mg 24 hr capsule Take 1 capsule (37.5 mg total) by mouth daily (Patient not taking: Reported on 6/4/2024)       No current facility-administered medications for this visit.        Allergies   Allergen Reactions    Celecoxib Anaphylaxis    Diclofenac Shortness Of Breath    Flavoring Agent - Food Allergy Anaphylaxis    Corn Oil - Food Allergy - Food Allergy Other (See Comments)     Clinically insignificant    Starch GI Intolerance and Other (See Comments)       Review of Systems    Video Exam    There were no vitals filed for this visit.    Physical Exam Pt denied any SI/HI/Ah/VH

## 2024-11-14 ENCOUNTER — OFFICE VISIT (OUTPATIENT)
Dept: PHYSICAL THERAPY | Facility: CLINIC | Age: 54
End: 2024-11-14
Payer: COMMERCIAL

## 2024-11-14 DIAGNOSIS — M75.41 IMPINGEMENT SYNDROME OF RIGHT SHOULDER: Primary | ICD-10-CM

## 2024-11-14 DIAGNOSIS — S46.211S BICEPS STRAIN, RIGHT, SEQUELA: ICD-10-CM

## 2024-11-14 DIAGNOSIS — S46.011S ROTATOR CUFF STRAIN, RIGHT, SEQUELA: ICD-10-CM

## 2024-11-14 PROCEDURE — 97110 THERAPEUTIC EXERCISES: CPT

## 2024-11-14 PROCEDURE — 97530 THERAPEUTIC ACTIVITIES: CPT

## 2024-11-14 NOTE — PROGRESS NOTES
Daily Note     Today's date: 2024  Patient name: Ruben Ratliff  : 1970  MRN: [id number]  Referring provider: Abel Venegas MD  Dx:   Encounter Diagnosis     ICD-10-CM    1. Impingement syndrome of right shoulder  M75.41       2. Biceps strain, right, sequela  S46.211S       3. Rotator cuff strain, right, sequela  S46.011S               Start Time: 0845  Stop Time: 930  Total time in clinic (min): 45 minutes    Subjective: Pt reports he was not able to complete his DB HEP due to pain and fatigue but doesn't have pain today.       Objective: See treatment diary below      Assessment: Pt shows decreased strength in RTC musclularture w. Poor stabilization w. Pertubation in OH activity.   Performs charted interventions within tolerance.  Pt would benefit from continued PT.      Plan: Continue per plan of care.      Precautions:   Patient Active Problem List   Diagnosis    Cervical somatic dysfunction    Depression    Disc degeneration, lumbar    Hyperlipidemia    Left hand pain    Skin cyst    PTSD (post-traumatic stress disorder)    Carpal tunnel syndrome on left    Dyshydrosis    Musculoskeletal pain of extremity    Arm numbness       POC expires Unit limit Auth Expiration date PT/OT/ST + Visit Limit?    4 2025 45                           Visit/Unit Tracking  AUTH Status:  Date 10/3 10/8 10/17 10/24 10/31 11/7 11/14         Used 1 2 3 4 5 6          Remaining  44 43 [phone] 41 40            Access Code: [email or web address]     FOTO 10/3     11/7       Manuals 10/3 10/8 10/17 10/24 10/31 11/7 11/14      PROM R   HA AF AF                                               Neuro Re-Ed             D2 flexion    Supine 2x10 GMB Supine 2x10 GMB Supine 2x10 GMB       Wall ball circles    RMB 2x20 RMB 2x20 GMB 2x20 RMB 3x20s                                                                       Ther Ex             Bicep curl Neutral /pron/sup  2x5  10#   x8 8# ea  2x5 8#    10# 2x5   10# 2x5 10# 2x8 10# 2x10 10# 2x10     "   Shoulder ER/IR  3x12 YTB 3x12 YTB 3x12 GTB 3x12 GTB 3x8   4# Jan       Tricep extension             Wrist alan/sup  2x15 RTB 2x15  GTB 2x15 GTB         Chest press   3x10 10#  3x10 10# 3x12 10# 3x12 10# 3x12 10# 3x12 10#       Empty cups     2# 2x10 2#   2x10        Swimmers       2x5 2#  2x5 2#       Chest flys       Incline 5#   3x10       Ther Activity             Rows   5\"/5\" tempo   3x10 Black TB 5\"/5\" tempo   3x10 Black TB 5\"/5\" temp 3x10 blk TB 5\"/5\" tempo 3x10 blk TB 5\"/5\" tempo 3x10   15# 5\"/5\" tempo 3x10   15#      Banded OH press        RTB 3x10 6#       UBE  2'/2' 2'/2' 2'/2' 3'/3' 3'/3' 3'/3'      education AL- POC and anatomy AL  AF AF        Gait Training                                       Modalities                                                  "

## 2024-11-21 ENCOUNTER — OFFICE VISIT (OUTPATIENT)
Dept: PHYSICAL THERAPY | Facility: CLINIC | Age: 54
End: 2024-11-21
Payer: COMMERCIAL

## 2024-11-21 DIAGNOSIS — S46.011S ROTATOR CUFF STRAIN, RIGHT, SEQUELA: ICD-10-CM

## 2024-11-21 DIAGNOSIS — S46.211S BICEPS STRAIN, RIGHT, SEQUELA: ICD-10-CM

## 2024-11-21 DIAGNOSIS — M75.41 IMPINGEMENT SYNDROME OF RIGHT SHOULDER: Primary | ICD-10-CM

## 2024-11-21 PROCEDURE — 97110 THERAPEUTIC EXERCISES: CPT

## 2024-11-21 PROCEDURE — 97112 NEUROMUSCULAR REEDUCATION: CPT

## 2024-11-21 PROCEDURE — 97530 THERAPEUTIC ACTIVITIES: CPT

## 2024-11-21 NOTE — PROGRESS NOTES
Daily Note     Today's date: 2024  Patient name: Ruben Ratliff  : 1970  MRN: [id number]  Referring provider: Abel Venegas MD  Dx:   Encounter Diagnosis     ICD-10-CM    1. Impingement syndrome of right shoulder  M75.41       2. Biceps strain, right, sequela  S46.211S       3. Rotator cuff strain, right, sequela  S46.011S                      Subjective: Pt reports rolling the hose made his R shoulder sore and he still has difficulty making the bed.        Objective: See treatment diary below      Assessment: Continued RTC strength deficits.  Was challenged with 90/90 external rotation strengthening this visit with discomfort reported initially but this improved with continued repetition.  Patient remains challenged with scapular strengthening.  Pt would benefit from continued PT.      Plan: Continue per plan of care.      Precautions:   Patient Active Problem List   Diagnosis    Cervical somatic dysfunction    Depression    Disc degeneration, lumbar    Hyperlipidemia    Left hand pain    Skin cyst    PTSD (post-traumatic stress disorder)    Carpal tunnel syndrome on left    Dyshydrosis    Musculoskeletal pain of extremity    Arm numbness       POC expires Unit limit Auth Expiration date PT/OT/ST + Visit Limit?    4 2025 45                           Visit/Unit Tracking  AUTH Status:  Date 10/3 10/8 10/17 10/24 10/31 11/7 11/14 11/21        Used 1 2 3 4 5 6 7 8        Remaining  44 43 42 41 40            Access Code: [email or web address]     FOTO 10/3     11/7       Manuals 10/3 10/8 10/17 10/24 10/31 11/7 11/14 11/21     PROM R   HA AF AF                                               Neuro Re-Ed             D2 flexion    Supine 2x10 GMB Supine 2x10 GMB Supine 2x10 GMB       Wall ball circles    RMB 2x20 RMB 2x20 GMB 2x20 RMB 3x20s RMB 3x20     90/90 ER        RTB 2x10                                                         Ther Ex             Bicep curl Neutral /pron/sup  2x5  10#   x8 8# ea  2x5 8#  "   10# 2x5   10# 2x5 10# 2x8 10# 2x10 10# 2x10       Shoulder ER/IR  3x12 YTB 3x12 YTB 3x12 GTB 3x12 GTB 3x8   4# Jan  3x10 4# keis     Tricep extension             Wrist alan/sup  2x15 RTB 2x15  GTB 2x15 GTB         Chest press   3x10 10#  3x10 10# 3x12 10# 3x12 10# 3x12 10# 3x12 10#  3x9 15#     Empty cups     2# 2x10 2#   2x10        Swimmers       2x5 2#  2x5 2#  2x5 2#     Chest flys       Incline 5#   3x10  Incline 5# 3x10     Ther Activity             Rows   5\"/5\" tempo   3x10 Black TB 5\"/5\" tempo   3x10 Black TB 5\"/5\" temp 3x10 blk TB 5\"/5\" tempo 3x10 blk TB 5\"/5\" tempo 3x10   15# 5\"/5\" tempo 3x10   15# 5\"/5\" tempo 3x10 18#     Banded OH press        RTB 3x10 6#  RTB 3x10 6#     UBE  2'/2' 2'/2' 2'/2' 3'/3' 3'/3' 3'/3' 3'/3'     education AL- POC and anatomy AL  AF AF        Gait Training                                       Modalities                                                    "

## 2024-12-01 DIAGNOSIS — E78.2 MIXED HYPERLIPIDEMIA: ICD-10-CM

## 2024-12-03 RX ORDER — ATORVASTATIN CALCIUM 20 MG/1
20 TABLET, FILM COATED ORAL DAILY
Qty: 90 TABLET | Refills: 1 | Status: SHIPPED | OUTPATIENT
Start: 2024-12-03

## 2024-12-09 DIAGNOSIS — J20.9 ACUTE BRONCHITIS WITH BRONCHOSPASM: ICD-10-CM

## 2024-12-09 RX ORDER — ALBUTEROL SULFATE 90 UG/1
2 INHALANT RESPIRATORY (INHALATION) EVERY 6 HOURS PRN
Qty: 54 G | Refills: 1 | Status: SHIPPED | OUTPATIENT
Start: 2024-12-09

## 2024-12-09 NOTE — TELEPHONE ENCOUNTER
Reason for call:   [x] Refill   [] Prior Auth  [x] Other: patent had appt with dr Dillard 11.13.2024 he advised to continue using inhaler - needs rx sent in    Office:   [x] PCP/Provider - Uli dillard/ [facility]  [] Specialty/Provider -     Medication:   albuterol (PROVENTIL HFA,VENTOLIN HFA) 90 mcg/act inhaler    Dose/Frequency: Inhale 2 puffs every 6 (six) hours as needed for wheezing     Quantity: 54    Pharmacy:   [facility]pharmacy #[id number] - [address]       Does the patient have enough for 3 days?   [] Yes   [x] No - Send as HP to POD

## 2024-12-10 ENCOUNTER — TELEMEDICINE (OUTPATIENT)
Dept: BEHAVIORAL/MENTAL HEALTH CLINIC | Facility: CLINIC | Age: 54
End: 2024-12-10
Payer: COMMERCIAL

## 2024-12-10 DIAGNOSIS — F32.A DEPRESSION, UNSPECIFIED DEPRESSION TYPE: ICD-10-CM

## 2024-12-10 DIAGNOSIS — F43.10 PTSD (POST-TRAUMATIC STRESS DISORDER): Primary | ICD-10-CM

## 2024-12-10 PROCEDURE — 90834 PSYTX W PT 45 MINUTES: CPT | Performed by: SOCIAL WORKER

## 2024-12-11 NOTE — PSYCH
"Behavioral Health Psychotherapy Progress Note    Psychotherapy Provided: Individual Psychotherapy     1. PTSD (post-traumatic stress disorder)        2. Depression, unspecified depression type                  Behavioral Health Treatment Plan and Discharge Planning: Marty Ratliff is aware of and agrees to continue to work on their treatment plan. They have identified and are working toward their discharge goals. yes      Visit start and stop times:    12/10/24  Start Time: 0900  Stop Time: 0945  Total Visit Time: 45 minutes  Virtual Regular Visit  Therapist met w/pt for individual session.  Symptoms include:worry and frustration.  Pt stated that he had a good trip with his family to [place] for Thanksgiving week.  He stated that he didn't \"hold\" them back b/c of his shoulder and felt a sense of accomplishment.  He stated that it has been sore but in general he is able to do a lot more with it.  he stated upon coming back, his son was suspended.  Pt stated that he and his wife were on the same page in regards to their son's suspension which was also a positive due to previous experiences with parenting.      Verification of patient location:    Patient is located at Home in the following state in which I hold an active license [place]      Assessment/Plan:    Problem List Items Addressed This Visit       Depression    PTSD (post-traumatic stress disorder) - Primary         Reason for visit is No chief complaint on file.       Encounter provider Jaycee Schaffer      Recent Visits  Date Type Provider Dept   12/10/24 Telemedicine Jaycee Schaffer [facility] [address]   Showing recent visits within past 7 days and meeting all other requirements  Future Appointments  No visits were found meeting these conditions.  Showing future appointments within next 150 days and meeting all other requirements       The patient was identified by name and date of birth. Ruben Ratliff was informed that this is a " telemedicine visit and that the visit is being conducted throughthe Epic Embedded platform. He agrees to proceed..  My office door was closed. No one else was in the room.  He acknowledged consent and understanding of privacy and security of the video platform. The patient has agreed to participate and understands they can discontinue the visit at any time.    Patient is aware this is a billable service.     Subjective  Ruben Ratliff is a 54 y.o. male  .      HPI     Past Medical History:   Diagnosis Date    Alcohol abuse     Bilateral inguinal hernia 02/22/2021    Bronchitis     Cough     Pt states he has a morning cough from some sinus drainage- pt reports having a cold week of 2/8/21    COVID-19 12/2020    Pt states he only lost taste and smell- no other symptoms reported.    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    Tobacco abuse        Past Surgical History:   Procedure Laterality Date    FACIAL/NECK BIOPSY Left 8/4/2020    Procedure: EXCISION CYST NECK;  Surgeon: Guy Capone MD;  Location: [facility];  Service: Plastics    FLAP LOCAL HEAD / NECK Left 8/4/2020    Procedure: COMPLEX CLOSURE VS ADJACENT TISSUE REARRANGEMENT NECK;  Surgeon: Guy Capone MD;  Location[facility];  Service: Plastics    HERNIA REPAIR      HERNIA REPAIR Bilateral 2/22/2021    Procedure: REPAIR HERNIA INGUINAL, LAPAROSCOPIC BILATERAL WITH MESH;  Surgeon: Sebas Ceja MD;  Location: [facility];  Service: General    PYLOROMYOTOMY      ROTATOR CUFF REPAIR Left     times 2 per pt    TONSILLECTOMY AND ADENOIDECTOMY      TOOTH EXTRACTION         Current Outpatient Medications    Medication Sig Dispense Refill    acetaminophen (TYLENOL) 500 mg tablet Take 500 mg by mouth every 6 (six) hours as needed for mild pain      albuterol (PROVENTIL HFA,VENTOLIN HFA) 90 mcg/act inhaler Inhale 2 puffs every 6 (six) hours as needed for wheezing 54 g 1    atorvastatin (LIPITOR) 20 mg tablet TAKE 1 TABLET BY MOUTH EVERY DAY 90 tablet 1    Azelastine HCl 137 MCG/SPRAY SOLN INSTILL 1 SPRAY IN EACH NOSTRIL TWICE A DAY AS DIRECTED 90 mL 1    clobetasol (TEMOVATE) 0.05 % ointment Apply topically 2 (two) times a day 30 g 3    Flaxseed, Linseed, (FLAX SEED OIL PO) Take by mouth      multivitamin (THERAGRAN) TABS Take 1 tablet by mouth daily       No current facility-administered medications for this visit.        Allergies   Allergen Reactions    Celecoxib Anaphylaxis    Diclofenac Shortness Of Breath    Flavoring Agent - Food Allergy Anaphylaxis    Corn Oil - Food Allergy - Food Allergy Other (See Comments)     Clinically insignificant    Starch GI Intolerance and Other (See Comments)       Review of Systems    Video Exam    There were no vitals filed for this visit.    Physical Exam

## 2024-12-17 ENCOUNTER — OFFICE VISIT (OUTPATIENT)
Dept: OBGYN CLINIC | Facility: CLINIC | Age: 54
End: 2024-12-17
Payer: COMMERCIAL

## 2024-12-17 VITALS
HEIGHT: 67 IN | BODY MASS INDEX: 30.13 KG/M2 | SYSTOLIC BLOOD PRESSURE: 122 MMHG | DIASTOLIC BLOOD PRESSURE: 80 MMHG | HEART RATE: 63 BPM | RESPIRATION RATE: 18 BRPM | WEIGHT: 192 LBS

## 2024-12-17 DIAGNOSIS — M75.41 IMPINGEMENT SYNDROME OF RIGHT SHOULDER: Primary | ICD-10-CM

## 2024-12-17 PROCEDURE — 99213 OFFICE O/P EST LOW 20 MIN: CPT | Performed by: STUDENT IN AN ORGANIZED HEALTH CARE EDUCATION/TRAINING PROGRAM

## 2024-12-17 RX ORDER — MELOXICAM 7.5 MG/1
7.5 TABLET ORAL DAILY
Qty: 30 TABLET | Refills: 2 | Status: SHIPPED | OUTPATIENT
Start: 2024-12-17

## 2024-12-17 NOTE — PROGRESS NOTES
Patient Name:  Ruben Ratliff  MRN:  [id number]    Assessment & Plan     1. Impingement syndrome of right shoulder  -     meloxicam (Mobic) 7.5 mg tablet; Take 1 tablet (7.5 mg total) by mouth daily Instructed to go to the ED if he experiences any shortness of breathing, difficulty breathing, chest pain, or other new symptoms.  -     Ambulatory Referral to Orthopedic Surgery; Future    Right shoulder Impingement  Referral placed to non operative sports medicine team for ultrasound guided bicep tendon sheath injection  Discussed oral medications. Recommended use of meloxicam and tylenol as needed.  Discussed injections. Referral placed to non op sports medicine as listed above.  Discussed rehabilitation. Recommend HEP for periscapular strengthening.  Follow-up with non op sports medicine for injection.    Chief Complaint     Right shoulder pain    History of the Present Illness     Ruben Ratliff is a right HD 54 y.o. male presenting for follow-up of right shoulder pain. He notes that pain was improving significantly, but he went on a skiing trip and noted pain returned following the trip, which he believes is from pushing luggage around at the airport and using poles on the trails. He continues to take tylenol intermittently with minimal relief. He continues to have intermittent pain worst with overhead movement of the right upper extremity such as when he was lifting a box off of his artificial belia tree. Continues to deny numbness or tingling in the distal extremity.    Review of Systems     Review of Systems   Constitutional:  Negative for chills and fever.   HENT:  Negative for ear pain and sore throat.    Eyes:  Negative for pain and visual disturbance.   Respiratory:  Negative for cough and shortness of breath.    Cardiovascular:  Negative for chest pain and palpitations.   Gastrointestinal:  Negative for abdominal pain and vomiting.   Genitourinary:  Negative for dysuria and hematuria.  "  Musculoskeletal:  Positive for arthralgias. Negative for back pain.   Skin:  Negative for color change and rash.   Neurological:  Negative for seizures and syncope.   All other systems reviewed and are negative.      Physical Exam     /80   Pulse 63   Resp 18   Ht 5' 7\" (1.702 m)   Wt 87.1 kg (192 lb)   BMI 30.07 kg/m²     Right Shoulder exam  No bruising, swelling or deformity  NonTTP cervical spine, clavicle, AC joint, acromion, scapular spine, posterior joint line, deltoid, anterior joint line, bicipital groove  Active ROM  Forward flexion: 170  External rotation in adduction: 70  Internal rotation: thoracolumbar  Strength Testin/5 with Nirmal  5/5 ER in adduction  Provocative maneuvers:  +: gant, speeds  -: cross body adduction, yergasons, obriens  SILT C5-T1  2+ Radial pulse, symmetric with contralateral       Eyes:  Anicteric sclerae.  Neck:  Supple.  Lungs:  Normal respiratory effort.  Cardiovascular:  Capillary refill is less than 2 seconds.  Skin:  Intact without erythema.  Neurologic:  Sensation grossly intact to light touch.  Psychiatric:  Mood and affect are appropriate.    Data Review     I have personally reviewed pertinent films: no new images reviewed at today's visit    Scribe Attestation      I,:  Roderick Alfonso PA-C am acting as a scribe while in the presence of the attending physician.:       I,:  Abel Venegas MD personally performed the services described in this documentation    as scribed in my presence.:             "

## 2024-12-18 ENCOUNTER — PROCEDURE VISIT (OUTPATIENT)
Dept: OBGYN CLINIC | Facility: CLINIC | Age: 54
End: 2024-12-18
Payer: COMMERCIAL

## 2024-12-18 ENCOUNTER — TELEPHONE (OUTPATIENT)
Age: 54
End: 2024-12-18

## 2024-12-18 VITALS
RESPIRATION RATE: 18 BRPM | DIASTOLIC BLOOD PRESSURE: 67 MMHG | BODY MASS INDEX: 29.63 KG/M2 | SYSTOLIC BLOOD PRESSURE: 119 MMHG | TEMPERATURE: 98.5 F | WEIGHT: 188.8 LBS | OXYGEN SATURATION: 98 % | HEART RATE: 74 BPM | HEIGHT: 67 IN

## 2024-12-18 DIAGNOSIS — M75.21 BICEPS TENDONITIS ON RIGHT: Primary | ICD-10-CM

## 2024-12-18 PROCEDURE — 20606 DRAIN/INJ JOINT/BURSA W/US: CPT | Performed by: FAMILY MEDICINE

## 2024-12-18 PROCEDURE — 99203 OFFICE O/P NEW LOW 30 MIN: CPT | Performed by: FAMILY MEDICINE

## 2024-12-18 RX ORDER — BUPIVACAINE HYDROCHLORIDE 2.5 MG/ML
1 INJECTION, SOLUTION INFILTRATION; PERINEURAL
Status: COMPLETED | OUTPATIENT
Start: 2024-12-18 | End: 2024-12-18

## 2024-12-18 RX ORDER — TRIAMCINOLONE ACETONIDE 40 MG/ML
40 INJECTION, SUSPENSION INTRA-ARTICULAR; INTRAMUSCULAR
Status: COMPLETED | OUTPATIENT
Start: 2024-12-18 | End: 2024-12-18

## 2024-12-18 RX ADMIN — TRIAMCINOLONE ACETONIDE 40 MG: 40 INJECTION, SUSPENSION INTRA-ARTICULAR; INTRAMUSCULAR at 08:30

## 2024-12-18 RX ADMIN — BUPIVACAINE HYDROCHLORIDE 1 ML: 2.5 INJECTION, SOLUTION INFILTRATION; PERINEURAL at 08:30

## 2024-12-18 NOTE — PROGRESS NOTES
Medium joint arthrocentesis  Universal Protocol:  Consent: Verbal consent obtained.  Risks and benefits: risks, benefits and alternatives were discussed  Consent given by: patient  Patient identity confirmed: verbally with patient  Supporting Documentation  Indications: pain   Procedure Details  Location: shoulder - Acromioclavicular joint: Right proximal bicep.  Preparation: Patient was prepped and draped in the usual sterile fashion  Needle size: 25 G  Ultrasound guidance: yes  Approach: anterolateral  Medications administered: 1 mL bupivacaine 0.25 %; 40 mg triamcinolone acetonide 40 mg/mL    Patient tolerance: patient tolerated the procedure well with no immediate complications    Risks and benefits of CSI were discussed with patient extensively. Risks were highlighted which included but were not limited to infection, pain, local site swelling, and chance that injection may not be effective. Patient was also counseled regarding glucose elevation days after receiving CSI and to be mindful of diet and check sugars daily. Patient agreeable to proceed with CSI after counseling.

## 2024-12-18 NOTE — PROGRESS NOTES
"     Subjective:  Chief Complaint   Patient presents with    Right Shoulder - Clicking, Numbness, Pain    Right Arm - Numbness, Pain       Ruben Ratliff is a 54 y.o. male presenting today for consultation for consideration for ultrasound-guided proximal bicep sheath injection.  Patient has been following with Dr. Venegas and has completed an MRI of the right shoulder which revealed moderate degree of proximal bicep tendinitis and infraspinatus tendinosis with low-grade partial-thickness tear.  Patient has been completing physical therapy with some improvement.        The following portions of the patient's history were reviewed and updated as appropriate:   Medical history  Family history  Medication   PSH  Allergies      Occupation:         Objective:  /67   Pulse 74   Temp 98.5 °F (36.9 °C) (Temporal)   Resp 18   Ht 5' 7\" (1.702 m)   Wt 85.6 kg (188 lb 12.8 oz)   SpO2 98%   BMI 29.57 kg/m²     Skin: no rashes, lesions, skin discolorations, lacerations  Vasculature: normal radial and ulnar pulse,  normal skin color, normal capillary refill in extremity, no upper extremity edema  Neurologic: Neurologic exam is normal throughout upper extremities, Awake, alert, and oriented x3, no apparent distress.   Musculoskeletal:   right SHOULDER EXAM    General: no gross deformity, no skin changes redness or warmth noted, no sign of infection    Supraspinatus strength testin/5  Infraspinatus strength testin/5  Subscapularis    Belly press: negative      Speed +  Biceps TTP: positive          Tenderness to palpation of AC joint: negative  Pain with cross-body adduction: negative          Imaging:    No results found.     Assessment/Plan:  1. Biceps tendonitis on right (Primary)  Ultrasound-guided corticosteroid injection provided for patient's right proximal bicep.  Ultrasound images were saved and will be uploaded into the epic system.  Patient will follow-up with Dr. Venegas as scheduled.  - Ambulatory " Referral to Orthopedic Surgery

## 2025-01-07 ENCOUNTER — TELEMEDICINE (OUTPATIENT)
Dept: BEHAVIORAL/MENTAL HEALTH CLINIC | Facility: CLINIC | Age: 55
End: 2025-01-07
Payer: COMMERCIAL

## 2025-01-07 DIAGNOSIS — F32.A DEPRESSION, UNSPECIFIED DEPRESSION TYPE: ICD-10-CM

## 2025-01-07 DIAGNOSIS — F43.10 PTSD (POST-TRAUMATIC STRESS DISORDER): Primary | ICD-10-CM

## 2025-01-07 PROCEDURE — 90832 PSYTX W PT 30 MINUTES: CPT | Performed by: SOCIAL WORKER

## 2025-01-08 NOTE — PSYCH
Behavioral Health Psychotherapy Progress Note    Psychotherapy Provided: Individual Psychotherapy     1. PTSD (post-traumatic stress disorder)        2. Depression, unspecified depression type                Behavioral Health Treatment Plan and Discharge Planning: Marty Ratliff is aware of and agrees to continue to work on their treatment plan. They have identified and are working toward their discharge goals. yes    Depression Follow-up Plan Completed: Not applicable    Visit start and stop times:    01/07/25  Start Time: 0900  Stop Time: 0932  Total Visit Time: 32 minutes  Virtual Regular Visit  Therapist met w/pt for individual session.  symptoms include: stress, frustration, fatigue.  He shared that his shoulder was feeling slightly better but now feels like how it did in the beginning.  He expressed how his physical pain has been affecting his mental health due to it holding him back in many ways.  Pt expressed that he is a “pretty” active person and due to the pain can't complete tasks he wants to.  Therapist validated pt's feelings and also worked with pt to figure out other things that may help his overall health if he can't be as active.  He shared that he was able to go to [place] to spend time w/his friends from high school.  He shared that distracting himself and engaging in his other hobbies help him mentally.      Verification of patient location:    Patient is located at Home in the following state in which I hold an active license [place]      Assessment/Plan: f/u in one month    Problem List Items Addressed This Visit       Depression    PTSD (post-traumatic stress disorder) - Primary           Reason for visit is No chief complaint on file.       Encounter provider Jaycee Schaffer      Recent Visits  Date Type Provider Dept   01/07/25 Telemedicine Jaycee Schaffer [facility] [address]   Showing recent visits within past 7 days and meeting all other requirements  Future  Appointments  No visits were found meeting these conditions.  Showing future appointments within next 150 days and meeting all other requirements       The patient was identified by name and date of birth. Ruben Ratliff was informed that this is a telemedicine visit and that the visit is being conducted throughthe Epic Embedded platform. He agrees to proceed..  My office door was closed. No one else was in the room.  He acknowledged consent and understanding of privacy and security of the video platform. The patient has agreed to participate and understands they can discontinue the visit at any time.    Patient is aware this is a billable service.     Subjective  Ruben Ratliff is a 54 y.o. male  .      HPI     Past Medical History:   Diagnosis Date    Alcohol abuse     Bilateral inguinal hernia 02/22/2021    Bronchitis     Cough     Pt states he has a morning cough from some sinus drainage- pt reports having a cold week of 2/8/21    COVID-19 12/2020    Pt states he only lost taste and smell- no other symptoms reported.    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    Tobacco abuse        Past Surgical History:   Procedure Laterality Date    FACIAL/NECK BIOPSY Left 8/4/2020    Procedure: EXCISION CYST NECK;  Surgeon: Guy Capone MD;  Location: [facility];  Service: Plastics    FLAP LOCAL HEAD / NECK Left 8/4/2020    Procedure: COMPLEX CLOSURE VS ADJACENT TISSUE REARRANGEMENT NECK;  Surgeon: Guy Capone MD;  Location: [facility];  Service: Plastics    HERNIA REPAIR      HERNIA REPAIR Bilateral 2/22/2021    Procedure: REPAIR HERNIA INGUINAL, LAPAROSCOPIC BILATERAL  WITH MESH;  Surgeon: Sebas Ceja MD;  Location: [facility];  Service: General    PYLOROMYOTOMY      ROTATOR CUFF REPAIR Left     times 2 per pt    TONSILLECTOMY AND ADENOIDECTOMY      TOOTH EXTRACTION         Current Outpatient Medications   Medication Sig Dispense Refill    acetaminophen (TYLENOL) 500 mg tablet Take 500 mg by mouth every 6 (six) hours as needed for mild pain      albuterol (PROVENTIL HFA,VENTOLIN HFA) 90 mcg/act inhaler Inhale 2 puffs every 6 (six) hours as needed for wheezing 54 g 1    atorvastatin (LIPITOR) 20 mg tablet TAKE 1 TABLET BY MOUTH EVERY DAY 90 tablet 1    Azelastine HCl 137 MCG/SPRAY SOLN INSTILL 1 SPRAY IN EACH NOSTRIL TWICE A DAY AS DIRECTED 90 mL 1    clobetasol (TEMOVATE) 0.05 % ointment Apply topically 2 (two) times a day 30 g 3    Flaxseed, Linseed, (FLAX SEED OIL PO) Take by mouth      meloxicam (Mobic) 7.5 mg tablet Take 1 tablet (7.5 mg total) by mouth daily Instructed to go to the ED if he experiences any shortness of breathing, difficulty breathing, chest pain, or other new symptoms. 30 tablet 2    multivitamin (THERAGRAN) TABS Take 1 tablet by mouth daily       No current facility-administered medications for this visit.        Allergies   Allergen Reactions    Celecoxib Anaphylaxis    Diclofenac Shortness Of Breath    Flavoring Agent - Food Allergy Anaphylaxis    Corn Oil - Food Allergy - Food Allergy Other (See Comments)     Clinically insignificant    Starch GI Intolerance and Other (See Comments)       Review of Systems    Video Exam    There were no vitals filed for this visit.    Physical Exam Pt denied any SI/Hi/Ah/VH

## 2025-01-28 ENCOUNTER — OFFICE VISIT (OUTPATIENT)
Dept: OBGYN CLINIC | Facility: CLINIC | Age: 55
End: 2025-01-28
Payer: COMMERCIAL

## 2025-01-28 VITALS — HEIGHT: 67 IN | WEIGHT: 186 LBS | BODY MASS INDEX: 29.19 KG/M2 | RESPIRATION RATE: 18 BRPM

## 2025-01-28 DIAGNOSIS — M75.41 IMPINGEMENT SYNDROME OF RIGHT SHOULDER: ICD-10-CM

## 2025-01-28 DIAGNOSIS — G89.29 CHRONIC RIGHT SHOULDER PAIN: ICD-10-CM

## 2025-01-28 DIAGNOSIS — M25.511 CHRONIC RIGHT SHOULDER PAIN: ICD-10-CM

## 2025-01-28 DIAGNOSIS — M75.21 BICEPS TENDONITIS ON RIGHT: Primary | ICD-10-CM

## 2025-01-28 PROCEDURE — 99213 OFFICE O/P EST LOW 20 MIN: CPT | Performed by: STUDENT IN AN ORGANIZED HEALTH CARE EDUCATION/TRAINING PROGRAM

## 2025-01-28 NOTE — PROGRESS NOTES
Patient Name:  Ruben Ratliff  MRN:  [id number]    Assessment & Plan     1. Biceps tendonitis on right  2. Impingement syndrome of right shoulder  3. Chronic right shoulder pain      Right shoulder Impingement, bicep tendonitis   Patient has been experiencing extended symptoms and discomfort.   Discussed a subacromial cortisone injection as treatment option can be diagnostic measure, patient declined injection in office today.   Discussed oral medications. Recommended use of meloxicam and tylenol as needed.  Discussed rehabilitation. Patient will continue with home exercises for periscapular strengthening.  Again reviewed MRI results. Discussed if needing to proceed with surgical intervention a diagnostic arthroscopy would be performed with subacromial decompression and open pectoral bicep tenodesis. Patient will monitor his symptoms for the next 2 months, talk to his wife and consider surgical intervention.   Follow-up 2 months    Chief Complaint     Right shoulder pain    History of the Present Illness     Ruben Ratliff is a right HD 54 y.o. male presenting for follow-up of right shoulder pain.  Patient has been treating approximately 5 months of discomfort for  biceps tendonitis with formal PT, home exercises and an image guided proximal bicep sheath injection with Dr. Denny on 12/18/2024.  He feels the injection provided 2 weeks worth of relief. Prior to the injection he states a pop occurred in his shoulder that provided relief. Overall he feels his discomfort has improved, and ability to use his right arm. His main concern is the sensation and weakness with using his bicep muscle if needing to  a weighted object.  Continues to deny numbness or tingling in the distal extremity. On average he has no complaints of pain at rest, its when lifting objects his discomfort 7/10 with a functioning rate at 50% depending on activity he feels he is limited with actions. . Medication wise he is taking Meloxicam  "intermittently providing minimal relief.     Review of Systems     Review of Systems   Constitutional:  Negative for chills and fever.   HENT:  Negative for ear pain and sore throat.    Eyes:  Negative for pain and visual disturbance.   Respiratory:  Negative for cough and shortness of breath.    Cardiovascular:  Negative for chest pain and palpitations.   Gastrointestinal:  Negative for abdominal pain and vomiting.   Genitourinary:  Negative for dysuria and hematuria.   Musculoskeletal:  Positive for arthralgias. Negative for back pain.   Skin:  Negative for color change and rash.   Neurological:  Negative for seizures and syncope.   All other systems reviewed and are negative.      Physical Exam     Resp 18   Ht 5' 7\" (1.702 m)   Wt 84.4 kg (186 lb)   BMI 29.13 kg/m²     Right Shoulder exam  No bruising, swelling or deformity  NonTTP cervical spine, clavicle, AC joint, acromion, scapular spine, posterior joint line, deltoid, anterior joint line, bicipital groove    Active ROM  Forward flexion: 180  External rotation in adduction: 80  Internal rotation: thoracolumbar  Strength Testin/5 with Nirmal  5/5 ER in adduction  Provocative maneuvers:  +: gant, Yergason, speeds,   -: cross body adduction, belly press, lift off, Neer impingement, , o'justyn's, uppercut   SILT C5-T1  2+ Radial pulse, symmetric with contralateral       Eyes:  Anicteric sclerae.  Neck:  Supple.  Lungs:  Normal respiratory effort.  Cardiovascular:  Capillary refill is less than 2 seconds.  Skin:  Intact without erythema.  Neurologic:  Sensation grossly intact to light touch.  Psychiatric:  Mood and affect are appropriate.    Data Review     No new images were reviewed today.       Scribe Attestation      I,:  Lori May am acting as a scribe while in the presence of the attending physician.:       I,:  Abel Venegas MD personally performed the services described in this documentation    as scribed in my presence.:             "

## 2025-02-04 ENCOUNTER — TELEMEDICINE (OUTPATIENT)
Dept: BEHAVIORAL/MENTAL HEALTH CLINIC | Facility: CLINIC | Age: 55
End: 2025-02-04
Payer: COMMERCIAL

## 2025-02-04 DIAGNOSIS — F32.A DEPRESSION, UNSPECIFIED DEPRESSION TYPE: ICD-10-CM

## 2025-02-04 DIAGNOSIS — F43.10 PTSD (POST-TRAUMATIC STRESS DISORDER): Primary | ICD-10-CM

## 2025-02-04 PROCEDURE — 90834 PSYTX W PT 45 MINUTES: CPT | Performed by: SOCIAL WORKER

## 2025-02-05 NOTE — PSYCH
Behavioral Health Psychotherapy Progress Note    Psychotherapy Provided: Individual Psychotherapy     1. PTSD (post-traumatic stress disorder)        2. Depression, unspecified depression type              Behavioral Health Treatment Plan and Discharge Planning: Marty Ratliff is aware of and agrees to continue to work on their treatment plan. They have identified and are working toward their discharge goals. yes    Depression Follow-up Plan Completed: Not applicable    Visit start and stop times:    02/04/25  Start Time: 0900  Stop Time: 0947  Total Visit Time: 47 minutes  Virtual Regular Visit  Therapist met w/pt for individual session.  Symptoms include: worry, stress, frustration.  He stated that he has been having some issues w/his son and difference in parenting strategies the past week.  He expressed not feeling heard and also worried that his son isn't going to learn consequences and how harmful that can be in the future.  Therapist and pt discussed strategies he can implement to help better feel a part of the team.  Also, discussion was held around pt trying to distract himself more with things he enjoys, especially the new games he got.  Pt stated that he is still struggling w/physical pain but managing it.  He also stated that although he is feeling more stress he feels he is still navigating better than he would have a few years ago.       Verification of patient location:    Patient is located at Home in the following state in which I hold an active license [place]      Assessment/Plan:  f/u in one month    Problem List Items Addressed This Visit       Depression    PTSD (post-traumatic stress disorder) - Primary       Reason for visit is No chief complaint on file.       Encounter provider Jaycee Schaffer      Recent Visits  Date Type Provider Dept   02/04/25 Telemedicine Jaycee Schaffer [facility] [address]   Showing recent visits within past 7 days and meeting all other  requirements  Future Appointments  No visits were found meeting these conditions.  Showing future appointments within next 150 days and meeting all other requirements       The patient was identified by name and date of birth. Ruben Ratliff was informed that this is a telemedicine visit and that the visit is being conducted throughthe Epic Embedded platform. He agrees to proceed..  My office door was closed. No one else was in the room.  He acknowledged consent and understanding of privacy and security of the video platform. The patient has agreed to participate and understands they can discontinue the visit at any time.    Patient is aware this is a billable service.     Subjective  Ruben Ratliff is a 54 y.o. male  .      HPI     Past Medical History:   Diagnosis Date    Alcohol abuse     Bilateral inguinal hernia 02/22/2021    Bronchitis     Cough     Pt states he has a morning cough from some sinus drainage- pt reports having a cold week of 2/8/21    COVID-19 12/2020    Pt states he only lost taste and smell- no other symptoms reported.    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    Tobacco abuse        Past Surgical History:   Procedure Laterality Date    FACIAL/NECK BIOPSY Left 8/4/2020    Procedure: EXCISION CYST NECK;  Surgeon: Guy Capone MD;  Location: [facility];  Service: Plastics    FLAP LOCAL HEAD / NECK Left 8/4/2020    Procedure: COMPLEX CLOSURE VS ADJACENT TISSUE REARRANGEMENT NECK;  Surgeon: Guy Capone MD;  Location: [facility];  Service: Plastics    HERNIA REPAIR      HERNIA REPAIR Bilateral 2/22/2021    Procedure: REPAIR HERNIA INGUINAL,  LAPAROSCOPIC BILATERAL WITH MESH;  Surgeon: Sebas Ceja MD;  Location: [facility];  Service: General    PYLOROMYOTOMY      ROTATOR CUFF REPAIR Left     times 2 per pt    TONSILLECTOMY AND ADENOIDECTOMY      TOOTH EXTRACTION         Current Outpatient Medications   Medication Sig Dispense Refill    acetaminophen (TYLENOL) 500 mg tablet Take 500 mg by mouth every 6 (six) hours as needed for mild pain (Patient not taking: Reported on 1/28/2025)      albuterol (PROVENTIL HFA,VENTOLIN HFA) 90 mcg/act inhaler Inhale 2 puffs every 6 (six) hours as needed for wheezing 54 g 1    atorvastatin (LIPITOR) 20 mg tablet TAKE 1 TABLET BY MOUTH EVERY DAY 90 tablet 1    Azelastine HCl 137 MCG/SPRAY SOLN INSTILL 1 SPRAY IN EACH NOSTRIL TWICE A DAY AS DIRECTED 90 mL 1    clobetasol (TEMOVATE) 0.05 % ointment Apply topically 2 (two) times a day 30 g 3    Flaxseed, Linseed, (FLAX SEED OIL PO) Take by mouth      meloxicam (Mobic) 7.5 mg tablet Take 1 tablet (7.5 mg total) by mouth daily Instructed to go to the ED if he experiences any shortness of breathing, difficulty breathing, chest pain, or other new symptoms. (Patient taking differently: Take 7.5 mg by mouth daily Instructed to go to the ED if he experiences any shortness of breathing, difficulty breathing, chest pain, or other new symptoms.) 30 tablet 2    multivitamin (THERAGRAN) TABS Take 1 tablet by mouth daily       No current facility-administered medications for this visit.        Allergies   Allergen Reactions    Celecoxib Anaphylaxis    Diclofenac Shortness Of Breath    Flavoring Agent - Food Allergy Anaphylaxis    Corn Oil - Food Allergy - Food Allergy Other (See Comments)     Clinically insignificant    Starch GI Intolerance and Other (See Comments)       Review of Systems    Video Exam    There were no vitals filed for this visit.    Physical Exam Pt denied any SI/HI/AH/VH

## 2025-03-04 ENCOUNTER — TELEMEDICINE (OUTPATIENT)
Dept: BEHAVIORAL/MENTAL HEALTH CLINIC | Facility: CLINIC | Age: 55
End: 2025-03-04
Payer: COMMERCIAL

## 2025-03-04 DIAGNOSIS — F32.A DEPRESSION, UNSPECIFIED DEPRESSION TYPE: ICD-10-CM

## 2025-03-04 DIAGNOSIS — F43.10 PTSD (POST-TRAUMATIC STRESS DISORDER): Primary | ICD-10-CM

## 2025-03-04 PROCEDURE — 90834 PSYTX W PT 45 MINUTES: CPT | Performed by: SOCIAL WORKER

## 2025-03-05 NOTE — PSYCH
"Behavioral Health Psychotherapy Progress Note    Psychotherapy Provided: Individual Psychotherapy     1. PTSD (post-traumatic stress disorder)        2. Depression, unspecified depression type            DATA: Therapist met w/pt for individual session.  Symptoms include: racing thoughts, stress, frustration.  Pt stated that they have another racoon in there attic which has been an ongoing mccormick.  He stated that due to his physical limitations right now it makes doing work like that even harder.  He stated that he continues to have shoulder pain but some days are better than others.  He continues to engage in his hobbies and has been able to do it socially which he enjoys.  Pt stated his son is currently on a ski trip which he feels is good for his son due to some current stressors he is going throuhg. Pt shared that his son's friend recently had a dirt biking accident and is currently in ICU.  Pt stated he is doing his best to support his son and also cope with it.  Therapist and pt discussed ongoing strategies to help pt continue to manage his anger/frustration.    During this session, this clinician used the following therapeutic modalities: Cognitive Behavioral Therapy    Substance Abuse was not addressed during this session. If the client is diagnosed with a co-occurring substance use disorder, please indicate any changes in the frequency or amount of use: unknown. Stage of change for addressing substance use diagnoses: No substance use/Not applicable    ASSESSMENT:  Marty Ratliff presents with a Euthymic/ normal mood.     his affect is Normal range and intensity, which is congruent, with his mood and the content of the session. The client has made progress on their goals.     Marty Ratliff presents with a none risk of suicide, none risk of self-harm, and none risk of harm to others.    For any risk assessment that surpasses a \"low\" rating, a safety plan must be developed.    A safety plan was indicated: " no  If yes, describe in detail n/a    PLAN: Between sessions, Marty Ratliff will continue to work on engaging in hobbies he enjoys and expressing how he feels. At the next session, the therapist will use Cognitive Behavioral Therapy to address symptoms of depression.    Behavioral Health Treatment Plan and Discharge Planning: Marty Ratliff is aware of and agrees to continue to work on their treatment plan. They have identified and are working toward their discharge goals. yes    Depression Follow-up Plan Completed: Not applicable    Visit start and stop times:    25  Start Time: 09  Stop Time: 940  Total Visit Time: 40 minutesVirtual Regular VisitName: Ruben Ratliff      : 1970      MRN: [id number]  Encounter Provider: Jaycee Schaffer  Encounter Date: 3/4/2025   Encounter department: [facility] [address]  :  Assessment & Plan  PTSD (post-traumatic stress disorder)         Depression, unspecified depression type             History of Present Illness     HPI  Review of Systems    Objective   There were no vitals taken for this visit.    Physical Exam    Administrative Statements   Encounter provider Jaycee Schaffer    The Patient is located at Home and in the following state in which I hold an active license [place].    The patient was identified by name and date of birth. Ruben Ratliff was informed that this is a telemedicine visit and that the visit is being conducted through the Epic Embedded platform. He agrees to proceed..  My office door was closed. No one else was in the room.  He acknowledged consent and understanding of privacy and security of the video platform. The patient has agreed to participate and understands they can discontinue the visit at any time.    I have spent a total time of 40 minutes in caring for this patient on the day of the visit/encounter including Counseling / Coordination of care, not including the time spent for  establishing the audio/video connection.

## 2025-03-31 ENCOUNTER — OFFICE VISIT (OUTPATIENT)
Dept: INTERNAL MEDICINE CLINIC | Facility: CLINIC | Age: 55
End: 2025-03-31
Payer: COMMERCIAL

## 2025-03-31 VITALS
SYSTOLIC BLOOD PRESSURE: 126 MMHG | HEART RATE: 68 BPM | DIASTOLIC BLOOD PRESSURE: 72 MMHG | BODY MASS INDEX: 29.13 KG/M2 | OXYGEN SATURATION: 98 % | HEIGHT: 67 IN

## 2025-03-31 DIAGNOSIS — R22.31 ARM MASS, RIGHT: Primary | ICD-10-CM

## 2025-03-31 PROCEDURE — 99213 OFFICE O/P EST LOW 20 MIN: CPT

## 2025-03-31 NOTE — PROGRESS NOTES
Name: Ruben Ratliff      : 1970      MRN: [id number]  Encounter Provider: NAMITA Tapia  Encounter Date: 3/31/2025   Encounter department: [facility] INTERNAL MEDICINE [place]  :  Assessment & Plan  Arm mass, right  Possibly a lipoma over the right bicep, it is soft and movable, not painful to palpation.  No erythema.  It is about the size of an orange.  Will get an ultrasound of that right arm and refer to general surgery for evaluation.  Orders:  •  US extremity soft tissue; Future  •  Ambulatory Referral to General Surgery; Future           History of Present Illness {?Quick Links Encounters * My Last Note * Last Note in Specialty * Snapshot * Since Last Visit * History :21648}  Marty is here today with complaints of a large lump on his right bicep he noticed Saturday night.  It is not noticeable unless he is flexing.  It is not painful or uncomfortable, he states his arm feels a little stiff and it is a little tender since he has noticed it due to him pressing on it and touching it.  He has full range of motion, no pain down his arm or numbness and tingling.      Review of Systems   Constitutional:  Negative for chills and fever.   HENT: Negative.     Respiratory:  Negative for cough, chest tightness and shortness of breath.    Cardiovascular:  Negative for chest pain, palpitations and leg swelling.   Gastrointestinal: Negative.    Musculoskeletal:  Negative for arthralgias, back pain and myalgias.   Skin:  Negative for color change and rash.        Lump right upper arm   Allergic/Immunologic: Negative.    Neurological:  Negative for dizziness, seizures, syncope, light-headedness and headaches.   Psychiatric/Behavioral: Negative.     All other systems reviewed and are negative.      Objective {?Quick Links Trend Vitals * Enter New Vitals * Results Review * Timeline (Adult) * Labs * Imaging * Cardiology * Procedures * Lung Cancer Screening * Surgical eConsent :52870}  /72 (BP  "Location: Left arm, Patient Position: Sitting, Cuff Size: Standard)   Pulse 68   Ht 5' 7\" (1.702 m)   SpO2 98%   BMI 29.13 kg/m²      Physical Exam  Vitals and nursing note reviewed.   Constitutional:       General: He is not in acute distress.     Appearance: He is well-developed.   Cardiovascular:      Rate and Rhythm: Normal rate and regular rhythm.      Pulses: Normal pulses.      Heart sounds: Normal heart sounds. No murmur heard.  Pulmonary:      Effort: Pulmonary effort is normal. No respiratory distress.      Breath sounds: Normal breath sounds.   Abdominal:      General: Bowel sounds are normal.      Palpations: Abdomen is soft.      Tenderness: There is no abdominal tenderness.   Musculoskeletal:        Arms:    Skin:     General: Skin is warm and dry.   Neurological:      Mental Status: He is alert.   Psychiatric:         Mood and Affect: Mood normal.         "

## 2025-04-01 ENCOUNTER — TELEMEDICINE (OUTPATIENT)
Dept: BEHAVIORAL/MENTAL HEALTH CLINIC | Facility: CLINIC | Age: 55
End: 2025-04-01
Payer: COMMERCIAL

## 2025-04-01 DIAGNOSIS — F43.10 PTSD (POST-TRAUMATIC STRESS DISORDER): Primary | ICD-10-CM

## 2025-04-01 DIAGNOSIS — F32.A DEPRESSION, UNSPECIFIED DEPRESSION TYPE: ICD-10-CM

## 2025-04-01 PROCEDURE — 90834 PSYTX W PT 45 MINUTES: CPT | Performed by: SOCIAL WORKER

## 2025-04-01 NOTE — PSYCH
"Virtual Regular VisitName: Ruben Ratliff      : 1970      MRN: [id number]  Encounter Provider: Jaycee Schaffer  Encounter Date: 2025   Encounter department: [facility] [address]  :  Assessment & Plan  PTSD (post-traumatic stress disorder)         Depression, unspecified depression type               DATA: Therapist met w/pt for individual session.  Pt shared that he has had a busy month but overall feels as if he is doing okay mentally.  He stated that there are ongoing stressors with his son but nothing that has created too much conflict.  Pt shared that he has been keeping busy and has been focused on getting his gardening situated for the spring.  Therapist and pt discussed how gardening is a good outlet.  Therapist and pt discussed that he feels as if his PTSD has been well managed but has felt anxious at times.  However, has been able to implement positive coping skills.    During this session, this clinician used the following therapeutic modalities: Cognitive Behavioral Therapy, Motivational Interviewing, and Solution-Focused Therapy    Substance Abuse was not addressed during this session. If the client is diagnosed with a co-occurring substance use disorder, please indicate any changes in the frequency or amount of use: unknown. Stage of change for addressing substance use diagnoses: No substance use/Not applicable    ASSESSMENT:  Ruben presents with a Euthymic/ normal mood. Ruben's affect is Normal range and intensity, which is congruent, with their mood and the content of the session. The client has made progress on their goals as evidenced by pts self report.    Ruben presents with a none risk of suicide, none risk of self-harm, and none risk of harm to others.    For any risk assessment that surpasses a \"low\" rating, a safety plan must be developed.    A safety plan was indicated: no  If yes, describe in detail n/a    PLAN: Between " sessions, Ruben will continue to engage in positive hobbies/outlets. At the next session, the therapist will use Cognitive Behavioral Therapy and Motivational Interviewing to address symptoms of anxiety.    Behavioral Health Treatment Plan [facility]: Diagnosis and Treatment Plan explained to Ruben Miranda relates understanding diagnosis and is agreeable to Treatment Plan. Yes     Depression Follow-up Plan Completed: Not applicable     Reason for visit is No chief complaint on file.     Recent Visits  No visits were found meeting these conditions.  Showing recent visits within past 7 days and meeting all other requirements  Today's Visits  Date Type Provider Dept   04/01/25 Telemedicine Jaycee Schaffer [facility] [address]   Showing today's visits and meeting all other requirements  Future Appointments  No visits were found meeting these conditions.  Showing future appointments within next 150 days and meeting all other requirements     History of Present Illness     HPI    Past Medical History   Past Medical History:   Diagnosis Date    Alcohol abuse     Bilateral inguinal hernia 02/22/2021    Bronchitis     Cough     Pt states he has a morning cough from some sinus drainage- pt reports having a cold week of 2/8/21    COVID-19 12/2020    Pt states he only lost taste and smell- no other symptoms reported.    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    Tobacco abuse      Past Surgical History:   Procedure Laterality Date    FACIAL/NECK BIOPSY Left 8/4/2020    Procedure: EXCISION CYST NECK;  Surgeon: Guy Capone MD;  Location: [facility] OR;  Service: Plastics     FLAP LOCAL HEAD / NECK Left 8/4/2020    Procedure: COMPLEX CLOSURE VS ADJACENT TISSUE REARRANGEMENT NECK;  Surgeon: Guy Capone MD;  Location: [facility];  Service: Plastics    HERNIA REPAIR      HERNIA REPAIR Bilateral 2/22/2021    Procedure: REPAIR HERNIA INGUINAL, LAPAROSCOPIC BILATERAL WITH MESH;  Surgeon: Sebas Ceja MD;  Location: [facility];  Service: General    PYLOROMYOTOMY      ROTATOR CUFF REPAIR Left     times 2 per pt    TONSILLECTOMY AND ADENOIDECTOMY      TOOTH EXTRACTION       Current Outpatient Medications   Medication Instructions    acetaminophen (TYLENOL) 500 mg, Every 6 hours PRN    albuterol (PROVENTIL HFA,VENTOLIN HFA) 90 mcg/act inhaler 2 puffs, Inhalation, Every 6 hours PRN    atorvastatin (LIPITOR) 20 mg, Oral, Daily    Azelastine HCl 137 MCG/SPRAY SOLN INSTILL 1 SPRAY IN EACH NOSTRIL TWICE A DAY AS DIRECTED    clobetasol (TEMOVATE) 0.05 % ointment Topical, 2 times daily    Flaxseed, Linseed, (FLAX SEED OIL PO) Take by mouth    MAGNESIUM PO Take by mouth    meloxicam (MOBIC) 7.5 mg, Oral, Daily, Instructed to go to the ED if he experiences any shortness of breathing, difficulty breathing, chest pain, or other new symptoms.    multivitamin (THERAGRAN) TABS 1 tablet, Daily    POTASSIUM PO Take by mouth     Allergies   Allergen Reactions    Celecoxib Anaphylaxis    Diclofenac Shortness Of Breath    Flavoring Agent - Food Allergy Anaphylaxis    Corn Oil - Food Allergy - Food Allergy Other (See Comments)     Clinically insignificant    Starch GI Intolerance and Other (See Comments)       Objective   There were no vitals taken for this visit.    Video Exam  Physical Exam     Administrative Statements   Encounter provider Jaycee Schaffer    The Patient is located at Home and in the following state in which I hold an active license [place].    The patient was identified by name and date of birth. Ruben Ratliff was informed that this is a telemedicine visit and that the visit is  being conducted through the Epic Embedded platform. He agrees to proceed..  My office door was closed. No one else was in the room.  He acknowledged consent and understanding of privacy and security of the video platform. The patient has agreed to participate and understands they can discontinue the visit at any time.      Visit Time  Start Time: 0800  Stop Time: 0840  Total Visit Time: 40 minutes

## 2025-04-08 ENCOUNTER — HOSPITAL ENCOUNTER (OUTPATIENT)
Dept: ULTRASOUND IMAGING | Facility: CLINIC | Age: 55
Discharge: HOME | End: 2025-04-08
Payer: COMMERCIAL

## 2025-04-08 ENCOUNTER — RESULTS FOLLOW-UP (OUTPATIENT)
Dept: INTERNAL MEDICINE CLINIC | Facility: CLINIC | Age: 55
End: 2025-04-08

## 2025-04-08 DIAGNOSIS — R22.31 MASS OF RIGHT UPPER EXTREMITY: Primary | ICD-10-CM

## 2025-04-08 DIAGNOSIS — R22.31 ARM MASS, RIGHT: ICD-10-CM

## 2025-04-08 PROCEDURE — 76882 US LMTD JT/FCL EVL NVASC XTR: CPT

## 2025-04-10 ENCOUNTER — OFFICE VISIT (OUTPATIENT)
Dept: OBGYN CLINIC | Facility: CLINIC | Age: 55
End: 2025-04-10
Payer: COMMERCIAL

## 2025-04-10 VITALS — WEIGHT: 190.8 LBS | HEIGHT: 67 IN | BODY MASS INDEX: 29.95 KG/M2

## 2025-04-10 DIAGNOSIS — R22.31 MASS OF RIGHT UPPER EXTREMITY: ICD-10-CM

## 2025-04-10 DIAGNOSIS — S46.211A BICEPS RUPTURE, PROXIMAL, RIGHT, INITIAL ENCOUNTER: Primary | ICD-10-CM

## 2025-04-10 PROCEDURE — 99214 OFFICE O/P EST MOD 30 MIN: CPT | Performed by: ORTHOPAEDIC SURGERY

## 2025-04-10 NOTE — ASSESSMENT & PLAN NOTE
Patient has a right proximal biceps tendon rupture with Alok sign. His range of motion and strength are within his baseline limites and he has minimal pain. He was counseled that proximal bicep tendon ruptures do not require surgical intervention and patients can function normally with these injuries. He was counseled, however, surgical intervention can assist with cosmesis if he desired. After thorough discussion, the patient opted for non-surgical management with therapy and will follow up PRN.

## 2025-04-10 NOTE — PROGRESS NOTES
Assessment & Plan  Mass of right upper extremity    Orders:    Ambulatory Referral to Orthopedic Surgery    Biceps rupture, proximal, right, initial encounter  Patient has a right proximal biceps tendon rupture with Alok sign. His range of motion and strength are within his baseline limites and he has minimal pain. He was counseled that proximal bicep tendon ruptures do not require surgical intervention and patients can function normally with these injuries. He was counseled, however, surgical intervention can assist with cosmesis if he desired. After thorough discussion, the patient opted for non-surgical management with therapy and will follow up PRN.                 Ruben DEE Gaudencio  [id number]  1970    ORTHOPAEDIC SURGERY OUTPATIENT NOTE  4/10/2025      HISTORY:  54 y.o. male  who presents for initial evaluation of his right arm. He reports three weeks ago he noticed a bulge in his arm that resembled a fatty mass. In August of last year, he took an advanced rope course with a family member and reported pain after the course. He waited until the Fall to get seen by a physician due to the hope that the pain would resolve. He saw an orthopedist and an MRI was ordered which did not demonstrate rotator cuff tearing, but did have bicep tendonosis. He reports he went to therapy and then three weeks ago he noticed a new bulge in his arm. He denies numbness or tingling.     Past Medical History:   Diagnosis Date    Alcohol abuse     Bilateral inguinal hernia 02/22/2021    Bronchitis     Cough     Pt states he has a morning cough from some sinus drainage- pt reports having a cold week of 2/8/21    COVID-19 12/2020    Pt states he only lost taste and smell- no other symptoms reported.    COVID-19 virus infection 12/14/2020    Disc degeneration, lumbar     Diverticulosis     Facet syndrome     Hyperlipidemia     Meningitis     as child    Other muscle spasm     Parapsoriasis     Pneumonia     Psoriasis     PTSD  (post-traumatic stress disorder)     Sacroiliitis (HCC)     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    Tobacco abuse        Past Surgical History:   Procedure Laterality Date    FACIAL/NECK BIOPSY Left 8/4/2020    Procedure: EXCISION CYST NECK;  Surgeon: Guy Capone MD;  Location: [facility];  Service: Plastics    FLAP LOCAL HEAD / NECK Left 8/4/2020    Procedure: COMPLEX CLOSURE VS ADJACENT TISSUE REARRANGEMENT NECK;  Surgeon: Guy Capone MD;  Location: [facility];  Service: Plastics    HERNIA REPAIR      HERNIA REPAIR Bilateral 2/22/2021    Procedure: REPAIR HERNIA INGUINAL, LAPAROSCOPIC BILATERAL WITH MESH;  Surgeon: Sebas Ceja MD;  Location: [facility];  Service: General    PYLOROMYOTOMY      ROTATOR CUFF REPAIR Left     times 2 per pt    TONSILLECTOMY AND ADENOIDECTOMY      TOOTH EXTRACTION         Social History     Socioeconomic History    Marital status: /Civil Union     Spouse name: Not on file    Number of children: 1    Years of education: Not on file    Highest education level: Not on file   Occupational History    Occupation: COMPUTER CONSULTING     Comment: FULL-TIME EMPLOYMENT   Tobacco Use    Smoking status: Former     Types: Cigarettes    Smokeless tobacco: Never    Tobacco comments:     Pt quit 2008   Vaping Use    Vaping status: Never Used   Substance and Sexual Activity    Alcohol use: Yes     Comment: BEER - DESCRIBES AS INFREQUENT    Drug use: Yes     Types: Marijuana     Comment: Medical marijuana    Sexual activity: Yes     Partners: Female     Comment: did not ask   Other Topics Concern    Not on file   Social History Narrative    LIVING INDEPENDENTLY WITH SPOUSE    ONE SON    Unemployed    Hobbies-exercising, yd work, video games, camping     Social Drivers of Health     Financial Resource Strain: Not on file   Food Insecurity: Not on file   Transportation Needs: Not on file   Physical Activity: Not on file    Stress: Not on file   Social Connections: Not on file   Intimate Partner Violence: Not on file   Housing Stability: Not on file       Family History   Problem Relation Age of Onset    Cancer Mother     Lung cancer Mother         CARCINOID TUMOR    Hypertension Mother     Hypothyroidism Mother     Kidney cancer Mother     Thyroid cancer Mother     No Known Problems Father         Motor vehicle accident    Diabetes Maternal Grandmother     Alzheimer's disease Maternal Grandfather     Coronary artery disease Maternal Grandfather     Other Maternal Grandfather         PACEMAKER        Patient's Medications   New Prescriptions    No medications on file   Previous Medications    ACETAMINOPHEN (TYLENOL) 500 MG TABLET    Take 500 mg by mouth every 6 (six) hours as needed for mild pain    ALBUTEROL (PROVENTIL HFA,VENTOLIN HFA) 90 MCG/ACT INHALER    Inhale 2 puffs every 6 (six) hours as needed for wheezing    ATORVASTATIN (LIPITOR) 20 MG TABLET    TAKE 1 TABLET BY MOUTH EVERY DAY    AZELASTINE  MCG/SPRAY SOLN    INSTILL 1 SPRAY IN EACH NOSTRIL TWICE A DAY AS DIRECTED    CLOBETASOL (TEMOVATE) 0.05 % OINTMENT    Apply topically 2 (two) times a day    FLAXSEED, LINSEED, (FLAX SEED OIL PO)    Take by mouth    MAGNESIUM PO    Take by mouth    MELOXICAM (MOBIC) 7.5 MG TABLET    Take 1 tablet (7.5 mg total) by mouth daily Instructed to go to the ED if he experiences any shortness of breathing, difficulty breathing, chest pain, or other new symptoms.    MULTIVITAMIN (THERAGRAN) TABS    Take 1 tablet by mouth daily    POTASSIUM PO    Take by mouth   Modified Medications    No medications on file   Discontinued Medications    No medications on file       Allergies   Allergen Reactions    Celecoxib Anaphylaxis    Diclofenac Shortness Of Breath    Flavoring Agent - Food Allergy Anaphylaxis    Corn Oil - Food Allergy - Food Allergy Other (See Comments)     Clinically insignificant    Starch GI Intolerance and Other (See Comments)  "       Ht 5' 7\" (1.702 m)   Wt 86.5 kg (190 lb 12.8 oz)   BMI 29.88 kg/m²      REVIEW OF SYSTEMS:  Constitutional: Negative.    HEENT: Negative.    Respiratory: Negative.    Skin: Negative.    Neurological: Negative.    Psychiatric/Behavioral: Negative.  Musculoskeletal: Negative except for that mentioned in the HPI.    Gen: No acute distress, resting comfortably in bed  HEENT: Eyes clear, moist mucus membranes, hearing intact  Respiratory: No audible wheezing or stridor  Cardiovascular: Well Perfused peripherally, 2+ distal pulse  Abdomen: nondistended, no peritoneal signs     PHYSICAL EXAM:    LEFT SHOULDER:    Appearance: reverse paty sign     Forward flexion:   180 degrees   Abduction:  180 degrees   External rotation at 90 degrees abduction:   90 degrees   Internal rotation at 90 degrees abduction:  90 degrees   External rotation at 0 degrees:   70 degrees   Internal rotation: T7     STRENGTH:  Forward flexion:  5/5   Abduction:  5/5   External rotation:  5/5   Internal rotation:  5/5        Speed test: Negative  Yergason's: Negative   Tender to palpation ACJ (acromioclavicular joint): Negative   Tender to palpation LHB (long head of biceps): Negative  Barfield test: Negative  Clarendon test: Negative  Hornblower's: Negative  Lift off: Negative  Belly press: Negative  Bear hug: Negative  External lag sign: Negative  Cross-body adduction: Negative  Sulcus sign: Negative  Nirmal's test: Negative  Drop arm test: negative    Radial/median/ulnar nerve intact    <2 sec cap refill        IMAGING:  MRI of the right shoulder prior to his tear demonstrates no rotator cuff tearing, no degenerative changes, and increase signal in the bicep tendon.     ASSESSMENT AND PLAN:  54 y.o. male  with a right proximal bicep tendon rupture. He was indicated for non-operative management as his function should not be affected by this injury. He can follow up PRN     "

## 2025-05-06 ENCOUNTER — TELEMEDICINE (OUTPATIENT)
Dept: BEHAVIORAL/MENTAL HEALTH CLINIC | Facility: CLINIC | Age: 55
End: 2025-05-06
Payer: COMMERCIAL

## 2025-05-06 DIAGNOSIS — F32.A DEPRESSION, UNSPECIFIED DEPRESSION TYPE: ICD-10-CM

## 2025-05-06 DIAGNOSIS — F43.10 PTSD (POST-TRAUMATIC STRESS DISORDER): Primary | ICD-10-CM

## 2025-05-06 PROCEDURE — 90834 PSYTX W PT 45 MINUTES: CPT | Performed by: SOCIAL WORKER

## 2025-05-06 NOTE — PSYCH
"Virtual Regular VisitName: Ruben Ratliff      : 1970      MRN: [id number]  Encounter Provider: Jaycee Schaffer  Encounter Date: 2025   Encounter department: [facility] [address]  :  Assessment & Plan  PTSD (post-traumatic stress disorder)         Depression, unspecified depression type             DATA: Therapist met w/pt for individual session.  Symptoms include: worry and racing thoughts, however, it has been a better month. Pt stated that he hasn't had as many stressors and was able to engage in more hobbies.  He stated that he also spent some time away with family which was also helpful.  He plans on planting his vegetables/flowers this weekend and is looking forward to doing that hobby.  He continues to struggle w/physical pain which at times limits him per pt report but he continues to try to keep a positive mind set.    During this session, this clinician used the following therapeutic modalities: Client-centered Therapy and Cognitive Behavioral Therapy    Substance Abuse was not addressed during this session. If the client is diagnosed with a co-occurring substance use disorder, please indicate any changes in the frequency or amount of use: unknown. Stage of change for addressing substance use diagnoses: No substance use/Not applicable    ASSESSMENT:  Ruben presents with a Euthymic/ normal mood. Ruben's affect is Normal range and intensity, which is congruent, with their mood and the content of the session. The client has made progress on their goals as evidenced by pts improved symptoms and encountering less triggers  Ruben presents with a none risk of suicide, none risk of self-harm, and none risk of harm to others.    For any risk assessment that surpasses a \"low\" rating, a safety plan must be developed.    A safety plan was indicated: no  If yes, describe in detail n/a    PLAN: Between sessions, Ruben will continue to engage in " jason. At the next session, the therapist will use Cognitive Behavioral Therapy to address symptoms of depression    Behavioral Health Treatment Plan [facility]: Diagnosis and Treatment Plan explained to Ruben Miranda relates understanding diagnosis and is agreeable to Treatment Plan. Yes     Depression Follow-up Plan Completed: Not applicable     Reason for visit is No chief complaint on file.     Recent Visits  No visits were found meeting these conditions.  Showing recent visits within past 7 days and meeting all other requirements  Today's Visits  Date Type Provider Dept   05/06/25 Telemedicine Jaycee Schaffer [facility] [address]   Showing today's visits and meeting all other requirements  Future Appointments  No visits were found meeting these conditions.  Showing future appointments within next 150 days and meeting all other requirements     History of Present Illness     HPI    Past Medical History   Past Medical History:   Diagnosis Date    Alcohol abuse     Bilateral inguinal hernia 02/22/2021    Bronchitis     Cough     Pt states he has a morning cough from some sinus drainage- pt reports having a cold week of 2/8/21    COVID-19 12/2020    Pt states he only lost taste and smell- no other symptoms reported.    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    Tobacco abuse      Past Surgical History:   Procedure Laterality Date    FACIAL/NECK BIOPSY Left 8/4/2020    Procedure: EXCISION CYST NECK;  Surgeon: Guy Capone MD;  Location: [facility];  Service: Plastics    FLAP LOCAL HEAD / NECK Left 8/4/2020    Procedure: COMPLEX CLOSURE VS ADJACENT TISSUE  REARRANGEMENT NECK;  Surgeon: Guy Capone MD;  Location: [facility];  Service: Plastics    HERNIA REPAIR      HERNIA REPAIR Bilateral 2/22/2021    Procedure: REPAIR HERNIA INGUINAL, LAPAROSCOPIC BILATERAL WITH MESH;  Surgeon: Sebas Ceja MD;  Location: [facility];  Service: General    PYLOROMYOTOMY      ROTATOR CUFF REPAIR Left     times 2 per pt    TONSILLECTOMY AND ADENOIDECTOMY      TOOTH EXTRACTION       Current Outpatient Medications   Medication Instructions    acetaminophen (TYLENOL) 500 mg, Every 6 hours PRN    albuterol (PROVENTIL HFA,VENTOLIN HFA) 90 mcg/act inhaler 2 puffs, Inhalation, Every 6 hours PRN    atorvastatin (LIPITOR) 20 mg, Oral, Daily    Azelastine HCl 137 MCG/SPRAY SOLN INSTILL 1 SPRAY IN EACH NOSTRIL TWICE A DAY AS DIRECTED    clobetasol (TEMOVATE) 0.05 % ointment Topical, 2 times daily    Flaxseed, Linseed, (FLAX SEED OIL PO) Take by mouth    MAGNESIUM PO Take by mouth    meloxicam (MOBIC) 7.5 mg, Oral, Daily, Instructed to go to the ED if he experiences any shortness of breathing, difficulty breathing, chest pain, or other new symptoms.    multivitamin (THERAGRAN) TABS 1 tablet, Daily    POTASSIUM PO Take by mouth     Allergies   Allergen Reactions    Celecoxib Anaphylaxis    Diclofenac Shortness Of Breath    Flavoring Agent - Food Allergy Anaphylaxis    Corn Oil - Food Allergy - Food Allergy Other (See Comments)     Clinically insignificant    Starch GI Intolerance and Other (See Comments)       Objective   There were no vitals taken for this visit.    Video Exam  Physical Exam     Administrative Statements   Encounter provider Jaycee Schaffer    The Patient is located at Home and in the following state in which I hold an active license [place].    The patient was identified by name and date of birth. Ruben Ratliff was informed that this is a telemedicine visit and that the visit is being conducted through the Epic Embedded platform. He agrees to proceed..  My office door  was closed. No one else was in the room.  He acknowledged consent and understanding of privacy and security of the video platform. The patient has agreed to participate and understands they can discontinue the visit at any time.      Visit Time  Start Time: 0900  Stop Time: 0945  Total Visit Time: 45 minutes

## 2025-05-20 ENCOUNTER — RA CDI HCC (OUTPATIENT)
Dept: OTHER | Facility: HOSPITAL | Age: 55
End: 2025-05-20

## 2025-05-22 ENCOUNTER — RESULTS FOLLOW-UP (OUTPATIENT)
Dept: INTERNAL MEDICINE CLINIC | Facility: CLINIC | Age: 55
End: 2025-05-22

## 2025-05-22 LAB
ALBUMIN SERPL-MCNC: 4.5 G/DL (ref 3.6–5.1)
ALBUMIN/GLOB SERPL: 2.1 (CALC) (ref 1–2.5)
ALP SERPL-CCNC: 60 U/L (ref 35–144)
ALT SERPL-CCNC: 24 U/L (ref 9–46)
AST SERPL-CCNC: 23 U/L (ref 10–35)
BASOPHILS # BLD AUTO: 52 CELLS/UL (ref 0–200)
BASOPHILS NFR BLD AUTO: 0.9 %
BILIRUB SERPL-MCNC: 0.5 MG/DL (ref 0.2–1.2)
BUN SERPL-MCNC: 14 MG/DL (ref 7–25)
BUN/CREAT SERPL: NORMAL (CALC) (ref 6–22)
CALCIUM SERPL-MCNC: 9.8 MG/DL (ref 8.6–10.3)
CHLORIDE SERPL-SCNC: 107 MMOL/L (ref 98–110)
CHOLEST SERPL-MCNC: 174 MG/DL
CHOLEST/HDLC SERPL: 3.3 (CALC)
CO2 SERPL-SCNC: 27 MMOL/L (ref 20–32)
CREAT SERPL-MCNC: 0.97 MG/DL (ref 0.7–1.3)
EOSINOPHIL # BLD AUTO: 232 CELLS/UL (ref 15–500)
EOSINOPHIL NFR BLD AUTO: 4 %
ERYTHROCYTE [DISTWIDTH] IN BLOOD BY AUTOMATED COUNT: 12.6 % (ref 11–15)
GFR/BSA.PRED SERPLBLD CYS-BASED-ARV: 92 ML/MIN/1.73M2
GLOBULIN SER CALC-MCNC: 2.1 G/DL (CALC) (ref 1.9–3.7)
GLUCOSE SERPL-MCNC: 99 MG/DL (ref 65–99)
HBA1C MFR BLD: 5.4 %
HCT VFR BLD AUTO: 43.6 % (ref 38.5–50)
HDLC SERPL-MCNC: 53 MG/DL
HGB BLD-MCNC: 14.8 G/DL (ref 13.2–17.1)
LDLC SERPL CALC-MCNC: 102 MG/DL (CALC)
LYMPHOCYTES # BLD AUTO: 2100 CELLS/UL (ref 850–3900)
LYMPHOCYTES NFR BLD AUTO: 36.2 %
MCH RBC QN AUTO: 31.4 PG (ref 27–33)
MCHC RBC AUTO-ENTMCNC: 33.9 G/DL (ref 32–36)
MCV RBC AUTO: 92.4 FL (ref 80–100)
MONOCYTES # BLD AUTO: 331 CELLS/UL (ref 200–950)
MONOCYTES NFR BLD AUTO: 5.7 %
NEUTROPHILS # BLD AUTO: 3086 CELLS/UL (ref 1500–7800)
NEUTROPHILS NFR BLD AUTO: 53.2 %
NONHDLC SERPL-MCNC: 121 MG/DL (CALC)
PLATELET # BLD AUTO: 268 THOUSAND/UL (ref 140–400)
PMV BLD REES-ECKER: 10.3 FL (ref 7.5–12.5)
POTASSIUM SERPL-SCNC: 4.8 MMOL/L (ref 3.5–5.3)
PROT SERPL-MCNC: 6.6 G/DL (ref 6.1–8.1)
RBC # BLD AUTO: 4.72 MILLION/UL (ref 4.2–5.8)
SODIUM SERPL-SCNC: 142 MMOL/L (ref 135–146)
TRIGL SERPL-MCNC: 95 MG/DL
WBC # BLD AUTO: 5.8 THOUSAND/UL (ref 3.8–10.8)

## 2025-05-27 ENCOUNTER — OFFICE VISIT (OUTPATIENT)
Dept: INTERNAL MEDICINE CLINIC | Facility: CLINIC | Age: 55
End: 2025-05-27
Payer: COMMERCIAL

## 2025-05-27 VITALS
HEIGHT: 67 IN | RESPIRATION RATE: 17 BRPM | OXYGEN SATURATION: 95 % | BODY MASS INDEX: 29.07 KG/M2 | DIASTOLIC BLOOD PRESSURE: 72 MMHG | HEART RATE: 82 BPM | SYSTOLIC BLOOD PRESSURE: 120 MMHG | WEIGHT: 185.2 LBS

## 2025-05-27 DIAGNOSIS — E78.2 MIXED HYPERLIPIDEMIA: Primary | ICD-10-CM

## 2025-05-27 DIAGNOSIS — S46.211A BICEPS RUPTURE, PROXIMAL, RIGHT, INITIAL ENCOUNTER: ICD-10-CM

## 2025-05-27 DIAGNOSIS — L30.1 DYSHIDROSIS: ICD-10-CM

## 2025-05-27 DIAGNOSIS — L30.1 DYSHYDROSIS: ICD-10-CM

## 2025-05-27 DIAGNOSIS — Z12.5 SCREENING FOR PROSTATE CANCER: ICD-10-CM

## 2025-05-27 PROBLEM — R20.0 ARM NUMBNESS: Status: RESOLVED | Noted: 2024-06-04 | Resolved: 2025-05-27

## 2025-05-27 PROCEDURE — 99214 OFFICE O/P EST MOD 30 MIN: CPT | Performed by: PHYSICIAN ASSISTANT

## 2025-05-27 RX ORDER — TRIAMCINOLONE ACETONIDE 5 MG/G
CREAM TOPICAL 3 TIMES DAILY
Qty: 30 G | Refills: 0 | Status: SHIPPED | OUTPATIENT
Start: 2025-05-27

## 2025-05-27 NOTE — PATIENT INSTRUCTIONS
Continue current medications.  Would recommend follow-up with orthopedics for the torn biceps to answer ongoing questions.  Give trial of triamcinolone cream to hands.  Use up to 3 times a day.  Plan follow-up with repeat labs in 6 months to be annual physical and follow-up.  Follow-up sooner as needed.

## 2025-05-27 NOTE — ASSESSMENT & PLAN NOTE
Finger areas breaking out with small bumps.  Previous clobetasol not as effective as it had been in the past.  Patient agreeable to try new cream.

## 2025-05-27 NOTE — ASSESSMENT & PLAN NOTE
On atorvastatin 20 mg daily.  Lipid profile shows decent control.  LDL slightly up on this determination.  Patient feels that this is most likely related to his diet.  He does not want to increase the medication at this time but would like to modify his diet and reevaluate at next visit.    Orders:    Comprehensive metabolic panel; Future    Lipid Panel with Direct LDL reflex; Future

## 2025-05-27 NOTE — ASSESSMENT & PLAN NOTE
Patient continues to complain of soreness in the biceps area.  He has multiple questions that I think can be answered more effectively by orthopedics.

## 2025-05-27 NOTE — PROGRESS NOTES
Name: Ruben Ratliff      : 1970      MRN: [id number]  Encounter Provider: Uli Dillard PA-C  Encounter Date: 2025   Encounter department: [facility] INTERNAL MEDICINE [place]  :  Assessment & Plan  Mixed hyperlipidemia  On atorvastatin 20 mg daily.  Lipid profile shows decent control.  LDL slightly up on this determination.  Patient feels that this is most likely related to his diet.  He does not want to increase the medication at this time but would like to modify his diet and reevaluate at next visit.    Orders:    Comprehensive metabolic panel; Future    Lipid Panel with Direct LDL reflex; Future    Screening for prostate cancer    Orders:    PSA, Total Screen; Future    Dyshidrosis    Orders:    triamcinolone (KENALOG) 0.5 % cream; Apply topically 3 (three) times a day    Biceps rupture, proximal, right, initial encounter  Patient continues to complain of soreness in the biceps area.  He has multiple questions that I think can be answered more effectively by orthopedics.         Dyshydrosis  Finger areas breaking out with small bumps.  Previous clobetasol not as effective as it had been in the past.  Patient agreeable to try new cream.               Depression Screening and Follow-up Plan: Patient was screened for depression during today's encounter. They screened negative with a PHQ-9 score of 0.        History of Present Illness   Follow-up, labs reviewed with patient.    Other than concerns of his partial rupture of his right biceps, no new concerns.      Review of Systems   Constitutional:  Negative for activity change, chills, fatigue and fever.   HENT:  Negative for congestion.    Eyes:  Negative for discharge.   Respiratory:  Negative for cough, chest tightness and shortness of breath.    Cardiovascular:  Negative for chest pain, palpitations and leg swelling.   Gastrointestinal:  Negative for abdominal pain, blood in stool, constipation, diarrhea, nausea and vomiting.  "  Endocrine: Negative for polydipsia, polyphagia and polyuria.   Genitourinary:  Negative for difficulty urinating.   Musculoskeletal:  Negative for arthralgias and myalgias.   Skin:  Negative for rash.   Allergic/Immunologic: Negative for immunocompromised state.   Neurological:  Negative for dizziness, syncope, weakness, light-headedness and headaches.   Hematological:  Negative for adenopathy. Does not bruise/bleed easily.   Psychiatric/Behavioral:  Negative for dysphoric mood, sleep disturbance and suicidal ideas. The patient is not nervous/anxious.        Objective   /72 (BP Location: Left arm, Patient Position: Sitting, Cuff Size: Adult)   Pulse 82   Resp 17   Ht 5' 7\" (1.702 m)   Wt 84 kg (185 lb 3.2 oz)   SpO2 95%   BMI 29.01 kg/m²      Physical Exam  Vitals and nursing note reviewed.   Constitutional:       General: He is not in acute distress.     Appearance: Normal appearance. He is well-developed. He is not ill-appearing.   HENT:      Head: Normocephalic.     Eyes:      Extraocular Movements: Extraocular movements intact.      Pupils: Pupils are equal, round, and reactive to light.     Neck:      Thyroid: No thyromegaly.      Vascular: No carotid bruit or JVD.     Cardiovascular:      Rate and Rhythm: Normal rate and regular rhythm.      Heart sounds: Normal heart sounds.   Pulmonary:      Effort: Pulmonary effort is normal.      Breath sounds: Normal breath sounds.     Musculoskeletal:         General: No swelling.      Cervical back: Normal range of motion and neck supple.      Right lower leg: No edema.      Left lower leg: No edema.      Comments: Deformity right proximal biceps   Lymphadenopathy:      Cervical: No cervical adenopathy.     Skin:     General: Skin is warm and dry.      Findings: Rash (Clear-colored papules in clusters on patient's fingers.) present.     Neurological:      General: No focal deficit present.      Mental Status: He is alert and oriented to person, place, and " time. Mental status is at baseline.     Psychiatric:         Mood and Affect: Mood normal.         Behavior: Behavior normal.         Thought Content: Thought content normal.

## 2025-06-01 DIAGNOSIS — E78.2 MIXED HYPERLIPIDEMIA: ICD-10-CM

## 2025-06-01 RX ORDER — ATORVASTATIN CALCIUM 20 MG/1
20 TABLET, FILM COATED ORAL DAILY
Qty: 90 TABLET | Refills: 1 | Status: SHIPPED | OUTPATIENT
Start: 2025-06-01

## 2025-06-03 ENCOUNTER — TELEMEDICINE (OUTPATIENT)
Dept: BEHAVIORAL/MENTAL HEALTH CLINIC | Facility: CLINIC | Age: 55
End: 2025-06-03
Payer: COMMERCIAL

## 2025-06-03 DIAGNOSIS — F43.10 PTSD (POST-TRAUMATIC STRESS DISORDER): ICD-10-CM

## 2025-06-03 DIAGNOSIS — F32.A DEPRESSION, UNSPECIFIED DEPRESSION TYPE: Primary | ICD-10-CM

## 2025-06-03 PROCEDURE — 90834 PSYTX W PT 45 MINUTES: CPT | Performed by: SOCIAL WORKER

## 2025-07-01 ENCOUNTER — TELEMEDICINE (OUTPATIENT)
Dept: BEHAVIORAL/MENTAL HEALTH CLINIC | Facility: CLINIC | Age: 55
End: 2025-07-01

## 2025-07-01 DIAGNOSIS — F33.41 RECURRENT MAJOR DEPRESSIVE DISORDER, IN PARTIAL REMISSION (HCC): Primary | ICD-10-CM

## 2025-07-02 NOTE — BH CRISIS PLAN
TRANSFER SUMMARY    [facility]    Patient Name Ruben Ratliff     Date of Birth: 54 y.o. 1970      MRN: [id number]    Admission Date: several years ago    Date of Transfer: July 2, 2025    Admission Diagnosis:     PTSD    Current Diagnosis:     1. Recurrent major depressive disorder, in partial remission (HCC)            Reason for Admission: Ruben presented for treatment due to worsening anxiety, PTSD symptoms, nightmares, and flashbacks. Primary complaints included DEPRESSIVE SYMPTOMS: depressed mood, decreased interest, irritability and ANXIETY SYMPTOMS: worrying, poor concentration, feeling agitated, racing thoughts.     Progress in Treatment: Ruben was seen for Individual Couseling. During the course of treatment he was consistent with therapy and able to work on managing his PTSD symptoms more effectively.  He was able to work through some of his past trauma and learn coping skills to manage his anger/irritability.      Episodes of Higher Level of Care: Yes, one inpatient hospital admission    Transfer request Initiated by: Therapist: Jaycee Schaffer    Reason for Transfer Request: clinician leaving practice    Does this individual need a clinician with specialized training/expertise?: No    [facility]? None    Other pertinent issues: None    Are there any specific individuals who would be a “best fit” or who have already agreed to accept this transfer request?        Therapist: Phyllis Rouse      Attempts to maintain the current therapeutic relationship: No    Transfer request routed to Clinical Supervisor for input and/or approval.     Comments from other involved providers and/or clinical coordinator : None    Jaycee Schaffer07/02/25

## 2025-07-02 NOTE — PSYCH
"Virtual Regular VisitName: Ruben Ratliff      : 1970      MRN: [id number]  Encounter Provider: Jayceemihir Schaffer  Encounter Date: 2025   Encounter department: [facility] [address]  :  Assessment & Plan  Recurrent major depressive disorder, in partial remission (HCC)             DATA: Therapist met w/pt for individual session.  Pt shared that it has been an uneventful month.  He stated that he has had some anxious moments but in general he feels as if has been able to implement positive coping skills.  He continues to be busy which helps him mentally.  He is planning on doing some things around the house today with his son's support.  He stated that him and his wife have been getting a lot and there hasn't been a lot of conflict in regards to his son.  Therapist and pt discussed transition process and reviewed tx goals pt has worked on.    During this session, this clinician used the following therapeutic modalities: Cognitive Behavioral Therapy    Substance Abuse was not addressed during this session. If the client is diagnosed with a co-occurring substance use disorder, please indicate any changes in the frequency or amount of use: unknown. Stage of change for addressing substance use diagnoses: No substance use/Not applicable    ASSESSMENT:  Ruben presents with a Euthymic/ normal mood. Ruben's affect is Normal range and intensity, which is congruent, with their mood and the content of the session. The client has made progress on their goals as evidenced by improved symptoms.    Ruben presents with a none risk of suicide, none risk of self-harm, and none risk of harm to others.    For any risk assessment that surpasses a \"low\" rating, a safety plan must be developed.    A safety plan was indicated: no  If yes, describe in detail n/a    PLAN: Pt will transition to new therapist.      Behavioral Health Treatment Plan [facility]: Diagnosis and Treatment " Plan explained to Ruben Miranda relates understanding diagnosis and is agreeable to Treatment Plan. Yes     Depression Follow-up Plan Completed: Not applicable     Reason for visit is No chief complaint on file.     Recent Visits  Date Type Provider Dept   07/01/25 Telemedicine Jaycee Schaffer [facility] [address]   Showing recent visits within past 7 days and meeting all other requirements  Future Appointments  No visits were found meeting these conditions.  Showing future appointments within next 150 days and meeting all other requirements     History of Present Illness     HPI    Past Medical History   Past Medical History[1]  Past Surgical History[2]  Current Outpatient Medications   Medication Instructions    albuterol (PROVENTIL HFA,VENTOLIN HFA) 90 mcg/act inhaler 2 puffs, Inhalation, Every 6 hours PRN    atorvastatin (LIPITOR) 20 mg, Oral, Daily    Azelastine HCl 137 MCG/SPRAY SOLN INSTILL 1 SPRAY IN EACH NOSTRIL TWICE A DAY AS DIRECTED    Flaxseed, Linseed, (FLAX SEED OIL PO) Take by mouth    MAGNESIUM PO Take by mouth    meloxicam (MOBIC) 7.5 mg, Oral, Daily, Instructed to go to the ED if he experiences any shortness of breathing, difficulty breathing, chest pain, or other new symptoms.    multivitamin (THERAGRAN) TABS 1 tablet, Daily    POTASSIUM PO Take by mouth    triamcinolone (KENALOG) 0.5 % cream Topical, 3 times daily     Allergies[3]    Objective   There were no vitals taken for this visit.    Video Exam  Physical Exam     Administrative Statements   Encounter provider Jaycee Schaffer    The Patient is located at  and in the following state in which I hold an active license .    The patient was identified by name and date of birth. Ruben Ratliff was informed that this is a telemedicine visit and that the visit is being conducted through .     He acknowledged consent and understanding of privacy and security of the video platform. The patient has agreed to participate and  understands they can discontinue the visit at any time.        Visit Time  Start Time: 0900  Stop Time: 0940  Total Visit Time: 40 minutes         [1]   Past Medical History:  Diagnosis Date    Alcohol abuse     Bilateral inguinal hernia 02/22/2021    Bronchitis     Cough     Pt states he has a morning cough from some sinus drainage- pt reports having a cold week of 2/8/21    COVID-19 12/2020    Pt states he only lost taste and smell- no other symptoms reported.    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    Tobacco abuse    [2]   Past Surgical History:  Procedure Laterality Date    FACIAL/NECK BIOPSY Left 8/4/2020    Procedure: EXCISION CYST NECK;  Surgeon: Guy Capone MD;  Location: [facility];  Service: Plastics    FLAP LOCAL HEAD / NECK Left 8/4/2020    Procedure: COMPLEX CLOSURE VS ADJACENT TISSUE REARRANGEMENT NECK;  Surgeon: Guy Capone MD;  [facility];  Service: Plastics    HERNIA REPAIR      HERNIA REPAIR Bilateral 2/22/2021    Procedure: REPAIR HERNIA INGUINAL, LAPAROSCOPIC BILATERAL WITH MESH;  Surgeon: Sebas Ceja MD;  Location: [facility];  Service: General    PYLOROMYOTOMY      ROTATOR CUFF REPAIR Left     times 2 per pt    TONSILLECTOMY AND ADENOIDECTOMY      TOOTH EXTRACTION     [3]   Allergies  Allergen Reactions    Celecoxib Anaphylaxis    Diclofenac Shortness Of Breath    Flavoring Agent - Food Allergy Anaphylaxis    Corn Oil - Food Allergy - Food Allergy Other (See Comments)     Clinically insignificant    Starch GI Intolerance and Other (See Comments)

## 2025-07-15 ENCOUNTER — OFFICE VISIT (OUTPATIENT)
Dept: PSYCHIATRY | Facility: CLINIC | Age: 55
End: 2025-07-15

## 2025-07-15 DIAGNOSIS — F32.A DEPRESSION, UNSPECIFIED DEPRESSION TYPE: ICD-10-CM

## 2025-07-15 DIAGNOSIS — F41.9 ANXIETY DISORDER, UNSPECIFIED TYPE: ICD-10-CM

## 2025-07-15 DIAGNOSIS — F43.9 TRAUMA AND STRESSOR-RELATED DISORDER: Primary | ICD-10-CM

## 2025-07-15 PROCEDURE — PBNCHG PB NO CHARGE PLACEHOLDER: Performed by: STUDENT IN AN ORGANIZED HEALTH CARE EDUCATION/TRAINING PROGRAM

## 2025-07-15 NOTE — BH TREATMENT PLAN
"Outpatient Behavioral Health Psychotherapy Treatment Plan    Marty Ratliff  1970     Date of Initial Psychotherapy Assessment: 7/15/25   Date of Current Treatment Plan: 07/15/25  Treatment Plan Target Date: 6 months       Diagnosis:   No diagnosis found.    Area(s) of Need: self confidence, improving trust in other,     Long Term Goal 1 (in the client's own words): \"to get back into a social aspect of life\"     Stage of Change: Preparation    Target Date for completion: 6 months      Anticipated therapeutic modalities: CBT     People identified to complete this goal: patient       Objective 1: (identify the means of measuring success in meeting the objective): being more social, allowing others into my life more, interacting with others       I am currently under the care of a [facility] psychiatric provider: no    My [facility] psychiatric provider is: N/A    I am currently taking psychiatric medications: No    I feel that I will be ready for discharge from mental health care when I reach the following (measurable goal/objective): \"when I can leave the house and not be in fear of what might happen when I leave\"     For children and adults who have a legal guardian:   Has there been any change to custody orders and/or guardianship status? N/A. If yes, attach updated documentation.    I have created my Crisis Plan and have been offered a copy of this plan    Behavioral Health Treatment Plan  [facility]: Diagnosis and Treatment Plan explained to Marty Ratliff acknowledges an understanding of their diagnosis. Marty Ratliff agrees to this treatment plan.    I have been offered a copy of this Treatment Plan. yes        "
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
## 2025-08-12 ENCOUNTER — TELEMEDICINE (OUTPATIENT)
Dept: PSYCHIATRY | Facility: CLINIC | Age: 55
End: 2025-08-12

## (undated) DEVICE — DRAPE EQUIPMENT RF WAND

## (undated) DEVICE — PAD GROUNDING ADULT

## (undated) DEVICE — GLOVE INDICATOR PI UNDERGLOVE SZ 8.5 BLUE

## (undated) DEVICE — SYRINGE BULB 2 OZ

## (undated) DEVICE — LIGHT HANDLE COVER SLEEVE DISP BLUE STELLAR

## (undated) DEVICE — TROCAR HERNIA OVAL PERITONEAL DISTENTION BALLOON

## (undated) DEVICE — SUT VICRYL 4-0 PS-2 27 IN J426H

## (undated) DEVICE — GLOVE INDICATOR PI UNDERGLOVE SZ 7.5 BLUE

## (undated) DEVICE — GLOVE SRG BIOGEL ECLIPSE 8.5

## (undated) DEVICE — TUBING SMOKE EVAC W/FILTRATION DEVICE PLUMEPORT ACTIV

## (undated) DEVICE — BIPOLAR CORD DISP

## (undated) DEVICE — TROCAR: Brand: KII SLEEVE

## (undated) DEVICE — ENDOPATH PNEUMONEEDLE INSUFFLATION NEEDLES WITH LUER LOCK CONNECTORS 120MM: Brand: ENDOPATH

## (undated) DEVICE — NEEDLE 25G X 1 1/2

## (undated) DEVICE — TROCAR HERNIA BALLON STRUCTURED 10 MM

## (undated) DEVICE — HYDROPHILIC WOUND DRESSING WITH ZINC PLUS VITAMINS A AND B6.: Brand: DERMAGRAN®-B

## (undated) DEVICE — BETHLEHEM UNIVERSAL OUTPATIENT: Brand: CARDINAL HEALTH

## (undated) DEVICE — SPONGE LAP 18 X 18 IN

## (undated) DEVICE — COTTON TIP APPLICTOR 2 PK

## (undated) DEVICE — TROCAR: Brand: KII FIOS FIRST ENTRY

## (undated) DEVICE — ELECTRODE NEEDLE MEGAFINE 2IN E-Z CLEAN MEGADYNE -0118

## (undated) DEVICE — 3M™ STERI-STRIP™ REINFORCED ADHESIVE SKIN CLOSURES, R1546, 1/4 IN X 4 IN (6 MM X 100 MM), 10 STRIPS/ENVELOPE: Brand: 3M™ STERI-STRIP™

## (undated) DEVICE — SCD SEQUENTIAL COMPRESSION COMFORT SLEEVE MEDIUM KNEE LENGTH: Brand: KENDALL SCD

## (undated) DEVICE — ENDOPATH 5MM CURVED SCISSORS WITH MONOPOLAR CAUTERY: Brand: ENDOPATH

## (undated) DEVICE — PREMIUM DRY TRAY LF: Brand: MEDLINE INDUSTRIES, INC.

## (undated) DEVICE — GAUZE SPONGES,8 PLY: Brand: CURITY

## (undated) DEVICE — ELECTRODE EZ CLEAN BLADE -0012

## (undated) DEVICE — SPONGE STICK WITH PVP-I: Brand: KENDALL

## (undated) DEVICE — FLEXIBLE ADHESIVE BANDAGE,X-LARGE: Brand: CURITY

## (undated) DEVICE — CHLORAPREP HI-LITE 26ML ORANGE

## (undated) DEVICE — SYRINGE 10ML LL

## (undated) DEVICE — GLOVE SRG BIOGEL ECLIPSE 7.5

## (undated) DEVICE — TIBURON SPLIT SHEET: Brand: CONVERTORS

## (undated) DEVICE — POV-IOD SWAB STICKS

## (undated) DEVICE — SUT VICRYL 0 UR-6 27 IN J603H

## (undated) DEVICE — [HIGH FLOW INSUFFLATOR,  DO NOT USE IF PACKAGE IS DAMAGED,  KEEP DRY,  KEEP AWAY FROM SUNLIGHT,  PROTECT FROM HEAT AND RADIOACTIVE SOURCES.]: Brand: PNEUMOSURE

## (undated) DEVICE — 3M™ TEGADERM™ TRANSPARENT FILM DRESSING FRAME STYLE, 1624W, 2-3/8 IN X 2-3/4 IN (6 CM X 7 CM), 100/CT 4CT/CASE: Brand: 3M™ TEGADERM™

## (undated) DEVICE — INTENDED FOR TISSUE SEPARATION, AND OTHER PROCEDURES THAT REQUIRE A SHARP SURGICAL BLADE TO PUNCTURE OR CUT.: Brand: BARD-PARKER SAFETY BLADES SIZE 15, STERILE

## (undated) DEVICE — INTENDED FOR TISSUE SEPARATION, AND OTHER PROCEDURES THAT REQUIRE A SHARP SURGICAL BLADE TO PUNCTURE OR CUT.: Brand: BARD-PARKER ® CARBON RIB-BACK BLADES

## (undated) DEVICE — ALLENTOWN LAP CHOLE APP PACK: Brand: CARDINAL HEALTH

## (undated) DEVICE — TONGUE DEPRESSOR STERILE